# Patient Record
Sex: MALE | Race: WHITE | NOT HISPANIC OR LATINO | URBAN - METROPOLITAN AREA
[De-identification: names, ages, dates, MRNs, and addresses within clinical notes are randomized per-mention and may not be internally consistent; named-entity substitution may affect disease eponyms.]

---

## 2017-01-26 ENCOUNTER — APPOINTMENT (RX ONLY)
Dept: URBAN - METROPOLITAN AREA CLINIC 371 | Facility: CLINIC | Age: 57
Setting detail: DERMATOLOGY
End: 2017-01-26

## 2017-01-26 DIAGNOSIS — D22 MELANOCYTIC NEVI: ICD-10-CM

## 2017-01-26 DIAGNOSIS — L91.8 OTHER HYPERTROPHIC DISORDERS OF THE SKIN: ICD-10-CM

## 2017-01-26 DIAGNOSIS — L81.4 OTHER MELANIN HYPERPIGMENTATION: ICD-10-CM

## 2017-01-26 DIAGNOSIS — L82.1 OTHER SEBORRHEIC KERATOSIS: ICD-10-CM

## 2017-01-26 PROBLEM — E13.9 OTHER SPECIFIED DIABETES MELLITUS WITHOUT COMPLICATIONS: Status: ACTIVE | Noted: 2017-01-26

## 2017-01-26 PROBLEM — D22.4 MELANOCYTIC NEVI OF SCALP AND NECK: Status: ACTIVE | Noted: 2017-01-26

## 2017-01-26 PROBLEM — D48.5 NEOPLASM OF UNCERTAIN BEHAVIOR OF SKIN: Status: ACTIVE | Noted: 2017-01-26

## 2017-01-26 PROCEDURE — ? BIOPSY BY SHAVE METHOD

## 2017-01-26 PROCEDURE — 11100: CPT

## 2017-01-26 PROCEDURE — 99243 OFF/OP CNSLTJ NEW/EST LOW 30: CPT | Mod: 25

## 2017-01-26 PROCEDURE — ? DEFER

## 2017-01-26 PROCEDURE — ? COUNSELING

## 2017-01-26 ASSESSMENT — LOCATION DETAILED DESCRIPTION DERM
LOCATION DETAILED: LEFT INFERIOR LATERAL NECK
LOCATION DETAILED: RIGHT RIB CAGE
LOCATION DETAILED: RIGHT CLAVICULAR NECK
LOCATION DETAILED: LEFT RIB CAGE
LOCATION DETAILED: RIGHT SUPERIOR UPPER BACK
LOCATION DETAILED: RIGHT MEDIAL TRAPEZIAL NECK
LOCATION DETAILED: LEFT INGUINAL CREASE
LOCATION DETAILED: RIGHT CENTRAL MALAR CHEEK
LOCATION DETAILED: SUPRAPUBIC SKIN

## 2017-01-26 ASSESSMENT — LOCATION ZONE DERM
LOCATION ZONE: NECK
LOCATION ZONE: TRUNK
LOCATION ZONE: FACE

## 2017-01-26 ASSESSMENT — LOCATION SIMPLE DESCRIPTION DERM
LOCATION SIMPLE: ABDOMEN
LOCATION SIMPLE: RIGHT UPPER BACK
LOCATION SIMPLE: LEFT ANTERIOR NECK
LOCATION SIMPLE: POSTERIOR NECK
LOCATION SIMPLE: GROIN
LOCATION SIMPLE: RIGHT ANTERIOR NECK
LOCATION SIMPLE: RIGHT CHEEK

## 2017-01-26 NOTE — PROCEDURE: BIOPSY BY SHAVE METHOD
Render Post-Care Instructions In Note?: yes
Size Of Lesion In Cm: 0
Biopsy Method: Dermablade
Destruction After The Procedure: No
Notification Instructions: Patient will be notified of biopsy results. However, patient instructed to call the office if not contacted within 2 weeks.
Type Of Destruction Used: Curettage
Biopsy Type: H and E
Billing Type: Third-Party Bill
Anesthesia Volume In Cc (Will Not Render If 0): 0.5
Body Location Override (Optional - Billing Will Still Be Based On Selected Body Map Location If Applicable): Left Pubic Bone
Consent: Written consent was obtained and risks were reviewed including but not limited to scarring, infection, bleeding, scabbing, incomplete removal, nerve damage and allergy to anesthesia.
Detail Level: Detailed
Wound Care: Petrolatum
Silver Nitrate Text: The wound bed was treated with silver nitrate after the biopsy was performed.
Anesthesia Type: 1% lidocaine with epinephrine and a 1:10 solution of 8.4% sodium bicarbonate
Dressing: bandage
Hemostasis: Aluminum Chloride
Post-Care Instructions: I reviewed with the patient in detail post-care instructions. Patient is to keep the biopsy site dry overnight, and then apply bacitracin twice daily until healed. Patient may apply hydrogen peroxide soaks to remove any crusting.

## 2017-01-28 DIAGNOSIS — I10 ESSENTIAL HYPERTENSION: ICD-10-CM

## 2017-01-28 DIAGNOSIS — I42.9 CARDIOMYOPATHY (HCC): ICD-10-CM

## 2017-01-28 RX ORDER — CARVEDILOL 12.5 MG/1
TABLET ORAL
Qty: 60 TABLET | Refills: 0 | Status: SHIPPED | OUTPATIENT
Start: 2017-01-28 | End: 2017-04-24 | Stop reason: SDUPTHER

## 2017-01-28 RX ORDER — ISOSORBIDE MONONITRATE 30 MG/1
TABLET, EXTENDED RELEASE ORAL
Qty: 30 TABLET | Refills: 5 | Status: SHIPPED | OUTPATIENT
Start: 2017-01-28 | End: 2017-06-12 | Stop reason: SDUPTHER

## 2017-02-17 ENCOUNTER — OFFICE VISIT (OUTPATIENT)
Dept: FAMILY MEDICINE CLINIC | Facility: CLINIC | Age: 57
End: 2017-02-17

## 2017-02-17 ENCOUNTER — HOSPITAL ENCOUNTER (OUTPATIENT)
Age: 57
Discharge: HOME | End: 2017-02-17
Payer: COMMERCIAL

## 2017-02-17 VITALS
BODY MASS INDEX: 37.17 KG/M2 | DIASTOLIC BLOOD PRESSURE: 88 MMHG | HEART RATE: 68 BPM | TEMPERATURE: 97 F | HEIGHT: 72 IN | OXYGEN SATURATION: 96 % | SYSTOLIC BLOOD PRESSURE: 139 MMHG | WEIGHT: 274.4 LBS

## 2017-02-17 DIAGNOSIS — E11.21 DIABETIC NEPHROPATHY ASSOCIATED WITH TYPE 2 DIABETES MELLITUS (HCC): ICD-10-CM

## 2017-02-17 DIAGNOSIS — I10 ESSENTIAL HYPERTENSION: ICD-10-CM

## 2017-02-17 DIAGNOSIS — Z12.5 SCREENING PSA (PROSTATE SPECIFIC ANTIGEN): ICD-10-CM

## 2017-02-17 DIAGNOSIS — E11.42 TYPE 2 DIABETES MELLITUS WITH DIABETIC POLYNEUROPATHY, WITHOUT LONG-TERM CURRENT USE OF INSULIN (HCC): Primary | ICD-10-CM

## 2017-02-17 DIAGNOSIS — E87.6 HYPOKALEMIA: Primary | ICD-10-CM

## 2017-02-17 DIAGNOSIS — E78.5 HYPERLIPIDEMIA WITH TARGET LDL LESS THAN 70: ICD-10-CM

## 2017-02-17 DIAGNOSIS — E11.42 TYPE 2 DIABETES MELLITUS WITH DIABETIC POLYNEUROPATHY, WITHOUT LONG-TERM CURRENT USE OF INSULIN (HCC): ICD-10-CM

## 2017-02-17 DIAGNOSIS — L84 FOOT CALLUS: ICD-10-CM

## 2017-02-17 DIAGNOSIS — I42.9 CARDIOMYOPATHY (HCC): ICD-10-CM

## 2017-02-17 LAB
ALBUMIN SERPL-MCNC: 4.2 G/DL (ref 3.5–5.2)
ALBUMIN/GLOBULIN RATIO: ABNORMAL (ref 1–2.5)
ALP BLD-CCNC: 84 U/L (ref 40–129)
ALT SERPL-CCNC: 17 U/L (ref 5–41)
ANION GAP SERPL CALCULATED.3IONS-SCNC: 18 MMOL/L (ref 9–17)
AST SERPL-CCNC: 12 U/L
BILIRUB SERPL-MCNC: 0.3 MG/DL (ref 0.3–1.2)
BUN BLDV-MCNC: 14 MG/DL (ref 6–20)
BUN/CREAT BLD: ABNORMAL (ref 9–20)
CALCIUM SERPL-MCNC: 9.5 MG/DL (ref 8.6–10.4)
CHLORIDE BLD-SCNC: 100 MMOL/L (ref 98–107)
CO2: 25 MMOL/L (ref 20–31)
CREAT SERPL-MCNC: 0.86 MG/DL (ref 0.7–1.2)
GFR AFRICAN AMERICAN: >60 ML/MIN
GFR NON-AFRICAN AMERICAN: >60 ML/MIN
GFR SERPL CREATININE-BSD FRML MDRD: ABNORMAL ML/MIN/{1.73_M2}
GFR SERPL CREATININE-BSD FRML MDRD: ABNORMAL ML/MIN/{1.73_M2}
GLUCOSE BLD-MCNC: 146 MG/DL (ref 70–99)
HBA1C MFR BLD: 9.6 %
POTASSIUM SERPL-SCNC: 3.5 MMOL/L (ref 3.7–5.3)
PROSTATE SPECIFIC ANTIGEN: 1.19 UG/L
SODIUM BLD-SCNC: 143 MMOL/L (ref 135–144)
TOTAL PROTEIN: 7.9 G/DL (ref 6.4–8.3)

## 2017-02-17 PROCEDURE — 80053 COMPREHEN METABOLIC PANEL: CPT

## 2017-02-17 PROCEDURE — 99214 OFFICE O/P EST MOD 30 MIN: CPT | Performed by: FAMILY MEDICINE

## 2017-02-17 PROCEDURE — 84153 ASSAY OF PSA TOTAL: CPT

## 2017-02-17 PROCEDURE — 83036 HEMOGLOBIN GLYCOSYLATED A1C: CPT | Performed by: FAMILY MEDICINE

## 2017-02-17 PROCEDURE — 36415 COLL VENOUS BLD VENIPUNCTURE: CPT

## 2017-02-17 RX ORDER — NITROGLYCERIN 0.4 MG/1
0.4 TABLET SUBLINGUAL EVERY 5 MIN PRN
Qty: 25 TABLET | Refills: 0 | Status: SHIPPED | OUTPATIENT
Start: 2017-02-17 | End: 2018-06-18 | Stop reason: SDUPTHER

## 2017-02-17 ASSESSMENT — ENCOUNTER SYMPTOMS
DIARRHEA: 0
VOMITING: 0
CONSTIPATION: 0
APNEA: 1
WHEEZING: 0
ABDOMINAL PAIN: 0
SHORTNESS OF BREATH: 0
NAUSEA: 0
ABDOMINAL DISTENTION: 0
COUGH: 0
CHEST TIGHTNESS: 0

## 2017-04-24 DIAGNOSIS — I10 ESSENTIAL HYPERTENSION: ICD-10-CM

## 2017-04-24 RX ORDER — CARVEDILOL 12.5 MG/1
TABLET ORAL
Qty: 60 TABLET | Refills: 3 | Status: SHIPPED | OUTPATIENT
Start: 2017-04-24 | End: 2017-06-12 | Stop reason: SDUPTHER

## 2017-04-24 RX ORDER — ACETAMINOPHEN 500 MG/1
TABLET ORAL
Qty: 120 TABLET | Refills: 0 | Status: SHIPPED | OUTPATIENT
Start: 2017-04-24 | End: 2020-05-27 | Stop reason: SDUPTHER

## 2017-06-12 ENCOUNTER — HOSPITAL ENCOUNTER (OUTPATIENT)
Age: 57
Discharge: HOME | End: 2017-06-12
Payer: COMMERCIAL

## 2017-06-12 ENCOUNTER — OFFICE VISIT (OUTPATIENT)
Dept: FAMILY MEDICINE CLINIC | Age: 57
End: 2017-06-12
Payer: COMMERCIAL

## 2017-06-12 VITALS
HEIGHT: 73 IN | OXYGEN SATURATION: 97 % | SYSTOLIC BLOOD PRESSURE: 200 MMHG | BODY MASS INDEX: 36.45 KG/M2 | RESPIRATION RATE: 17 BRPM | TEMPERATURE: 97 F | DIASTOLIC BLOOD PRESSURE: 122 MMHG | HEART RATE: 60 BPM | WEIGHT: 275 LBS

## 2017-06-12 DIAGNOSIS — E11.42 TYPE 2 DIABETES MELLITUS WITH DIABETIC POLYNEUROPATHY, WITHOUT LONG-TERM CURRENT USE OF INSULIN (HCC): Primary | ICD-10-CM

## 2017-06-12 DIAGNOSIS — R35.0 FREQUENCY OF URINATION: ICD-10-CM

## 2017-06-12 DIAGNOSIS — I10 ESSENTIAL HYPERTENSION: ICD-10-CM

## 2017-06-12 DIAGNOSIS — G47.33 OSA ON CPAP: ICD-10-CM

## 2017-06-12 DIAGNOSIS — E78.5 HYPERLIPIDEMIA WITH TARGET LDL LESS THAN 70: ICD-10-CM

## 2017-06-12 DIAGNOSIS — Z99.89 OSA ON CPAP: ICD-10-CM

## 2017-06-12 DIAGNOSIS — I42.9 CARDIOMYOPATHY (HCC): ICD-10-CM

## 2017-06-12 DIAGNOSIS — E11.21 DIABETIC NEPHROPATHY ASSOCIATED WITH TYPE 2 DIABETES MELLITUS (HCC): ICD-10-CM

## 2017-06-12 LAB
-: ABNORMAL
ALBUMIN SERPL-MCNC: 3.8 G/DL (ref 3.5–5.2)
ALBUMIN/GLOBULIN RATIO: ABNORMAL (ref 1–2.5)
ALP BLD-CCNC: 94 U/L (ref 40–129)
ALT SERPL-CCNC: 12 U/L (ref 5–41)
AMORPHOUS: ABNORMAL
ANION GAP SERPL CALCULATED.3IONS-SCNC: 12 MMOL/L (ref 9–17)
AST SERPL-CCNC: 10 U/L
BACTERIA: ABNORMAL
BILIRUB SERPL-MCNC: 0.54 MG/DL (ref 0.3–1.2)
BILIRUBIN URINE: NEGATIVE
BUN BLDV-MCNC: 11 MG/DL (ref 6–20)
BUN/CREAT BLD: ABNORMAL (ref 9–20)
CALCIUM SERPL-MCNC: 9.1 MG/DL (ref 8.6–10.4)
CASTS UA: ABNORMAL /LPF
CHLORIDE BLD-SCNC: 100 MMOL/L (ref 98–107)
CO2: 25 MMOL/L (ref 20–31)
COLOR: YELLOW
COMMENT UA: ABNORMAL
CREAT SERPL-MCNC: 0.9 MG/DL (ref 0.7–1.2)
CRYSTALS, UA: ABNORMAL /HPF
EPITHELIAL CELLS UA: ABNORMAL /HPF
GFR AFRICAN AMERICAN: >60 ML/MIN
GFR NON-AFRICAN AMERICAN: >60 ML/MIN
GFR SERPL CREATININE-BSD FRML MDRD: ABNORMAL ML/MIN/{1.73_M2}
GFR SERPL CREATININE-BSD FRML MDRD: ABNORMAL ML/MIN/{1.73_M2}
GLUCOSE BLD-MCNC: 251 MG/DL (ref 70–99)
GLUCOSE URINE: ABNORMAL
HBA1C MFR BLD: 9.9 %
HCT VFR BLD CALC: 42.4 % (ref 41–53)
HEMOGLOBIN: 13.8 G/DL (ref 13.5–17.5)
KETONES, URINE: NEGATIVE
LEUKOCYTE ESTERASE, URINE: ABNORMAL
MCH RBC QN AUTO: 30 PG (ref 26–34)
MCHC RBC AUTO-ENTMCNC: 32.4 G/DL (ref 31–37)
MCV RBC AUTO: 92.4 FL (ref 80–100)
MUCUS: ABNORMAL
NITRITE, URINE: NEGATIVE
OTHER OBSERVATIONS UA: ABNORMAL
PDW BLD-RTO: 13.9 % (ref 11.5–14.9)
PH UA: 6 (ref 5–8)
PLATELET # BLD: 177 K/UL (ref 150–450)
PMV BLD AUTO: 11.5 FL (ref 6–12)
POTASSIUM SERPL-SCNC: 3.9 MMOL/L (ref 3.7–5.3)
PROTEIN UA: ABNORMAL
RBC # BLD: 4.59 M/UL (ref 4.5–5.9)
RBC UA: ABNORMAL /HPF
RENAL EPITHELIAL, UA: ABNORMAL /HPF
SODIUM BLD-SCNC: 137 MMOL/L (ref 135–144)
SPECIFIC GRAVITY UA: 1.03 (ref 1–1.03)
TOTAL PROTEIN: 7.7 G/DL (ref 6.4–8.3)
TRICHOMONAS: ABNORMAL
TURBIDITY: CLEAR
URINE HGB: NEGATIVE
UROBILINOGEN, URINE: NORMAL
WBC # BLD: 7.1 K/UL (ref 3.5–11)
WBC UA: ABNORMAL /HPF
YEAST: ABNORMAL

## 2017-06-12 PROCEDURE — 80053 COMPREHEN METABOLIC PANEL: CPT

## 2017-06-12 PROCEDURE — 81001 URINALYSIS AUTO W/SCOPE: CPT

## 2017-06-12 PROCEDURE — 85027 COMPLETE CBC AUTOMATED: CPT

## 2017-06-12 PROCEDURE — 83036 HEMOGLOBIN GLYCOSYLATED A1C: CPT | Performed by: FAMILY MEDICINE

## 2017-06-12 PROCEDURE — 36415 COLL VENOUS BLD VENIPUNCTURE: CPT

## 2017-06-12 PROCEDURE — 99215 OFFICE O/P EST HI 40 MIN: CPT | Performed by: FAMILY MEDICINE

## 2017-06-12 RX ORDER — ISOSORBIDE MONONITRATE 30 MG/1
TABLET, EXTENDED RELEASE ORAL
Qty: 90 TABLET | Refills: 3 | Status: SHIPPED | OUTPATIENT
Start: 2017-06-12 | End: 2018-06-19 | Stop reason: ALTCHOICE

## 2017-06-12 RX ORDER — GLIPIZIDE 10 MG/1
10 TABLET ORAL
Qty: 60 TABLET | Refills: 3 | Status: SHIPPED | OUTPATIENT
Start: 2017-06-12 | End: 2018-06-11 | Stop reason: SDUPTHER

## 2017-06-12 RX ORDER — CARVEDILOL 12.5 MG/1
TABLET ORAL
Qty: 180 TABLET | Refills: 3 | Status: SHIPPED | OUTPATIENT
Start: 2017-06-12 | End: 2018-06-19 | Stop reason: DRUGHIGH

## 2017-06-12 RX ORDER — HYDROCHLOROTHIAZIDE 25 MG/1
TABLET ORAL
Qty: 90 TABLET | Refills: 3 | Status: SHIPPED | OUTPATIENT
Start: 2017-06-12 | End: 2018-06-19 | Stop reason: ALTCHOICE

## 2017-06-12 RX ORDER — ATORVASTATIN CALCIUM 20 MG/1
20 TABLET, FILM COATED ORAL EVERY EVENING
Qty: 90 TABLET | Refills: 3 | Status: SHIPPED | OUTPATIENT
Start: 2017-06-12 | End: 2018-06-14 | Stop reason: ALTCHOICE

## 2017-06-12 RX ORDER — AMLODIPINE BESYLATE 10 MG/1
TABLET ORAL
Qty: 90 TABLET | Refills: 3 | Status: SHIPPED | OUTPATIENT
Start: 2017-06-12 | End: 2018-06-18 | Stop reason: SDUPTHER

## 2017-06-12 RX ORDER — LISINOPRIL 40 MG/1
40 TABLET ORAL DAILY
Qty: 90 TABLET | Refills: 3 | Status: SHIPPED | OUTPATIENT
Start: 2017-06-12 | End: 2017-10-16 | Stop reason: SDUPTHER

## 2017-06-12 RX ORDER — ASPIRIN 81 MG/1
TABLET ORAL
Qty: 90 TABLET | Refills: 3 | Status: SHIPPED | OUTPATIENT
Start: 2017-06-12 | End: 2018-06-18 | Stop reason: SDUPTHER

## 2017-06-12 ASSESSMENT — ENCOUNTER SYMPTOMS
NAUSEA: 0
DIARRHEA: 0
ABDOMINAL DISTENTION: 0
CHEST TIGHTNESS: 0
SHORTNESS OF BREATH: 0
VOMITING: 0
APNEA: 1
COUGH: 0
CONSTIPATION: 0
ABDOMINAL PAIN: 0
WHEEZING: 0

## 2017-06-15 ENCOUNTER — HOSPITAL ENCOUNTER (INPATIENT)
Age: 57
LOS: 1 days | Discharge: LEFT BEFORE DISCHARGE | DRG: 198 | End: 2017-06-16
Attending: EMERGENCY MEDICINE | Admitting: INTERNAL MEDICINE
Payer: COMMERCIAL

## 2017-06-15 ENCOUNTER — APPOINTMENT (OUTPATIENT)
Dept: GENERAL RADIOLOGY | Age: 57
DRG: 198 | End: 2017-06-15
Payer: COMMERCIAL

## 2017-06-15 DIAGNOSIS — I20.0 UNSTABLE ANGINA (HCC): Primary | ICD-10-CM

## 2017-06-15 LAB
ABSOLUTE EOS #: 0.1 K/UL (ref 0–0.4)
ABSOLUTE LYMPH #: 1.9 K/UL (ref 1–4.8)
ABSOLUTE MONO #: 1 K/UL (ref 0.1–1.2)
ANION GAP SERPL CALCULATED.3IONS-SCNC: 13 MMOL/L (ref 9–17)
BASOPHILS # BLD: 0 %
BASOPHILS ABSOLUTE: 0 K/UL (ref 0–0.2)
BUN BLDV-MCNC: 15 MG/DL (ref 6–20)
BUN/CREAT BLD: ABNORMAL (ref 9–20)
CALCIUM SERPL-MCNC: 9.5 MG/DL (ref 8.6–10.4)
CHLORIDE BLD-SCNC: 103 MMOL/L (ref 98–107)
CO2: 25 MMOL/L (ref 20–31)
CREAT SERPL-MCNC: 0.89 MG/DL (ref 0.7–1.2)
DIFFERENTIAL TYPE: NORMAL
EOSINOPHILS RELATIVE PERCENT: 1 %
GFR AFRICAN AMERICAN: >60 ML/MIN
GFR NON-AFRICAN AMERICAN: >60 ML/MIN
GFR SERPL CREATININE-BSD FRML MDRD: ABNORMAL ML/MIN/{1.73_M2}
GFR SERPL CREATININE-BSD FRML MDRD: ABNORMAL ML/MIN/{1.73_M2}
GLUCOSE BLD-MCNC: 104 MG/DL (ref 70–99)
GLUCOSE BLD-MCNC: 148 MG/DL (ref 75–110)
HCT VFR BLD CALC: 42.8 % (ref 41–53)
HEMOGLOBIN: 13.9 G/DL (ref 13.5–17.5)
LYMPHOCYTES # BLD: 23 %
MAGNESIUM: 2 MG/DL (ref 1.6–2.6)
MCH RBC QN AUTO: 29.5 PG (ref 26–34)
MCHC RBC AUTO-ENTMCNC: 32.5 G/DL (ref 31–37)
MCV RBC AUTO: 90.9 FL (ref 80–100)
MONOCYTES # BLD: 12 %
PARTIAL THROMBOPLASTIN TIME: 109.9 SEC (ref 21.3–31.3)
PARTIAL THROMBOPLASTIN TIME: 25.6 SEC (ref 21.3–31.3)
PDW BLD-RTO: 14.2 % (ref 12.5–15.4)
PLATELET # BLD: 191 K/UL (ref 140–450)
PLATELET ESTIMATE: NORMAL
PMV BLD AUTO: 10.7 FL (ref 6–12)
POC TROPONIN I: 0.01 NG/ML (ref 0–0.1)
POC TROPONIN INTERP: NORMAL
POTASSIUM SERPL-SCNC: 4 MMOL/L (ref 3.7–5.3)
RBC # BLD: 4.71 M/UL (ref 4.5–5.9)
RBC # BLD: NORMAL 10*6/UL
SEG NEUTROPHILS: 64 %
SEGMENTED NEUTROPHILS ABSOLUTE COUNT: 5.2 K/UL (ref 1.8–7.7)
SODIUM BLD-SCNC: 141 MMOL/L (ref 135–144)
TROPONIN INTERP: NORMAL
TROPONIN INTERP: NORMAL
TROPONIN T: <0.03 NG/ML
TROPONIN T: <0.03 NG/ML
WBC # BLD: 8.3 K/UL (ref 3.5–11)
WBC # BLD: NORMAL 10*3/UL

## 2017-06-15 PROCEDURE — 96365 THER/PROPH/DIAG IV INF INIT: CPT

## 2017-06-15 PROCEDURE — 83735 ASSAY OF MAGNESIUM: CPT

## 2017-06-15 PROCEDURE — 93005 ELECTROCARDIOGRAM TRACING: CPT

## 2017-06-15 PROCEDURE — 80048 BASIC METABOLIC PNL TOTAL CA: CPT

## 2017-06-15 PROCEDURE — 6370000000 HC RX 637 (ALT 250 FOR IP): Performed by: EMERGENCY MEDICINE

## 2017-06-15 PROCEDURE — 84484 ASSAY OF TROPONIN QUANT: CPT

## 2017-06-15 PROCEDURE — 6370000000 HC RX 637 (ALT 250 FOR IP): Performed by: INTERNAL MEDICINE

## 2017-06-15 PROCEDURE — 2060000000 HC ICU INTERMEDIATE R&B

## 2017-06-15 PROCEDURE — 36415 COLL VENOUS BLD VENIPUNCTURE: CPT

## 2017-06-15 PROCEDURE — 6360000002 HC RX W HCPCS: Performed by: EMERGENCY MEDICINE

## 2017-06-15 PROCEDURE — 82947 ASSAY GLUCOSE BLOOD QUANT: CPT

## 2017-06-15 PROCEDURE — 85730 THROMBOPLASTIN TIME PARTIAL: CPT

## 2017-06-15 PROCEDURE — 99222 1ST HOSP IP/OBS MODERATE 55: CPT | Performed by: INTERNAL MEDICINE

## 2017-06-15 PROCEDURE — 71020 XR CHEST STANDARD TWO VW: CPT

## 2017-06-15 PROCEDURE — 2580000003 HC RX 258: Performed by: INTERNAL MEDICINE

## 2017-06-15 PROCEDURE — 2500000003 HC RX 250 WO HCPCS: Performed by: EMERGENCY MEDICINE

## 2017-06-15 PROCEDURE — 85025 COMPLETE CBC W/AUTO DIFF WBC: CPT

## 2017-06-15 PROCEDURE — 99285 EMERGENCY DEPT VISIT HI MDM: CPT

## 2017-06-15 RX ORDER — DEXTROSE MONOHYDRATE 25 G/50ML
12.5 INJECTION, SOLUTION INTRAVENOUS PRN
Status: DISCONTINUED | OUTPATIENT
Start: 2017-06-15 | End: 2017-06-16 | Stop reason: HOSPADM

## 2017-06-15 RX ORDER — SODIUM CHLORIDE 0.9 % (FLUSH) 0.9 %
10 SYRINGE (ML) INJECTION PRN
Status: DISCONTINUED | OUTPATIENT
Start: 2017-06-15 | End: 2017-06-16 | Stop reason: HOSPADM

## 2017-06-15 RX ORDER — ASPIRIN 81 MG/1
243 TABLET, CHEWABLE ORAL ONCE
Status: COMPLETED | OUTPATIENT
Start: 2017-06-15 | End: 2017-06-15

## 2017-06-15 RX ORDER — NITROGLYCERIN 0.4 MG/1
0.4 TABLET SUBLINGUAL EVERY 5 MIN PRN
Status: DISCONTINUED | OUTPATIENT
Start: 2017-06-15 | End: 2017-06-16 | Stop reason: HOSPADM

## 2017-06-15 RX ORDER — ONDANSETRON 2 MG/ML
4 INJECTION INTRAMUSCULAR; INTRAVENOUS EVERY 6 HOURS PRN
Status: DISCONTINUED | OUTPATIENT
Start: 2017-06-15 | End: 2017-06-16 | Stop reason: HOSPADM

## 2017-06-15 RX ORDER — AMLODIPINE BESYLATE 10 MG/1
10 TABLET ORAL DAILY
Status: DISCONTINUED | OUTPATIENT
Start: 2017-06-15 | End: 2017-06-16 | Stop reason: HOSPADM

## 2017-06-15 RX ORDER — LISINOPRIL 20 MG/1
40 TABLET ORAL DAILY
Status: DISCONTINUED | OUTPATIENT
Start: 2017-06-15 | End: 2017-06-16 | Stop reason: HOSPADM

## 2017-06-15 RX ORDER — ASPIRIN 81 MG/1
81 TABLET ORAL DAILY
Status: DISCONTINUED | OUTPATIENT
Start: 2017-06-15 | End: 2017-06-16 | Stop reason: HOSPADM

## 2017-06-15 RX ORDER — SODIUM CHLORIDE 9 MG/ML
INJECTION, SOLUTION INTRAVENOUS CONTINUOUS
Status: DISCONTINUED | OUTPATIENT
Start: 2017-06-15 | End: 2017-06-16 | Stop reason: HOSPADM

## 2017-06-15 RX ORDER — ISOSORBIDE MONONITRATE 30 MG/1
30 TABLET, EXTENDED RELEASE ORAL DAILY
Status: DISCONTINUED | OUTPATIENT
Start: 2017-06-15 | End: 2017-06-16 | Stop reason: HOSPADM

## 2017-06-15 RX ORDER — CARVEDILOL 12.5 MG/1
12.5 TABLET ORAL 2 TIMES DAILY
Status: DISCONTINUED | OUTPATIENT
Start: 2017-06-15 | End: 2017-06-16 | Stop reason: HOSPADM

## 2017-06-15 RX ORDER — HEPARIN SODIUM 10000 [USP'U]/100ML
1000 INJECTION, SOLUTION INTRAVENOUS CONTINUOUS
Status: DISCONTINUED | OUTPATIENT
Start: 2017-06-15 | End: 2017-06-16 | Stop reason: HOSPADM

## 2017-06-15 RX ORDER — NICOTINE POLACRILEX 4 MG
15 LOZENGE BUCCAL PRN
Status: DISCONTINUED | OUTPATIENT
Start: 2017-06-15 | End: 2017-06-16 | Stop reason: HOSPADM

## 2017-06-15 RX ORDER — DEXTROSE MONOHYDRATE 50 MG/ML
100 INJECTION, SOLUTION INTRAVENOUS PRN
Status: DISCONTINUED | OUTPATIENT
Start: 2017-06-15 | End: 2017-06-16 | Stop reason: HOSPADM

## 2017-06-15 RX ORDER — SODIUM CHLORIDE 0.9 % (FLUSH) 0.9 %
10 SYRINGE (ML) INJECTION EVERY 12 HOURS SCHEDULED
Status: DISCONTINUED | OUTPATIENT
Start: 2017-06-15 | End: 2017-06-16 | Stop reason: HOSPADM

## 2017-06-15 RX ORDER — ACETAMINOPHEN 325 MG/1
650 TABLET ORAL EVERY 4 HOURS PRN
Status: DISCONTINUED | OUTPATIENT
Start: 2017-06-15 | End: 2017-06-16 | Stop reason: HOSPADM

## 2017-06-15 RX ORDER — NITROGLYCERIN 20 MG/100ML
5 INJECTION INTRAVENOUS CONTINUOUS
Status: DISCONTINUED | OUTPATIENT
Start: 2017-06-15 | End: 2017-06-16 | Stop reason: HOSPADM

## 2017-06-15 RX ORDER — HEPARIN SODIUM 1000 [USP'U]/ML
60 INJECTION, SOLUTION INTRAVENOUS; SUBCUTANEOUS ONCE
Status: COMPLETED | OUTPATIENT
Start: 2017-06-15 | End: 2017-06-15

## 2017-06-15 RX ORDER — ATORVASTATIN CALCIUM 20 MG/1
20 TABLET, FILM COATED ORAL NIGHTLY
Status: DISCONTINUED | OUTPATIENT
Start: 2017-06-15 | End: 2017-06-16 | Stop reason: HOSPADM

## 2017-06-15 RX ORDER — HEPARIN SODIUM 1000 [USP'U]/ML
30 INJECTION, SOLUTION INTRAVENOUS; SUBCUTANEOUS PRN
Status: DISCONTINUED | OUTPATIENT
Start: 2017-06-15 | End: 2017-06-16 | Stop reason: HOSPADM

## 2017-06-15 RX ORDER — GLIPIZIDE 10 MG/1
10 TABLET ORAL
Status: DISCONTINUED | OUTPATIENT
Start: 2017-06-15 | End: 2017-06-16 | Stop reason: HOSPADM

## 2017-06-15 RX ORDER — HEPARIN SODIUM 1000 [USP'U]/ML
60 INJECTION, SOLUTION INTRAVENOUS; SUBCUTANEOUS PRN
Status: DISCONTINUED | OUTPATIENT
Start: 2017-06-15 | End: 2017-06-16 | Stop reason: HOSPADM

## 2017-06-15 RX ADMIN — CARVEDILOL 12.5 MG: 12.5 TABLET, FILM COATED ORAL at 20:28

## 2017-06-15 RX ADMIN — GLIPIZIDE 10 MG: 10 TABLET ORAL at 17:15

## 2017-06-15 RX ADMIN — HEPARIN SODIUM 7400 UNITS: 1000 INJECTION, SOLUTION INTRAVENOUS; SUBCUTANEOUS at 20:03

## 2017-06-15 RX ADMIN — SODIUM CHLORIDE: 9 INJECTION, SOLUTION INTRAVENOUS at 13:39

## 2017-06-15 RX ADMIN — HEPARIN SODIUM 7400 UNITS: 1000 INJECTION, SOLUTION INTRAVENOUS; SUBCUTANEOUS at 10:14

## 2017-06-15 RX ADMIN — INSULIN LISPRO 2 UNITS: 100 INJECTION, SOLUTION INTRAVENOUS; SUBCUTANEOUS at 17:22

## 2017-06-15 RX ADMIN — NITROGLYCERIN 5 MCG/MIN: 20 INJECTION INTRAVENOUS at 09:29

## 2017-06-15 RX ADMIN — ATORVASTATIN CALCIUM 20 MG: 20 TABLET, FILM COATED ORAL at 20:28

## 2017-06-15 RX ADMIN — ASPIRIN 243 MG: 81 TABLET ORAL at 09:29

## 2017-06-15 RX ADMIN — HEPARIN SODIUM AND DEXTROSE 1000 UNITS/HR: 10000; 5 INJECTION INTRAVENOUS at 10:14

## 2017-06-15 ASSESSMENT — PAIN DESCRIPTION - LOCATION: LOCATION: CHEST

## 2017-06-15 ASSESSMENT — ENCOUNTER SYMPTOMS
BACK PAIN: 0
COUGH: 0
SHORTNESS OF BREATH: 1
ABDOMINAL PAIN: 0
VOMITING: 0
SORE THROAT: 0
NAUSEA: 0

## 2017-06-15 ASSESSMENT — PAIN SCALES - GENERAL
PAINLEVEL_OUTOF10: 0
PAINLEVEL_OUTOF10: 6
PAINLEVEL_OUTOF10: 0

## 2017-06-15 ASSESSMENT — PAIN DESCRIPTION - ORIENTATION: ORIENTATION: LEFT

## 2017-06-15 ASSESSMENT — PAIN DESCRIPTION - DESCRIPTORS: DESCRIPTORS: ACHING

## 2017-06-15 ASSESSMENT — HEART SCORE: ECG: 1

## 2017-06-15 ASSESSMENT — PAIN DESCRIPTION - PAIN TYPE: TYPE: ACUTE PAIN

## 2017-06-16 VITALS
BODY MASS INDEX: 35.87 KG/M2 | WEIGHT: 264.8 LBS | SYSTOLIC BLOOD PRESSURE: 168 MMHG | RESPIRATION RATE: 18 BRPM | TEMPERATURE: 98.2 F | OXYGEN SATURATION: 96 % | DIASTOLIC BLOOD PRESSURE: 121 MMHG | HEART RATE: 69 BPM | HEIGHT: 72 IN

## 2017-06-16 LAB
GLUCOSE BLD-MCNC: 139 MG/DL (ref 75–110)
PARTIAL THROMBOPLASTIN TIME: 32.5 SEC (ref 21.3–31.3)
TROPONIN INTERP: NORMAL
TROPONIN T: <0.03 NG/ML

## 2017-06-16 PROCEDURE — 36415 COLL VENOUS BLD VENIPUNCTURE: CPT

## 2017-06-16 PROCEDURE — 2580000003 HC RX 258: Performed by: INTERNAL MEDICINE

## 2017-06-16 PROCEDURE — 6360000002 HC RX W HCPCS: Performed by: EMERGENCY MEDICINE

## 2017-06-16 PROCEDURE — 85730 THROMBOPLASTIN TIME PARTIAL: CPT

## 2017-06-16 PROCEDURE — 84484 ASSAY OF TROPONIN QUANT: CPT

## 2017-06-16 RX ADMIN — HEPARIN SODIUM 3700 UNITS: 1000 INJECTION, SOLUTION INTRAVENOUS; SUBCUTANEOUS at 01:57

## 2017-06-16 RX ADMIN — HEPARIN SODIUM AND DEXTROSE 13.4 UNITS/KG/HR: 10000; 5 INJECTION INTRAVENOUS at 01:56

## 2017-06-16 RX ADMIN — SODIUM CHLORIDE: 9 INJECTION, SOLUTION INTRAVENOUS at 01:56

## 2017-06-19 LAB
EKG ATRIAL RATE: 69 BPM
EKG P AXIS: 45 DEGREES
EKG P-R INTERVAL: 172 MS
EKG Q-T INTERVAL: 394 MS
EKG QRS DURATION: 94 MS
EKG QTC CALCULATION (BAZETT): 422 MS
EKG R AXIS: 4 DEGREES
EKG T AXIS: 146 DEGREES
EKG VENTRICULAR RATE: 69 BPM

## 2017-06-26 ENCOUNTER — TELEPHONE (OUTPATIENT)
Dept: FAMILY MEDICINE CLINIC | Age: 57
End: 2017-06-26

## 2017-07-24 ENCOUNTER — OFFICE VISIT (OUTPATIENT)
Dept: FAMILY MEDICINE CLINIC | Age: 57
End: 2017-07-24
Payer: COMMERCIAL

## 2017-07-24 VITALS
BODY MASS INDEX: 37.27 KG/M2 | DIASTOLIC BLOOD PRESSURE: 89 MMHG | HEIGHT: 72 IN | TEMPERATURE: 97.7 F | SYSTOLIC BLOOD PRESSURE: 157 MMHG | WEIGHT: 275.2 LBS | HEART RATE: 64 BPM | OXYGEN SATURATION: 98 %

## 2017-07-24 DIAGNOSIS — Z99.89 OSA ON CPAP: ICD-10-CM

## 2017-07-24 DIAGNOSIS — G47.33 OSA ON CPAP: ICD-10-CM

## 2017-07-24 DIAGNOSIS — E11.21 DIABETIC NEPHROPATHY ASSOCIATED WITH TYPE 2 DIABETES MELLITUS (HCC): ICD-10-CM

## 2017-07-24 DIAGNOSIS — E78.5 HYPERLIPIDEMIA WITH TARGET LDL LESS THAN 70: ICD-10-CM

## 2017-07-24 DIAGNOSIS — F43.21 GRIEF: ICD-10-CM

## 2017-07-24 DIAGNOSIS — I10 ESSENTIAL HYPERTENSION: ICD-10-CM

## 2017-07-24 DIAGNOSIS — G47.09 OTHER INSOMNIA: ICD-10-CM

## 2017-07-24 DIAGNOSIS — E66.9 OBESITY (BMI 30-39.9): ICD-10-CM

## 2017-07-24 DIAGNOSIS — E11.42 TYPE 2 DIABETES MELLITUS WITH DIABETIC POLYNEUROPATHY, WITHOUT LONG-TERM CURRENT USE OF INSULIN (HCC): Primary | ICD-10-CM

## 2017-07-24 PROBLEM — I20.0 UNSTABLE ANGINA (HCC): Status: RESOLVED | Noted: 2017-06-15 | Resolved: 2017-07-24

## 2017-07-24 PROBLEM — G47.00 INSOMNIA: Status: ACTIVE | Noted: 2017-07-24

## 2017-07-24 PROCEDURE — 99214 OFFICE O/P EST MOD 30 MIN: CPT | Performed by: FAMILY MEDICINE

## 2017-07-24 RX ORDER — TRAZODONE HYDROCHLORIDE 50 MG/1
50 TABLET ORAL NIGHTLY
Qty: 30 TABLET | Refills: 1 | Status: SHIPPED | OUTPATIENT
Start: 2017-07-24 | End: 2018-06-14

## 2017-07-24 ASSESSMENT — PATIENT HEALTH QUESTIONNAIRE - PHQ9
4. FEELING TIRED OR HAVING LITTLE ENERGY: 1
6. FEELING BAD ABOUT YOURSELF - OR THAT YOU ARE A FAILURE OR HAVE LET YOURSELF OR YOUR FAMILY DOWN: 1
2. FEELING DOWN, DEPRESSED OR HOPELESS: 1
SUM OF ALL RESPONSES TO PHQ9 QUESTIONS 1 & 2: 3
1. LITTLE INTEREST OR PLEASURE IN DOING THINGS: 2
9. THOUGHTS THAT YOU WOULD BE BETTER OFF DEAD, OR OF HURTING YOURSELF: 0
SUM OF ALL RESPONSES TO PHQ QUESTIONS 1-9: 8
3. TROUBLE FALLING OR STAYING ASLEEP: 1
8. MOVING OR SPEAKING SO SLOWLY THAT OTHER PEOPLE COULD HAVE NOTICED. OR THE OPPOSITE, BEING SO FIGETY OR RESTLESS THAT YOU HAVE BEEN MOVING AROUND A LOT MORE THAN USUAL: 0
5. POOR APPETITE OR OVEREATING: 2
10. IF YOU CHECKED OFF ANY PROBLEMS, HOW DIFFICULT HAVE THESE PROBLEMS MADE IT FOR YOU TO DO YOUR WORK, TAKE CARE OF THINGS AT HOME, OR GET ALONG WITH OTHER PEOPLE: 1
7. TROUBLE CONCENTRATING ON THINGS, SUCH AS READING THE NEWSPAPER OR WATCHING TELEVISION: 0

## 2017-07-24 ASSESSMENT — ENCOUNTER SYMPTOMS
ABDOMINAL DISTENTION: 0
VOMITING: 0
COUGH: 0
DIARRHEA: 0
SHORTNESS OF BREATH: 0
NAUSEA: 0
ABDOMINAL PAIN: 0
CHEST TIGHTNESS: 0
WHEEZING: 0
CONSTIPATION: 0

## 2017-09-25 ENCOUNTER — OFFICE VISIT (OUTPATIENT)
Dept: FAMILY MEDICINE CLINIC | Age: 57
End: 2017-09-25
Payer: COMMERCIAL

## 2017-09-25 VITALS
DIASTOLIC BLOOD PRESSURE: 100 MMHG | TEMPERATURE: 97.7 F | BODY MASS INDEX: 36.57 KG/M2 | HEART RATE: 53 BPM | WEIGHT: 270 LBS | HEIGHT: 72 IN | RESPIRATION RATE: 17 BRPM | SYSTOLIC BLOOD PRESSURE: 185 MMHG

## 2017-09-25 DIAGNOSIS — E11.42 TYPE 2 DIABETES MELLITUS WITH DIABETIC POLYNEUROPATHY, WITHOUT LONG-TERM CURRENT USE OF INSULIN (HCC): ICD-10-CM

## 2017-09-25 DIAGNOSIS — Z23 NEEDS FLU SHOT: ICD-10-CM

## 2017-09-25 DIAGNOSIS — E78.5 HYPERLIPIDEMIA WITH TARGET LDL LESS THAN 70: ICD-10-CM

## 2017-09-25 DIAGNOSIS — I10 ESSENTIAL HYPERTENSION: ICD-10-CM

## 2017-09-25 DIAGNOSIS — E11.21 DIABETIC NEPHROPATHY ASSOCIATED WITH TYPE 2 DIABETES MELLITUS (HCC): Primary | ICD-10-CM

## 2017-09-25 DIAGNOSIS — I42.9 CARDIOMYOPATHY, UNSPECIFIED TYPE (HCC): ICD-10-CM

## 2017-09-25 DIAGNOSIS — M79.604 RIGHT LEG PAIN: ICD-10-CM

## 2017-09-25 DIAGNOSIS — N52.2 DRUG-INDUCED ERECTILE DYSFUNCTION: ICD-10-CM

## 2017-09-25 LAB — HBA1C MFR BLD: 8.8 %

## 2017-09-25 PROCEDURE — 99214 OFFICE O/P EST MOD 30 MIN: CPT | Performed by: FAMILY MEDICINE

## 2017-09-25 PROCEDURE — 83036 HEMOGLOBIN GLYCOSYLATED A1C: CPT | Performed by: FAMILY MEDICINE

## 2017-09-25 PROCEDURE — 90471 IMMUNIZATION ADMIN: CPT | Performed by: FAMILY MEDICINE

## 2017-09-25 PROCEDURE — 90686 IIV4 VACC NO PRSV 0.5 ML IM: CPT | Performed by: FAMILY MEDICINE

## 2017-09-25 ASSESSMENT — ENCOUNTER SYMPTOMS
COUGH: 0
NAUSEA: 0
ABDOMINAL DISTENTION: 0
VOMITING: 0
ABDOMINAL PAIN: 0
CONSTIPATION: 0
WHEEZING: 0
DIARRHEA: 0
SHORTNESS OF BREATH: 0
CHEST TIGHTNESS: 0

## 2017-09-26 PROBLEM — N52.2 DRUG-INDUCED ERECTILE DYSFUNCTION: Status: ACTIVE | Noted: 2017-09-26

## 2017-09-26 ASSESSMENT — ENCOUNTER SYMPTOMS: APNEA: 1

## 2017-10-16 ENCOUNTER — NURSE ONLY (OUTPATIENT)
Dept: FAMILY MEDICINE CLINIC | Age: 57
End: 2017-10-16
Payer: COMMERCIAL

## 2017-10-16 VITALS — HEART RATE: 68 BPM | DIASTOLIC BLOOD PRESSURE: 102 MMHG | SYSTOLIC BLOOD PRESSURE: 163 MMHG

## 2017-10-16 DIAGNOSIS — I10 ESSENTIAL HYPERTENSION: Primary | ICD-10-CM

## 2017-10-16 PROCEDURE — 99211 OFF/OP EST MAY X REQ PHY/QHP: CPT | Performed by: FAMILY MEDICINE

## 2017-10-16 RX ORDER — LISINOPRIL 40 MG/1
40 TABLET ORAL DAILY
Qty: 90 TABLET | Refills: 3 | Status: SHIPPED | OUTPATIENT
Start: 2017-10-16 | End: 2018-06-18 | Stop reason: SDUPTHER

## 2017-10-16 NOTE — PROGRESS NOTES
Patient is here for blood pressure check.     1. Essential hypertension  Needs to start taking Lisinopril 40 mg  Orders Placed This Encounter   Medications    lisinopril (PRINIVIL;ZESTRIL) 40 MG tablet     Sig: Take 1 tablet by mouth daily Stop Lisinopril 30 mg     Dispense:  90 tablet     Refill:  3           UNcontrolled BP     Patient was advised by Natasha Woods to start taking Lisinopril 40 mg  He is taking all other BP meds per isaias       BP Readings from Last 3 Encounters:   10/16/17 (!) 163/102   09/25/17 (!) 185/100   07/24/17 (!) 157/89       Pulse Readings from Last 3 Encounters:   10/16/17 68   09/25/17 53   07/24/17 64       Needs nurse visit appointment for BP check in 1 week      Future Appointments  Date Time Provider Vianey Malagon   10/31/2017 8:30 AM John Cabello MD [facility]

## 2017-10-31 ENCOUNTER — OFFICE VISIT (OUTPATIENT)
Dept: FAMILY MEDICINE CLINIC | Age: 57
End: 2017-10-31
Payer: COMMERCIAL

## 2017-10-31 VITALS
HEIGHT: 73 IN | HEART RATE: 68 BPM | WEIGHT: 272 LBS | BODY MASS INDEX: 36.05 KG/M2 | RESPIRATION RATE: 18 BRPM | TEMPERATURE: 97.8 F | DIASTOLIC BLOOD PRESSURE: 97 MMHG | SYSTOLIC BLOOD PRESSURE: 155 MMHG

## 2017-10-31 DIAGNOSIS — I25.10 CORONARY ARTERY DISEASE INVOLVING NATIVE CORONARY ARTERY OF NATIVE HEART WITHOUT ANGINA PECTORIS: ICD-10-CM

## 2017-10-31 DIAGNOSIS — I10 ESSENTIAL HYPERTENSION: ICD-10-CM

## 2017-10-31 DIAGNOSIS — E11.42 TYPE 2 DIABETES MELLITUS WITH DIABETIC POLYNEUROPATHY, WITHOUT LONG-TERM CURRENT USE OF INSULIN (HCC): Primary | ICD-10-CM

## 2017-10-31 DIAGNOSIS — L84 FOOT CALLUS: ICD-10-CM

## 2017-10-31 DIAGNOSIS — E11.65 TYPE 2 DIABETES MELLITUS WITH HYPERGLYCEMIA, WITHOUT LONG-TERM CURRENT USE OF INSULIN (HCC): ICD-10-CM

## 2017-10-31 DIAGNOSIS — E78.5 HYPERLIPIDEMIA WITH TARGET LDL LESS THAN 70: ICD-10-CM

## 2017-10-31 DIAGNOSIS — E11.21 DIABETIC NEPHROPATHY ASSOCIATED WITH TYPE 2 DIABETES MELLITUS (HCC): ICD-10-CM

## 2017-10-31 DIAGNOSIS — R35.0 FREQUENCY OF URINATION: ICD-10-CM

## 2017-10-31 PROCEDURE — 3045F PR MOST RECENT HEMOGLOBIN A1C LEVEL 7.0-9.0%: CPT | Performed by: FAMILY MEDICINE

## 2017-10-31 PROCEDURE — G8427 DOCREV CUR MEDS BY ELIG CLIN: HCPCS | Performed by: FAMILY MEDICINE

## 2017-10-31 PROCEDURE — 1036F TOBACCO NON-USER: CPT | Performed by: FAMILY MEDICINE

## 2017-10-31 PROCEDURE — G8598 ASA/ANTIPLAT THER USED: HCPCS | Performed by: FAMILY MEDICINE

## 2017-10-31 PROCEDURE — 3017F COLORECTAL CA SCREEN DOC REV: CPT | Performed by: FAMILY MEDICINE

## 2017-10-31 PROCEDURE — 96160 PT-FOCUSED HLTH RISK ASSMT: CPT | Performed by: FAMILY MEDICINE

## 2017-10-31 PROCEDURE — G8484 FLU IMMUNIZE NO ADMIN: HCPCS | Performed by: FAMILY MEDICINE

## 2017-10-31 PROCEDURE — G8417 CALC BMI ABV UP PARAM F/U: HCPCS | Performed by: FAMILY MEDICINE

## 2017-10-31 PROCEDURE — 99214 OFFICE O/P EST MOD 30 MIN: CPT | Performed by: FAMILY MEDICINE

## 2017-10-31 ASSESSMENT — PATIENT HEALTH QUESTIONNAIRE - PHQ9
5. POOR APPETITE OR OVEREATING: 0
SUM OF ALL RESPONSES TO PHQ QUESTIONS 1-9: 0
3. TROUBLE FALLING OR STAYING ASLEEP: 0
SUM OF ALL RESPONSES TO PHQ9 QUESTIONS 1 & 2: 0
9. THOUGHTS THAT YOU WOULD BE BETTER OFF DEAD, OR OF HURTING YOURSELF: 0
4. FEELING TIRED OR HAVING LITTLE ENERGY: 0
8. MOVING OR SPEAKING SO SLOWLY THAT OTHER PEOPLE COULD HAVE NOTICED. OR THE OPPOSITE, BEING SO FIGETY OR RESTLESS THAT YOU HAVE BEEN MOVING AROUND A LOT MORE THAN USUAL: 0
7. TROUBLE CONCENTRATING ON THINGS, SUCH AS READING THE NEWSPAPER OR WATCHING TELEVISION: 0
10. IF YOU CHECKED OFF ANY PROBLEMS, HOW DIFFICULT HAVE THESE PROBLEMS MADE IT FOR YOU TO DO YOUR WORK, TAKE CARE OF THINGS AT HOME, OR GET ALONG WITH OTHER PEOPLE: 0
1. LITTLE INTEREST OR PLEASURE IN DOING THINGS: 0
6. FEELING BAD ABOUT YOURSELF - OR THAT YOU ARE A FAILURE OR HAVE LET YOURSELF OR YOUR FAMILY DOWN: 0
2. FEELING DOWN, DEPRESSED OR HOPELESS: 0

## 2017-10-31 ASSESSMENT — ENCOUNTER SYMPTOMS
ABDOMINAL PAIN: 0
COUGH: 0
WHEEZING: 0
APNEA: 1
CONSTIPATION: 0
DIARRHEA: 0
SHORTNESS OF BREATH: 0
CHEST TIGHTNESS: 0
NAUSEA: 0
VOMITING: 0
ABDOMINAL DISTENTION: 0

## 2017-10-31 NOTE — PROGRESS NOTES
HYPERTENSION visit     BP Readings from Last 3 Encounters:   10/31/17 (!) 162/96   10/16/17 (!) 163/102   09/25/17 (!) 185/100       LDL Cholesterol (mg/dL)   Date Value   10/13/2016 94     HDL (mg/dL)   Date Value   10/13/2016 45     BUN (mg/dL)   Date Value   06/15/2017 15     CREATININE (mg/dL)   Date Value   06/15/2017 0.89     Glucose (mg/dL)   Date Value   06/15/2017 104 (H)   02/06/2012 157 (H)              Have you changed or started any medications since your last visit including any over-the-counter medicines, vitamins, or herbal medicines? no   Have you stopped taking any of your medications? Is so, why? -  no  Are you having any side effects from any of your medications? - no    Have you seen any other physician or provider since your last visit?  no   Have you had any other diagnostic tests since your last visit?  no   Have you been seen in the emergency room and/or had an admission in a hospital since we last saw you?  no   Have you had your routine dental cleaning in the past 6 months?  no     Do you have an active [employer] account? If no, what is the barrier?   Yes    Patient Care Team:  Karel Camargo MD as PCP - General (Family Medicine)  Arden Bunn MD as Consulting Physician (Cardiology)    Medical History Review  Past Medical, Family, and Social History reviewed and does contribute to the patient presenting condition    Health Maintenance   Topic Date Due    Diabetic retinal exam  08/01/2014    Lipid screen  10/13/2017    Diabetic foot exam  02/17/2018    PSA counseling  02/17/2018    Diabetic hemoglobin A1C test  09/25/2018    Colon cancer screen colonoscopy  01/15/2023    DTaP/Tdap/Td vaccine (2 - Td) 04/03/2024    Flu vaccine  Completed    Pneumococcal med risk  Completed    Hepatitis C screen  Completed    HIV screen  Completed

## 2017-10-31 NOTE — PROGRESS NOTES
10/13/2016       -vital signs stable and within normal limits except high BPs and Overweight per BMI. BP (!) 155/97   Pulse 68   Temp 97.8 °F (36.6 °C) (Oral)   Resp 18   Ht 6' 1\" (1.854 m)   Wt 272 lb (123.4 kg)   BMI 35.89 kg/m²   Body mass index is 35.89 kg/m². Hypertension, cardiomyopathy : Patient here for follow-up of elevated blood pressure. He is not exercising, but walking,  and is adherent to low salt diet. Blood pressure is well controlled at home. Cardiac symptoms fatigue. Patient denies chest pain, chest pressure/discomfort, claudication, dyspnea, exertional chest pressure/discomfort, irregular heart beat, lower extremity edema, near-syncope, orthopnea, palpitations, paroxysmal nocturnal dyspnea, syncope and tachypnea. Cardiovascular risk factors: advanced age (older than 54 for men, 72 for women), diabetes mellitus, dyslipidemia, hypertension, male gender, microalbuminuria and obesity (BMI >= 30 kg/m2). Use of agents associated with hypertension: none. History of target organ damage: Cardiomyopathy. Per my note from 9/25/17  Had cardiac cath and echo 2-D at [address] He was admitted 6/16/17  Records reviewed in media. He had Lexiscan which was acute focal, EF 47%  Had coronary angiography with left circumflex 50% stenosis     He tells me again that he didn't follow-up with his cardiologist.  He doesn't know his name. He doesn't have an appointment. I advised him to see a new cardiologist.    Didn't take the BP meds today. \"Was too early\"    Uncontrolled BP but improved from prior    BP Readings from Last 3 Encounters:   10/31/17 (!) 155/97   10/16/17 (!) 163/102   09/25/17 (!) 185/100      Pulse controlled. Pulse Readings from Last 3 Encounters:   10/31/17 68   10/16/17 68   09/25/17 53       Frequency of urination    Still urinating every 3-4 hrs and waking up 2-3 times/night to urinate. Denies dysuria or hematuria.   Reports the Erectile Dysfunction has improved and \"it's great Lab Results   Component Value Date    LDLCHOLESTEROL 94 10/13/2016    LDLCHOLESTEROL 117 (H) 08/06/2013    LDLCHOLESTEROL 116 (H) 02/06/2012       Lab Results   Component Value Date    CHOLHDLRATIO 3.4 10/13/2016    CHOLHDLRATIO 4.0 08/06/2013    CHOLHDLRATIO 4.0 02/06/2012         Lab Results   Component Value Date    THERYRQU35 668 10/13/2016     Lab Results   Component Value Date    FOLATE 8.4 10/13/2016     No results found for: VITD25      Lab Results   Component Value Date    PSA 1.19 02/17/2017    PSA 0.53 08/06/2013         Current Outpatient Prescriptions   Medication Sig Dispense Refill    lisinopril (PRINIVIL;ZESTRIL) 40 MG tablet Take 1 tablet by mouth daily Stop Lisinopril 30 mg 90 tablet 3    traZODone (DESYREL) 50 MG tablet Take 1 tablet by mouth nightly Insomnia 30 tablet 1    linagliptin (TRADJENTA) 5 MG tablet Take 1 tablet by mouth daily 90 tablet 3    hydrochlorothiazide (HYDRODIURIL) 25 MG tablet TAKE ONE TABLET BY MOUTH ONCE DAILY 90 tablet 3    atorvastatin (LIPITOR) 20 MG tablet Take 1 tablet by mouth every evening 90 tablet 3    amLODIPine (NORVASC) 10 MG tablet TAKE ONE TABLET BY MOUTH EVERY DAY 90 tablet 3    glipiZIDE (GLUCOTROL) 10 MG tablet Take 1 tablet by mouth 2 times daily (before meals) 60 tablet 3    metFORMIN (GLUCOPHAGE) 1000 MG tablet Take 1 tablet by mouth 2 times daily (with meals) 180 tablet 3    aspirin (ASPIRIN LOW DOSE) 81 MG EC tablet TAKE ONE TABLET BY MOUTH EVERY DAY 90 tablet 3    carvedilol (COREG) 12.5 MG tablet TAKE ONE TABLET BY MOUTH TWICE DAILY 180 tablet 3    isosorbide mononitrate (IMDUR) 30 MG extended release tablet TAKE ONE TABLET BY MOUTH IN THE MORNING 90 tablet 3    EQ ACETAMINOPHEN 500 MG tablet TAKE ONE TABLET BY MOUTH EVERY 6 HOURS AS NEEDED FOR PAIN 120 tablet 0    nitroGLYCERIN (NITROSTAT) 0.4 MG SL tablet Place 1 tablet under the tongue every 5 minutes as needed for Chest pain Max 3 times 25 tablet 0    miconazole (MICOTIN) 2 % powder Apply topically 2 times daily  between the toes 1 Bottle 1     No current facility-administered medications for this visit. Rest of complaints with corresponding details per ROS      The patient's past medical, surgical, social, and family history as well as his current medications and allergies were reviewed as documented in today's encounter. Past Medical History:   Diagnosis Date    CAD (coronary artery disease)     Cardiomyopathy ([facility]     Coronary artery disease involving native coronary artery of native heart without angina pectoris 2016    Diabetic nephropathy associated with type 2 diabetes mellitus ([facility] 2016    Diabetic polyneuropathy associated with type 2 diabetes mellitus ([facility]     DM (diabetes mellitus), type 2 ([facility]     for 15-20 yrs    HTN (hypertension)     Hypertensive urgency     Lipidemia     Obesity     Obesity (BMI 30-39.9) 2016    MORENA on CPAP 2013    Sleep apnea 2013      Social History     Social History    Marital status: Single     Spouse name: N/A    Number of children: N/A    Years of education: N/A     Occupational History    Not on file.      Social History Main Topics    Smoking status: Former Smoker     Quit date: 1981    Smokeless tobacco: Former User    Alcohol use 0.6 - 1.2 oz/week     1 - 2 Cans of beer per week      Comment: 1-2 cans a month    Drug use:      Types: Marijuana      Comment: marijuana every day     Sexual activity: Not on file     Other Topics Concern    Not on file     Social History Narrative    No narrative on file     Counseling given: Yes        Family History   Problem Relation Age of Onset    Asthma Mother      /78    High Blood Pressure Mother     Heart Disease Mother     High Blood Pressure Father     Diabetes Sister     High Blood Pressure Sister     Diabetes Brother     High Blood Pressure Brother     Cancer Sister      breast    Cancer Sister      lung/smoker time. No cranial nerve deficit. He exhibits normal muscle tone. Coordination normal.   Skin: Skin is warm and dry. No rash noted. He is not diaphoretic. Psychiatric: He has a normal mood and affect. His behavior is normal. Judgment and thought content normal.   Nursing note and vitals reviewed. ASSESSMENT AND PLAN      1. Type 2 diabetes mellitus with diabetic polyneuropathy, without long-term current use of insulin ([facility]  3. Type 2 diabetes mellitus with hyperglycemia, without long-term current use of insulin ([facility])  8. Diabetic nephropathy associated with type 2 diabetes mellitus ([facility]    Poorly controlled   - Comprehensive Metabolic Panel; Future  - TSH without Reflex; Future  - Vitamin B12 & Folate; Future  - Microalbumin / Creatinine Urine Ratio; Future  -advised home blood glucose testing  daily  -daily feet exam, Foot care: advised to wash feet daily, pat dry and apply lotion at night, avoiding between toes. Need to look at feet daily and report to a physician any signs of inflammation or skin damage  -annual dilated eye exam  -Low carb, low fat diet, increase fruits and vegetables, and exercise 4-5 times a day 30-40 minutes a day discussed  -continue current treatment  -continue Aspirin 81 mg  -continue ACEI and statin      2. Essential hypertension  Not controlled  Didn't take the BP meds today. Discussed low salt diet and BP and pulse monitoring daily, BP log given  Needs nurse visit appointment for BP check in 1 week   Continue current treatment. - UA W/REFLEX CULTURE; Future  - Comprehensive Metabolic Panel; Future  - TSH without Reflex; Future      4. Coronary artery disease involving native coronary artery of native heart without angina pectoris  Needs to make appointment with cardiology  Continue lisinopril, Lipitor, aspirin, Coreg, Imdur    5. Foot callus  Used to make appointment with podiatry  Referring reprinted and contact information given to patient  6.  Frequency of urination    - UA W/REFLEX CULTURE; Future    7. Hyperlipidemia with target LDL less than 70  Not at goal  Continue statin  - Lipid Panel; Future      Reprinted referral to  Ophthalmology , urology, cardio, podiatry, and advised patient to schedule. Also contact information was given to patient. PLEASE TAKE YOUR MEDICATIONS EVEN IN THE MORNING, BEFORE appointments. COME BACK IN 1 WEEK FOR blood pressure RECHECK     BRING BLOOD PRESSURE AND BLOOD GLUCOSE LOGS AT NEXT appointment with ME.     Reprinted referral to  Ophthalmology , urology, cardiology, podiatry and advised patient to schedule appointment with specialist.   Vicki Lopes labs in 1 week  Orders Placed This Encounter   Procedures    UA W/REFLEX CULTURE     Culture and sensitivity     Standing Status:   Future     Standing Expiration Date:   10/31/2018    Comprehensive Metabolic Panel     Standing Status:   Future     Standing Expiration Date:   10/31/2018    TSH without Reflex     Standing Status:   Future     Standing Expiration Date:   11/1/2018    Lipid Panel     Standing Status:   Future     Standing Expiration Date:   10/31/2018     Order Specific Question:   Is Patient Fasting?/# of Hours     Answer:   8-10 Hours    Vitamin B12 & Folate     Standing Status:   Future     Standing Expiration Date:   11/1/2018    Microalbumin / Creatinine Urine Ratio     Standing Status:   Future     Standing Expiration Date:   11/1/2018       There are no discontinued medications. rology, cardiology, podiatry and advised patient to schedule appointment with specialist.   Gomez Adorno received counseling on the following healthy behaviors: nutrition, exercise, medication adherence and weight loss  Reviewed prior labs and health maintenance  Continue current medications, diet and exercise. Discussed use, benefit, and side effects of prescribed medications. Barriers to medication compliance addressed.    Patient given educational materials - see patient instructions  Was a self-tracking inadvertent computerized transcription errors may be present. Although every effort was made to ensure accuracy, no guarantees can be provided that every mistake has been identified and corrected by editing .     Electronically signed by Leny Reardon MD on 10/31/2017 at 10:03 PM

## 2017-10-31 NOTE — PATIENT INSTRUCTIONS
PLEASE TAKE YOUR MEDICATIONS EVEN IN THE MORNING, BEFORE appointments. COME BACK IN 1 WEEK FOR blood pressure RECHECK    BRING BLOOD PRESSURE AND BLOOD GLUCOSE LOGS AT NEXT appointment with ME. Reprinted referral to  Ophthalmology , urology, cardiology, podiatry and advised patient to schedule appointment with specialist.   Farnaz shulzt in 1 week        Patient Education        DASH Diet: Care Instructions  Your Care Instructions  The DASH diet is an eating plan that can help lower your blood pressure. DASH stands for Dietary Approaches to Stop Hypertension. Hypertension is high blood pressure. The DASH diet focuses on eating foods that are high in calcium, potassium, and magnesium. These nutrients can lower blood pressure. The foods that are highest in these nutrients are fruits, vegetables, low-fat dairy products, nuts, seeds, and legumes. But taking calcium, potassium, and magnesium supplements instead of eating foods that are high in those nutrients does not have the same effect. The DASH diet also includes whole grains, fish, and poultry. The DASH diet is one of several lifestyle changes your doctor may recommend to lower your high blood pressure. Your doctor may also want you to decrease the amount of sodium in your diet. Lowering sodium while following the DASH diet can lower blood pressure even further than just the DASH diet alone. Follow-up care is a key part of your treatment and safety. Be sure to make and go to all appointments, and call your doctor if you are having problems. It's also a good idea to know your test results and keep a list of the medicines you take. How can you care for yourself at home? Following the DASH diet  · Eat 4 to 5 servings of fruit each day. A serving is 1 medium-sized piece of fruit, ½ cup chopped or canned fruit, 1/4 cup dried fruit, or 4 ounces (½ cup) of fruit juice. Choose fruit more often than fruit juice. · Eat 4 to 5 servings of vegetables each day.  A serving is 1 cup of lettuce or raw leafy vegetables, ½ cup of chopped or cooked vegetables, or 4 ounces (½ cup) of vegetable juice. Choose vegetables more often than vegetable juice. · Get 2 to 3 servings of low-fat and fat-free dairy each day. A serving is 8 ounces of milk, 1 cup of yogurt, or 1 ½ ounces of cheese. · Eat 6 to 8 servings of grains each day. A serving is 1 slice of bread, 1 ounce of dry cereal, or ½ cup of cooked rice, pasta, or cooked cereal. Try to choose whole-grain products as much as possible. · Limit lean meat, poultry, and fish to 2 servings each day. A serving is 3 ounces, about the size of a deck of cards. · Eat 4 to 5 servings of nuts, seeds, and legumes (cooked dried beans, lentils, and split peas) each week. A serving is 1/3 cup of nuts, 2 tablespoons of seeds, or ½ cup of cooked beans or peas. · Limit fats and oils to 2 to 3 servings each day. A serving is 1 teaspoon of vegetable oil or 2 tablespoons of salad dressing. · Limit sweets and added sugars to 5 servings or less a week. A serving is 1 tablespoon jelly or jam, ½ cup sorbet, or 1 cup of lemonade. · Eat less than 2,300 milligrams (mg) of sodium a day. If you limit your sodium to 1,500 mg a day, you can lower your blood pressure even more. Tips for success  · Start small. Do not try to make dramatic changes to your diet all at once. You might feel that you are missing out on your favorite foods and then be more likely to not follow the plan. Make small changes, and stick with them. Once those changes become habit, add a few more changes. · Try some of the following:  ¨ Make it a goal to eat a fruit or vegetable at every meal and at snacks. This will make it easy to get the recommended amount of fruits and vegetables each day. ¨ Try yogurt topped with fruit and nuts for a snack or healthy dessert. ¨ Add lettuce, tomato, cucumber, and onion to sandwiches.   ¨ Combine a ready-made pizza crust with low-fat mozzarella cheese and lots of vegetable toppings. Try using tomatoes, squash, spinach, broccoli, carrots, cauliflower, and onions. ¨ Have a variety of cut-up vegetables with a low-fat dip as an appetizer instead of chips and dip. ¨ Sprinkle sunflower seeds or chopped almonds over salads. Or try adding chopped walnuts or almonds to cooked vegetables. ¨ Try some vegetarian meals using beans and peas. Add garbanzo or kidney beans to salads. Make burritos and tacos with mashed saavedra beans or black beans. Where can you learn more? Go to https://[employer].GetSocial. org and sign in to your [employer] account. Enter V340 in the [employer] box to learn more about \"DASH Diet: Care Instructions. \"     If you do not have an account, please click on the \"Sign Up Now\" link. Current as of: April 3, 2017  Content Version: 11.3  © 7060-3684 [employer]. Care instructions adapted under license by [facility] ([facility]). If you have questions about a medical condition or this instruction, always ask your healthcare professional. [place] [address] any warranty or liability for your use of this information.

## 2018-05-20 LAB
AVERAGE GLUCOSE: NORMAL
HBA1C MFR BLD: 11 %

## 2018-05-21 LAB
AVERAGE GLUCOSE: NORMAL
HBA1C MFR BLD: 11 %
LEFT VENTRICULAR EJECTION FRACTION MODE: NORMAL
LV EF: 55 % (ref 55–?)

## 2018-05-24 ENCOUNTER — TELEPHONE (OUTPATIENT)
Dept: FAMILY MEDICINE CLINIC | Age: 58
End: 2018-05-24

## 2018-06-11 DIAGNOSIS — E11.42 DIABETIC POLYNEUROPATHY ASSOCIATED WITH TYPE 2 DIABETES MELLITUS (HCC): ICD-10-CM

## 2018-06-11 DIAGNOSIS — E11.42 TYPE 2 DIABETES MELLITUS WITH DIABETIC POLYNEUROPATHY, WITHOUT LONG-TERM CURRENT USE OF INSULIN (HCC): ICD-10-CM

## 2018-06-11 RX ORDER — BLOOD-GLUCOSE METER
KIT MISCELLANEOUS
Qty: 100 STRIP | Refills: 3 | Status: SHIPPED | OUTPATIENT
Start: 2018-06-11 | End: 2019-01-11 | Stop reason: SDUPTHER

## 2018-06-11 RX ORDER — GLIPIZIDE 10 MG/1
TABLET ORAL
Qty: 60 TABLET | Refills: 3 | Status: SHIPPED | OUTPATIENT
Start: 2018-06-11 | End: 2019-01-11 | Stop reason: SDUPTHER

## 2018-06-14 ENCOUNTER — OFFICE VISIT (OUTPATIENT)
Dept: FAMILY MEDICINE CLINIC | Age: 58
End: 2018-06-14
Payer: COMMERCIAL

## 2018-06-14 VITALS
TEMPERATURE: 98.3 F | HEART RATE: 60 BPM | OXYGEN SATURATION: 98 % | DIASTOLIC BLOOD PRESSURE: 75 MMHG | SYSTOLIC BLOOD PRESSURE: 128 MMHG | HEIGHT: 73 IN | BODY MASS INDEX: 33.34 KG/M2 | WEIGHT: 251.6 LBS

## 2018-06-14 DIAGNOSIS — E11.42 TYPE 2 DIABETES MELLITUS WITH DIABETIC POLYNEUROPATHY, WITHOUT LONG-TERM CURRENT USE OF INSULIN (HCC): ICD-10-CM

## 2018-06-14 DIAGNOSIS — G47.33 OSA ON CPAP: ICD-10-CM

## 2018-06-14 DIAGNOSIS — I42.9 CARDIOMYOPATHY, UNSPECIFIED TYPE (HCC): Primary | ICD-10-CM

## 2018-06-14 DIAGNOSIS — Z13.220 SCREENING FOR HYPERLIPIDEMIA: ICD-10-CM

## 2018-06-14 DIAGNOSIS — I10 ESSENTIAL HYPERTENSION: ICD-10-CM

## 2018-06-14 DIAGNOSIS — E11.21 DIABETIC NEPHROPATHY ASSOCIATED WITH TYPE 2 DIABETES MELLITUS (HCC): ICD-10-CM

## 2018-06-14 DIAGNOSIS — E11.65 TYPE 2 DIABETES MELLITUS WITH HYPERGLYCEMIA, WITHOUT LONG-TERM CURRENT USE OF INSULIN (HCC): ICD-10-CM

## 2018-06-14 DIAGNOSIS — Z23 NEED FOR PROPHYLACTIC VACCINATION AND INOCULATION AGAINST VARICELLA: ICD-10-CM

## 2018-06-14 DIAGNOSIS — I25.10 CORONARY ARTERY DISEASE INVOLVING NATIVE CORONARY ARTERY OF NATIVE HEART WITHOUT ANGINA PECTORIS: ICD-10-CM

## 2018-06-14 DIAGNOSIS — Z99.89 OSA ON CPAP: ICD-10-CM

## 2018-06-14 DIAGNOSIS — E66.9 OBESITY (BMI 30-39.9): ICD-10-CM

## 2018-06-14 PROCEDURE — 1036F TOBACCO NON-USER: CPT | Performed by: NURSE PRACTITIONER

## 2018-06-14 PROCEDURE — G8598 ASA/ANTIPLAT THER USED: HCPCS | Performed by: NURSE PRACTITIONER

## 2018-06-14 PROCEDURE — 3046F HEMOGLOBIN A1C LEVEL >9.0%: CPT | Performed by: NURSE PRACTITIONER

## 2018-06-14 PROCEDURE — G8417 CALC BMI ABV UP PARAM F/U: HCPCS | Performed by: NURSE PRACTITIONER

## 2018-06-14 PROCEDURE — 2022F DILAT RTA XM EVC RTNOPTHY: CPT | Performed by: NURSE PRACTITIONER

## 2018-06-14 PROCEDURE — 99214 OFFICE O/P EST MOD 30 MIN: CPT | Performed by: NURSE PRACTITIONER

## 2018-06-14 PROCEDURE — 3017F COLORECTAL CA SCREEN DOC REV: CPT | Performed by: NURSE PRACTITIONER

## 2018-06-14 PROCEDURE — G8427 DOCREV CUR MEDS BY ELIG CLIN: HCPCS | Performed by: NURSE PRACTITIONER

## 2018-06-14 RX ORDER — CARVEDILOL 25 MG/1
25 TABLET ORAL 2 TIMES DAILY WITH MEALS
COMMUNITY
End: 2018-06-18 | Stop reason: SDUPTHER

## 2018-06-14 RX ORDER — SPIRONOLACTONE 25 MG/1
25 TABLET ORAL DAILY
COMMUNITY
End: 2018-06-18 | Stop reason: SDUPTHER

## 2018-06-14 RX ORDER — CHLORTHALIDONE 25 MG/1
25 TABLET ORAL DAILY
COMMUNITY
End: 2018-06-18 | Stop reason: SDUPTHER

## 2018-06-14 ASSESSMENT — ENCOUNTER SYMPTOMS
EYE DISCHARGE: 0
COUGH: 0
SHORTNESS OF BREATH: 0
ABDOMINAL PAIN: 0
BLOOD IN STOOL: 0

## 2018-06-18 DIAGNOSIS — I10 ESSENTIAL HYPERTENSION: ICD-10-CM

## 2018-06-18 DIAGNOSIS — E11.42 TYPE 2 DIABETES MELLITUS WITH DIABETIC POLYNEUROPATHY, WITHOUT LONG-TERM CURRENT USE OF INSULIN (HCC): ICD-10-CM

## 2018-06-18 DIAGNOSIS — E11.21 DIABETIC NEPHROPATHY ASSOCIATED WITH TYPE 2 DIABETES MELLITUS (HCC): ICD-10-CM

## 2018-06-19 RX ORDER — NITROGLYCERIN 0.4 MG/1
0.4 TABLET SUBLINGUAL EVERY 5 MIN PRN
Qty: 25 TABLET | Refills: 0 | Status: SHIPPED | OUTPATIENT
Start: 2018-06-19 | End: 2019-03-18 | Stop reason: SDUPTHER

## 2018-06-19 RX ORDER — CHLORTHALIDONE 25 MG/1
25 TABLET ORAL DAILY
Qty: 30 TABLET | Refills: 2 | Status: SHIPPED | OUTPATIENT
Start: 2018-06-19 | End: 2018-07-23 | Stop reason: SDUPTHER

## 2018-06-19 RX ORDER — CARVEDILOL 25 MG/1
25 TABLET ORAL 2 TIMES DAILY WITH MEALS
Qty: 60 TABLET | Refills: 2 | Status: SHIPPED | OUTPATIENT
Start: 2018-06-19 | End: 2018-07-23 | Stop reason: SDUPTHER

## 2018-06-19 RX ORDER — ASPIRIN 81 MG/1
TABLET ORAL
Qty: 90 TABLET | Refills: 3 | Status: SHIPPED | OUTPATIENT
Start: 2018-06-19 | End: 2018-07-23 | Stop reason: SDUPTHER

## 2018-06-19 RX ORDER — HYDROCHLOROTHIAZIDE 25 MG/1
TABLET ORAL
Qty: 90 TABLET | Refills: 3 | OUTPATIENT
Start: 2018-06-19

## 2018-06-19 RX ORDER — SPIRONOLACTONE 25 MG/1
25 TABLET ORAL DAILY
Qty: 30 TABLET | Refills: 2 | Status: SHIPPED | OUTPATIENT
Start: 2018-06-19 | End: 2019-01-11

## 2018-06-19 RX ORDER — ACETAMINOPHEN 500 MG
TABLET ORAL
Qty: 120 TABLET | Refills: 0 | OUTPATIENT
Start: 2018-06-19

## 2018-06-19 RX ORDER — AMLODIPINE BESYLATE 10 MG/1
TABLET ORAL
Qty: 90 TABLET | Refills: 3 | Status: SHIPPED | OUTPATIENT
Start: 2018-06-19 | End: 2018-07-23 | Stop reason: SDUPTHER

## 2018-06-19 RX ORDER — ISOSORBIDE MONONITRATE 30 MG/1
TABLET, EXTENDED RELEASE ORAL
Qty: 90 TABLET | Refills: 3 | OUTPATIENT
Start: 2018-06-19

## 2018-06-19 RX ORDER — LISINOPRIL 40 MG/1
40 TABLET ORAL DAILY
Qty: 90 TABLET | Refills: 3 | Status: SHIPPED | OUTPATIENT
Start: 2018-06-19 | End: 2018-07-23 | Stop reason: SDUPTHER

## 2018-07-23 ENCOUNTER — OFFICE VISIT (OUTPATIENT)
Dept: FAMILY MEDICINE CLINIC | Age: 58
End: 2018-07-23
Payer: COMMERCIAL

## 2018-07-23 VITALS
WEIGHT: 254.4 LBS | SYSTOLIC BLOOD PRESSURE: 140 MMHG | BODY MASS INDEX: 33.72 KG/M2 | OXYGEN SATURATION: 98 % | DIASTOLIC BLOOD PRESSURE: 84 MMHG | HEART RATE: 60 BPM | TEMPERATURE: 97.8 F | HEIGHT: 73 IN

## 2018-07-23 DIAGNOSIS — I10 ESSENTIAL HYPERTENSION: ICD-10-CM

## 2018-07-23 DIAGNOSIS — E78.5 HYPERLIPIDEMIA WITH TARGET LDL LESS THAN 70: ICD-10-CM

## 2018-07-23 DIAGNOSIS — Z23 NEED FOR PROPHYLACTIC VACCINATION AND INOCULATION AGAINST VARICELLA: ICD-10-CM

## 2018-07-23 DIAGNOSIS — E11.21 DIABETIC NEPHROPATHY ASSOCIATED WITH TYPE 2 DIABETES MELLITUS (HCC): ICD-10-CM

## 2018-07-23 DIAGNOSIS — Z12.5 SCREENING PSA (PROSTATE SPECIFIC ANTIGEN): ICD-10-CM

## 2018-07-23 DIAGNOSIS — I50.32 CHRONIC DIASTOLIC CHF (CONGESTIVE HEART FAILURE) (HCC): ICD-10-CM

## 2018-07-23 DIAGNOSIS — E11.42 TYPE 2 DIABETES MELLITUS WITH DIABETIC POLYNEUROPATHY, WITHOUT LONG-TERM CURRENT USE OF INSULIN (HCC): ICD-10-CM

## 2018-07-23 DIAGNOSIS — E11.65 TYPE 2 DIABETES MELLITUS WITH HYPERGLYCEMIA, WITHOUT LONG-TERM CURRENT USE OF INSULIN (HCC): Primary | ICD-10-CM

## 2018-07-23 PROCEDURE — 99214 OFFICE O/P EST MOD 30 MIN: CPT | Performed by: FAMILY MEDICINE

## 2018-07-23 PROCEDURE — 3046F HEMOGLOBIN A1C LEVEL >9.0%: CPT | Performed by: FAMILY MEDICINE

## 2018-07-23 PROCEDURE — 1036F TOBACCO NON-USER: CPT | Performed by: FAMILY MEDICINE

## 2018-07-23 PROCEDURE — G8427 DOCREV CUR MEDS BY ELIG CLIN: HCPCS | Performed by: FAMILY MEDICINE

## 2018-07-23 PROCEDURE — 3017F COLORECTAL CA SCREEN DOC REV: CPT | Performed by: FAMILY MEDICINE

## 2018-07-23 PROCEDURE — G8417 CALC BMI ABV UP PARAM F/U: HCPCS | Performed by: FAMILY MEDICINE

## 2018-07-23 PROCEDURE — 2022F DILAT RTA XM EVC RTNOPTHY: CPT | Performed by: FAMILY MEDICINE

## 2018-07-23 PROCEDURE — G8598 ASA/ANTIPLAT THER USED: HCPCS | Performed by: FAMILY MEDICINE

## 2018-07-23 RX ORDER — CHLORTHALIDONE 25 MG/1
25 TABLET ORAL DAILY
Qty: 30 TABLET | Refills: 2 | Status: SHIPPED | OUTPATIENT
Start: 2018-07-23 | End: 2019-01-11 | Stop reason: SDUPTHER

## 2018-07-23 RX ORDER — AMLODIPINE BESYLATE 10 MG/1
TABLET ORAL
Qty: 90 TABLET | Refills: 3 | Status: SHIPPED | OUTPATIENT
Start: 2018-07-23 | End: 2019-01-11 | Stop reason: SDUPTHER

## 2018-07-23 RX ORDER — ATORVASTATIN CALCIUM 40 MG/1
40 TABLET, FILM COATED ORAL DAILY
Qty: 90 TABLET | Refills: 3 | Status: SHIPPED | OUTPATIENT
Start: 2018-07-23 | End: 2019-01-11 | Stop reason: SDUPTHER

## 2018-07-23 RX ORDER — LISINOPRIL 40 MG/1
40 TABLET ORAL DAILY
Qty: 90 TABLET | Refills: 3 | Status: SHIPPED | OUTPATIENT
Start: 2018-07-23 | End: 2019-01-11 | Stop reason: SDUPTHER

## 2018-07-23 RX ORDER — CARVEDILOL 25 MG/1
25 TABLET ORAL 2 TIMES DAILY WITH MEALS
Qty: 60 TABLET | Refills: 2 | Status: SHIPPED | OUTPATIENT
Start: 2018-07-23 | End: 2019-01-11 | Stop reason: SDUPTHER

## 2018-07-23 RX ORDER — ASPIRIN 81 MG/1
TABLET ORAL
Qty: 90 TABLET | Refills: 3 | Status: SHIPPED | OUTPATIENT
Start: 2018-07-23 | End: 2019-01-11 | Stop reason: SDUPTHER

## 2018-07-23 ASSESSMENT — ENCOUNTER SYMPTOMS
COUGH: 0
VOMITING: 0
SHORTNESS OF BREATH: 0
ABDOMINAL PAIN: 0
CONSTIPATION: 0
DIARRHEA: 0
WHEEZING: 0
ABDOMINAL DISTENTION: 0
APNEA: 1
NAUSEA: 0
CHEST TIGHTNESS: 0

## 2018-07-23 NOTE — PROGRESS NOTES
5/21/18 in [facility]  Lab Results   Component Value Date    LABA1C 11 05/20/2018     Lab Results   Component Value Date     08/06/2013         Lab Results   Component Value Date    LABA1C 8.8 09/25/2017     taking Aspirin 81 mg  Doesn't smoke    Urine microabumin was high. Lab Results   Component Value Date    LABMICR 39 (H) 10/13/2016       LDL not controlled  Not on Lipitor, but the note from cardiologist says he should be on Lipitor. Lab Results   Component Value Date    LDLCHOLESTEROL 94 10/13/2016       Hypertension, CHF: Patient here for follow-up of elevated blood pressure. He is exercising,walking,  and is adherent to low salt diet. Blood pressure is well controlled at home. Cardiac symptoms lower extremity edema. Patient denies chest pain, chest pressure/discomfort, claudication, dyspnea, exertional chest pressure/discomfort, fatigue, irregular heart beat, near-syncope, orthopnea, palpitations, paroxysmal nocturnal dyspnea, syncope and tachypnea. Cardiovascular risk factors: advanced age (older than 54 for men, 72 for women), diabetes mellitus, dyslipidemia, hypertension, male gender, microalbuminuria and obesity (BMI >= 30 kg/m2). Use of agents associated with hypertension: none. History of target organ damage: heart failure and left ventricular hypertrophy, cardiomyopathy. Had CHF x 2 in May  Had cardiac cath and Echo 2-D at [address]. Saw cardiology in June per his report    [facility]18[facility] [address] [facility] [place]   \"Moderate to severe concentric hypertrophy. The estimated left   ventricular ejection fraction is 55%. Normal left ventricular ejection   fraction. · The aortic valve is sclerotic but opens well. · Mild mitral regurgitation. · Calculated RVSP: 38 mmHg. Mild pulmonary hypertension\"            Didn't take the BP meds today.  Says he didn't have breakfast yet and cannot take meds on empty stomach    High BP  BP Readings from Last 3 Encounters:   07/23/18 (!) 140/84   06/14/18 128/75   10/31/17 (!) 155/97      Mild bradycardia   Pulse Readings from Last 3 Encounters:   07/23/18 60   06/14/18 60   10/31/17 68       Negative depression screening. PHQ Scores 10/31/2017 7/24/2017 11/18/2016   PHQ2 Score 0 3 0   PHQ9 Score 0 8 0     Interpretation of Total Score Depression Severity: 1-4 = Minimal depression, 5-9 = Mild depression, 10-14 = Moderate depression, 15-19 = Moderately severe depression, 20-27 = Severe depression          Current Outpatient Prescriptions   Medication Sig Dispense Refill    spironolactone (ALDACTONE) 25 MG tablet Take 1 tablet by mouth daily 30 tablet 2    nitroGLYCERIN (NITROSTAT) 0.4 MG SL tablet Place 1 tablet under the tongue every 5 minutes as needed for Chest pain Max 3 times 25 tablet 0    lisinopril (PRINIVIL;ZESTRIL) 40 MG tablet Take 1 tablet by mouth daily Stop Lisinopril 30 mg 90 tablet 3    linagliptin (TRADJENTA) 5 MG tablet Take 1 tablet by mouth daily 90 tablet 3    aspirin (ASPIRIN LOW DOSE) 81 MG EC tablet TAKE ONE TABLET BY MOUTH EVERY DAY 90 tablet 3    amLODIPine (NORVASC) 10 MG tablet TAKE ONE TABLET BY MOUTH EVERY DAY 90 tablet 3    carvedilol (COREG) 25 MG tablet Take 1 tablet by mouth 2 times daily (with meals) 60 tablet 2    chlorthalidone (HYGROTON) 25 MG tablet Take 1 tablet by mouth daily 30 tablet 2    glipiZIDE (GLUCOTROL) 10 MG tablet TAKE ONE TABLET BY MOUTH TWICE DAILY (BEFORE  MEALS) 60 tablet 3    FREESTYLE LITE strip USE TO TEST 2 TO 3 TIMES DAILY TO MATCH GLUCOMETER 100 strip 3    metFORMIN (GLUCOPHAGE) 1000 MG tablet Take 1 tablet by mouth 2 times daily (with meals) 180 tablet 3    EQ ACETAMINOPHEN 500 MG tablet TAKE ONE TABLET BY MOUTH EVERY 6 HOURS AS NEEDED FOR PAIN 120 tablet 0    miconazole (MICOTIN) 2 % powder Apply topically 2 times daily  between the toes 1 Bottle 1     No current facility-administered medications for this visit.           No Known Allergies     Rest of complaints with 06/15/2017    BUN 15 06/15/2017    CREATININE 0.89 06/15/2017    GLUCOSE 104 06/15/2017    GLUCOSE 157 02/06/2012    CALCIUM 9.5 06/15/2017        Lab Results   Component Value Date    ALT 12 06/12/2017    AST 10 06/12/2017    ALKPHOS 94 06/12/2017    BILITOT 0.54 06/12/2017       Lab Results   Component Value Date    TSH 0.92 10/13/2016       Hyperlipidemia . he  is already on statin, tolerates it well, denies muscle cramps. Reports compliance with statin. Lab Results   Component Value Date    CHOL 155 10/13/2016    CHOL 182 08/06/2013    CHOL 190 02/06/2012     Lab Results   Component Value Date    TRIG 78 10/13/2016    TRIG 99 08/06/2013    TRIG 136 02/06/2012     Lab Results   Component Value Date    HDL 45 10/13/2016    HDL 45 08/06/2013    HDL 47 02/06/2012     Lab Results   Component Value Date    LDLCHOLESTEROL 94 10/13/2016    LDLCHOLESTEROL 117 (H) 08/06/2013    LDLCHOLESTEROL 116 (H) 02/06/2012       Lab Results   Component Value Date    CHOLHDLRATIO 3.4 10/13/2016    CHOLHDLRATIO 4.0 08/06/2013    CHOLHDLRATIO 4.0 02/06/2012         Lab Results   Component Value Date    RWECZADU24 668 10/13/2016     Lab Results   Component Value Date    FOLATE 8.4 10/13/2016     No results found for: VITD25    Labs in [address] from 5/23/18 reviewed    Blood Glucose 123  CBC showed mild anemia  Trops mildly elevated 0.08 on 5/20/18  TSH 0.31 on 5/21/18    ASSESSMENT AND PLAN      1. Type 2 diabetes mellitus with hyperglycemia, without long-term current use of insulin (HCC)  Worsening   - Comprehensive Metabolic Panel; Future  - restart metFORMIN (GLUCOPHAGE) 1000 MG tablet; Take 1 tablet by mouth 2 times daily (with meals)  Dispense: 180 tablet;  Refill: 3  -continue  [address] Jhonatan Maradiaga MD*    -advised home blood glucose testing daily or twice a day, fasting in the morning and bedtime.   -daily feet exam, Foot care: advised to wash feet daily, pat dry and apply lotion at night, avoiding between toes. Need to look at feet daily and report to a physician any signs of inflammation or skin damage  -annual dilated eye exam  -Low carb, low fat diet, increase fruits and vegetables, and exercise 4-5 times a day 30-40 minutes a day discussed  -continue current treatment  -continue Aspirin  -continue ACEI and statin      2. Chronic diastolic CHF (congestive heart failure) (HCC)  Stable  Lab Results   Component Value Date    LVEF 55 05/21/2018    LVEFMODE Echo 05/21/2018     Needs of compliance with meds discussed    - Comprehensive Metabolic Panel; Future  - lisinopril (PRINIVIL;ZESTRIL) 40 MG tablet; Take 1 tablet by mouth daily  Dispense: 90 tablet; Refill: 3  - [address]  - carvedilol (COREG) 25 MG tablet; Take 1 tablet by mouth 2 times daily (with meals)  Dispense: 60 tablet; Refill: 2  - chlorthalidone (HYGROTON) 25 MG tablet; Take 1 tablet by mouth daily  Dispense: 30 tablet; Refill: 2  - Ejection Fraction Percentage    Discussed low salt diet and BP , WT, and pulse monitoring daily, BP log given    3. Essential hypertension  NOT controlled  Didn't take the BP meds today. - Comprehensive Metabolic Panel; Future  - lisinopril (PRINIVIL;ZESTRIL) 40 MG tablet; Take 1 tablet by mouth daily  Dispense: 90 tablet; Refill: 3  - amLODIPine (NORVASC) 10 MG tablet; TAKE ONE TABLET BY MOUTH EVERY DAY  Dispense: 90 tablet; Refill: 3  Discussed low salt diet and BP and pulse monitoring daily, BP log given  Needs nurse visit appointment for BP check in 1 week    4. Type 2 diabetes mellitus with diabetic polyneuropathy, without long-term current use of insulin ([facility])  Worsening   - Comprehensive Metabolic Panel; Future  - aspirin (ASPIRIN LOW DOSE) 81 MG EC tablet; TAKE ONE TABLET BY MOUTH EVERY DAY  Dispense: 90 tablet; Refill: 3  - metFORMIN (GLUCOPHAGE) 1000 MG tablet; Take 1 tablet by mouth 2 times daily (with meals)  Dispense: 180 tablet;  Refill: 3  - AFL tablet 90 tablet 3     Sig: Take 1 tablet by mouth daily       All patient questions answered. Patient voiced understanding. Quality Measures    Body mass index is 33.56 kg/m². Elevated. Weight control planned discussed conventional weight loss and Healthy diet and regular exercise. BP: (!) 140/84 (manual) Blood pressure is high. Treatment plan consists of Weight Reduction, DASH Eating Plan, Dietary Sodium Restriction, Increased Physical Activity, Patient In-home Blood Pressure Monitoring and No treatment change needed. Needs nurse visit appointment for BP check in 1 week       LDL not controlled  Lab Results   Component Value Date    LDLCHOLESTEROL 94 10/13/2016    (goal LDL reduction with dx if diabetes is 50% LDL reduction)      Negative depression screening. PHQ Scores 10/31/2017 7/24/2017 11/18/2016   PHQ2 Score 0 3 0   PHQ9 Score 0 8 0     Interpretation of Total Score Depression Severity: 1-4 = Minimal depression, 5-9 = Mild depression, 10-14 = Moderate depression, 15-19 = Moderately severe depression, 20-27 = Severe depression      The patient's past medical, surgical, social, and family history as well as his   current medications and allergies were reviewed as documented in today's encounter. Medications, labs, diagnostic studies, consultations and follow-up as documented in this encounter. Return in about 6 weeks (around 9/3/2018) for NEEDS one time 30 MINUTES APPT., POC A1C, DM2, HTN, HLP, LABS F/U. Patient was seen with total face to face time of  25 minutes. More than 50% of this visit was counseling and education. Future Appointments  Date Time Provider Vianey Malagon   8/1/2018 10:00 AM [facility]EDU[facility]   9/7/2018 9:00 AM Kaya Ross MD [facility] [place]       This note was completed by using the assistance of a speech-recognition program. However, inadvertent computerized transcription errors may be present.  Although every effort was made

## 2018-07-23 NOTE — Clinical Note
Patient needs care coordination due to uncontrolled diabetes, hemoglobin A1c, 11. Admitted for CHF x2 in May, noncompliance with meds, his daughter is giving his meds.

## 2018-07-23 NOTE — PROGRESS NOTES
Visit Information    Have you changed or started any medications since your last visit including any over-the-counter medicines, vitamins, or herbal medicines? no   Are you having any side effects from any of your medications? -  no  Have you stopped taking any of your medications? Is so, why? -  no    Have you seen any other physician or provider since your last visit? No  Have you had any other diagnostic tests since your last visit? No  Have you been seen in the emergency room and/or had an admission to a hospital since we last saw you? No  Have you had your routine dental cleaning in the past 6 months? no    Have you activated your [facility] account? If not, what are your barriers?  Yes     Patient Care Team:  Zuleyma Guillermo MD as PCP - General (Family Medicine)  Zuleyma Guillermo MD as PCP - [facility] Attributed Provider  Louis Patel MD as Consulting Physician (Cardiology)    Medical History Review  Past Medical, Family, and Social History reviewed and does contribute to the patient presenting condition    Health Maintenance   Topic Date Due    Shingles Vaccine (1 of 2 - 2 Dose Series) 12/20/2011    Diabetic retinal exam  08/01/2014    Lipid screen  10/13/2017    Diabetic foot exam  02/17/2018    PSA counseling  02/17/2018    A1C test (Diabetic or Prediabetic)  03/25/2018    Potassium monitoring  06/15/2018    Creatinine monitoring  06/15/2018    Flu vaccine (1) 09/01/2018    Colon cancer screen colonoscopy  01/15/2023    DTaP/Tdap/Td vaccine (2 - Td) 04/03/2024    Pneumococcal med risk  Completed    Hepatitis C screen  Completed    HIV screen  Completed

## 2018-07-28 PROBLEM — R35.0 FREQUENCY OF URINATION: Status: RESOLVED | Noted: 2017-06-12 | Resolved: 2018-07-28

## 2018-07-28 PROBLEM — I50.9 ACUTE HEART FAILURE (HCC): Status: ACTIVE | Noted: 2018-05-20

## 2018-10-02 ENCOUNTER — TELEPHONE (OUTPATIENT)
Dept: FAMILY MEDICINE CLINIC | Age: 58
End: 2018-10-02

## 2018-10-02 NOTE — TELEPHONE ENCOUNTER
He needs care coordination, population health, noncompliance, CHF, diabetes mellitus. I received a note from CHF clinic that he didn't schedule even if I referred him, note scanned in media from 10/2/2018    Patient was admitted to CHF ×2 in May at [address]. Lab Results   Component Value Date    LABA1C 11 05/20/2018     Lab Results   Component Value Date     08/06/2013     No future appointments.      I did route his chart to you on 7/28/18

## 2018-10-04 NOTE — TELEPHONE ENCOUNTER
Please reschedule pt with me, in the first available spot, 15 mins. (\"6 week wf.u POC A1C,  DEPRESSION SCREEN. DM2, HTN, HLP, LABS F/U.CHF. (w/ref to yancy, CHF clinic&Julienne, needs diab ed)      ALSO, ADVISE HIM TO MAKE APPOINTMENT WITH CHF CLINIC AS WE RECEIVED NOTE FROM CHF CLINIC THAT HE DIDN'T START GOING YET! No future appointments.     (FYI-I also referred pt to [facility], message viewed per routing history)

## 2019-01-11 ENCOUNTER — TELEPHONE (OUTPATIENT)
Dept: FAMILY MEDICINE CLINIC | Age: 59
End: 2019-01-11

## 2019-01-11 ENCOUNTER — OFFICE VISIT (OUTPATIENT)
Dept: FAMILY MEDICINE CLINIC | Age: 59
End: 2019-01-11
Payer: COMMERCIAL

## 2019-01-11 VITALS
OXYGEN SATURATION: 99 % | SYSTOLIC BLOOD PRESSURE: 191 MMHG | BODY MASS INDEX: 35.25 KG/M2 | HEART RATE: 65 BPM | HEIGHT: 73 IN | DIASTOLIC BLOOD PRESSURE: 101 MMHG | WEIGHT: 266 LBS

## 2019-01-11 DIAGNOSIS — Z23 NEED FOR PROPHYLACTIC VACCINATION AND INOCULATION AGAINST VARICELLA: ICD-10-CM

## 2019-01-11 DIAGNOSIS — Z12.5 SCREENING PSA (PROSTATE SPECIFIC ANTIGEN): ICD-10-CM

## 2019-01-11 DIAGNOSIS — Z13.31 POSITIVE DEPRESSION SCREENING: ICD-10-CM

## 2019-01-11 DIAGNOSIS — F33.0 MILD EPISODE OF RECURRENT MAJOR DEPRESSIVE DISORDER (HCC): ICD-10-CM

## 2019-01-11 DIAGNOSIS — E78.5 HYPERLIPIDEMIA WITH TARGET LDL LESS THAN 70: ICD-10-CM

## 2019-01-11 DIAGNOSIS — I10 ESSENTIAL HYPERTENSION: ICD-10-CM

## 2019-01-11 DIAGNOSIS — E11.42 DIABETIC POLYNEUROPATHY ASSOCIATED WITH TYPE 2 DIABETES MELLITUS (HCC): ICD-10-CM

## 2019-01-11 DIAGNOSIS — Z91.199 NONADHERENCE TO MEDICAL TREATMENT: ICD-10-CM

## 2019-01-11 DIAGNOSIS — E11.21 DIABETIC NEPHROPATHY ASSOCIATED WITH TYPE 2 DIABETES MELLITUS (HCC): ICD-10-CM

## 2019-01-11 DIAGNOSIS — I42.9 CARDIOMYOPATHY, UNSPECIFIED TYPE (HCC): ICD-10-CM

## 2019-01-11 DIAGNOSIS — Z23 NEED FOR IMMUNIZATION AGAINST INFLUENZA: ICD-10-CM

## 2019-01-11 DIAGNOSIS — E11.42 TYPE 2 DIABETES MELLITUS WITH DIABETIC POLYNEUROPATHY, WITHOUT LONG-TERM CURRENT USE OF INSULIN (HCC): Primary | ICD-10-CM

## 2019-01-11 DIAGNOSIS — I50.32 CHRONIC DIASTOLIC CHF (CONGESTIVE HEART FAILURE) (HCC): ICD-10-CM

## 2019-01-11 DIAGNOSIS — E11.65 TYPE 2 DIABETES MELLITUS WITH HYPERGLYCEMIA, WITHOUT LONG-TERM CURRENT USE OF INSULIN (HCC): ICD-10-CM

## 2019-01-11 LAB — HBA1C MFR BLD: 9.8 %

## 2019-01-11 PROCEDURE — 90686 IIV4 VACC NO PRSV 0.5 ML IM: CPT | Performed by: FAMILY MEDICINE

## 2019-01-11 PROCEDURE — G8482 FLU IMMUNIZE ORDER/ADMIN: HCPCS | Performed by: FAMILY MEDICINE

## 2019-01-11 PROCEDURE — 1036F TOBACCO NON-USER: CPT | Performed by: FAMILY MEDICINE

## 2019-01-11 PROCEDURE — 83036 HEMOGLOBIN GLYCOSYLATED A1C: CPT | Performed by: FAMILY MEDICINE

## 2019-01-11 PROCEDURE — 3046F HEMOGLOBIN A1C LEVEL >9.0%: CPT | Performed by: FAMILY MEDICINE

## 2019-01-11 PROCEDURE — 90471 IMMUNIZATION ADMIN: CPT | Performed by: FAMILY MEDICINE

## 2019-01-11 PROCEDURE — 96160 PT-FOCUSED HLTH RISK ASSMT: CPT | Performed by: FAMILY MEDICINE

## 2019-01-11 PROCEDURE — G8427 DOCREV CUR MEDS BY ELIG CLIN: HCPCS | Performed by: FAMILY MEDICINE

## 2019-01-11 PROCEDURE — G8417 CALC BMI ABV UP PARAM F/U: HCPCS | Performed by: FAMILY MEDICINE

## 2019-01-11 PROCEDURE — 3017F COLORECTAL CA SCREEN DOC REV: CPT | Performed by: FAMILY MEDICINE

## 2019-01-11 PROCEDURE — G8431 POS CLIN DEPRES SCRN F/U DOC: HCPCS | Performed by: FAMILY MEDICINE

## 2019-01-11 PROCEDURE — 2022F DILAT RTA XM EVC RTNOPTHY: CPT | Performed by: FAMILY MEDICINE

## 2019-01-11 PROCEDURE — 99215 OFFICE O/P EST HI 40 MIN: CPT | Performed by: FAMILY MEDICINE

## 2019-01-11 PROCEDURE — G8598 ASA/ANTIPLAT THER USED: HCPCS | Performed by: FAMILY MEDICINE

## 2019-01-11 RX ORDER — LISINOPRIL 40 MG/1
40 TABLET ORAL DAILY
Qty: 90 TABLET | Refills: 3 | Status: SHIPPED | OUTPATIENT
Start: 2019-01-11 | End: 2019-02-19 | Stop reason: ALTCHOICE

## 2019-01-11 RX ORDER — ASPIRIN 81 MG/1
81 TABLET ORAL DAILY
Qty: 90 TABLET | Refills: 3 | Status: SHIPPED | OUTPATIENT
Start: 2019-01-11 | End: 2019-04-11 | Stop reason: SDUPTHER

## 2019-01-11 RX ORDER — ATORVASTATIN CALCIUM 40 MG/1
40 TABLET, FILM COATED ORAL DAILY
Qty: 90 TABLET | Refills: 3 | Status: SHIPPED | OUTPATIENT
Start: 2019-01-11 | End: 2019-04-11 | Stop reason: SDUPTHER

## 2019-01-11 RX ORDER — ASPIRIN 81 MG/1
TABLET ORAL
Qty: 90 TABLET | Refills: 3 | Status: SHIPPED | OUTPATIENT
Start: 2019-01-11 | End: 2019-01-11 | Stop reason: SDUPTHER

## 2019-01-11 RX ORDER — CHLORTHALIDONE 25 MG/1
25 TABLET ORAL EVERY MORNING
Qty: 90 TABLET | Refills: 3 | Status: SHIPPED | OUTPATIENT
Start: 2019-01-11 | End: 2019-02-19 | Stop reason: SDUPTHER

## 2019-01-11 RX ORDER — CARVEDILOL 25 MG/1
25 TABLET ORAL 2 TIMES DAILY WITH MEALS
Qty: 180 TABLET | Refills: 3 | Status: SHIPPED | OUTPATIENT
Start: 2019-01-11 | End: 2019-04-11 | Stop reason: SDUPTHER

## 2019-01-11 RX ORDER — SPIRONOLACTONE 25 MG/1
25 TABLET ORAL DAILY
Qty: 30 TABLET | Refills: 2 | Status: CANCELLED | OUTPATIENT
Start: 2019-01-11

## 2019-01-11 RX ORDER — GLIPIZIDE 10 MG/1
10 TABLET ORAL
Qty: 180 TABLET | Refills: 3 | Status: SHIPPED | OUTPATIENT
Start: 2019-01-11 | End: 2019-02-19 | Stop reason: ALTCHOICE

## 2019-01-11 RX ORDER — CHLORTHALIDONE 25 MG/1
25 TABLET ORAL DAILY
Qty: 30 TABLET | Refills: 2 | Status: SHIPPED | OUTPATIENT
Start: 2019-01-11 | End: 2019-01-11 | Stop reason: SDUPTHER

## 2019-01-11 RX ORDER — AMLODIPINE BESYLATE 10 MG/1
TABLET ORAL
Qty: 90 TABLET | Refills: 3 | Status: SHIPPED | OUTPATIENT
Start: 2019-01-11 | End: 2019-04-11 | Stop reason: SDUPTHER

## 2019-01-11 ASSESSMENT — PATIENT HEALTH QUESTIONNAIRE - PHQ9
5. POOR APPETITE OR OVEREATING: 2
10. IF YOU CHECKED OFF ANY PROBLEMS, HOW DIFFICULT HAVE THESE PROBLEMS MADE IT FOR YOU TO DO YOUR WORK, TAKE CARE OF THINGS AT HOME, OR GET ALONG WITH OTHER PEOPLE: 1
9. THOUGHTS THAT YOU WOULD BE BETTER OFF DEAD, OR OF HURTING YOURSELF: 0
SUM OF ALL RESPONSES TO PHQ QUESTIONS 1-9: 9
SUM OF ALL RESPONSES TO PHQ9 QUESTIONS 1 & 2: 3
6. FEELING BAD ABOUT YOURSELF - OR THAT YOU ARE A FAILURE OR HAVE LET YOURSELF OR YOUR FAMILY DOWN: 1
SUM OF ALL RESPONSES TO PHQ QUESTIONS 1-9: 9
8. MOVING OR SPEAKING SO SLOWLY THAT OTHER PEOPLE COULD HAVE NOTICED. OR THE OPPOSITE, BEING SO FIGETY OR RESTLESS THAT YOU HAVE BEEN MOVING AROUND A LOT MORE THAN USUAL: 0
3. TROUBLE FALLING OR STAYING ASLEEP: 2
4. FEELING TIRED OR HAVING LITTLE ENERGY: 1
1. LITTLE INTEREST OR PLEASURE IN DOING THINGS: 2
2. FEELING DOWN, DEPRESSED OR HOPELESS: 1
7. TROUBLE CONCENTRATING ON THINGS, SUCH AS READING THE NEWSPAPER OR WATCHING TELEVISION: 0

## 2019-01-11 ASSESSMENT — ENCOUNTER SYMPTOMS
SHORTNESS OF BREATH: 0
NAUSEA: 0
ABDOMINAL DISTENTION: 0
ABDOMINAL PAIN: 0
WHEEZING: 0
COUGH: 0
DIARRHEA: 0
CHEST TIGHTNESS: 0
VOMITING: 0
CONSTIPATION: 0

## 2019-01-13 PROBLEM — F33.0 MILD EPISODE OF RECURRENT MAJOR DEPRESSIVE DISORDER (HCC): Status: ACTIVE | Noted: 2019-01-13

## 2019-01-16 LAB
CHOLESTEROL, TOTAL: 177 MG/DL
CHOLESTEROL/HDL RATIO: 4.1
HDLC SERPL-MCNC: 43 MG/DL (ref 35–70)
LDL CHOLESTEROL CALCULATED: 119 MG/DL (ref 0–160)
LEFT VENTRICULAR EJECTION FRACTION MODE: NORMAL
LV EF: 30 % (ref 30–35)
TRIGL SERPL-MCNC: 73 MG/DL
VLDLC SERPL CALC-MCNC: 15 MG/DL

## 2019-01-18 ENCOUNTER — TELEPHONE (OUTPATIENT)
Dept: FAMILY MEDICINE CLINIC | Age: 59
End: 2019-01-18

## 2019-01-21 ENCOUNTER — TELEPHONE (OUTPATIENT)
Dept: FAMILY MEDICINE CLINIC | Age: 59
End: 2019-01-21

## 2019-02-05 ENCOUNTER — TELEPHONE (OUTPATIENT)
Dept: FAMILY MEDICINE CLINIC | Age: 59
End: 2019-02-05

## 2019-02-19 ENCOUNTER — OFFICE VISIT (OUTPATIENT)
Dept: FAMILY MEDICINE CLINIC | Age: 59
End: 2019-02-19
Payer: COMMERCIAL

## 2019-02-19 ENCOUNTER — TELEPHONE (OUTPATIENT)
Dept: FAMILY MEDICINE CLINIC | Age: 59
End: 2019-02-19

## 2019-02-19 VITALS
DIASTOLIC BLOOD PRESSURE: 75 MMHG | HEART RATE: 67 BPM | SYSTOLIC BLOOD PRESSURE: 149 MMHG | WEIGHT: 265 LBS | HEIGHT: 73 IN | BODY MASS INDEX: 35.12 KG/M2 | OXYGEN SATURATION: 98 %

## 2019-02-19 DIAGNOSIS — I10 ESSENTIAL HYPERTENSION: ICD-10-CM

## 2019-02-19 DIAGNOSIS — E78.5 HYPERLIPIDEMIA WITH TARGET LDL LESS THAN 70: ICD-10-CM

## 2019-02-19 DIAGNOSIS — Z12.5 SCREENING PSA (PROSTATE SPECIFIC ANTIGEN): ICD-10-CM

## 2019-02-19 DIAGNOSIS — E11.42 TYPE 2 DIABETES MELLITUS WITH DIABETIC POLYNEUROPATHY, WITHOUT LONG-TERM CURRENT USE OF INSULIN (HCC): ICD-10-CM

## 2019-02-19 DIAGNOSIS — K59.01 SLOW TRANSIT CONSTIPATION: ICD-10-CM

## 2019-02-19 DIAGNOSIS — I50.42 CHRONIC COMBINED SYSTOLIC AND DIASTOLIC CHF (CONGESTIVE HEART FAILURE) (HCC): Primary | ICD-10-CM

## 2019-02-19 DIAGNOSIS — D64.9 ANEMIA, UNSPECIFIED TYPE: ICD-10-CM

## 2019-02-19 DIAGNOSIS — E11.65 TYPE 2 DIABETES MELLITUS WITH HYPERGLYCEMIA, WITHOUT LONG-TERM CURRENT USE OF INSULIN (HCC): ICD-10-CM

## 2019-02-19 PROCEDURE — 99215 OFFICE O/P EST HI 40 MIN: CPT | Performed by: FAMILY MEDICINE

## 2019-02-19 PROCEDURE — G8417 CALC BMI ABV UP PARAM F/U: HCPCS | Performed by: FAMILY MEDICINE

## 2019-02-19 PROCEDURE — 3017F COLORECTAL CA SCREEN DOC REV: CPT | Performed by: FAMILY MEDICINE

## 2019-02-19 PROCEDURE — G8598 ASA/ANTIPLAT THER USED: HCPCS | Performed by: FAMILY MEDICINE

## 2019-02-19 PROCEDURE — 3046F HEMOGLOBIN A1C LEVEL >9.0%: CPT | Performed by: FAMILY MEDICINE

## 2019-02-19 PROCEDURE — 2022F DILAT RTA XM EVC RTNOPTHY: CPT | Performed by: FAMILY MEDICINE

## 2019-02-19 PROCEDURE — G8427 DOCREV CUR MEDS BY ELIG CLIN: HCPCS | Performed by: FAMILY MEDICINE

## 2019-02-19 PROCEDURE — 1036F TOBACCO NON-USER: CPT | Performed by: FAMILY MEDICINE

## 2019-02-19 PROCEDURE — G8482 FLU IMMUNIZE ORDER/ADMIN: HCPCS | Performed by: FAMILY MEDICINE

## 2019-02-19 RX ORDER — HYDRALAZINE HYDROCHLORIDE 50 MG/1
50 TABLET, FILM COATED ORAL 3 TIMES DAILY
Qty: 90 TABLET | Refills: 0
Start: 2019-02-19 | End: 2019-04-11 | Stop reason: SDUPTHER

## 2019-02-19 RX ORDER — SPIRONOLACTONE 25 MG/1
25 TABLET ORAL DAILY
Qty: 90 TABLET | Refills: 0 | Status: SHIPPED | OUTPATIENT
Start: 2019-02-19 | End: 2019-04-11 | Stop reason: SDUPTHER

## 2019-02-19 RX ORDER — HYDRALAZINE HYDROCHLORIDE 50 MG/1
50 TABLET, FILM COATED ORAL
COMMUNITY
Start: 2019-02-13 | End: 2019-02-19 | Stop reason: SDUPTHER

## 2019-02-19 RX ORDER — CHLORTHALIDONE 25 MG/1
25 TABLET ORAL EVERY MORNING
Qty: 90 TABLET | Refills: 0 | Status: SHIPPED | OUTPATIENT
Start: 2019-02-19 | End: 2019-04-11 | Stop reason: SDUPTHER

## 2019-02-19 RX ORDER — SPIRONOLACTONE 25 MG/1
25 TABLET ORAL
COMMUNITY
Start: 2019-02-13 | End: 2019-02-19 | Stop reason: SDUPTHER

## 2019-02-19 RX ORDER — ISOSORBIDE MONONITRATE 30 MG/1
30 TABLET, EXTENDED RELEASE ORAL DAILY
Qty: 90 TABLET | Refills: 0
Start: 2019-02-19 | End: 2019-04-11

## 2019-02-19 ASSESSMENT — ENCOUNTER SYMPTOMS
NAUSEA: 0
ABDOMINAL PAIN: 0
ABDOMINAL DISTENTION: 1
CHEST TIGHTNESS: 0
VOMITING: 0
COUGH: 0
WHEEZING: 0
SHORTNESS OF BREATH: 0
CONSTIPATION: 1
DIARRHEA: 0

## 2019-02-22 ENCOUNTER — TELEPHONE (OUTPATIENT)
Dept: FAMILY MEDICINE CLINIC | Age: 59
End: 2019-02-22

## 2019-02-25 ENCOUNTER — TELEPHONE (OUTPATIENT)
Dept: FAMILY MEDICINE CLINIC | Age: 59
End: 2019-02-25

## 2019-02-25 DIAGNOSIS — E11.42 TYPE 2 DIABETES MELLITUS WITH DIABETIC POLYNEUROPATHY, WITHOUT LONG-TERM CURRENT USE OF INSULIN (HCC): Primary | ICD-10-CM

## 2019-02-25 PROBLEM — D64.9 ANEMIA: Status: ACTIVE | Noted: 2019-02-25

## 2019-02-25 PROBLEM — I50.42 CHRONIC COMBINED SYSTOLIC AND DIASTOLIC CHF (CONGESTIVE HEART FAILURE) (HCC): Status: ACTIVE | Noted: 2018-07-23

## 2019-02-25 RX ORDER — MULTIVIT-MIN/IRON FUM/FOLIC AC 7.5 MG-4
1 TABLET ORAL DAILY
Qty: 90 TABLET | Refills: 0
Start: 2019-02-25 | End: 2020-01-02 | Stop reason: SDUPTHER

## 2019-02-25 RX ORDER — FOLIC ACID 1 MG/1
1 TABLET ORAL DAILY
Qty: 90 TABLET | Refills: 0
Start: 2019-02-25 | End: 2019-04-11

## 2019-02-26 LAB
ALBUMIN SERPL-MCNC: 4.3 G/DL
ALP BLD-CCNC: 88 U/L
ALT SERPL-CCNC: 22 U/L
ANION GAP SERPL CALCULATED.3IONS-SCNC: 10 MMOL/L
AST SERPL-CCNC: 18 U/L
BASOPHILS ABSOLUTE: ABNORMAL /ΜL
BASOPHILS RELATIVE PERCENT: ABNORMAL %
BILIRUB SERPL-MCNC: 0.6 MG/DL (ref 0.1–1.4)
BUN BLDV-MCNC: 28 MG/DL
CALCIUM SERPL-MCNC: 9.8 MG/DL
CHLORIDE BLD-SCNC: 102 MMOL/L
CO2: 27 MMOL/L
CREAT SERPL-MCNC: 1.25 MG/DL
EOSINOPHILS ABSOLUTE: ABNORMAL /ΜL
EOSINOPHILS RELATIVE PERCENT: ABNORMAL %
GFR CALCULATED: 60
GLUCOSE BLD-MCNC: 156 MG/DL
HCT VFR BLD CALC: 37.4 % (ref 41–53)
HEMOGLOBIN: 12.3 G/DL (ref 13.5–17.5)
LYMPHOCYTES ABSOLUTE: ABNORMAL /ΜL
LYMPHOCYTES RELATIVE PERCENT: ABNORMAL %
MCH RBC QN AUTO: 29.2 PG
MCHC RBC AUTO-ENTMCNC: 33 G/DL
MCV RBC AUTO: 89 FL
MONOCYTES ABSOLUTE: ABNORMAL /ΜL
MONOCYTES RELATIVE PERCENT: ABNORMAL %
NEUTROPHILS ABSOLUTE: ABNORMAL /ΜL
NEUTROPHILS RELATIVE PERCENT: ABNORMAL %
PLATELET # BLD: 181 K/ΜL
PMV BLD AUTO: 10.6 FL
POTASSIUM SERPL-SCNC: 3.9 MMOL/L
PROSTATE SPECIFIC ANTIGEN: 1.43 NG/ML
RBC # BLD: 4.22 10^6/ΜL
SODIUM BLD-SCNC: 139 MMOL/L
TOTAL PROTEIN: 8.1
TSH SERPL DL<=0.05 MIU/L-ACNC: 1.26 UIU/ML
WBC # BLD: 6.1 10^3/ML

## 2019-02-27 DIAGNOSIS — I10 ESSENTIAL HYPERTENSION: ICD-10-CM

## 2019-02-27 DIAGNOSIS — Z12.5 SCREENING PSA (PROSTATE SPECIFIC ANTIGEN): ICD-10-CM

## 2019-02-27 DIAGNOSIS — D64.9 ANEMIA, UNSPECIFIED TYPE: ICD-10-CM

## 2019-02-27 DIAGNOSIS — I50.42 CHRONIC COMBINED SYSTOLIC AND DIASTOLIC CHF (CONGESTIVE HEART FAILURE) (HCC): ICD-10-CM

## 2019-02-27 DIAGNOSIS — E11.42 TYPE 2 DIABETES MELLITUS WITH DIABETIC POLYNEUROPATHY, WITHOUT LONG-TERM CURRENT USE OF INSULIN (HCC): ICD-10-CM

## 2019-02-27 DIAGNOSIS — E78.5 HYPERLIPIDEMIA WITH TARGET LDL LESS THAN 70: ICD-10-CM

## 2019-03-18 RX ORDER — NITROGLYCERIN 0.4 MG/1
TABLET SUBLINGUAL
Qty: 25 TABLET | Refills: 0 | Status: SHIPPED | OUTPATIENT
Start: 2019-03-18 | End: 2019-10-04 | Stop reason: SDUPTHER

## 2019-03-19 ENCOUNTER — TELEPHONE (OUTPATIENT)
Dept: FAMILY MEDICINE CLINIC | Age: 59
End: 2019-03-19

## 2019-04-11 ENCOUNTER — CARE COORDINATION (OUTPATIENT)
Dept: CARE COORDINATION | Age: 59
End: 2019-04-11

## 2019-04-11 ENCOUNTER — OFFICE VISIT (OUTPATIENT)
Dept: FAMILY MEDICINE CLINIC | Age: 59
End: 2019-04-11
Payer: COMMERCIAL

## 2019-04-11 VITALS
WEIGHT: 251.4 LBS | OXYGEN SATURATION: 99 % | DIASTOLIC BLOOD PRESSURE: 79 MMHG | SYSTOLIC BLOOD PRESSURE: 132 MMHG | HEIGHT: 73 IN | HEART RATE: 60 BPM | BODY MASS INDEX: 33.32 KG/M2

## 2019-04-11 DIAGNOSIS — E78.5 HYPERLIPIDEMIA WITH TARGET LDL LESS THAN 70: ICD-10-CM

## 2019-04-11 DIAGNOSIS — Z01.84 IMMUNITY STATUS TESTING: ICD-10-CM

## 2019-04-11 DIAGNOSIS — I50.42 CHRONIC COMBINED SYSTOLIC AND DIASTOLIC CHF (CONGESTIVE HEART FAILURE) (HCC): ICD-10-CM

## 2019-04-11 DIAGNOSIS — D64.9 ANEMIA, UNSPECIFIED TYPE: ICD-10-CM

## 2019-04-11 DIAGNOSIS — N52.9 ERECTILE DYSFUNCTION, UNSPECIFIED ERECTILE DYSFUNCTION TYPE: ICD-10-CM

## 2019-04-11 DIAGNOSIS — E11.42 TYPE 2 DIABETES MELLITUS WITH DIABETIC POLYNEUROPATHY, WITHOUT LONG-TERM CURRENT USE OF INSULIN (HCC): Primary | ICD-10-CM

## 2019-04-11 DIAGNOSIS — I10 ESSENTIAL HYPERTENSION: ICD-10-CM

## 2019-04-11 DIAGNOSIS — Z23 NEED FOR PROPHYLACTIC VACCINATION AND INOCULATION AGAINST VARICELLA: ICD-10-CM

## 2019-04-11 DIAGNOSIS — E11.65 TYPE 2 DIABETES MELLITUS WITH HYPERGLYCEMIA, WITHOUT LONG-TERM CURRENT USE OF INSULIN (HCC): ICD-10-CM

## 2019-04-11 DIAGNOSIS — F33.41 RECURRENT MAJOR DEPRESSIVE DISORDER, IN PARTIAL REMISSION (HCC): ICD-10-CM

## 2019-04-11 LAB — HBA1C MFR BLD: 8.8 %

## 2019-04-11 PROCEDURE — 1036F TOBACCO NON-USER: CPT | Performed by: FAMILY MEDICINE

## 2019-04-11 PROCEDURE — G8427 DOCREV CUR MEDS BY ELIG CLIN: HCPCS | Performed by: FAMILY MEDICINE

## 2019-04-11 PROCEDURE — 3017F COLORECTAL CA SCREEN DOC REV: CPT | Performed by: FAMILY MEDICINE

## 2019-04-11 PROCEDURE — 99214 OFFICE O/P EST MOD 30 MIN: CPT | Performed by: FAMILY MEDICINE

## 2019-04-11 PROCEDURE — 3045F PR MOST RECENT HEMOGLOBIN A1C LEVEL 7.0-9.0%: CPT | Performed by: FAMILY MEDICINE

## 2019-04-11 PROCEDURE — 2022F DILAT RTA XM EVC RTNOPTHY: CPT | Performed by: FAMILY MEDICINE

## 2019-04-11 PROCEDURE — G8417 CALC BMI ABV UP PARAM F/U: HCPCS | Performed by: FAMILY MEDICINE

## 2019-04-11 PROCEDURE — G8598 ASA/ANTIPLAT THER USED: HCPCS | Performed by: FAMILY MEDICINE

## 2019-04-11 PROCEDURE — 83036 HEMOGLOBIN GLYCOSYLATED A1C: CPT | Performed by: FAMILY MEDICINE

## 2019-04-11 RX ORDER — AMLODIPINE BESYLATE 10 MG/1
TABLET ORAL
Qty: 90 TABLET | Refills: 3 | Status: SHIPPED | OUTPATIENT
Start: 2019-04-11 | End: 2019-09-25 | Stop reason: SDUPTHER

## 2019-04-11 RX ORDER — CARVEDILOL 25 MG/1
25 TABLET ORAL 2 TIMES DAILY WITH MEALS
Qty: 180 TABLET | Refills: 3 | Status: SHIPPED | OUTPATIENT
Start: 2019-04-11 | End: 2019-09-25 | Stop reason: SDUPTHER

## 2019-04-11 RX ORDER — HYDRALAZINE HYDROCHLORIDE 50 MG/1
50 TABLET, FILM COATED ORAL 3 TIMES DAILY
Qty: 90 TABLET | Refills: 3 | Status: SHIPPED | OUTPATIENT
Start: 2019-04-11 | End: 2019-09-25 | Stop reason: SDUPTHER

## 2019-04-11 RX ORDER — SPIRONOLACTONE 25 MG/1
25 TABLET ORAL EVERY MORNING
Qty: 90 TABLET | Refills: 1 | Status: SHIPPED | OUTPATIENT
Start: 2019-04-11 | End: 2019-09-25 | Stop reason: SDUPTHER

## 2019-04-11 RX ORDER — ASPIRIN 81 MG/1
81 TABLET ORAL DAILY
Qty: 90 TABLET | Refills: 3 | Status: SHIPPED | OUTPATIENT
Start: 2019-04-11 | End: 2019-09-25 | Stop reason: SDUPTHER

## 2019-04-11 RX ORDER — METFORMIN HYDROCHLORIDE 500 MG/1
500 TABLET, EXTENDED RELEASE ORAL 2 TIMES DAILY
Qty: 180 TABLET | Refills: 3 | Status: SHIPPED | OUTPATIENT
Start: 2019-04-11 | End: 2019-09-25 | Stop reason: SDUPTHER

## 2019-04-11 RX ORDER — CHLORTHALIDONE 25 MG/1
25 TABLET ORAL EVERY MORNING
Qty: 90 TABLET | Refills: 1 | Status: SHIPPED | OUTPATIENT
Start: 2019-04-11 | End: 2019-09-25 | Stop reason: SDUPTHER

## 2019-04-11 RX ORDER — ATORVASTATIN CALCIUM 40 MG/1
40 TABLET, FILM COATED ORAL DAILY
Qty: 90 TABLET | Refills: 3 | Status: SHIPPED | OUTPATIENT
Start: 2019-04-11 | End: 2019-09-25 | Stop reason: SDUPTHER

## 2019-04-11 ASSESSMENT — ENCOUNTER SYMPTOMS
VOMITING: 0
NAUSEA: 0
COUGH: 0
ABDOMINAL DISTENTION: 0
CONSTIPATION: 0
SHORTNESS OF BREATH: 1
ABDOMINAL PAIN: 0
WHEEZING: 0
CHEST TIGHTNESS: 0
DIARRHEA: 0

## 2019-04-11 ASSESSMENT — PATIENT HEALTH QUESTIONNAIRE - PHQ9
1. LITTLE INTEREST OR PLEASURE IN DOING THINGS: 0
2. FEELING DOWN, DEPRESSED OR HOPELESS: 0
SUM OF ALL RESPONSES TO PHQ9 QUESTIONS 1 & 2: 0
SUM OF ALL RESPONSES TO PHQ QUESTIONS 1-9: 0
SUM OF ALL RESPONSES TO PHQ QUESTIONS 1-9: 0

## 2019-04-11 NOTE — PROGRESS NOTES
Chief Complaint   Patient presents with    Diabetes    Hypertension    Hyperlipidemia    Discuss Labs    Other     did see cardio, not podiatry or dm ed        Chen Cabello  here today for follow up on chronic medical problems, go over labs and/or diagnostic studies, and medication refills. Diabetes; Hypertension; Hyperlipidemia; Discuss Labs; and Other (did see cardio, not podiatry or dm ed )      Comes with his daughter  Dipti Ennis for which I did FMLA for her to attend his appointments and to improve his compliance. She shows me the notebook that she bought for her father, there is nothing written in it. He still didn't bring the blood glucose log but he remembers the last values. He assures me that he is taking his medications. The daughter tells me she is placing the medications in a pillbox and makes sure he is taking them. She says she is checking everyday with him. Patient was also referred to care coordination. Diabetes Mellitus Type II, Follow-up: Patient here for follow-up ofType 2 diabetes mellitus. Current symptoms/problems include hyperglycemia, paresthesia of the feet and visual disturbances and have been improving. Symptoms have been present for several years. Known diabetic complications: nephropathy, peripheral neuropathy, impotence and cardiovascular disease   Today he complains a lot about erectile dysfunction not getting better, has been having it for about 2 years. He is agreeable for referral to urologist  I referred him to podiatry he never went    Cardiovascular risk factors: advanced age (older than 54 for men, 72 for women), diabetes mellitus, dyslipidemia, hypertension, male gender, microalbuminuria, obesity (BMI >= 30 kg/m2), sedentary lifestyle and smoking/ tobacco exposure    Medication compliance:  compliant all of the time  Current diabetic medications include oral agent (monotherapy): Glucophage.      Eye exam current (within one year): no   I refer him to ophthalmologist but he was never seen. Weight trend: decreasing rapidly  Wt Readings from Last 3 Encounters:   04/11/19 251 lb 6.4 oz (114 kg)   02/19/19 265 lb (120.2 kg)   01/11/19 266 lb (120.7 kg)       Prior visit with dietician: no  Current diet: in general, a \"healthy\" diet    Current exercise: no regular exercise  Barriers: impairment:  cognitive and lack of motivation    Current monitoring regimen: home blood tests - daily  Home blood sugar records: fasting range: 150, 135  Any episodes of hypoglycemia? no    Is He on ACE inhibitor or angiotensin II receptor blocker? NO     reports that he quit smoking about 38 years ago. His smoking use included cigarettes. He has a 1.00 pack-year smoking history. He has quit using smokeless tobacco.     Counseling given: Yes      BP Readings from Last 3 Encounters:   04/11/19 132/79   02/19/19 (!) 149/75   01/11/19 (!) 191/101       Daily Aspirin? Yes    A1c Improving     Urine microabumin high. Lab Results   Component Value Date    LABA1C 8.8 04/11/2019    LABA1C 9.8 01/11/2019    LABA1C 11.0 05/21/2018     Lab Results   Component Value Date    LABMICR 39 (H) 10/13/2016    CREATININE 1.25 02/26/2019     Lab Results   Component Value Date    ALT 22 02/26/2019    AST 18 02/26/2019     Lab Results   Component Value Date    CHOL 177 01/16/2019    TRIG 73 01/16/2019    HDL 43 01/16/2019    LDLCALC 119 01/16/2019        Lab Results   Component Value Date    LDLCALC 119 01/16/2019    LDLCHOLESTEROL 94 10/13/2016         Hypertension, congestive heart failure, cardiomyopathy, coronary artery disease   : Patient here for follow-up of elevated blood pressure. He is not  exercising and is adherent to low salt diet. Blood pressure is well controlled at home. Cardiac symptoms  dyspnea and fatigue, Improving .  Patient denies  chest pain, chest pressure/discomfort, claudication, exertional chest pressure/discomfort, irregular heart beat, lower extremity edema, near-syncope, orthopnea, palpitations, paroxysmal nocturnal dyspnea, syncope and tachypnea. Cardiovascular risk factors: advanced age (older than 54 for men, 72 for women), diabetes mellitus, dyslipidemia, hypertension, male gender, microalbuminuria, obesity (BMI >= 30 kg/m2), sedentary lifestyle and smoking/ tobacco exposure. Use of agents associated with hypertension: none. History of target organ damage: angina/ prior myocardial infarction, heart failure, left ventricular hypertrophy and prior coronary revascularization. Patient has no stents. He had 2 admissions due to acute chronic CHF in the May 2018. He was then admitted again for CHF at [facility] 1/15/19 through 1/17/19 and again 1/21/19 through 1/24/19. Now admits that he wasn't been taking medications at that time, but now he is taking them  He tells me has appointment with cardiologist at [facility]    [address] showed 50% stenosis, EF 45%    Lab Results   Component Value Date    LVEF 30 01/16/2019    LVEFMODE Echo 01/16/2019         BP controlled. Robertnahed Rubalcava reports compliance with BP medications, and tolerates them well, denies side effects. BP Readings from Last 3 Encounters:   04/11/19 132/79   02/19/19 (!) 149/75   01/11/19 (!) 191/101        Pulse Readings from Last 3 Encounters:   04/11/19 60   02/19/19 67   01/11/19 65         Hyperlipidemia:  No new myalgias or GI upset on atorvastatin (Lipitor). Medication compliance: compliant all of the time. Patient is  following a low fat, low cholesterol diet. He is not exercising regularly. Lab Results   Component Value Date    CHOL 177 01/16/2019    TRIG 73 01/16/2019    HDL 43 01/16/2019    LDLCALC 119 01/16/2019     Lab Results   Component Value Date    ALT 22 02/26/2019    AST 18 02/26/2019          Lab Results   Component Value Date    LDLCALC 119 01/16/2019    LDLCHOLESTEROL 94 10/13/2016         Depression is better.   Patient says he is feeling much better since he is able to breathe well and doesn't feel so sick anymore  Denies suicidal ideation, plan or intent. He says all his 4 daughters are checking on him. PHQ-2 Over the past 2 weeks, how often have you been bothered by any of the following problems? Little interest or pleasure in doing things: Not at all  Feeling down, depressed, or hopeless: Not at all  PHQ-2 Score: 0  PHQ-9 Over the past 2 weeks, how often have you been bothered by any of the following problems?   PHQ-9 Total Score: 0      PHQ Scores 4/11/2019 1/11/2019 10/31/2017 7/24/2017 11/18/2016   PHQ2 Score 0 3 0 3 0   PHQ9 Score 0 9 0 8 0     Interpretation of Total Score Depression Severity: 1-4 = Minimal depression, 5-9 = Mild depression, 10-14 = Moderate depression, 15-19 = Moderately severe depression, 20-27 = Severe depression        Current Outpatient Medications   Medication Sig Dispense Refill    nitroGLYCERIN (NITROSTAT) 0.4 MG SL tablet PLACE 1 TABLET UNDER THE TONGUE EVERY 5 MINUTES AS NEEDED FOR CHEST PAIN - MAXIMUM OF 3 TIMES 25 tablet 0    blood glucose test strips (FREESTYLE LITE) strip Checking Blood Glucose twice a day 100 strip 3    hydrALAZINE (APRESOLINE) 50 MG tablet Take 1 tablet by mouth 3 times daily BP medication 90 tablet 0    spironolactone (ALDACTONE) 25 MG tablet Take 1 tablet by mouth daily BP and CHF 90 tablet 0    chlorthalidone (HYGROTON) 25 MG tablet Take 1 tablet by mouth every morning Water pill and BP pill 90 tablet 0    metFORMIN (GLUCOPHAGE) 500 MG tablet Take 1 tablet by mouth 2 times daily (with meals) Per home nurse 60 tablet 0    carvedilol (COREG) 25 MG tablet Take 1 tablet by mouth 2 times daily (with meals) 180 tablet 3    amLODIPine (NORVASC) 10 MG tablet TAKE ONE TABLET BY MOUTH EVERY DAY 90 tablet 3    atorvastatin (LIPITOR) 40 MG tablet Take 1 tablet by mouth daily 90 tablet 3    aspirin EC 81 MG EC tablet Take 1 tablet by mouth daily 90 tablet 3    EQ ACETAMINOPHEN 500 MG tablet TAKE ONE TABLET BY MOUTH EVERY 6 HOURS AS NEEDED FOR PAIN 120 tablet 0    Multiple Vitamins-Minerals (MULTIVITAMIN WITH MINERALS) tablet Take 1 tablet by mouth daily 90 tablet 0     No current facility-administered medications for this visit. No Known Allergies     Rest of complaints with corresponding details per ROS      The patient'spast medical, surgical, social, and family history as well as his current medications and allergies were reviewed as documented in today's encounter. Social History     Socioeconomic History    Marital status: Single     Spouse name: Not on file    Number of children: Not on file    Years of education: Not on file    Highest education level: Not on file   Occupational History    Not on file   Social Needs    Financial resource strain: Not on file    Food insecurity:     Worry: Not on file     Inability: Not on file    Transportation needs:     Medical: Not on file     Non-medical: Not on file   Tobacco Use    Smoking status: Former Smoker     Packs/day: 0.50     Years: 2.00     Pack years: 1.00     Types: Cigarettes     Last attempt to quit: 1981     Years since quittin.2    Smokeless tobacco: Former User   Substance and Sexual Activity    Alcohol use:  Yes     Alcohol/week: 0.6 - 1.2 oz     Types: 1 - 2 Cans of beer per week     Comment: 1-2 cans a month    Drug use: Yes     Types: Marijuana     Comment: marijuana every day     Sexual activity: Not on file   Lifestyle    Physical activity:     Days per week: Not on file     Minutes per session: Not on file    Stress: Not on file   Relationships    Social connections:     Talks on phone: Not on file     Gets together: Not on file     Attends Baptism service: Not on file     Active member of club or organization: Not on file     Attends meetings of clubs or organizations: Not on file     Relationship status: Not on file    Intimate partner violence:     Fear of current or ex partner: Not on file Emotionally abused: Not on file     Physically abused: Not on file     Forced sexual activity: Not on file   Other Topics Concern    Not on file   Social History Narrative    Not on file     Counseling given: Yes                      Review of Systems   Constitutional: Positive for fatigue and unexpected weight change (lost). Negative for activity change, appetite change, chills, diaphoresis and fever. Eyes: Positive for visual disturbance (chronic). Respiratory: Positive for shortness of breath (NIX). Negative for cough, chest tightness and wheezing. Cardiovascular: Negative for chest pain, palpitations and leg swelling. Gastrointestinal: Negative for abdominal distention, abdominal pain, constipation, diarrhea, nausea and vomiting. Endocrine: Negative for cold intolerance, heat intolerance, polydipsia, polyphagia and polyuria. Genitourinary: Negative for difficulty urinating, frequency and urgency. Erectile Dysfunction    Allergic/Immunologic: Positive for immunocompromised state (due to diabetes). Neurological: Positive for numbness. Psychiatric/Behavioral: Negative for dysphoric mood and sleep disturbance. The patient is not nervous/anxious.          -vital signs stable and within normal limits except Obesity per BMI. /79   Pulse 60   Ht 6' 1\" (1.854 m)   Wt 251 lb 6.4 oz (114 kg)   SpO2 99%   BMI 33.17 kg/m²         Physical Exam   Constitutional: He is oriented to person, place, and time. He appears well-developed and well-nourished. No distress. HENT:   Head: Normocephalic and atraumatic. Right Ear: External ear normal.   Left Ear: External ear normal.   Nose: Nose normal.   Mouth/Throat: Oropharynx is clear and moist. No oropharyngeal exudate. Eyes: Conjunctivae and EOM are normal. Right eye exhibits no discharge. Left eye exhibits no discharge. No scleral icterus. Neck: Normal range of motion. Neck supple. No thyromegaly present.    Cardiovascular: Normal rate, regular rhythm and intact distal pulses. Murmur heard. Crescendo systolic murmur is present with a grade of 3/6. Pulmonary/Chest: Effort normal and breath sounds normal. No respiratory distress. He has no wheezes. He has no rales. He exhibits no tenderness. Abdominal: Soft. Bowel sounds are normal. He exhibits no distension. There is no tenderness. Obese abdomen. Musculoskeletal: Normal range of motion. He exhibits no edema or tenderness. Neurological: He is alert and oriented to person, place, and time. No cranial nerve deficit. He exhibits normal muscle tone. Coordination normal.   Skin: Skin is warm and dry. Capillary refill takes less than 2 seconds. No rash (.wellc) noted. He is not diaphoretic. Psychiatric: His behavior is normal. Judgment and thought content normal. His mood appears anxious. Nursing note and vitals reviewed. Discussed testing with the patient and all questions fully answered. I personally reviewed testing with patient. Hyperglycemia not well controlled  Mild acute kidney injury  Anemia, I referred him to GI specialist but he didn't go yet. He denies blood in the stool. He reports constipation has resolved since he has been taking stool softeners.   Hyperlipidemia improving  PSA increasing  Otherwise labs within normal limits    Orders Only on 02/27/2019   Component Date Value Ref Range Status    Sodium 02/26/2019 139  mmol/L Final    Chloride 02/26/2019 102  mmol/L Final    Potassium 02/26/2019 3.9  mmol/L Final    BUN 02/26/2019 28* mg/dL Final    CREATININE 02/26/2019 1.25   Final    Glucose 02/26/2019 156* mg/dL Final    AST 02/26/2019 18  U/L Final    ALT 02/26/2019 22  U/L Final    Calcium 02/26/2019 9.8  mg/dL Final    Total Protein 02/26/2019 8.1*  Final    CO2 02/26/2019 27  mmol/L Final    Alb 02/26/2019 4.3   Final    Alkaline Phosphatase 02/26/2019 88  U/L Final    Total Bilirubin 02/26/2019 0.6  0.1 - 1.4 mg/dL Final    Gfr Calculated 02/26/2019 60   Final    Anion Gap 02/26/2019 10  mmol/L Final    WBC 02/26/2019 6.1  10^3/mL Final    Hemoglobin 02/26/2019 12.3* 13.5 - 17.5 g/dL Final    Hematocrit 02/26/2019 37.4* 41 - 53 % Final    Platelets 57/88/0586 181  K/µL Final    RBC 02/26/2019 4.22* 10^6/µL Final    MCV 02/26/2019 89  fL Final    MCH 02/26/2019 29.2  pg Final    MCHC 02/26/2019 33.0  g/dL Final    MPV 02/26/2019 10.6  fL Final    PSA 02/26/2019 1.43  ng/mL Final    TSH 02/26/2019 1.26  uIU/mL Final             Lab Results   Component Value Date    CHOL 177 01/16/2019    CHOL 155 10/13/2016    CHOL 182 08/06/2013     Lab Results   Component Value Date    TRIG 73 01/16/2019    TRIG 78 10/13/2016    TRIG 99 08/06/2013     Lab Results   Component Value Date    HDL 43 01/16/2019    HDL 45 10/13/2016    HDL 45 08/06/2013     Lab Results   Component Value Date    LDLCALC 119 01/16/2019    LDLCHOLESTEROL 94 10/13/2016    LDLCHOLESTEROL 117 (H) 08/06/2013    LDLCHOLESTEROL 116 (H) 02/06/2012     Lab Results   Component Value Date    VLDL 15 01/16/2019    VLDL NOT REPORTED 10/13/2016    VLDL NOT REPORTED 08/06/2013     Lab Results   Component Value Date    CHOLHDLRATIO 4.1 01/16/2019    CHOLHDLRATIO 3.4 10/13/2016    CHOLHDLRATIO 4.0 08/06/2013         Lab Results   Component Value Date    FNNBISDD88 668 10/13/2016     Lab Results   Component Value Date    FOLATE 8.4 10/13/2016     No results found for: VITD25    Lab Results   Component Value Date    PSA 1.43 02/26/2019    PSA 1.19 02/17/2017    PSA 0.53 08/06/2013         ASSESSMENT AND PLAN  1. Type 2 diabetes mellitus with diabetic polyneuropathy, without long-term current use of insulin (HCC)  Improving   - Hepatitis B Surface Antibody; Future  - aspirin EC 81 MG EC tablet; Take 1 tablet by mouth daily  Dispense: 90 tablet;  Refill: 3  - POCT glycosylated hemoglobin (Hb A1C)  Lab Results   Component Value Date    LABA1C 8.8 04/11/2019    LABA1C 9.8 01/11/2019    LABA1C 11.0 05/21/2018       - Microalbumin / Creatinine Urine Ratio; Future  - Basic Metabolic Panel; Future  - Vitamin B12 & Folate; Future  - [facility] Diabetes Education - [address]    -advised home blood glucose testing  daily  -daily feet exam, Foot care: advised to wash feet daily, pat dry and apply lotion at night, avoiding between toes. Need to look at feet daily and report to a physician any signs of inflammation or skin damage  -annual dilated eye exam  -Low carb, low fat diet, increase fruits and vegetables, and exercise 4-5 times a day 30-40 minutes a day discussed  Change metformin short-acting to metformin  twice a day    -continue Aspirin  -continue  statin  Not on ACE inhibitor due to being on Aldactone, high risk for hyperkalemia  Will not start another SGLT as he developed hypoglycemia with Steglatro and he can get confused again    2. Essential hypertension  Well controlled  Discussed low salt diet and BP and pulse monitoring daily, BP log given  Continue current treatment. - amLODIPine (NORVASC) 10 MG tablet; TAKE ONE TABLET BY MOUTH EVERY DAY  Dispense: 90 tablet; Refill: 3  - carvedilol (COREG) 25 MG tablet; Take 1 tablet by mouth 2 times daily (with meals)  Dispense: 180 tablet; Refill: 3  - chlorthalidone (HYGROTON) 25 MG tablet; Take 1 tablet by mouth every morning Water pill and BP pill  Dispense: 90 tablet; Refill: 1  - hydrALAZINE (APRESOLINE) 50 MG tablet; Take 1 tablet by mouth 3 times daily BP medication  Dispense: 90 tablet; Refill: 3  - spironolactone (ALDACTONE) 25 MG tablet; Take 1 tablet by mouth every morning BP and CHF  Dispense: 90 tablet; Refill: 1  - Basic Metabolic Panel; Future    3. Chronic combined systolic and diastolic CHF (congestive heart failure) (HCC)  Stable  Lab Results   Component Value Date    LVEF 30 01/16/2019    LVEFMODE Echo 01/16/2019     - chlorthalidone (HYGROTON) 25 MG tablet;  Take 1 tablet by mouth every morning Water pill and BP pill  Dispense: 90 tablet; Refill: 1  - hydrALAZINE (APRESOLINE) 50 MG tablet; Take 1 tablet by mouth 3 times daily BP medication  Dispense: 90 tablet; Refill: 3  - spironolactone (ALDACTONE) 25 MG tablet; Take 1 tablet by mouth every morning BP and CHF  Dispense: 90 tablet; Refill: 1 - [address] [facility]  Follow up with [facility]ologist at [facility]  Will refer to [facility]  CHF counseling  Discussed low sodium  diet; patient verbalized understanding. Moderate daily exercise encouraged as tolerated. Discussed rest breaks as needed; patient verbalized understanding. Patient instructed to weigh self at the same time of each day, using same clothes and same scale; reinforced teaching to monitor for  3-5 lbs  unintentional weight gain over  2-3 days to notify the physician office if weight change noted. Signs and symptoms of CHF discussed with patient, such as feeling more tired than normal, feeling short of breath, coughing that increases when you lie down, sudden weight gain, swelling of your feet, legs or belly. Patient verbalized understanding to notify the physician office if these symptoms occur. Compliance with plan of care and further disease process causes discussed with patient, patient encouraged to keep all follow up appointments. Patient verbalized understanding. 4. Type 2 diabetes mellitus with hyperglycemia, without long-term current use of insulin (HCC)  Improving  Change metformin short-acting to metformin  twice a day  - [facility] Diabetes Education - [employer]    5. Hyperlipidemia with target LDL less than 70  Improving   - atorvastatin (LIPITOR) 40 MG tablet; Take 1 tablet by mouth daily  Dispense: 90 tablet; Refill: 3    6. Need for prophylactic vaccination and inoculation against varicella    - zoster recombinant adjuvanted vaccine [facility]) 50 MCG/0.5ML SUSR injection; Inject 0.5 mLs into the muscle once for 1 dose 50 MCG IM then repeat 2-6 months. Dispense: 1 each; Refill: 1    7.  Immunity status testing  - Hepatitis B Surface Antibody; Future    8. Erectile dysfunction, unspecified erectile dysfunction type  Failing to change as expected. - Chip Fuller MD, Urology [place]    9. Anemia, unspecified type  Reprinted referral to  GI  and advised patient to schedule appointment with specialist.   - Vitamin B12 & Folate; Future  - CBC Auto Differential; Future  - Iron and TIBC; Future  - Ferritin; Future    10. Recurrent major depressive disorder, in partial remission ([address]  Improving   Will continue to monitor. Reprinted referral to Ophthalmology , GI and podiatry,  and advised patient to schedule appointment with specialist.     Sign up for proxy for daughter    Orders Placed This Encounter   Procedures    Hepatitis B Surface Antibody     Standing Status:   Future     Standing Expiration Date:   4/11/2020    Microalbumin / Creatinine Urine Ratio     Standing Status:   Future     Standing Expiration Date:   4/11/2020    Basic Metabolic Panel     Standing Status:   Future     Standing Expiration Date:   12/11/2019    Vitamin B12 & Folate     Standing Status:   Future     Standing Expiration Date:   12/11/2019    CBC Auto Differential     Standing Status:   Future     Standing Expiration Date:   12/11/2019    Iron and TIBC     Standing Status:   Future     Standing Expiration Date:   12/11/2019     Order Specific Question:   Is Patient Fasting? Answer:   yes     Order Specific Question:   No of Hours?      Answer:   8    Ferritin     Standing Status:   Future     Standing Expiration Date:   12/11/2019   Chip Fuller MD, Urology, [place]     Referral Priority:   Routine     Referral Type:   Eval and Treat     Referral Reason:   Specialty Services Required     Referred to Provider:   Giovanna Fuentes MD     Requested Specialty:   Urology     Number of Visits Requested:   [address] [facility]     Referral Priority:   Routine     Referral pill    hydrALAZINE (APRESOLINE) 50 MG tablet 90 tablet 3     Sig: Take 1 tablet by mouth 3 times daily BP medication    spironolactone (ALDACTONE) 25 MG tablet 90 tablet 1     Sig: Take 1 tablet by mouth every morning BP and CHF    zoster recombinant adjuvanted vaccine (SHINGRIX) 50 MCG/0.5ML SUSR injection 1 each 1     Sig: Inject 0.5 mLs into the muscle once for 1 dose 50 MCG IM then repeat 2-6 months.  metFORMIN (GLUCOPHAGE XR) 500 MG extended release tablet 180 tablet 3     Sig: Take 1 tablet by mouth 2 times daily Stop short acting Metformin. All patient questions answered. Patient voiced understanding. Quality Measures    Body mass index is 33.17 kg/m². Elevated. Weight control planned discussed conventional weight loss and Healthy diet and regular exercise. BP: 132/79 Blood pressure is normal. Treatment plan consists of Weight Reduction, DASH Eating Plan, Dietary Sodium Restriction, Increased Physical Activity, Patient In-home Blood Pressure Monitoring and No treatment change needed. Lab Results   Component Value Date    LDLCALC 119 01/16/2019    LDLCHOLESTEROL 94 10/13/2016    (goal LDL reduction with dx if diabetes is 50% LDL reduction)      PHQ Scores 4/11/2019 1/11/2019 10/31/2017 7/24/2017 11/18/2016   PHQ2 Score 0 3 0 3 0   PHQ9 Score 0 9 0 8 0     Interpretation of Total Score Depression Severity: 1-4 = Minimal depression, 5-9 = Mild depression, 10-14 = Moderate depression, 15-19 = Moderately severe depression, 20-27 = Severe depression      The patient's past medical,surgical, social, and family history as well as his   current medications and allergies were reviewed as documented in today's encounter. Medications, labs, diagnostic studies, consultations and follow-up asdocumented in this encounter. Return in about 3 months (around 7/11/2019) for Needs 30 mins ONLY FOR F/U chronic problems, **POC A1C, DM2, HTN, HLP.     Patient was seen with total face to face time of  25 minutes. More than 50% of this visit was counseling and education. Future Appointments   Date Time Provider Vianey Malagon   7/23/2019  3:45 PM [facility]       This note was completed by using the assistance of a speech-recognition program. However, inadvertent computerized transcription errors may be present. Although every effort was made to ensure accuracy, no guarantees can be provided that every mistake has been identified and corrected by editing .     Electronically signed by Guerda Samuel MD on 4/11/2019 at 6:43 PM

## 2019-04-11 NOTE — PATIENT INSTRUCTIONS
[address] [facility]    [address] [facility], [facility]   [phone]   Nura Christine, APRN-CNP    [address]   [phone] (Fax)         ???  Kathryn Arriaga MD Consulting Physician Cardiology 02/17/2017 End  2/17/17   Phone: [phone]; Fax: [phone]          Patient Education        Learning About Meal Planning for Diabetes  Why plan your meals? Meal planning can be a key part of managing diabetes. Planning meals and snacks with the right balance of carbohydrate, protein, and fat can help you keep your blood sugar at the target level you set with your doctor. You don't have to eat special foods. You can eat what your family eats, including sweets once in a while. But you do have to pay attention to how often you eat and how much you eat of certain foods. You may want to work with a dietitian or a certified diabetes educator. He or she can give you tips and meal ideas and can answer your questions about meal planning. This health professional can also help you reach a healthy weight if that is one of your goals. What plan is right for you? Your dietitian or diabetes educator may suggest that you start with the plate format or carbohydrate counting. The plate format  The plate format is a simple way to help you manage how you eat. You plan meals by learning how much space each food should take on a plate. Using the plate format helps you spread carbohydrate throughout the day. It can make it easier to keep your blood sugar level within your target range. It also helps you see if you're eating healthy portion sizes. To use the plate format, you put non-starchy vegetables on half your plate. Add meat or meat substitutes on one-quarter of the plate. Put a grain or starchy vegetable (such as brown rice or a potato) on the final quarter of the plate.  You can add a small piece of fruit and some low-fat or fat-free milk or yogurt, that don't come with labels, such as fresh fruits and vegetables, you'll need a guide that lists carbohydrate in these foods. Ask your doctor, dietitian, or diabetes educator about books or other nutrition guides you can use. If you take insulin, you need to know how many grams of carbohydrate are in a meal. This lets you know how much rapid-acting insulin to take before you eat. If you use an insulin pump, you get a constant rate of insulin during the day. So the pump must be programmed at meals to give you extra insulin to cover the rise in blood sugar after meals. When you know how much carbohydrate you will eat, you can take the right amount of insulin. Or, if you always use the same amount of insulin, you need to make sure that you eat the same amount of carbohydrate at meals. If you need more help to understand carbohydrate counting and food labels, ask your doctor, dietitian, or diabetes educator. How do you get started with meal planning? Here are some tips to get started:  · Plan your meals a week at a time. Don't forget to include snacks too. · Use cookbooks or online recipes to plan several main meals. Plan some quick meals for busy nights. You also can double some recipes that freeze well. Then you can save half for other busy nights when you don't have time to cook. · Make sure you have the ingredients you need for your recipes. If you're running low on basic items, put these items on your shopping list too. · List foods that you use to make breakfasts, lunches, and snacks. List plenty of fruits and vegetables. · Post this list on the refrigerator. Add to it as you think of more things you need. · Take the list to the store to do your weekly shopping. Follow-up care is a key part of your treatment and safety. Be sure to make and go to all appointments, and call your doctor if you are having problems.  It's also a good idea to know your test results and keep a list of the medicines you take.  Where can you learn more? Go to https://chpepiceweb.Swapferit. org and sign in to your [employer] account. Enter J772 in the [facility] box to learn more about \"Learning About Meal Planning for Diabetes. \"     If you do not have an account, please click on the \"Sign Up Now\" link. Current as of: July 25, 2018  Content Version: 11.9  © 0757-0136 [employer]. Care instructions adapted under license by [facility] ([facility]). If you have questions about a medical condition or this instruction, always ask your healthcare professional. [place] [address] any warranty or liability for your use of this information. Patient Education        Learning About Diabetes Food Guidelines  Your Care Instructions    Meal planning is important to manage diabetes. It helps keep your blood sugar at a target level (which you set with your doctor). You don't have to eat special foods. You can eat what your family eats, including sweets once in a while. But you do have to pay attention to how often you eat and how much you eat of certain foods. You may want to work with a dietitian or a certified diabetes educator (CDE) to help you plan meals and snacks. A dietitian or CDE can also help you lose weight if that is one of your goals. What should you know about eating carbs? Managing the amount of carbohydrate (carbs) you eat is an important part of healthy meals when you have diabetes. Carbohydrate is found in many foods. · Learn which foods have carbs. And learn the amounts of carbs in different foods. ? Bread, cereal, pasta, and rice have about 15 grams of carbs in a serving. A serving is 1 slice of bread (1 ounce), ½ cup of cooked cereal, or 1/3 cup of cooked pasta or rice. ? Fruits have 15 grams of carbs in a serving.  A serving is 1 small fresh fruit, such as an apple or orange; ½ of a banana; ½ cup of cooked or canned fruit; ½ cup of fruit juice; 1 cup of melon or raspberries; or 2 tablespoons of dried fruit. ? Milk and no-sugar-added yogurt have 15 grams of carbs in a serving. A serving is 1 cup of milk or 2/3 cup of no-sugar-added yogurt. ? Starchy vegetables have 15 grams of carbs in a serving. A serving is ½ cup of mashed potatoes or sweet potato; 1 cup winter squash; ½ of a small baked potato; ½ cup of cooked beans; or ½ cup cooked corn or green peas. · Learn how much carbs to eat each day and at each meal. A dietitian or CDE can teach you how to keep track of the amount of carbs you eat. This is called carbohydrate counting. · If you are not sure how to count carbohydrate grams, use the Plate Method to plan meals. It is a good, quick way to make sure that you have a balanced meal. It also helps you spread carbs throughout the day. ? Divide your plate by types of foods. Put non-starchy vegetables on half the plate, meat or other protein food on one-quarter of the plate, and a grain or starchy vegetable in the final quarter of the plate. To this you can add a small piece of fruit and 1 cup of milk or yogurt, depending on how many carbs you are supposed to eat at a meal.  · Try to eat about the same amount of carbs at each meal. Do not \"save up\" your daily allowance of carbs to eat at one meal.  · Proteins have very little or no carbs per serving. Examples of proteins are beef, chicken, turkey, fish, eggs, tofu, cheese, cottage cheese, and peanut butter. A serving size of meat is 3 ounces, which is about the size of a deck of cards. Examples of meat substitute serving sizes (equal to 1 ounce of meat) are 1/4 cup of cottage cheese, 1 egg, 1 tablespoon of peanut butter, and ½ cup of tofu. How can you eat out and still eat healthy? · Learn to estimate the serving sizes of foods that have carbohydrate. If you measure food at home, it will be easier to estimate the amount in a serving of restaurant food.   · If the meal you order has too much carbohydrate (such as potatoes, corn, or baked beans), ask to have a low-carbohydrate food instead. Ask for a salad or green vegetables. · If you use insulin, check your blood sugar before and after eating out to help you plan how much to eat in the future. · If you eat more carbohydrate at a meal than you had planned, take a walk or do other exercise. This will help lower your blood sugar. What else should you know? · Limit saturated fat, such as the fat from meat and dairy products. This is a healthy choice because people who have diabetes are at higher risk of heart disease. So choose lean cuts of meat and nonfat or low-fat dairy products. Use olive or canola oil instead of butter or shortening when cooking. · Don't skip meals. Your blood sugar may drop too low if you skip meals and take insulin or certain medicines for diabetes. · Check with your doctor before you drink alcohol. Alcohol can cause your blood sugar to drop too low. Alcohol can also cause a bad reaction if you take certain diabetes medicines. Follow-up care is a key part of your treatment and safety. Be sure to make and go to all appointments, and call your doctor if you are having problems. It's also a good idea to know your test results and keep a list of the medicines you take. Where can you learn more? Go to https://EventWithpeElement Works.The Scene. org and sign in to your [employer] account. Enter S827 in the [employer] box to learn more about \"Learning About Diabetes Food Guidelines. \"     If you do not have an account, please click on the \"Sign Up Now\" link. Current as of: July 25, 2018  Content Version: 11.9  © 2266-8674 [employer]. Care instructions adapted under license by [facility] ([facility]). If you have questions about a medical condition or this instruction, always ask your healthcare professional. [address] any warranty or liability for your use of this information.

## 2019-04-11 NOTE — RESULT ENCOUNTER NOTE
Addressed during office visit today, A1c 8.8, improving from prior but still uncontrolled diabetes, continue treatment recommended during the office visit

## 2019-04-11 NOTE — PROGRESS NOTES
Visit Information    Have you changed or started any medications since your last visit including any over-the-counter medicines, vitamins, or herbal medicines? no   Are you having any side effects from any of your medications? -  no  Have you stopped taking any of your medications? Is so, why? -  no    Have you seen any other physician or provider since your last visit? No  Have you had any other diagnostic tests since your last visit? No  Have you been seen in the emergency room and/or had an admission to a hospital since we last saw you? No  Have you had your routine dental cleaning in the past 6 months? No    Have you activated your [employer] account? If not, what are your barriers?  Yes     Patient Care Team:  Barry Lubin MD as PCP - General (Family Medicine)  Barry Lubin MD as PCP - S Attributed Provider  Juan Francisco Billingsley MD as Consulting Physician (Cardiology)  Fern Kuhn OD (Optometry)  Rabia Fitzgerald MD as Consulting Physician (Nephrology)  Amina Wilcox RN as Care Coordinator    Medical History Review  Past Medical, Family, and Social History reviewed and does contribute to the patient presenting condition    Health Maintenance   Topic Date Due    Hepatitis B Vaccine (1 of 3 - Risk 3-dose series) 12/20/1980    Shingles Vaccine (1 of 2) 12/20/2011    Diabetic microalbuminuria test  10/13/2017    A1C test (Diabetic or Prediabetic)  04/11/2019    Diabetic retinal exam  07/31/2019    Diabetic foot exam  01/11/2020    Lipid screen  01/16/2020    Potassium monitoring  02/26/2020    Creatinine monitoring  02/26/2020    PSA counseling  02/26/2020    Colon cancer screen colonoscopy  01/15/2023    DTaP/Tdap/Td vaccine (2 - Td) 04/03/2024    Flu vaccine  Completed    Pneumococcal 0-64 years Vaccine  Completed    Hepatitis C screen  Completed    HIV screen  Completed

## 2019-04-11 NOTE — CARE COORDINATION
Met with patient and his daughter to explain care coordination program and answer questions. Patient consented to enrollment. Will contact patient tomorrow AM to complete database.

## 2019-04-12 ENCOUNTER — CARE COORDINATION (OUTPATIENT)
Dept: CARE COORDINATION | Age: 59
End: 2019-04-12

## 2019-04-12 NOTE — CARE COORDINATION
Ambulatory Care Coordination Note  4/12/2019  CM Risk Score: 6  Mignon Mortality Risk Score:      ACC: Bess Pineda RN    Summary Note: Admission database completed. PMH includes CHF, Type 2 DM and MORENA with CPAP. Treated in ED 21 January 2019 for hypoglycemia and Acute Systolic HF. Patient denies having CPAP. Reports having difficulty paying utility bills. HF and Diabetes zone management initiated. CC Plan:   1.) Monitor & record glucose every morning & PRN  2.) Daily weight and record  3.) Take medications as prescribed  4.) Zone Management - YELLOW zone for both HF and Diabetes  5.) Follow up with Ophthalmology, GI, Urology and Podiatry as scheduled  6.) Referral to social service for utility issues  7.) Daily feet exam.  Keep feet clean & dry. Report and craks, cuts, blisters or open areas to podiatry  8.) Contact CC for any questions or issues  9.) Follow up with patient in two weeks. Congestive Heart Failure Assessment    Are you currently restricting fluids?:  No Restriction  Do you understand a low sodium diet?:  No  Do you understand how to read food labels?:  No  How many restaurant meals do you eat per week?:  0  Do you salt your food before tasting it?:  No         Symptoms:   None:  Yes      Symptom course:  stable  Patient-reported weight (lb):  251  Salt intake watch compared to last visit:  stable       Diabetes Assessment    Medic Alert ID:  No  Meal Planning:  Avoidance of concentrated sweets, Plate Method   How often do you test your blood sugar?:  Daily   Do you have barriers with adherence to non-pharmacologic self-management interventions?  (Nutrition/Exercise/Self-Monitoring):  No   Have you ever had to go to the ED for symptoms of low blood sugar?:  Yes   What is the date of your last ED visit for low blood sugar?:  1/21/19       Do you have hyperglycemia symptoms?:  No   Do you have hypoglycemia symptoms?:  No   Last Blood Sugar Value:  135   Blood Sugar Monitoring Regimen: Morning Fasting   Blood Sugar Trends:  Fluctuating        Lab Results   Component Value Date    LABA1C 8.8 04/11/2019    LABA1C 9.8 01/11/2019    LABA1C 11.0 05/21/2018     Lab Results   Component Value Date     08/06/2013     02/06/2012     Wt Readings from Last 3 Encounters:   04/11/19 251 lb 6.4 oz (114 kg)   02/19/19 265 lb (120.2 kg)   01/11/19 266 lb (120.7 kg)     BP Readings from Last 3 Encounters:   04/11/19 132/79   02/19/19 (!) 149/75   01/11/19 (!) 191/101     What to do if you think you have low blood sugar:    Check:  blood sugar right away if you have any symptoms of low blood sugar. If you think your blood sugar is low but cannot check it at that time, treat anyway. Treat: by eating or drinking 15 grams of something high in sugar; such as      []     4 ounces (1/2 cup) of regular fruit juice (like orange, apple, or grape juice)     []     4 ounces (1/2 cup) of regular soda pop (not diet)     []     3 or 4 glucose tablets     []     5 to 6 hard candies that you can chew quickly (such as mints)    Wait: 15 minutes and then check your blood sugar again. If it is still low, eat or drink something high in sugar again. Once your blood sugar returns to normal, eat a meal or snack. This can help keep low blood sugar from coming back. Ambulatory Care Coordination Assessment    Care Coordination Protocol  Program Enrollment:  Complex Care  Referral from Primary Care Provider:  No  Week 1 - Initial Assessment     Do you have all of your prescriptions and are they filled?:  Yes  Barriers to medication adherence:  None  Are you able to afford your medications?:  No  How often do you have trouble taking your medications the way you have been told to take them?:  I always take them as prescribed.      Do you have Home O2 Therapy?:  No      Is patient able to live independently?:  Yes     Current Housing:  Private Residence              Are you experiencing loss of meaning?:  Yes  Are you experiencing loss of hope and peace?:  No     Thinking about your patient's physical health needs, are there any symptoms or problems (risk indicators) you are unsure about that require further investigation?:  No identified areas of uncertainly or problems already being investigated   Are the patients physical health problems impacting on their mental well-being?:  No identified areas of concern   Are there any problems with your patients lifestyle behaviors (alcohol, drugs, diet, exercise) that are impacting on physical or mental well-being?:  No identified areas of concern   Do you have any other concerns about your patients mental well-being? How would you rate their severity and impact on the patient?:  No identified areas of concern   How would you rate their home environment in terms of safety and stability (including domestic violence, insecure housing, neighbor harassment)?:  Consistently safe, supportive, stable, no identified problems   How do daily activities impact on the patient's well-being? (include current or anticipated unemployment, work, caregiving, access to transportation or other):  No identified problems or perceived positive benefits   How would you rate their social network (family, work, friends)?:  Adequate participation with social networks   How would you rate their financial resources (including ability to afford all required medical care)?:  Financially secure, some resource challenges   How wells does the patient now understand their health and well-being (symptoms, signs or risk factors) and what they need to do to manage their health?:  Reasonable to good understanding and already engages in managing health or is willing to undertake better management   How well do you think your patient can engage in healthcare discussions?  (Barriers include language, deafness, aphasia, alcohol or drug problems, learning difficulties, concentration):  Clear and open communication, no identified barriers   Do other services need to be involved to help this patient?:  Other care/services not required at this time   Suggested Interventions and Community Resources                  Prior to Admission medications    Medication Sig Start Date End Date Taking? Authorizing Provider   amLODIPine (NORVASC) 10 MG tablet TAKE ONE TABLET BY MOUTH EVERY DAY 4/11/19   Clement Pineda MD   aspirin EC 81 MG EC tablet Take 1 tablet by mouth daily 4/11/19   Clement Pineda MD   atorvastatin (LIPITOR) 40 MG tablet Take 1 tablet by mouth daily 4/11/19   Clement Pineda MD   carvedilol (COREG) 25 MG tablet Take 1 tablet by mouth 2 times daily (with meals) 4/11/19   Clement Pineda MD   chlorthalidone (HYGROTON) 25 MG tablet Take 1 tablet by mouth every morning Water pill and BP pill 4/11/19   Clement Pineda MD   hydrALAZINE (APRESOLINE) 50 MG tablet Take 1 tablet by mouth 3 times daily BP medication 4/11/19   Clement Pineda MD   spironolactone (ALDACTONE) 25 MG tablet Take 1 tablet by mouth every morning BP and CHF 4/11/19   Clement Pineda MD   metFORMIN (GLUCOPHAGE XR) 500 MG extended release tablet Take 1 tablet by mouth 2 times daily Stop short acting Metformin.  4/11/19   Stacey Levi MD   nitroGLYCERIN (NITROSTAT) 0.4 MG SL tablet PLACE 1 TABLET UNDER THE TONGUE EVERY 5 MINUTES AS NEEDED FOR CHEST PAIN - MAXIMUM OF 3 TIMES 3/18/19   Clement Pineda MD   Multiple Vitamins-Minerals (MULTIVITAMIN WITH MINERALS) tablet Take 1 tablet by mouth daily 2/25/19   Clement Pineda MD   blood glucose test strips (FREESTYLE LITE) strip Checking Blood Glucose twice a day 2/19/19   Clement Pineda MD   EQ ACETAMINOPHEN 500 MG tablet TAKE ONE TABLET BY MOUTH EVERY 6 HOURS AS NEEDED FOR PAIN 4/24/17   Clement Pineda MD       Future Appointments   Date Time Provider Vianey Malagon   7/23/2019  3:45 PM Clement Pineda MD [facility]

## 2019-04-26 ENCOUNTER — CARE COORDINATION (OUTPATIENT)
Dept: CARE COORDINATION | Age: 59
End: 2019-04-26

## 2019-04-26 NOTE — CARE COORDINATION
Follow up CC call attempted. Patient was not available. Message left requesting return call. Call back info provided. Will attempt follow up with patient next week if no response received.

## 2019-05-01 ENCOUNTER — CARE COORDINATION (OUTPATIENT)
Dept: CARE COORDINATION | Age: 59
End: 2019-05-01

## 2019-05-01 NOTE — CARE COORDINATION
Call placed to patient for follow up. Patient states he recently had a niece pass away and the last several days have been chaotic with the  and dealing with family from out of town etc.  Patient asks if CC can call back at a later time. CC suggested Monday, 06 May. Patient was agreeable to this.

## 2019-05-06 ENCOUNTER — CARE COORDINATION (OUTPATIENT)
Dept: CARE COORDINATION | Age: 59
End: 2019-05-06

## 2019-05-06 NOTE — CARE COORDINATION
Call placed to patient. Patient states he s not able to talk at this time.   Requests CC call tomorrow morning, approximately 9:00 AM.

## 2019-05-07 ENCOUNTER — CARE COORDINATION (OUTPATIENT)
Dept: CARE COORDINATION | Age: 59
End: 2019-05-07

## 2019-05-07 NOTE — CARE COORDINATION
Ambulatory Care Coordination Note  5/7/2019  CM Risk Score: 6  Mignon Mortality Risk Score:      ACC: Tomás Thayer, LETY    Summary Note:   · Glucose 110 mg/dl this morning  · Not weighing self. Does not have a scale. · Struggles with salty snacks. · Requests assistance applying for food stamps    CC Plan:   1.) Monitor & record glucose every morning & PRN  2.) Obtain a scale and weigh self and record daily  3.) Take medications as prescribed  4.) Zone Management - YELLOW zone for both HF and Diabetes  5.) Follow up with Ophthalmology, GI, Urology and Podiatry as scheduled  6.) Referral to social service for social needs  7.) Daily feet exam.  Keep feet clean & dry. Report and craks, cuts, blisters or open areas to podiatry  8.) Contact CC for any questions or issues  9.) Follow up with patient in two weeks. Congestive Heart Failure Assessment    Are you currently restricting fluids?:  No Restriction  Do you understand a low sodium diet?:  No  Do you understand how to read food labels?:  No  How many restaurant meals do you eat per week?:  0  Do you salt your food before tasting it?:  No         Symptoms:   None:  Yes      Patient-reported weight (lb):   (Comment: Does not have a scale)  Salt intake watch compared to last visit:  improved       Diabetes Assessment    Medic Alert ID:  No  Meal Planning:  Avoidance of concentrated sweets, Plate Method   How often do you test your blood sugar?:  Daily   Do you have barriers with adherence to non-pharmacologic self-management interventions?  (Nutrition/Exercise/Self-Monitoring):  No   Have you ever had to go to the ED for symptoms of low blood sugar?:  Yes   What is the date of your last ED visit for low blood sugar?:  1/21/19       Do you have hyperglycemia symptoms?:  No   Do you have hypoglycemia symptoms?:  No   Last Blood Sugar Value:  110   Blood Sugar Monitoring Regimen:  Morning Fasting   Blood Sugar Trends:  No Change        Care Coordination Interventions    Program Enrollment:  Complex Care  Referral from Primary Care Provider:  No  Suggested Interventions and Community Resources         Goals Addressed                 This Visit's Progress     Conditions and Symptoms   On track     I will schedule office visits, as directed by my provider. I will keep my appointment or reschedule if I have to cancel. I will notify my provider of any barriers to my plan of care. I will follow my Zone Management tool to seek urgent or emergent care. I will notify my provider of any symptoms that indicate a worsening of my condition. Barriers: financial and stress  Plan for overcoming my barriers: Care Coordination  Confidence: 7/10  Anticipated Goal Completion Date: 12 July 2019         Self Monitoring   On track     Self-Monitored Blood Glucose - I will check my blood sugar Fasting blood sugar and Other: PRN  I will notify my provider of any trends of increasing or decreasing blood sugars over a 1 month period. I will notify my provider if I have any blood sugar readings less than 70 more than 2 times a month. Daily Weights - I will weight myself as directed - Daily and write down weights  I will notify my provider of any increase in weight by 3 or more pounds in 2 days OR 5 or more pounds in a week. Patient Reported Weight No flowsheet data found. Patient Reported Blood Glucose No flowsheet data found. Barriers: lack of motivation and lack of importance  Plan for overcoming my barriers: Care coordination  Confidence: 7/10  Anticipated Goal Completion Date: 12 July 2019              Prior to Admission medications    Medication Sig Start Date End Date Taking?  Authorizing Provider   carvedilol (COREG) 25 MG tablet Take 1 tablet by mouth 2 times daily (with meals) 4/11/19  Yes Flores Almaguer MD   chlorthalidone (HYGROTON) 25 MG tablet Take 1 tablet by mouth every morning Water pill and BP pill 4/11/19  Yes Flores Almaguer MD   hydrALAZINE (APRESOLINE) 50 MG tablet Take 1 tablet by mouth 3 times daily BP medication 4/11/19  Yes Gopi Lopez MD   spironolactone (ALDACTONE) 25 MG tablet Take 1 tablet by mouth every morning BP and CHF 4/11/19  Yes Gopi Lopez MD   metFORMIN (GLUCOPHAGE XR) 500 MG extended release tablet Take 1 tablet by mouth 2 times daily Stop short acting Metformin.  4/11/19  Yes Gopi Lopez MD   nitroGLYCERIN (NITROSTAT) 0.4 MG SL tablet PLACE 1 TABLET UNDER THE TONGUE EVERY 5 MINUTES AS NEEDED FOR CHEST PAIN - MAXIMUM OF 3 TIMES 3/18/19  Yes Gopi Lopez MD   Multiple Vitamins-Minerals (MULTIVITAMIN WITH MINERALS) tablet Take 1 tablet by mouth daily 2/25/19  Yes Gopi Lopez MD   blood glucose test strips (FREESTYLE LITE) strip Checking Blood Glucose twice a day 2/19/19  Yes Gopi Lopez MD   EQ ACETAMINOPHEN 500 MG tablet TAKE ONE TABLET BY MOUTH EVERY 6 HOURS AS NEEDED FOR PAIN 4/24/17  Yes Gopi Lopez MD   amLODIPine (NORVASC) 10 MG tablet TAKE ONE TABLET BY MOUTH EVERY DAY 4/11/19   Gopi Lopez MD   aspirin EC 81 MG EC tablet Take 1 tablet by mouth daily 4/11/19   Gopi Lopez MD   atorvastatin (LIPITOR) 40 MG tablet Take 1 tablet by mouth daily 4/11/19   Gopi Lopez MD       Future Appointments   Date Time Provider Vianey Malagon   6/11/2019 10:10 AM Ariel Kumari MD [address] [place] [address]   7/23/2019  3:45 PM Gopi Lopez MD  sean Toure

## 2019-05-09 ENCOUNTER — CARE COORDINATION (OUTPATIENT)
Dept: CARE COORDINATION | Age: 59
End: 2019-05-09

## 2019-05-09 NOTE — CARE COORDINATION
New pt was referred to  by [facility] , who states that pt is requesting assistance with filing for food stamps. CHW phoned pt and made introductions and explained the reason for calling. CHW explained to pt that he had been referred by CC/Nicholas King for assistance with applying for food stamps. Pt agreed that he had spoken with CC, and would be interested in the assistance. CHW arranged with pt to meet with him on Tuesday, 5/14/19 @ 10:30a, to complete the application for Medicaid/food stamps. Pt agreed to the appointment, he had no pen or paper available, CHW phoned pt back and left a voicemail for him with the date and time of the appointment, and CHW's name and contact number for reference.   CHW's Plan of Care  CHW will assist pt with completing a Medicaid/foodstamp application

## 2019-05-23 PROBLEM — Z12.11 SCREENING COLON: Status: ACTIVE | Noted: 2019-05-23

## 2019-05-28 ENCOUNTER — CARE COORDINATION (OUTPATIENT)
Dept: CARE COORDINATION | Age: 59
End: 2019-05-28

## 2019-05-28 NOTE — CARE COORDINATION
Phoned pt this afternoon to follow up on his missed appointment to apply for food stamps. Pt stated that he had forgotten, he had death in the family. CHW offered pt her sympathy. Pt was willing to reschedule with the CHW to apply for food stamps. Pt wants to reschedule for Monday, June 3rd @ 9a, pt will come to the [facility].   CHW's Plan of Care  CHW will phone pt on 5/31/19 to remind of appointment on 6/3/19 @ 9a

## 2019-06-10 ENCOUNTER — CARE COORDINATION (OUTPATIENT)
Dept: CARE COORDINATION | Age: 59
End: 2019-06-10

## 2019-06-10 NOTE — CARE COORDINATION
Phoned pt to follow up on his plans for completing an application for food stamps. Pt answered and asked if her can call CHW back, stating that he was on a long distance call at the time.   CHW's Plan of Care  CHW will assist pt with application when he decides to do it

## 2019-06-11 ENCOUNTER — OFFICE VISIT (OUTPATIENT)
Dept: UROLOGY | Age: 59
End: 2019-06-11
Payer: COMMERCIAL

## 2019-06-11 VITALS
BODY MASS INDEX: 33.19 KG/M2 | TEMPERATURE: 97.5 F | WEIGHT: 250.4 LBS | DIASTOLIC BLOOD PRESSURE: 69 MMHG | SYSTOLIC BLOOD PRESSURE: 141 MMHG | HEART RATE: 61 BPM | HEIGHT: 73 IN

## 2019-06-11 DIAGNOSIS — N52.01 ERECTILE DYSFUNCTION DUE TO ARTERIAL INSUFFICIENCY: Primary | ICD-10-CM

## 2019-06-11 PROCEDURE — G8417 CALC BMI ABV UP PARAM F/U: HCPCS | Performed by: UROLOGY

## 2019-06-11 PROCEDURE — 3017F COLORECTAL CA SCREEN DOC REV: CPT | Performed by: UROLOGY

## 2019-06-11 PROCEDURE — G8427 DOCREV CUR MEDS BY ELIG CLIN: HCPCS | Performed by: UROLOGY

## 2019-06-11 PROCEDURE — 1036F TOBACCO NON-USER: CPT | Performed by: UROLOGY

## 2019-06-11 PROCEDURE — 99204 OFFICE O/P NEW MOD 45 MIN: CPT | Performed by: UROLOGY

## 2019-06-11 PROCEDURE — G8598 ASA/ANTIPLAT THER USED: HCPCS | Performed by: UROLOGY

## 2019-06-11 ASSESSMENT — ENCOUNTER SYMPTOMS
SHORTNESS OF BREATH: 0
VOMITING: 0
NAUSEA: 0
BACK PAIN: 0
EYE PAIN: 0
COUGH: 0
COLOR CHANGE: 0
EYE REDNESS: 0
WHEEZING: 0
ABDOMINAL PAIN: 0

## 2019-06-11 NOTE — PROGRESS NOTES
[facility]  [facility] - [place]  [address]  [address]  [address] [facility] [address]  Dept: [phone]  Dept Fax: [phone] [facility] Office Note - New patient    Patient:  Hien Molina  YOB: 1960  Date: 6/11/2019    The patient is a 63 y.o. male who presents todayfor evaluation of the following problems:   Chief Complaint   Patient presents with    New Patient     Erectile Dysfunction ,  Can get an erection , can sustain for a few min , useable     referred by Giovany Malik MD.      HPI  Here for ED. Has been going on for one year. He can obtain an erections, but they only last for 5 minutes. The erections can be usable at times. He has not tried any meds for erections. He was a former smoker for a short period of time. He is on Coreg. He does not have any stents in his heart. He has never needed to take the nitro. (Patient's old records have been requested, reviewed and summarized in today's note.)    Summary of old records: N/A    Additional History: N/A    Procedures Today: N/A    Last several PSA's:  Lab Results   Component Value Date    PSA 1.43 02/26/2019    PSA 1.19 02/17/2017    PSA 0.53 08/06/2013     Last total testosterone:  No results found for: TESTOSTERONE  Urinalysis today:  No results found for this visit on 06/11/19.     AUA Symptom Score (6/11/2019):  INCOMPLETE EMPTYING: How often have you had the sensation of not emptying your bladder?: Not at all  FREQUENCY: How often do you have to urinate less than every two hours?: Not at all  INTERMITTENCY: How often have you found you stopped and started again several times when you urinated?: Not at all  URGENCY: How often have you found it difficult to postpone urination?: Not at all  WEAK STREAM: How often have you had a weak urinary stream?: Not at all  STRAINING: How often have you had to strain to start  urination?: Not at all  NOCTURIA: How many times did you typically get up at night to uriniate?: 2 Times  TOTAL I-PSS SCORE[de-identified] 2  How would you feel if you were to spend the rest of your life with your urinary condition?: Pleased    Last BUN and creatinine:  Lab Results   Component Value Date    BUN 28 (H) 2019     Lab Results   Component Value Date    CREATININE 1.25 2019       Additional Lab/Culture results: none    Imaging Reviewed during this Office Visit: none  (results were independently reviewed by physician and radiology report verified)    PAST MEDICAL, FAMILY AND SOCIAL HISTORY:  Past Medical History:   Diagnosis Date    Acute heart failure ([address] 2018    Anemia     CAD (coronary artery disease)     Cardiomyopathy ([address]     Chronic diastolic CHF (congestive heart failure) ([address] 2018    Chronic kidney disease     Coronary artery disease involving native coronary artery of native heart without angina pectoris 2016    Diabetic nephropathy associated with type 2 diabetes mellitus ([address] 2016    Diabetic polyneuropathy associated with type 2 diabetes mellitus ([address]     DM (diabetes mellitus), type 2 ([address]     for 15-20 yrs    Grief 2017    HTN (hypertension)     Hypertensive urgency     Lipidemia     Mild episode of recurrent major depressive disorder ([address] 2019    Obesity     Obesity (BMI 30-39.9) 2016    MORENA on CPAP 2013    Sleep apnea 2013    Slow transit constipation     Uncontrolled type 2 diabetes mellitus, without long-term current use of insulin ([address] 10/13/2016     Past Surgical History:   Procedure Laterality Date    CARDIAC CATHETERIZATION  2017    at [facility] per DR Darrion Morrison note in Media from 18, showed 50% stenosis PDA, EF 45%    CARDIAC CATHETERIZATION  2011    per Dr. Eduardo Poon note in media: non-obstructive CAD, EF 45%    COLONOSCOPY  01/15/2013    hemorrhoids, good prep, scanned     Family History   Problem Relation Age of Onset    Asthma Mother            Cassidy Bradshaw High Blood in this encounter. Orders Placed:  No orders of the defined types were placed in this encounter. Jonnathan Chatterjee MD    Agree with the ROS entered by the MA.

## 2019-06-28 ENCOUNTER — OFFICE (OUTPATIENT)
Dept: URBAN - METROPOLITAN AREA PATHOLOGY 17 | Facility: PATHOLOGY | Age: 59
End: 2019-06-28

## 2019-06-28 ENCOUNTER — OFFICE (OUTPATIENT)
Dept: URBAN - METROPOLITAN AREA CLINIC 32 | Facility: CLINIC | Age: 59
End: 2019-06-28
Payer: COMMERCIAL

## 2019-06-28 VITALS
RESPIRATION RATE: 16 BRPM | WEIGHT: 272 LBS | DIASTOLIC BLOOD PRESSURE: 61 MMHG | HEART RATE: 53 BPM | HEART RATE: 60 BPM | DIASTOLIC BLOOD PRESSURE: 60 MMHG | SYSTOLIC BLOOD PRESSURE: 98 MMHG | OXYGEN SATURATION: 100 % | SYSTOLIC BLOOD PRESSURE: 105 MMHG | SYSTOLIC BLOOD PRESSURE: 127 MMHG | OXYGEN SATURATION: 99 % | TEMPERATURE: 97.7 F | RESPIRATION RATE: 12 BRPM | HEART RATE: 61 BPM | SYSTOLIC BLOOD PRESSURE: 94 MMHG | DIASTOLIC BLOOD PRESSURE: 57 MMHG | HEIGHT: 75 IN | RESPIRATION RATE: 24 BRPM | DIASTOLIC BLOOD PRESSURE: 70 MMHG | SYSTOLIC BLOOD PRESSURE: 147 MMHG | DIASTOLIC BLOOD PRESSURE: 54 MMHG | RESPIRATION RATE: 20 BRPM | TEMPERATURE: 98.2 F | HEART RATE: 66 BPM | OXYGEN SATURATION: 97 % | HEART RATE: 62 BPM | SYSTOLIC BLOOD PRESSURE: 97 MMHG | OXYGEN SATURATION: 96 % | DIASTOLIC BLOOD PRESSURE: 68 MMHG | DIASTOLIC BLOOD PRESSURE: 71 MMHG | SYSTOLIC BLOOD PRESSURE: 149 MMHG | RESPIRATION RATE: 18 BRPM

## 2019-06-28 DIAGNOSIS — D12.2 BENIGN NEOPLASM OF ASCENDING COLON: ICD-10-CM

## 2019-06-28 DIAGNOSIS — Z12.11 ENCOUNTER FOR SCREENING FOR MALIGNANT NEOPLASM OF COLON: ICD-10-CM

## 2019-06-28 DIAGNOSIS — K57.30 DIVERTICULOSIS OF LARGE INTESTINE WITHOUT PERFORATION OR ABS: ICD-10-CM

## 2019-06-28 PROBLEM — D12.0 BENIGN NEOPLASM OF CECUM: Status: ACTIVE | Noted: 2019-06-28

## 2019-06-28 PROCEDURE — 45380 COLONOSCOPY AND BIOPSY: CPT | Mod: PT

## 2019-06-28 PROCEDURE — 88305 TISSUE EXAM BY PATHOLOGIST: CPT

## 2019-09-14 ENCOUNTER — CARE COORDINATION (OUTPATIENT)
Dept: FAMILY MEDICINE CLINIC | Age: 59
End: 2019-09-14

## 2019-09-14 NOTE — CARE COORDINATION
and Deepak Baptiste         Goals Addressed                 This Visit's Progress     Conditions and Symptoms   On track     I will schedule office visits, as directed by my provider. I will keep my appointment or reschedule if I have to cancel. I will notify my provider of any barriers to my plan of care. I will follow my Zone Management tool to seek urgent or emergent care. I will notify my provider of any symptoms that indicate a worsening of my condition. Barriers: financial and stress  Plan for overcoming my barriers: Care Coordination  Confidence: 7/10  Anticipated Goal Completion Date: 12 July 2019         Self Monitoring   On track     Self-Monitored Blood Glucose - I will check my blood sugar Fasting blood sugar and Other: PRN  I will notify my provider of any trends of increasing or decreasing blood sugars over a 1 month period. I will notify my provider if I have any blood sugar readings less than 70 more than 2 times a month. Daily Weights - I will weight myself as directed - Daily and write down weights  I will notify my provider of any increase in weight by 3 or more pounds in 2 days OR 5 or more pounds in a week. Patient Reported Weight No flowsheet data found. Patient Reported Blood Glucose No flowsheet data found. Barriers: lack of motivation and lack of importance  Plan for overcoming my barriers: Care coordination  Confidence: 7/10  Anticipated Goal Completion Date: 12 July 2019              Prior to Admission medications    Medication Sig Start Date End Date Taking?  Authorizing Provider   amLODIPine (NORVASC) 10 MG tablet TAKE ONE TABLET BY MOUTH EVERY DAY 4/11/19   Tyler Son MD   aspirin EC 81 MG EC tablet Take 1 tablet by mouth daily 4/11/19   Tyler Son MD   atorvastatin (LIPITOR) 40 MG tablet Take 1 tablet by mouth daily 4/11/19   Tyler Son MD   carvedilol (COREG) 25 MG tablet Take 1 tablet by mouth 2 times daily (with meals) 4/11/19

## 2019-09-25 ENCOUNTER — OFFICE VISIT (OUTPATIENT)
Dept: FAMILY MEDICINE CLINIC | Age: 59
End: 2019-09-25
Payer: COMMERCIAL

## 2019-09-25 ENCOUNTER — HOSPITAL ENCOUNTER (OUTPATIENT)
Age: 59
Setting detail: SPECIMEN
Discharge: HOME | End: 2019-09-25
Payer: COMMERCIAL

## 2019-09-25 ENCOUNTER — CARE COORDINATION (OUTPATIENT)
Dept: FAMILY MEDICINE CLINIC | Age: 59
End: 2019-09-25

## 2019-09-25 VITALS
OXYGEN SATURATION: 97 % | HEIGHT: 73 IN | DIASTOLIC BLOOD PRESSURE: 80 MMHG | SYSTOLIC BLOOD PRESSURE: 124 MMHG | WEIGHT: 243.2 LBS | HEART RATE: 62 BPM | BODY MASS INDEX: 32.23 KG/M2

## 2019-09-25 DIAGNOSIS — I10 ESSENTIAL HYPERTENSION: ICD-10-CM

## 2019-09-25 DIAGNOSIS — E11.65 TYPE 2 DIABETES MELLITUS WITH HYPERGLYCEMIA, WITHOUT LONG-TERM CURRENT USE OF INSULIN (HCC): ICD-10-CM

## 2019-09-25 DIAGNOSIS — N52.01 ERECTILE DYSFUNCTION DUE TO ARTERIAL INSUFFICIENCY: ICD-10-CM

## 2019-09-25 DIAGNOSIS — I50.42 CHRONIC COMBINED SYSTOLIC AND DIASTOLIC CHF (CONGESTIVE HEART FAILURE) (HCC): ICD-10-CM

## 2019-09-25 DIAGNOSIS — R76.8 HEPATITIS B ANTIBODY POSITIVE IN BLOOD: Primary | ICD-10-CM

## 2019-09-25 DIAGNOSIS — Z01.84 IMMUNITY STATUS TESTING: ICD-10-CM

## 2019-09-25 DIAGNOSIS — Z23 ENCOUNTER FOR IMMUNIZATION: ICD-10-CM

## 2019-09-25 DIAGNOSIS — E78.5 HYPERLIPIDEMIA WITH TARGET LDL LESS THAN 70: ICD-10-CM

## 2019-09-25 DIAGNOSIS — E11.42 TYPE 2 DIABETES MELLITUS WITH DIABETIC POLYNEUROPATHY, WITHOUT LONG-TERM CURRENT USE OF INSULIN (HCC): ICD-10-CM

## 2019-09-25 DIAGNOSIS — E11.42 TYPE 2 DIABETES MELLITUS WITH DIABETIC POLYNEUROPATHY, WITHOUT LONG-TERM CURRENT USE OF INSULIN (HCC): Primary | ICD-10-CM

## 2019-09-25 LAB
ALBUMIN SERPL-MCNC: 4.3 G/DL (ref 3.5–5.2)
ALBUMIN/GLOBULIN RATIO: ABNORMAL (ref 1–2.5)
ALP BLD-CCNC: 106 U/L (ref 40–129)
ALT SERPL-CCNC: 13 U/L (ref 5–41)
ANION GAP SERPL CALCULATED.3IONS-SCNC: 14 MMOL/L (ref 9–17)
AST SERPL-CCNC: 16 U/L
BILIRUB SERPL-MCNC: 0.49 MG/DL (ref 0.3–1.2)
BUN BLDV-MCNC: 26 MG/DL (ref 6–20)
BUN/CREAT BLD: ABNORMAL (ref 9–20)
CALCIUM SERPL-MCNC: 9.6 MG/DL (ref 8.6–10.4)
CHLORIDE BLD-SCNC: 104 MMOL/L (ref 98–107)
CHOLESTEROL/HDL RATIO: 3.1
CHOLESTEROL: 153 MG/DL
CHP ED QC CHECK: NORMAL
CO2: 24 MMOL/L (ref 20–31)
CREAT SERPL-MCNC: 1.35 MG/DL (ref 0.7–1.2)
FOLATE: >20 NG/ML
GFR AFRICAN AMERICAN: >60 ML/MIN
GFR NON-AFRICAN AMERICAN: 54 ML/MIN
GFR SERPL CREATININE-BSD FRML MDRD: ABNORMAL ML/MIN/{1.73_M2}
GFR SERPL CREATININE-BSD FRML MDRD: ABNORMAL ML/MIN/{1.73_M2}
GLUCOSE BLD-MCNC: 169 MG/DL (ref 70–99)
GLUCOSE BLD-MCNC: 180 MG/DL
HBA1C MFR BLD: 9.1 %
HBV SURFACE AB TITR SER: 193.3 MIU/ML
HCT VFR BLD CALC: 37 % (ref 41–53)
HDLC SERPL-MCNC: 49 MG/DL
HEMOGLOBIN: 12.3 G/DL (ref 13.5–17.5)
LDL CHOLESTEROL: 84 MG/DL (ref 0–130)
MCH RBC QN AUTO: 31.5 PG (ref 26–34)
MCHC RBC AUTO-ENTMCNC: 33.4 G/DL (ref 31–37)
MCV RBC AUTO: 94.6 FL (ref 80–100)
NRBC AUTOMATED: ABNORMAL PER 100 WBC
PDW BLD-RTO: 13.6 % (ref 11.5–14.9)
PLATELET # BLD: 209 K/UL (ref 150–450)
PMV BLD AUTO: 9.9 FL (ref 6–12)
POTASSIUM SERPL-SCNC: 3.9 MMOL/L (ref 3.7–5.3)
RBC # BLD: 3.91 M/UL (ref 4.5–5.9)
SODIUM BLD-SCNC: 142 MMOL/L (ref 135–144)
TOTAL PROTEIN: 8.1 G/DL (ref 6.4–8.3)
TRIGL SERPL-MCNC: 99 MG/DL
VITAMIN B-12: 709 PG/ML (ref 232–1245)
VLDLC SERPL CALC-MCNC: NORMAL MG/DL (ref 1–30)
WBC # BLD: 6.1 K/UL (ref 3.5–11)

## 2019-09-25 PROCEDURE — 80053 COMPREHEN METABOLIC PANEL: CPT

## 2019-09-25 PROCEDURE — G8427 DOCREV CUR MEDS BY ELIG CLIN: HCPCS | Performed by: FAMILY MEDICINE

## 2019-09-25 PROCEDURE — 86317 IMMUNOASSAY INFECTIOUS AGENT: CPT

## 2019-09-25 PROCEDURE — 80061 LIPID PANEL: CPT

## 2019-09-25 PROCEDURE — 36415 COLL VENOUS BLD VENIPUNCTURE: CPT

## 2019-09-25 PROCEDURE — G8598 ASA/ANTIPLAT THER USED: HCPCS | Performed by: FAMILY MEDICINE

## 2019-09-25 PROCEDURE — 3017F COLORECTAL CA SCREEN DOC REV: CPT | Performed by: FAMILY MEDICINE

## 2019-09-25 PROCEDURE — G8417 CALC BMI ABV UP PARAM F/U: HCPCS | Performed by: FAMILY MEDICINE

## 2019-09-25 PROCEDURE — 82746 ASSAY OF FOLIC ACID SERUM: CPT

## 2019-09-25 PROCEDURE — 2022F DILAT RTA XM EVC RTNOPTHY: CPT | Performed by: FAMILY MEDICINE

## 2019-09-25 PROCEDURE — 83036 HEMOGLOBIN GLYCOSYLATED A1C: CPT | Performed by: FAMILY MEDICINE

## 2019-09-25 PROCEDURE — 1036F TOBACCO NON-USER: CPT | Performed by: FAMILY MEDICINE

## 2019-09-25 PROCEDURE — 86705 HEP B CORE ANTIBODY IGM: CPT

## 2019-09-25 PROCEDURE — 99214 OFFICE O/P EST MOD 30 MIN: CPT | Performed by: FAMILY MEDICINE

## 2019-09-25 PROCEDURE — 82962 GLUCOSE BLOOD TEST: CPT | Performed by: FAMILY MEDICINE

## 2019-09-25 PROCEDURE — 85027 COMPLETE CBC AUTOMATED: CPT

## 2019-09-25 PROCEDURE — 82607 VITAMIN B-12: CPT

## 2019-09-25 PROCEDURE — 3046F HEMOGLOBIN A1C LEVEL >9.0%: CPT | Performed by: FAMILY MEDICINE

## 2019-09-25 RX ORDER — SILDENAFIL 50 MG/1
50 TABLET, FILM COATED ORAL PRN
Qty: 30 TABLET | Refills: 3 | Status: SHIPPED | OUTPATIENT
Start: 2019-09-25 | End: 2020-01-02 | Stop reason: SDUPTHER

## 2019-09-25 RX ORDER — SPIRONOLACTONE 25 MG/1
25 TABLET ORAL EVERY MORNING
Qty: 90 TABLET | Refills: 1 | Status: SHIPPED | OUTPATIENT
Start: 2019-09-25 | End: 2020-01-02 | Stop reason: SDUPTHER

## 2019-09-25 RX ORDER — ATORVASTATIN CALCIUM 40 MG/1
40 TABLET, FILM COATED ORAL DAILY
Qty: 90 TABLET | Refills: 3 | Status: SHIPPED | OUTPATIENT
Start: 2019-09-25 | End: 2020-01-02 | Stop reason: SDUPTHER

## 2019-09-25 RX ORDER — CHLORTHALIDONE 25 MG/1
25 TABLET ORAL EVERY MORNING
Qty: 90 TABLET | Refills: 1 | Status: SHIPPED | OUTPATIENT
Start: 2019-09-25 | End: 2020-01-02 | Stop reason: SDUPTHER

## 2019-09-25 RX ORDER — HYDRALAZINE HYDROCHLORIDE 50 MG/1
50 TABLET, FILM COATED ORAL 3 TIMES DAILY
Qty: 90 TABLET | Refills: 3 | Status: SHIPPED | OUTPATIENT
Start: 2019-09-25 | End: 2020-01-02 | Stop reason: SDUPTHER

## 2019-09-25 RX ORDER — METFORMIN HYDROCHLORIDE 500 MG/1
500 TABLET, EXTENDED RELEASE ORAL 2 TIMES DAILY
Qty: 180 TABLET | Refills: 3 | Status: SHIPPED | OUTPATIENT
Start: 2019-09-25 | End: 2020-01-02 | Stop reason: SDUPTHER

## 2019-09-25 RX ORDER — CARVEDILOL 25 MG/1
25 TABLET ORAL 2 TIMES DAILY WITH MEALS
Qty: 180 TABLET | Refills: 3 | Status: SHIPPED | OUTPATIENT
Start: 2019-09-25 | End: 2020-01-02 | Stop reason: SDUPTHER

## 2019-09-25 RX ORDER — ASPIRIN 81 MG/1
81 TABLET ORAL DAILY
Qty: 90 TABLET | Refills: 3 | Status: SHIPPED | OUTPATIENT
Start: 2019-09-25 | End: 2020-01-02 | Stop reason: SDUPTHER

## 2019-09-25 RX ORDER — AMLODIPINE BESYLATE 10 MG/1
TABLET ORAL
Qty: 90 TABLET | Refills: 3 | Status: SHIPPED | OUTPATIENT
Start: 2019-09-25 | End: 2020-01-02 | Stop reason: SDUPTHER

## 2019-09-25 ASSESSMENT — ENCOUNTER SYMPTOMS
NAUSEA: 0
CONSTIPATION: 0
ABDOMINAL PAIN: 0
SHORTNESS OF BREATH: 0
COUGH: 0
CHEST TIGHTNESS: 0
DIARRHEA: 0
ABDOMINAL DISTENTION: 0
WHEEZING: 0
VOMITING: 0

## 2019-09-25 NOTE — RESULT ENCOUNTER NOTE
Addressed during office visit today, blood glucose 180, A1c 9.1, worsening, abnormal, continue treatment recommended during the office visit

## 2019-09-25 NOTE — CARE COORDINATION
Ambulatory Care Coordination Note  9/25/2019  CM Risk Score: 6  Charlson 10 Year Mortality Risk Score: 79%     ACC: Jaime Tapia RN    Summary:  · POC A1C 9.1% today (previous 8.8%)  · Morning fasting - 180 mg/dl  · C/O neuropathy in BLE  · Admits to not being compliant with medications nor diet  · Discussed meal planning  · Discussed need for consistency in diabetes management    CC Plan:   1.) Monitor & record glucose twice daily & PRN  2.) Obtain a scale and weigh self and record daily  3.) Take medications as prescribed  4.) Zone Management - RED zone for Diabetes and YELLOW zone for HF  5.) Daily feet exam.  Keep feet clean & dry.  Report and cracks, cuts, blisters or open areas to podiatry  6.) Consult with Podiatry   7.) Consult with Diabetic Education  8.) Contact CC for any questions or issues    Congestive Heart Failure Assessment    Are you currently restricting fluids?:  No Restriction  Do you understand a low sodium diet?:  No  Do you understand how to read food labels?:  No  How many restaurant meals do you eat per week?:  0  Do you salt your food before tasting it?:  No         Symptoms:   CHF associated fatigue: Pos      Patient-reported weight (lb):  243  Weight trend:  fluctuating minimally  Salt intake watch compared to last visit:  stable       Diabetes Assessment    Medic Alert ID:  No  Meal Planning:  Avoidance of concentrated sweets, Plate Method   How often do you test your blood sugar?:  Daily, Bedtime   Do you have barriers with adherence to non-pharmacologic self-management interventions?  (Nutrition/Exercise/Self-Monitoring):  No   Have you ever had to go to the ED for symptoms of low blood sugar?:  Yes   What is the date of your last ED visit for low blood sugar?:  1/21/19       Do you have hyperglycemia symptoms?:  No   Do you have hypoglycemia symptoms?:  No   Last Blood Sugar Value:  180   Blood Sugar Monitoring Regimen:  Morning Fasting, At Bedtime        Lab Results   Component

## 2019-09-25 NOTE — PROGRESS NOTES
Taya Green reports compliance with BP medications, and tolerates them well, denies side effects. BP Readings from Last 3 Encounters:   09/25/19 124/80   06/11/19 (!) 141/69   04/11/19 132/79        Pulse is Normal.    Pulse Readings from Last 3 Encounters:   09/25/19 62   06/11/19 61   04/11/19 60         Hyperlipidemia:  No new myalgias or GI upset on atorvastatin (Lipitor). Medication compliance: compliant all of the time. Patient is  following a low fat, low cholesterol diet. LDL is Elevated. Lab Results   Component Value Date    LDLCALC 119 01/16/2019    LDLCHOLESTEROL 94 10/13/2016         Current Outpatient Medications   Medication Sig Dispense Refill    amLODIPine (NORVASC) 10 MG tablet TAKE ONE TABLET BY MOUTH EVERY DAY 90 tablet 3    aspirin EC 81 MG EC tablet Take 1 tablet by mouth daily 90 tablet 3    atorvastatin (LIPITOR) 40 MG tablet Take 1 tablet by mouth daily 90 tablet 3    carvedilol (COREG) 25 MG tablet Take 1 tablet by mouth 2 times daily (with meals) 180 tablet 3    chlorthalidone (HYGROTON) 25 MG tablet Take 1 tablet by mouth every morning Water pill and BP pill 90 tablet 1    hydrALAZINE (APRESOLINE) 50 MG tablet Take 1 tablet by mouth 3 times daily BP medication 90 tablet 3    spironolactone (ALDACTONE) 25 MG tablet Take 1 tablet by mouth every morning BP and CHF 90 tablet 1    metFORMIN (GLUCOPHAGE XR) 500 MG extended release tablet Take 1 tablet by mouth 2 times daily Stop short acting Metformin.  180 tablet 3    nitroGLYCERIN (NITROSTAT) 0.4 MG SL tablet PLACE 1 TABLET UNDER THE TONGUE EVERY 5 MINUTES AS NEEDED FOR CHEST PAIN - MAXIMUM OF 3 TIMES 25 tablet 0    Multiple Vitamins-Minerals (MULTIVITAMIN WITH MINERALS) tablet Take 1 tablet by mouth daily 90 tablet 0    blood glucose test strips (FREESTYLE LITE) strip Checking Blood Glucose twice a day 100 strip 3    EQ ACETAMINOPHEN 500 MG tablet TAKE ONE TABLET BY MOUTH EVERY 6 HOURS AS NEEDED FOR PAIN 120 tablet 0     No normal reflexes. No cranial nerve deficit. He exhibits normal muscle tone. Coordination normal.   Skin: Skin is warm and dry. Capillary refill takes less than 2 seconds. No rash noted. He is not diaphoretic. Psychiatric: He has a normal mood and affect. His behavior is normal. Judgment and thought content normal.   Nursing note and vitals reviewed. Discussed testing withthe patient and all questions fully answered. I personally reviewed testing with patient.   Hyperglycemia not controlled  Anemia  Hyperlipidemia  Chronic kidney disease        Otherwise labs within normal limits    Office Visit on 04/11/2019   Component Date Value Ref Range Status    Hemoglobin A1C 04/11/2019 8.8  % Final       Lab Results   Component Value Date    WBC 6.1 02/26/2019    HGB 12.3 (A) 02/26/2019    HCT 37.4 (A) 02/26/2019    MCV 89 02/26/2019     02/26/2019       Lab Results   Component Value Date     02/26/2019    K 3.9 02/26/2019     02/26/2019    CO2 27 02/26/2019    BUN 28 02/26/2019    CREATININE 1.25 02/26/2019    GLUCOSE 156 02/26/2019    GLUCOSE 157 02/06/2012    CALCIUM 9.8 02/26/2019        Lab Results   Component Value Date    ALT 22 02/26/2019    AST 18 02/26/2019    ALKPHOS 88 02/26/2019    BILITOT 0.6 02/26/2019       Lab Results   Component Value Date    TSH 1.26 02/26/2019       Lab Results   Component Value Date    CHOL 177 01/16/2019    CHOL 155 10/13/2016    CHOL 182 08/06/2013     Lab Results   Component Value Date    TRIG 73 01/16/2019    TRIG 78 10/13/2016    TRIG 99 08/06/2013     Lab Results   Component Value Date    HDL 43 01/16/2019    HDL 45 10/13/2016    HDL 45 08/06/2013     Lab Results   Component Value Date    LDLCALC 119 01/16/2019    LDLCHOLESTEROL 94 10/13/2016    LDLCHOLESTEROL 117 (H) 08/06/2013    LDLCHOLESTEROL 116 (H) 02/06/2012     Lab Results   Component Value Date    CHOLHDLRATIO 4.1 01/16/2019    CHOLHDLRATIO 3.4 10/13/2016    CHOLHDLRATIO 4.0 08/06/2013  carvedilol (COREG) 25 MG tablet REORDER    atorvastatin (LIPITOR) 40 MG tablet REORDER    amLODIPine (NORVASC) 10 MG tablet REORDER    aspirin EC 81 MG EC tablet REORDER         Freddy received counseling on the following healthy behaviors: nutrition, exercise, medication adherence and weight loss     Reviewed prior labs and health maintenance  Continue current medications, diet and exercise. Discussed use, benefit, and side effects of prescribed medications. Barriers to medication compliance addressed. Patient given educational materials - see patient instructions  Was a self-tracking handout given in paper form or via [employer]? Yes    Requested Prescriptions     Signed Prescriptions Disp Refills    spironolactone (ALDACTONE) 25 MG tablet 90 tablet 1     Sig: Take 1 tablet by mouth every morning BP and CHF    metFORMIN (GLUCOPHAGE XR) 500 MG extended release tablet 180 tablet 3     Sig: Take 1 tablet by mouth 2 times daily Stop short acting Metformin.  hydrALAZINE (APRESOLINE) 50 MG tablet 90 tablet 3     Sig: Take 1 tablet by mouth 3 times daily BP medication    chlorthalidone (HYGROTON) 25 MG tablet 90 tablet 1     Sig: Take 1 tablet by mouth every morning Water pill and BP pill    carvedilol (COREG) 25 MG tablet 180 tablet 3     Sig: Take 1 tablet by mouth 2 times daily (with meals)    atorvastatin (LIPITOR) 40 MG tablet 90 tablet 3     Sig: Take 1 tablet by mouth daily    amLODIPine (NORVASC) 10 MG tablet 90 tablet 3     Sig: TAKE ONE TABLET BY MOUTH EVERY DAY    aspirin EC 81 MG EC tablet 90 tablet 3     Sig: Take 1 tablet by mouth daily    sildenafil (VIAGRA) 50 MG tablet 30 tablet 3     Sig: Take 1 tablet by mouth as needed for Erectile Dysfunction Don't take with nitroglycerin    SITagliptin (JANUVIA) 50 MG tablet 90 tablet 1     Sig: Take 1 tablet by mouth daily       All patient questions answered. Patient voiced understanding. Quality Measures    Body mass index is 32.09 kg/m².

## 2019-09-26 LAB — HEPATITIS B CORE IGM ANTIBODY: NONREACTIVE

## 2019-10-04 DIAGNOSIS — I25.10 CORONARY ARTERY DISEASE INVOLVING NATIVE CORONARY ARTERY OF NATIVE HEART WITHOUT ANGINA PECTORIS: Primary | ICD-10-CM

## 2019-10-04 RX ORDER — NITROGLYCERIN 0.4 MG/1
TABLET SUBLINGUAL
Qty: 25 TABLET | Refills: 0 | Status: SHIPPED | OUTPATIENT
Start: 2019-10-04

## 2019-10-09 ENCOUNTER — OFFICE VISIT (OUTPATIENT)
Dept: PODIATRY | Age: 59
End: 2019-10-09
Payer: COMMERCIAL

## 2019-10-09 VITALS — RESPIRATION RATE: 16 BRPM | WEIGHT: 243 LBS | BODY MASS INDEX: 32.2 KG/M2 | HEIGHT: 73 IN

## 2019-10-09 DIAGNOSIS — E11.42 DIABETIC POLYNEUROPATHY ASSOCIATED WITH TYPE 2 DIABETES MELLITUS (HCC): ICD-10-CM

## 2019-10-09 DIAGNOSIS — E11.51 TYPE II DIABETES MELLITUS WITH PERIPHERAL CIRCULATORY DISORDER (HCC): ICD-10-CM

## 2019-10-09 DIAGNOSIS — I73.9 PERIPHERAL VASCULAR DISORDER (HCC): Primary | ICD-10-CM

## 2019-10-09 PROCEDURE — 99203 OFFICE O/P NEW LOW 30 MIN: CPT | Performed by: PODIATRIST

## 2019-10-09 PROCEDURE — G8598 ASA/ANTIPLAT THER USED: HCPCS | Performed by: PODIATRIST

## 2019-10-09 PROCEDURE — 3017F COLORECTAL CA SCREEN DOC REV: CPT | Performed by: PODIATRIST

## 2019-10-09 PROCEDURE — G8428 CUR MEDS NOT DOCUMENT: HCPCS | Performed by: PODIATRIST

## 2019-10-09 PROCEDURE — G8484 FLU IMMUNIZE NO ADMIN: HCPCS | Performed by: PODIATRIST

## 2019-10-09 PROCEDURE — 3046F HEMOGLOBIN A1C LEVEL >9.0%: CPT | Performed by: PODIATRIST

## 2019-10-09 PROCEDURE — 1036F TOBACCO NON-USER: CPT | Performed by: PODIATRIST

## 2019-10-09 PROCEDURE — 2022F DILAT RTA XM EVC RTNOPTHY: CPT | Performed by: PODIATRIST

## 2019-10-09 PROCEDURE — G8417 CALC BMI ABV UP PARAM F/U: HCPCS | Performed by: PODIATRIST

## 2019-10-09 RX ORDER — L-METHYLFOLATE-ALGAE-VIT B12-B6 CAP 3-90.314-2-35 MG 3-90.314-2-35 MG
1 CAP ORAL 2 TIMES DAILY
Qty: 60 CAPSULE | Refills: 2 | Status: SHIPPED | OUTPATIENT
Start: 2019-10-09 | End: 2020-01-02 | Stop reason: SDUPTHER

## 2019-10-28 ENCOUNTER — OFFICE VISIT (OUTPATIENT)
Dept: PODIATRY | Age: 59
End: 2019-10-28
Payer: COMMERCIAL

## 2019-10-28 DIAGNOSIS — M79.671 BILATERAL FOOT PAIN: ICD-10-CM

## 2019-10-28 DIAGNOSIS — E11.51 TYPE II DIABETES MELLITUS WITH PERIPHERAL CIRCULATORY DISORDER (HCC): Primary | ICD-10-CM

## 2019-10-28 DIAGNOSIS — I73.9 PERIPHERAL VASCULAR DISORDER (HCC): ICD-10-CM

## 2019-10-28 DIAGNOSIS — M79.672 BILATERAL FOOT PAIN: ICD-10-CM

## 2019-10-28 PROCEDURE — A5513 MULTI DEN INSERT CUSTOM MOLD: HCPCS | Performed by: PODIATRIST

## 2019-10-28 PROCEDURE — A5500 DIAB SHOE FOR DENSITY INSERT: HCPCS | Performed by: PODIATRIST

## 2019-11-02 ENCOUNTER — CARE COORDINATION (OUTPATIENT)
Dept: FAMILY MEDICINE CLINIC | Age: 59
End: 2019-11-02

## 2019-12-31 ENCOUNTER — CARE COORDINATION (OUTPATIENT)
Dept: CARE COORDINATION | Age: 59
End: 2019-12-31

## 2020-01-02 ENCOUNTER — OFFICE VISIT (OUTPATIENT)
Dept: FAMILY MEDICINE CLINIC | Age: 60
End: 2020-01-02
Payer: COMMERCIAL

## 2020-01-02 ENCOUNTER — CARE COORDINATION (OUTPATIENT)
Dept: CARE COORDINATION | Age: 60
End: 2020-01-02

## 2020-01-02 VITALS
OXYGEN SATURATION: 97 % | DIASTOLIC BLOOD PRESSURE: 79 MMHG | BODY MASS INDEX: 34.13 KG/M2 | HEIGHT: 72 IN | HEART RATE: 72 BPM | SYSTOLIC BLOOD PRESSURE: 137 MMHG | WEIGHT: 252 LBS

## 2020-01-02 PROCEDURE — 1036F TOBACCO NON-USER: CPT | Performed by: FAMILY MEDICINE

## 2020-01-02 PROCEDURE — G8427 DOCREV CUR MEDS BY ELIG CLIN: HCPCS | Performed by: FAMILY MEDICINE

## 2020-01-02 PROCEDURE — G8417 CALC BMI ABV UP PARAM F/U: HCPCS | Performed by: FAMILY MEDICINE

## 2020-01-02 PROCEDURE — 83036 HEMOGLOBIN GLYCOSYLATED A1C: CPT | Performed by: FAMILY MEDICINE

## 2020-01-02 PROCEDURE — G8484 FLU IMMUNIZE NO ADMIN: HCPCS | Performed by: FAMILY MEDICINE

## 2020-01-02 PROCEDURE — 99214 OFFICE O/P EST MOD 30 MIN: CPT | Performed by: FAMILY MEDICINE

## 2020-01-02 PROCEDURE — 2022F DILAT RTA XM EVC RTNOPTHY: CPT | Performed by: FAMILY MEDICINE

## 2020-01-02 PROCEDURE — 3017F COLORECTAL CA SCREEN DOC REV: CPT | Performed by: FAMILY MEDICINE

## 2020-01-02 RX ORDER — CARVEDILOL 25 MG/1
25 TABLET ORAL 2 TIMES DAILY WITH MEALS
Qty: 180 TABLET | Refills: 3 | Status: SHIPPED | OUTPATIENT
Start: 2020-01-02 | End: 2020-05-27 | Stop reason: SDUPTHER

## 2020-01-02 RX ORDER — AMLODIPINE BESYLATE 10 MG/1
TABLET ORAL
Qty: 90 TABLET | Refills: 3 | Status: SHIPPED | OUTPATIENT
Start: 2020-01-02 | End: 2020-05-27 | Stop reason: SDUPTHER

## 2020-01-02 RX ORDER — CHLORTHALIDONE 25 MG/1
25 TABLET ORAL EVERY MORNING
Qty: 90 TABLET | Refills: 1 | Status: SHIPPED | OUTPATIENT
Start: 2020-01-02 | End: 2020-05-27 | Stop reason: SDUPTHER

## 2020-01-02 RX ORDER — NITROGLYCERIN 0.4 MG/1
TABLET SUBLINGUAL
Qty: 25 TABLET | Refills: 0 | Status: CANCELLED | OUTPATIENT
Start: 2020-01-02

## 2020-01-02 RX ORDER — ASPIRIN 81 MG/1
81 TABLET ORAL DAILY
Qty: 90 TABLET | Refills: 3 | Status: SHIPPED | OUTPATIENT
Start: 2020-01-02 | End: 2020-05-27 | Stop reason: SDUPTHER

## 2020-01-02 RX ORDER — MULTIVIT-MIN/IRON FUM/FOLIC AC 7.5 MG-4
1 TABLET ORAL DAILY
Qty: 90 TABLET | Refills: 0 | Status: SHIPPED | OUTPATIENT
Start: 2020-01-02 | End: 2020-02-19

## 2020-01-02 RX ORDER — METFORMIN HYDROCHLORIDE 500 MG/1
500 TABLET, EXTENDED RELEASE ORAL 2 TIMES DAILY
Qty: 180 TABLET | Refills: 3 | Status: SHIPPED | OUTPATIENT
Start: 2020-01-02 | End: 2020-05-27 | Stop reason: SDUPTHER

## 2020-01-02 RX ORDER — HYDRALAZINE HYDROCHLORIDE 50 MG/1
50 TABLET, FILM COATED ORAL 3 TIMES DAILY
Qty: 90 TABLET | Refills: 3 | Status: SHIPPED | OUTPATIENT
Start: 2020-01-02 | End: 2020-05-27 | Stop reason: SDUPTHER

## 2020-01-02 RX ORDER — L-METHYLFOLATE-ALGAE-VIT B12-B6 CAP 3-90.314-2-35 MG 3-90.314-2-35 MG
1 CAP ORAL 2 TIMES DAILY
Qty: 60 CAPSULE | Refills: 2 | Status: SHIPPED | OUTPATIENT
Start: 2020-01-02 | End: 2020-05-27 | Stop reason: SDUPTHER

## 2020-01-02 RX ORDER — SILDENAFIL 50 MG/1
50 TABLET, FILM COATED ORAL PRN
Qty: 30 TABLET | Refills: 3 | Status: SHIPPED | OUTPATIENT
Start: 2020-01-02 | End: 2020-08-27 | Stop reason: DRUGHIGH

## 2020-01-02 RX ORDER — SPIRONOLACTONE 25 MG/1
25 TABLET ORAL EVERY MORNING
Qty: 90 TABLET | Refills: 1 | Status: SHIPPED | OUTPATIENT
Start: 2020-01-02 | End: 2020-05-27 | Stop reason: SDUPTHER

## 2020-01-02 RX ORDER — ATORVASTATIN CALCIUM 40 MG/1
40 TABLET, FILM COATED ORAL DAILY
Qty: 90 TABLET | Refills: 3 | Status: SHIPPED | OUTPATIENT
Start: 2020-01-02 | End: 2020-05-27 | Stop reason: SDUPTHER

## 2020-01-02 RX ORDER — ACETAMINOPHEN 500 MG
TABLET ORAL
Qty: 120 TABLET | Refills: 0 | Status: CANCELLED | OUTPATIENT
Start: 2020-01-02

## 2020-01-02 ASSESSMENT — ENCOUNTER SYMPTOMS
CONSTIPATION: 0
CHEST TIGHTNESS: 0
SORE THROAT: 0
TROUBLE SWALLOWING: 0
NAUSEA: 0
APNEA: 0
COLOR CHANGE: 0
EYE PAIN: 0
DIARRHEA: 0
COUGH: 0
VOMITING: 0
RESPIRATORY NEGATIVE: 1
SHORTNESS OF BREATH: 0
ABDOMINAL PAIN: 0

## 2020-01-02 NOTE — PROGRESS NOTES
Visit Information    Have you changed or started any medications since your last visit including any over-the-counter medicines, vitamins, or herbal medicines? no   Have you stopped taking any of your medications? Is so, why? -  no  Are you having any side effects from any of your medications? - no    Have you seen any other physician or provider since your last visit?  no   Have you had any other diagnostic tests since your last visit?  no   Have you been seen in the emergency room and/or had an admission in a hospital since we last saw you?  no   Have you had your routine dental cleaning in the past 6 months?  no     Do you have an active MyChart account? If no, what is the barrier?   Yes    Patient Care Team:  Lizzy Rankin MD as PCP - General (Family Medicine)  Lizzy Rankin MD as PCP - [facility]  Bobby Echavarria MD as Consulting Physician (Cardiology)  Adis Prasad OD (Optometry)  Brett Boyle MD as Consulting Physician (Nephrology)  Veronika Evans RN as Ambulatory Care Manager  Jennifer Mcpherson MD as Consulting Physician (Urology)  Mae Haider MD as Consulting Physician (Cardiology)  Adis Prasad OD (Optometry)  Elisabet Patterson DPM as Physician (Podiatry)    Medical History Review  Past Medical, Family, and Social History reviewed and does contribute to the patient presenting condition    Health Maintenance   Topic Date Due    Diabetic microalbuminuria test  10/13/2017    Diabetic retinal exam  07/31/2019    A1C test (Diabetic or Prediabetic)  12/25/2019    Shingles Vaccine (2 of 2) 02/13/2020    PSA counseling  02/26/2020    Lipid screen  09/25/2020    Potassium monitoring  09/25/2020    Creatinine monitoring  09/25/2020    Diabetic foot exam  10/09/2020    Colon cancer screen colonoscopy  01/15/2023    DTaP/Tdap/Td vaccine (2 - Td) 04/03/2024    Flu vaccine  Completed    Pneumococcal 0-64 years Vaccine  Completed    Hepatitis C screen  Completed    HIV

## 2020-01-02 NOTE — PATIENT INSTRUCTIONS
medicine for pain, take it as prescribed. ? If you are not taking a prescription pain medicine, ask your doctor if you can take an over-the-counter medicine. · Save hard tasks for days when you have less pain. Follow a hard task with an easy task. And remember to take breaks. · Relax, and reduce stress. You may want to try deep breathing or meditation. These can help. · Keep moving. Gentle, daily exercise can help reduce pain. Your doctor or physical therapist can tell you what type of exercise is best for you. This may include walking, swimming, and stationary biking. It may also include stretches and range-of-motion exercises. · Try heat, cold packs, and massage. · Get enough sleep. Constant pain can make you more tired. If the pain makes it hard to sleep, talk with your doctor. · Think positively. Your thoughts can affect your pain. Do fun things to distract yourself from the pain. See a movie, read a book, listen to music, or spend time with a friend. · Keep a pain diary. Try to write down how strong your pain is and what it feels like. Also try to notice and write down how your moods, thoughts, sleep, activities, and medicine affect your pain. These notes can help you and your doctor find the best ways to treat your pain. Reducing constipation caused by pain medicine  Pain medicines often cause constipation. To reduce constipation:  · Include fruits, vegetables, beans, and whole grains in your diet each day. These foods are high in fiber. · Drink plenty of fluids, enough so that your urine is light yellow or clear like water. If you have kidney, heart, or liver disease and have to limit fluids, talk with your doctor before you increase the amount of fluids you drink. · Get some exercise every day. Build up slowly to 30 to 60 minutes a day on 5 or more days of the week. · Take a fiber supplement, such as Citrucel or Metamucil, every day if needed.  Read and follow all instructions on the lotions are best used after a warm shower.   Do not apply to skin with cuts or bruises  EXAMPLES  Cetaphil  Cerave  Eucerin  Gold Bond

## 2020-01-02 NOTE — PROGRESS NOTES
[facility] PHYSICIANS  [facility]  [facility] [address]  [address] [address]  Dept: [phone]     2020   Sue Duff (:  1960) is a 63 y.o. male,   Chief Complaint   Patient presents with    Diabetes     HPI   DIABETES MELLITUS  Patient has a history of diabetes mellitus. medication compliance:  compliant most of the time, diabetic diet compliance:  compliant most of the time, home glucose monitoring: are performed regularly. Patient's recent   Lab Results   Component Value Date    LABA1C 8.2 2020   . This patient is on Metformin and Januvia. Patient is responding well with this therapy. Patient reports home glucose monitoring as Stable readings. Patient denieshypoglycemia episodes such as{symptoms. Patient denies neither polyuria nor polydipsia nor blurred vision. The patient has seen an ophthalmologist with in one last year. The patient is not on ACE inhibitors. The patient has a foot examination. The patient has seen an podiatrist with in last year. Recently saw Dr. Ashley Escalante. Lab Results   Component Value Date    LABA1C 8.2 2020     Lab Results   Component Value Date     2013     HYPERTENSION/CHF  Gerardo Avila 63 y.o. male  has a history of hypertension. Patient is currently on Chlorthalidone, Hydralazine, Norvasc, Coreg, and Spironolactone. Patient's most recent BP   BP Readings from Last 1 Encounters:   20 137/79      Patient denies any adverse reaction to this therapy. Patient does monitor BP at home. Blood pressure is well controlled. Cardiovascular risk factors: advanced age (older than 54 for men, 72 for women), diabetes mellitus, dyslipidemia, hypertension and male gender. He is not exercising and is adherent to low salt diet. Use of agents associated with hypertension: NSAIDS. History of target organ damage: angina/ prior myocardial infarction, heart failure and left ventricular hypertrophy.   Cardiology- Denies CP, SOB, No Constitutional: Positive for fatigue. Negative for chills and fever. HENT: Negative for congestion, ear pain, sore throat and trouble swallowing. Eyes: Negative for pain and visual disturbance. Respiratory: Negative. Negative for apnea, cough, chest tightness and shortness of breath. Cardiovascular: Negative. Gastrointestinal: Negative for abdominal pain, constipation, diarrhea, nausea and vomiting. Endocrine: Negative for cold intolerance and heat intolerance. Genitourinary: Negative for difficulty urinating, dysuria, frequency and genital sores. Erectile dysfunction   Musculoskeletal: Positive for myalgias. Negative for arthralgias and gait problem. Skin: Negative for color change and rash. Neurological: Negative for weakness, light-headedness and headaches. Psychiatric/Behavioral: Positive for sleep disturbance. Negative for agitation, behavioral problems and decreased concentration. The patient is nervous/anxious. Prior to Visit Medications    Medication Sig Taking? Authorizing Provider   spironolactone (ALDACTONE) 25 MG tablet Take 1 tablet by mouth every morning BP and CHF Yes VENTURA Majano CNP   SITagliptin (JANUVIA) 50 MG tablet Take 1 tablet by mouth daily Yes VENTURA Majano CNP   sildenafil (VIAGRA) 50 MG tablet Take 1 tablet by mouth as needed for Erectile Dysfunction Don't take with nitroglycerin Yes VENTURA Majano CNP   Multiple Vitamins-Minerals (MULTIVITAMIN WITH MINERALS) tablet Take 1 tablet by mouth daily Yes VENTURA Majano CNP   metFORMIN (GLUCOPHAGE XR) 500 MG extended release tablet Take 1 tablet by mouth 2 times daily Stop short acting Metformin.  Yes VENTURA Majano CNP   L-methylfolate-B6-B12 (METANX) 0-83.813-5-30 MG CAPS capsule Take 1 capsule by mouth 2 times daily Yes VENTURA Majano CNP   hydrALAZINE (APRESOLINE) 50 MG tablet Take 1 tablet by mouth 3 times daily BP medication Yes VENTURA Majano CNP   chlorthalidone (HYGROTON) 25 MG tablet Take 1 tablet by mouth every morning Water pill and BP pill Yes VENTURA Majano CNP   carvedilol (COREG) 25 MG tablet Take 1 tablet by mouth 2 times daily (with meals) Yes VENTURA Majano CNP   blood glucose test strips (FREESTYLE LITE) strip Checking Blood Glucose twice a day Yes VENTURA Majano CNP   atorvastatin (LIPITOR) 40 MG tablet Take 1 tablet by mouth daily Yes VENTURA Majano CNP   aspirin EC 81 MG EC tablet Take 1 tablet by mouth daily Yes VENTURA Majano CNP   amLODIPine (NORVASC) 10 MG tablet TAKE ONE TABLET BY MOUTH EVERY DAY Yes VENTURA Majano CNP   nitroGLYCERIN (NITROSTAT) 0.4 MG SL tablet DISSOLVE ONE TABLET UNDER THE TONGUE EVERY 5 MINUTES AS NEEDED FOR CHEST PAIN. DO NOT EXCEED A TOTAL OF 3 DOSES IN 15 MINUTES Yes Stacey Levi MD   EQ ACETAMINOPHEN 500 MG tablet TAKE ONE TABLET BY MOUTH EVERY 6 HOURS AS NEEDED FOR PAIN Yes Jaswant Wright MD      Social History     Tobacco Use    Smoking status: Former Smoker     Packs/day: 0.50     Years: 2.00     Pack years: 1.00     Types: Cigarettes     Last attempt to quit: 1981     Years since quittin.0    Smokeless tobacco: Former User   Substance Use Topics    Alcohol use: Yes     Alcohol/week: 1.0 - 2.0 standard drinks     Types: 1 - 2 Cans of beer per week     Comment: 1-2 cans a month      Vitals:    20 1531   BP: 137/79   Pulse: 72   SpO2: 97%   Weight: 252 lb (114.3 kg)   Height: 6' (1.829 m)     Estimated body mass index is 34.18 kg/m² as calculated from the following:    Height as of this encounter: 6' (1.829 m). Weight as of this encounter: 252 lb (114.3 kg). Physical Exam  Vitals signs and nursing note reviewed. Constitutional:       General: He is not in acute distress. Appearance: He is well-developed. He is not diaphoretic. HENT:      Head: Normocephalic and atraumatic. Right Ear: Hearing, tympanic membrane, ear canal and external ear normal.      Left Ear: Hearing, tympanic membrane, ear canal and external ear normal.      Nose: Nose normal. No mucosal edema. Mouth/Throat:      Pharynx: No oropharyngeal exudate or posterior oropharyngeal erythema. Eyes:      General: Lids are normal. No scleral icterus. Right eye: No discharge. Left eye: No discharge. Conjunctiva/sclera: Conjunctivae normal.      Pupils: Pupils are equal, round, and reactive to light. Neck:      Musculoskeletal: Normal range of motion and neck supple. Thyroid: No thyromegaly. Vascular: No JVD. Cardiovascular:      Rate and Rhythm: Normal rate and regular rhythm. Pulses:           Dorsalis pedis pulses are 2+ on the right side and 2+ on the left side. Posterior tibial pulses are 2+ on the right side and 2+ on the left side. Heart sounds: Murmur present. Crescendo  systolic murmur present with a grade of 3/6. No friction rub. No gallop. Pulmonary:      Effort: Pulmonary effort is normal. No respiratory distress. Breath sounds: Normal breath sounds. No wheezing or rales. Chest:      Chest wall: No tenderness. Abdominal:      General: Bowel sounds are normal. There is no distension. Palpations: Abdomen is soft. Tenderness: There is no tenderness. There is no guarding or rebound. Hernia: No hernia is present. Comments: Obese abdomen. Musculoskeletal: Normal range of motion. General: No tenderness. Right lower le+ Edema present. Left lower le+ Edema present. Feet:      Right foot:      Protective Sensation: 5 sites tested. 2 sites sensed. Skin integrity: Dry skin present. No ulcer, blister, skin breakdown, erythema, warmth or callus. Left foot:      Protective Sensation: 5 sites tested. 2 sites sensed. Skin integrity: Dry skin present.  No ulcer, blister, skin breakdown, erythema, warmth or callus. Lymphadenopathy:      Cervical: No cervical adenopathy. Skin:     General: Skin is warm and dry. Capillary Refill: Capillary refill takes less than 2 seconds. Findings: No rash. Neurological:      Mental Status: He is alert and oriented to person, place, and time. Cranial Nerves: No cranial nerve deficit. Sensory: No sensory deficit. Motor: No abnormal muscle tone. Coordination: Coordination normal.      Deep Tendon Reflexes: Reflexes normal.   Psychiatric:         Behavior: Behavior normal.         Thought Content: Thought content normal.         Judgment: Judgment normal.       Most recent labs reviewed with patient, and all questions answered.   Lab Results   Component Value Date    WBC 6.1 09/25/2019    HGB 12.3 (L) 09/25/2019    HCT 37.0 (L) 09/25/2019    MCV 94.6 09/25/2019     09/25/2019     Lab Results   Component Value Date     09/25/2019    K 3.9 09/25/2019     09/25/2019    CO2 24 09/25/2019    BUN 26 09/25/2019    CREATININE 1.35 09/25/2019    GLUCOSE 169 09/25/2019    GLUCOSE 157 02/06/2012    CALCIUM 9.6 09/25/2019      Lab Results   Component Value Date    ALT 13 09/25/2019    AST 16 09/25/2019    ALKPHOS 106 09/25/2019    BILITOT 0.49 09/25/2019     Lab Results   Component Value Date    TSH 1.26 02/26/2019     Lab Results   Component Value Date    CHOL 153 09/25/2019    CHOL 177 01/16/2019    CHOL 155 10/13/2016     Lab Results   Component Value Date    TRIG 99 09/25/2019    TRIG 73 01/16/2019    TRIG 78 10/13/2016     Lab Results   Component Value Date    HDL 49 09/25/2019    HDL 43 01/16/2019    HDL 45 10/13/2016     Lab Results   Component Value Date    LDLCALC 119 01/16/2019    LDLCHOLESTEROL 84 09/25/2019    LDLCHOLESTEROL 94 10/13/2016    LDLCHOLESTEROL 117 (H) 08/06/2013     Lab Results   Component Value Date    CHOLHDLRATIO 3.1 09/25/2019    CHOLHDLRATIO 4.1 01/16/2019    CHOLHDLRATIO 3.4 10/13/2016     Lab Results Component Value Date    LABA1C 8.2 01/02/2020      Lab Results   Component Value Date    VVJIZKPE95 520 09/25/2019     Lab Results   Component Value Date    FOLATE >20.0 09/25/2019     No results found for: VITD25  ASSESSMENT/PLAN:  1. Type 2 diabetes mellitus with diabetic polyneuropathy, without long-term current use of insulin (HCC)  Ongoing  Improved H A1c  Continue therapy. Metformin, Januvia  Continue to check Glucose levels at home. Report increased and low levels as discussed. Decrease carbohydrates, sugary drinks, desserts in your diet. We will recheck Hemoglobin A1c in 3 months. Exercise regularly, as tolerated. Try to lose weight.    - POCT glycosylated hemoglobin (Hb A1C)  - SITagliptin (JANUVIA) 50 MG tablet; Take 1 tablet by mouth daily  Dispense: 90 tablet; Refill: 1  - Multiple Vitamins-Minerals (MULTIVITAMIN WITH MINERALS) tablet; Take 1 tablet by mouth daily  Dispense: 90 tablet; Refill: 0  - metFORMIN (GLUCOPHAGE XR) 500 MG extended release tablet; Take 1 tablet by mouth 2 times daily Stop short acting Metformin. Dispense: 180 tablet; Refill: 3  - L-methylfolate-B6-B12 (METANX) 2-70.650-7-22 MG CAPS capsule; Take 1 capsule by mouth 2 times daily  Dispense: 60 capsule; Refill: 2  - blood glucose test strips (FREESTYLE LITE) strip; Checking Blood Glucose twice a day  Dispense: 100 strip; Refill: 3  - aspirin EC 81 MG EC tablet; Take 1 tablet by mouth daily  Dispense: 90 tablet; Refill: 3  - Basic Metabolic Panel; Future   Compounded pain cream from [employer] that will includes:   Diclofenac3%, Gabapentin 6%, Lidocaine 2%, and Prilocaine 2%. Patient is instructed to apply this cream TID    2. Coronary artery disease involving native coronary artery of native heart without angina pectoris  Historical  No recent CP  Follow up with cardiologist on a regular basis. Advised to go to the ER for worsening CP/SOB         3.  Chronic combined systolic and diastolic CHF (congestive heart failure) [facility])  Ongoing  Continue with the therapy. Weigh daily and log. Keep regular activity as tolerated. Cut down on salt intake. Keep appointment with the cardiologist.  Go to the ER for CP and SOB not relieved with rest.  - spironolactone (ALDACTONE) 25 MG tablet; Take 1 tablet by mouth every morning BP and CHF  Dispense: 90 tablet; Refill: 1  - hydrALAZINE (APRESOLINE) 50 MG tablet; Take 1 tablet by mouth 3 times daily BP medication  Dispense: 90 tablet; Refill: 3  - chlorthalidone (HYGROTON) 25 MG tablet; Take 1 tablet by mouth every morning Water pill and BP pill  Dispense: 90 tablet; Refill: 1  - aspirin EC 81 MG EC tablet; Take 1 tablet by mouth daily  Dispense: 90 tablet; Refill: 3  - Basic Metabolic Panel; Future    4. Essential hypertension  Ongoing  Continue therapy  Continue current therapy. Cut down on your salt intake. Cut down on caffeinated drinks, sports drinks. Instructed to check BP at home regularly. Report for any chest pains, shortness of breath, headaches, and lightheadedness. Call the office if your blood pressure continue to be higher than 140/90 or 90/50.    - spironolactone (ALDACTONE) 25 MG tablet; Take 1 tablet by mouth every morning BP and CHF  Dispense: 90 tablet; Refill: 1  - hydrALAZINE (APRESOLINE) 50 MG tablet; Take 1 tablet by mouth 3 times daily BP medication  Dispense: 90 tablet; Refill: 3  - chlorthalidone (HYGROTON) 25 MG tablet; Take 1 tablet by mouth every morning Water pill and BP pill  Dispense: 90 tablet; Refill: 1  - carvedilol (COREG) 25 MG tablet; Take 1 tablet by mouth 2 times daily (with meals)  Dispense: 180 tablet; Refill: 3  - amLODIPine (NORVASC) 10 MG tablet; TAKE ONE TABLET BY MOUTH EVERY DAY  Dispense: 90 tablet; Refill: 3    5. Hyperlipidemia with target LDL less than 70  Ongoing  Continue therapy   Advised to decrease the consumption of red meats, fried foods, trans fats, sweets, sugary beverages.    Advised to increase fish, vegetables, and fruits consumption. Advised to add fiber or OTC supplements in diet. Discussed weight loss which will result in improvement of lipids levels. Advised to increase daily physical activities and add regular exercises. - atorvastatin (LIPITOR) 40 MG tablet; Take 1 tablet by mouth daily  Dispense: 90 tablet; Refill: 3    6. Erectile dysfunction due to arterial insufficiency  Ongoing  Take the medication as it is discussed. Monitor your blood pressure regularly. Go to the emergency room for sustained headache, chest pain, shortness of breath, or sustained erection. - sildenafil (VIAGRA) 50 MG tablet; Take 1 tablet by mouth as needed for Erectile Dysfunction Don't take with nitroglycerin  Dispense: 30 tablet; Refill: 3      Compounded pain cream from [employer] that will includes:   Diclofenac3%, Gabapentin 6%, Lidocaine 2%, and Prilocaine 2%. Patient is instructed to apply this cream TID. Health Maintenance Due   Topic Date Due    Diabetic microalbuminuria test  10/13/2017    Diabetic retinal exam  07/31/2019    A1C test (Diabetic or Prediabetic)  12/25/2019     Return in about 3 months (around 4/2/2020) for Follow-up on chronic conditions, Keep Appt w/ Dr. Amna Cross, DIabetes Mellitus, Hypertension. Blessing Sparks received counseling on the following healthy behaviors: nutrition, exercise and medication adherence  Reviewed prior labs and health maintenance  Continue current medications, diet and exercise. Discussed use, benefit, and side effects of prescribed medications. Barriers to medication compliance addressed. Patient given educational materials - see patient instructions  Was a self-tracking handout given in paper form or via [employer]?  Yes    Requested Prescriptions     Signed Prescriptions Disp Refills    spironolactone (ALDACTONE) 25 MG tablet 90 tablet 1     Sig: Take 1 tablet by mouth every morning BP and CHF    SITagliptin (JANUVIA) 50 MG tablet 90 tablet 1     Sig: Take 1 tablet by mouth daily    sildenafil (VIAGRA) 50 MG tablet 30 tablet 3     Sig: Take 1 tablet by mouth as needed for Erectile Dysfunction Don't take with nitroglycerin    Multiple Vitamins-Minerals (MULTIVITAMIN WITH MINERALS) tablet 90 tablet 0     Sig: Take 1 tablet by mouth daily    metFORMIN (GLUCOPHAGE XR) 500 MG extended release tablet 180 tablet 3     Sig: Take 1 tablet by mouth 2 times daily Stop short acting Metformin.  L-methylfolate-B6-B12 (METANX) 8-25.691-3-40 MG CAPS capsule 60 capsule 2     Sig: Take 1 capsule by mouth 2 times daily    hydrALAZINE (APRESOLINE) 50 MG tablet 90 tablet 3     Sig: Take 1 tablet by mouth 3 times daily BP medication    chlorthalidone (HYGROTON) 25 MG tablet 90 tablet 1     Sig: Take 1 tablet by mouth every morning Water pill and BP pill    carvedilol (COREG) 25 MG tablet 180 tablet 3     Sig: Take 1 tablet by mouth 2 times daily (with meals)    blood glucose test strips (FREESTYLE LITE) strip 100 strip 3     Sig: Checking Blood Glucose twice a day    atorvastatin (LIPITOR) 40 MG tablet 90 tablet 3     Sig: Take 1 tablet by mouth daily    aspirin EC 81 MG EC tablet 90 tablet 3     Sig: Take 1 tablet by mouth daily    amLODIPine (NORVASC) 10 MG tablet 90 tablet 3     Sig: TAKE ONE TABLET BY MOUTH EVERY DAY       All patient questions answered. Patient voiced understanding. Quality Measures    Body mass index is 34.18 kg/m². Elevated. Weight control planned discussed Healthy diet and regular exercise. BP: 137/79 Blood pressure is normal. Treatment plan consists of No treatment change needed. Lab Results   Component Value Date    LDLCALC 119 01/16/2019    LDLCHOLESTEROL 84 09/25/2019    (goal LDL reduction with dx if diabetes is 50% LDL reduction)    . [facility]  PHQ Scores 4/11/2019 1/11/2019 10/31/2017 7/24/2017 11/18/2016   PHQ2 Score 0 3 0 3 0   PHQ9 Score 0 9 0 8 0     Interpretation of Total Score Depression Severity: 1-4 = Minimal depression, 5-9 = Mild depression, 10-14 = Moderate depression, 15-19 = Moderately severe depression, 20-27 = Severe depression   This note was completed by using the assistance of a speech-recognition program. However, inadvertent computerized transcription errors may be present. Although every effort was made to ensure accuracy, no guarantees can be provided that every mistake has been identified and corrected by editing   An electronic signature was used to authenticate this note.   --VENTURA Thomas CNP on 1/3/2020 at 7:25 AM

## 2020-01-02 NOTE — CARE COORDINATION
Ambulatory Care Coordination Note  1/2/2020  CM Risk Score: 6  [place]son 10 Year Mortality Risk Score: 79%     ACC: Vish Smith RN    Summary:  · Morning Fasting range 100-120 mg/dl  · After supper 160 - 175 mg/dl  · POC A1C 8.2% (Previous 9.1%, September 2019)  · C/O neuropathy in BLE  · Discussed meal planning  · Encouraged consistency in diabetes management  · Discussed Dietary Supplement      CC Plan:   1.) Continue to monitor and record glucose twice daily and prn  2.) Take medications as prescribed  3.) Zone Management - YELLOW zone for Diabetes and GREEN zone for HF  4.) Daily feet exam.  Keep feet clean & dry.  Report and cracks, cuts, blisters or open areas to podiatry  5.) Glucerna samples provided  6.) Contact CC for any questions or issues  7.) Consider graduation at next encounter    Congestive Heart Failure Assessment    Are you currently restricting fluids?:  No Restriction  Do you understand a low sodium diet?:  No  Do you understand how to read food labels?:  No  How many restaurant meals do you eat per week?:  0  Do you salt your food before tasting it?:  No         Symptoms:   None:  Yes   CHF associated leg swelling: Pos (Comment: Mild bilaterally)      Patient-reported weight (lb):  252       Diabetes Assessment    Medic Alert ID:  No  Meal Planning:  Avoidance of concentrated sweets, Plate Method   How often do you test your blood sugar?:  Daily, Bedtime   Do you have barriers with adherence to non-pharmacologic self-management interventions?  (Nutrition/Exercise/Self-Monitoring):  No   Have you ever had to go to the ED for symptoms of low blood sugar?:  Yes   What is the date of your last ED visit for low blood sugar?:  1/21/19            Wt Readings from Last 3 Encounters:   01/02/20 252 lb (114.3 kg)   10/09/19 243 lb (110.2 kg)   09/25/19 243 lb 3.2 oz (110.3 kg)     BP Readings from Last 3 Encounters:   01/02/20 137/79   09/25/19 124/80   06/11/19 (!) 141/69     Lab Results   Component Taking? Authorizing Provider   spironolactone (ALDACTONE) 25 MG tablet Take 1 tablet by mouth every morning BP and CHF 1/2/20   VENTURA Majano CNP   SITagliptin (JANUVIA) 50 MG tablet Take 1 tablet by mouth daily 1/2/20   VENTURA Majano CNP   sildenafil (VIAGRA) 50 MG tablet Take 1 tablet by mouth as needed for Erectile Dysfunction Don't take with nitroglycerin 1/2/20   VENTURA Majano CNP   Multiple Vitamins-Minerals (MULTIVITAMIN WITH MINERALS) tablet Take 1 tablet by mouth daily 1/2/20   VENTURA Majano CNP   metFORMIN (GLUCOPHAGE XR) 500 MG extended release tablet Take 1 tablet by mouth 2 times daily Stop short acting Metformin. 1/2/20   VENTURA Majano CNP   L-methylfolate-B6-B12 (METANX) 5-99.723-3-93 MG CAPS capsule Take 1 capsule by mouth 2 times daily 1/2/20   VENTURA Majano CNP   hydrALAZINE (APRESOLINE) 50 MG tablet Take 1 tablet by mouth 3 times daily BP medication 1/2/20   VENTURA Majano CNP   chlorthalidone (HYGROTON) 25 MG tablet Take 1 tablet by mouth every morning Water pill and BP pill 1/2/20   VENTURA Majano CNP   carvedilol (COREG) 25 MG tablet Take 1 tablet by mouth 2 times daily (with meals) 1/2/20   VENTURA Majano CNP   blood glucose test strips (FREESTYLE LITE) strip Checking Blood Glucose twice a day 1/2/20   VENTURA Majano CNP   atorvastatin (LIPITOR) 40 MG tablet Take 1 tablet by mouth daily 1/2/20   VENTURA Majano CNP   aspirin EC 81 MG EC tablet Take 1 tablet by mouth daily 1/2/20   VENTURA Majano CNP   amLODIPine (NORVASC) 10 MG tablet TAKE ONE TABLET BY MOUTH EVERY DAY 1/2/20   VENTURA Majano CNP   nitroGLYCERIN (NITROSTAT) 0.4 MG SL tablet DISSOLVE ONE TABLET UNDER THE TONGUE EVERY 5 MINUTES AS NEEDED FOR CHEST PAIN.   DO NOT EXCEED A TOTAL OF 3 DOSES IN 15 MINUTES 10/4/19   MD JOAQUIM Yen ACETAMINOPHEN 500 MG tablet TAKE ONE TABLET BY

## 2020-01-03 LAB — HBA1C MFR BLD: 8.2 %

## 2020-02-07 ENCOUNTER — OFFICE VISIT (OUTPATIENT)
Dept: FAMILY MEDICINE CLINIC | Age: 60
End: 2020-02-07
Payer: COMMERCIAL

## 2020-02-07 VITALS
BODY MASS INDEX: 33.66 KG/M2 | HEIGHT: 73 IN | DIASTOLIC BLOOD PRESSURE: 80 MMHG | OXYGEN SATURATION: 98 % | SYSTOLIC BLOOD PRESSURE: 130 MMHG | WEIGHT: 254 LBS | HEART RATE: 74 BPM

## 2020-02-07 PROBLEM — G57.91 NEUROPATHY OF RIGHT LOWER EXTREMITY: Status: ACTIVE | Noted: 2020-02-07

## 2020-02-07 PROCEDURE — 99213 OFFICE O/P EST LOW 20 MIN: CPT | Performed by: FAMILY MEDICINE

## 2020-02-07 PROCEDURE — G8417 CALC BMI ABV UP PARAM F/U: HCPCS | Performed by: FAMILY MEDICINE

## 2020-02-07 PROCEDURE — G8427 DOCREV CUR MEDS BY ELIG CLIN: HCPCS | Performed by: FAMILY MEDICINE

## 2020-02-07 PROCEDURE — 3017F COLORECTAL CA SCREEN DOC REV: CPT | Performed by: FAMILY MEDICINE

## 2020-02-07 PROCEDURE — 1036F TOBACCO NON-USER: CPT | Performed by: FAMILY MEDICINE

## 2020-02-07 PROCEDURE — G8482 FLU IMMUNIZE ORDER/ADMIN: HCPCS | Performed by: FAMILY MEDICINE

## 2020-02-07 PROCEDURE — 3052F HG A1C>EQUAL 8.0%<EQUAL 9.0%: CPT | Performed by: FAMILY MEDICINE

## 2020-02-07 PROCEDURE — 2022F DILAT RTA XM EVC RTNOPTHY: CPT | Performed by: FAMILY MEDICINE

## 2020-02-07 RX ORDER — GABAPENTIN 300 MG/1
300 CAPSULE ORAL NIGHTLY
Qty: 30 CAPSULE | Refills: 2 | Status: SHIPPED
Start: 2020-02-07 | End: 2020-02-25 | Stop reason: ALTCHOICE

## 2020-02-07 ASSESSMENT — ENCOUNTER SYMPTOMS
CONSTIPATION: 0
RESPIRATORY NEGATIVE: 1
COUGH: 0
SHORTNESS OF BREATH: 0
TROUBLE SWALLOWING: 0
CHEST TIGHTNESS: 0
EYE PAIN: 0
SORE THROAT: 0
COLOR CHANGE: 0
ABDOMINAL PAIN: 0
APNEA: 0
DIARRHEA: 0
VOMITING: 0
NAUSEA: 0

## 2020-02-07 NOTE — PROGRESS NOTES
[facility] PHYSICIANS  [facility] FAMILY PHYSICIANS [address] [facility] [address].  [address] [address]  Dept: [phone]     2020   Joseph Hinojosa (:  1960) is a 63 y.o. male is here today for sick call. Patient also wants to discuss   Chief Complaint   Patient presents with    Leg Pain     right leg radiates down right leg as well - motrin seems to help x 1 month      HPI   Joseph Hinojosa is a 63 y.o. male patient. Patient has a history of diabetes with polyneuropathy. He came in  c/o some right leg pain. He reports having some pain shooting pains coming from his ankle to his leg and thigh area. He describes the pain as \"funny feeling\", some numbness, that sometimes turns into sharp pain. Pain occurs intermittently. Usually 7/10 on the pain scale. He could be sitting on a chair and the pain would come on. Pain usually lasts for a few minutes. Then goes away. Patient denies any previous trauma or accident. Denies any weakness, or gait problems. Patient denies any falls patient reports pain is worse at night. Patient takes motrin with mild relief. Since patient's creatinine levels are increasing. I advised him to not take a lot of Ibuprofen. We purvi recheck BMP. Patient exam was normal.  Negative Homans sign. DIstal  pulses were intact. Takes some supplementation for the neuropathy. We will trial him on some gabapentin to take only at night. Lab Results   Component Value Date    CREATININE 1.35 (H) 2019     No data recorded   Counseling given: Not Answered    Review of Systems   Constitutional: Positive for activity change. Negative for chills, fatigue and fever. HENT: Negative for congestion, ear pain, sore throat and trouble swallowing. Eyes: Negative for pain and visual disturbance. Respiratory: Negative. Negative for apnea, cough, chest tightness and shortness of breath. Cardiovascular: Negative.     Gastrointestinal: Negative for abdominal pain, constipation, diarrhea, nausea and vomiting. Endocrine: Negative for cold intolerance and heat intolerance. Genitourinary: Negative for difficulty urinating, dysuria, frequency and genital sores. Musculoskeletal: Positive for arthralgias and myalgias. Negative for gait problem. Skin: Negative for color change and rash. Neurological: Positive for numbness. Negative for weakness, light-headedness and headaches. Psychiatric/Behavioral: Negative for agitation, behavioral problems, decreased concentration and sleep disturbance. The patient is nervous/anxious. Prior to Visit Medications    Medication Sig Taking? Authorizing Provider   gabapentin (NEURONTIN) 300 MG capsule Take 1 capsule by mouth nightly. Yes VENTURA Majano CNP   spironolactone (ALDACTONE) 25 MG tablet Take 1 tablet by mouth every morning BP and CHF Yes VENTURA Majano CNP   SITagliptin (JANUVIA) 50 MG tablet Take 1 tablet by mouth daily Yes VENTURA Majano CNP   Multiple Vitamins-Minerals (MULTIVITAMIN WITH MINERALS) tablet Take 1 tablet by mouth daily Yes VENTURA Majano CNP   metFORMIN (GLUCOPHAGE XR) 500 MG extended release tablet Take 1 tablet by mouth 2 times daily Stop short acting Metformin.  Yes VENTURA Majano CNP   L-methylfolate-B6-B12 (METANX) 1-73.346-8-03 MG CAPS capsule Take 1 capsule by mouth 2 times daily Yes VENTURA Majano CNP   hydrALAZINE (APRESOLINE) 50 MG tablet Take 1 tablet by mouth 3 times daily BP medication Yes VENTURA Majano CNP   chlorthalidone (HYGROTON) 25 MG tablet Take 1 tablet by mouth every morning Water pill and BP pill Yes VENTURA Majano CNP   carvedilol (COREG) 25 MG tablet Take 1 tablet by mouth 2 times daily (with meals) Yes VENTURA Majano CNP   blood glucose test strips (FREESTYLE LITE) strip Checking Blood Glucose twice a day Yes VENTURA Majano CNP   atorvastatin (LIPITOR) 40 MG tablet Take 1 muscle tone. Coordination: Coordination normal.      Deep Tendon Reflexes: Reflexes normal.   Psychiatric:         Behavior: Behavior normal.         Thought Content: Thought content normal.         Judgment: Judgment normal.       Most recent labs reviewed with patient, and all questions answered. Lab Results   Component Value Date    WBC 6.1 09/25/2019    HGB 12.3 (L) 09/25/2019    HCT 37.0 (L) 09/25/2019    MCV 94.6 09/25/2019     09/25/2019     Lab Results   Component Value Date     09/25/2019    K 3.9 09/25/2019     09/25/2019    CO2 24 09/25/2019    BUN 26 09/25/2019    CREATININE 1.35 09/25/2019    GLUCOSE 169 09/25/2019    GLUCOSE 157 02/06/2012    CALCIUM 9.6 09/25/2019      Lab Results   Component Value Date    ALT 13 09/25/2019    AST 16 09/25/2019    ALKPHOS 106 09/25/2019    BILITOT 0.49 09/25/2019     Lab Results   Component Value Date    TSH 1.26 02/26/2019     Lab Results   Component Value Date    CHOL 153 09/25/2019    CHOL 177 01/16/2019    CHOL 155 10/13/2016     Lab Results   Component Value Date    TRIG 99 09/25/2019    TRIG 73 01/16/2019    TRIG 78 10/13/2016     Lab Results   Component Value Date    HDL 49 09/25/2019    HDL 43 01/16/2019    HDL 45 10/13/2016     Lab Results   Component Value Date    LDLCALC 119 01/16/2019    LDLCHOLESTEROL 84 09/25/2019    LDLCHOLESTEROL 94 10/13/2016    LDLCHOLESTEROL 117 (H) 08/06/2013     Lab Results   Component Value Date    CHOLHDLRATIO 3.1 09/25/2019    CHOLHDLRATIO 4.1 01/16/2019    CHOLHDLRATIO 3.4 10/13/2016     Lab Results   Component Value Date    LABA1C 8.2 01/02/2020      Lab Results   Component Value Date    DCTVZAIR78 709 09/25/2019     Lab Results   Component Value Date    FOLATE >20.0 09/25/2019     No results found for: VITD25  ASSESSMENT/PLAN:  1. Neuropathy of right lower extremity  Ongoing  Start taking Gabapentin  Report for worsening symptoms    - gabapentin (NEURONTIN) 300 MG capsule;  Take 1 capsule by mouth nightly. Dispense: 30 capsule; Refill: 2    2. Type 2 diabetes mellitus with diabetic polyneuropathy, without long-term current use of insulin ([address]  Ongoing  Start taking Gabapentin  Monitor glucose closely  Get blood work done to check renal function    Report for worsening symptoms    - gabapentin (NEURONTIN) 300 MG capsule; Take 1 capsule by mouth nightly. Dispense: 30 capsule; Refill: 2  Health Maintenance Due   Topic Date Due    Diabetic microalbuminuria test  10/13/2017    Diabetic retinal exam  07/31/2019    PSA counseling  02/26/2020      Return for Keep Appt w/ Dr. Danny Mcardle,, Follow-up/chronic conditions. Staryaneli Cramer received counseling on the following healthy behaviors: nutrition, exercise and medication adherence  Reviewed prior labs and health maintenance  Continue current medications, diet and exercise. Discussed use, benefit, and side effects of prescribed medications. Barriers to medication compliance addressed. Patient given educational materials - see patient instructions  Was a self-tracking handout given in paper form or via [employer]? Yes    Requested Prescriptions     Signed Prescriptions Disp Refills    gabapentin (NEURONTIN) 300 MG capsule 30 capsule 2     Sig: Take 1 capsule by mouth nightly. All patient questions answered. Patient voiced understanding. Quality Measures    Body mass index is 33.51 kg/m². Elevated. Weight control planned discussed Healthy diet and regular exercise. BP: 130/80 Blood pressure is normal. Treatment plan consists of No treatment change needed.     Lab Results   Component Value Date    LDLCALC 119 01/16/2019    LDLCHOLESTEROL 84 09/25/2019    (goal LDL reduction with dx if diabetes is 50% LDL reduction)      PHQ Scores 4/11/2019 1/11/2019 10/31/2017 7/24/2017 11/18/2016   PHQ2 Score 0 3 0 3 0   PHQ9 Score 0 9 0 8 0     Interpretation of Total Score Depression Severity: 1-4 = Minimal depression, 5-9 = Mild depression, 10-14 = Moderate depression, 15-19 = Moderately severe depression, 20-27 = Severe depression   This note was completed by using the assistance of a speech-recognition program. However, inadvertent computerized transcription errors may be present. Although every effort was made to ensure accuracy, no guarantees can be provided that every mistake has been identified and corrected by editing   An electronic signature was used to authenticate this note.   --VENTURA Sharif - CNP on 2/7/2020 at 11:16 AM

## 2020-02-07 NOTE — PATIENT INSTRUCTIONS
pregnant. Seizure control is very important during pregnancy, and having a seizure could harm both mother and baby. Do not start or stop taking gabapentin for seizures without your doctor's advice, and tell your doctor right away if you become pregnant. Gabapentin can pass into breast milk, but effects on the nursing baby are not known. Tell your doctor if you are breast-feeding. How should I take gabapentin? Follow all directions on your prescription label. Do not take this medicine in larger or smaller amounts or for longer than recommended. The Horizant brand of gabapentin should not be taken during the day. For best results, take Horizant with food at about 5:00 in the evening. Both Gralise and Horizant should be taken with food. Neurontin can be taken with or without food. If you break a Neurontin tablet and take only half of it, take the other half at your next dose. Any tablet that has been broken should be used as soon as possible or within a few days. Do not crush, chew, or break an extended-release tablet. Swallow it whole. Measure liquid medicine with the dosing syringe provided, or with a special dose-measuring spoon or medicine cup. If you do not have a dose-measuring device, ask your pharmacist for one. If your doctor changes your brand, strength, or type of gabapentin, your dosage needs may change. Ask your pharmacist if you have any questions about the new kind of gabapentin you receive at the pharmacy. Do not stop using gabapentin suddenly, even if you feel fine. Stopping suddenly may cause increased seizures. Follow your doctor's instructions about tapering your dose. Wear a medical alert tag or carry an ID card stating that you have seizures. Any medical care provider who treats you should know that you take seizure medication. This medicine can cause unusual results with certain medical tests. Tell any doctor who treats you that you are using gabapentin.   Store gabapentin tablets warrant that uses outside of the [place] are appropriate, unless specifically indicated otherwise. [facility] drug information does not endorse drugs, diagnose patients or recommend therapy. [facility] drug information is an informational resource designed to assist licensed healthcare practitioners in caring for their patients and/or to serve consumers viewing this service as a supplement to, and not a substitute for, the expertise, skill, knowledge and judgment of healthcare practitioners. The absence of a warning for a given drug or drug combination in no way should be construed to indicate that the drug or drug combination is safe, effective or appropriate for any given patient. [facility] does not assume any responsibility for any aspect of healthcare administered with the aid of information [facility] provides. The information contained herein is not intended to cover all possible uses, directions, precautions, warnings, drug interactions, allergic reactions, or adverse effects. If you have questions about the drugs you are taking, check with your doctor, nurse or pharmacist.  Copyright 5898-3582 [address]: 14.01. Revision date: 10/10/2017. Care instructions adapted under license by [facility] ([facility]). If you have questions about a medical condition or this instruction, always ask your healthcare professional. [place] [address] any warranty or liability for your use of this information.

## 2020-02-19 RX ORDER — ASPIRIN 81 MG
TABLET, DELAYED RELEASE (ENTERIC COATED) ORAL
Qty: 90 TABLET | Refills: 3 | Status: SHIPPED | OUTPATIENT
Start: 2020-02-19 | End: 2020-05-27 | Stop reason: SDUPTHER

## 2020-02-20 ENCOUNTER — TELEPHONE (OUTPATIENT)
Dept: FAMILY MEDICINE CLINIC | Age: 60
End: 2020-02-20

## 2020-02-25 ENCOUNTER — HOSPITAL ENCOUNTER (OUTPATIENT)
Age: 60
Discharge: HOME | End: 2020-02-27
Payer: COMMERCIAL

## 2020-02-25 ENCOUNTER — HOSPITAL ENCOUNTER (OUTPATIENT)
Age: 60
Discharge: HOME | End: 2020-02-25
Payer: COMMERCIAL

## 2020-02-25 ENCOUNTER — HOSPITAL ENCOUNTER (OUTPATIENT)
Dept: GENERAL RADIOLOGY | Age: 60
Discharge: HOME | End: 2020-02-27
Payer: COMMERCIAL

## 2020-02-25 ENCOUNTER — CARE COORDINATION (OUTPATIENT)
Dept: CARE COORDINATION | Age: 60
End: 2020-02-25

## 2020-02-25 ENCOUNTER — OFFICE VISIT (OUTPATIENT)
Dept: FAMILY MEDICINE CLINIC | Age: 60
End: 2020-02-25
Payer: COMMERCIAL

## 2020-02-25 VITALS
HEART RATE: 68 BPM | BODY MASS INDEX: 34.33 KG/M2 | WEIGHT: 259 LBS | SYSTOLIC BLOOD PRESSURE: 130 MMHG | DIASTOLIC BLOOD PRESSURE: 80 MMHG | HEIGHT: 73 IN | OXYGEN SATURATION: 98 %

## 2020-02-25 LAB
ALBUMIN SERPL-MCNC: 4 G/DL (ref 3.5–5.2)
ALBUMIN/GLOBULIN RATIO: ABNORMAL (ref 1–2.5)
ALP BLD-CCNC: 84 U/L (ref 40–129)
ALT SERPL-CCNC: 20 U/L (ref 5–41)
ANION GAP SERPL CALCULATED.3IONS-SCNC: 9 MMOL/L (ref 9–17)
AST SERPL-CCNC: 17 U/L
BILIRUB SERPL-MCNC: 0.34 MG/DL (ref 0.3–1.2)
BUN BLDV-MCNC: 15 MG/DL (ref 6–20)
BUN/CREAT BLD: ABNORMAL (ref 9–20)
CALCIUM SERPL-MCNC: 9.3 MG/DL (ref 8.6–10.4)
CHLORIDE BLD-SCNC: 104 MMOL/L (ref 98–107)
CHOLESTEROL/HDL RATIO: 3.9
CHOLESTEROL: 180 MG/DL
CO2: 26 MMOL/L (ref 20–31)
CREAT SERPL-MCNC: 1.11 MG/DL (ref 0.7–1.2)
GFR AFRICAN AMERICAN: >60 ML/MIN
GFR NON-AFRICAN AMERICAN: >60 ML/MIN
GFR SERPL CREATININE-BSD FRML MDRD: ABNORMAL ML/MIN/{1.73_M2}
GFR SERPL CREATININE-BSD FRML MDRD: ABNORMAL ML/MIN/{1.73_M2}
GLUCOSE BLD-MCNC: 158 MG/DL (ref 70–99)
HCT VFR BLD CALC: 37 % (ref 41–53)
HDLC SERPL-MCNC: 46 MG/DL
HEMOGLOBIN: 12.3 G/DL (ref 13.5–17.5)
LDL CHOLESTEROL: 105 MG/DL (ref 0–130)
MCH RBC QN AUTO: 31 PG (ref 26–34)
MCHC RBC AUTO-ENTMCNC: 33.2 G/DL (ref 31–37)
MCV RBC AUTO: 93.5 FL (ref 80–100)
NRBC AUTOMATED: ABNORMAL PER 100 WBC
PDW BLD-RTO: 13.5 % (ref 11.5–14.9)
PLATELET # BLD: 180 K/UL (ref 150–450)
PMV BLD AUTO: 10.4 FL (ref 6–12)
POTASSIUM SERPL-SCNC: 4 MMOL/L (ref 3.7–5.3)
RBC # BLD: 3.95 M/UL (ref 4.5–5.9)
SODIUM BLD-SCNC: 139 MMOL/L (ref 135–144)
TOTAL PROTEIN: 7.8 G/DL (ref 6.4–8.3)
TRIGL SERPL-MCNC: 146 MG/DL
TSH SERPL DL<=0.05 MIU/L-ACNC: 0.8 MIU/L (ref 0.3–5)
VLDLC SERPL CALC-MCNC: NORMAL MG/DL (ref 1–30)
WBC # BLD: 5.7 K/UL (ref 3.5–11)

## 2020-02-25 PROCEDURE — 82607 VITAMIN B-12: CPT

## 2020-02-25 PROCEDURE — 80053 COMPREHEN METABOLIC PANEL: CPT

## 2020-02-25 PROCEDURE — 85027 COMPLETE CBC AUTOMATED: CPT

## 2020-02-25 PROCEDURE — 3052F HG A1C>EQUAL 8.0%<EQUAL 9.0%: CPT | Performed by: FAMILY MEDICINE

## 2020-02-25 PROCEDURE — 84443 ASSAY THYROID STIM HORMONE: CPT

## 2020-02-25 PROCEDURE — 36415 COLL VENOUS BLD VENIPUNCTURE: CPT

## 2020-02-25 PROCEDURE — 73610 X-RAY EXAM OF ANKLE: CPT

## 2020-02-25 PROCEDURE — G8417 CALC BMI ABV UP PARAM F/U: HCPCS | Performed by: FAMILY MEDICINE

## 2020-02-25 PROCEDURE — 3017F COLORECTAL CA SCREEN DOC REV: CPT | Performed by: FAMILY MEDICINE

## 2020-02-25 PROCEDURE — 80061 LIPID PANEL: CPT

## 2020-02-25 PROCEDURE — 2022F DILAT RTA XM EVC RTNOPTHY: CPT | Performed by: FAMILY MEDICINE

## 2020-02-25 PROCEDURE — 82746 ASSAY OF FOLIC ACID SERUM: CPT

## 2020-02-25 PROCEDURE — 99214 OFFICE O/P EST MOD 30 MIN: CPT | Performed by: FAMILY MEDICINE

## 2020-02-25 PROCEDURE — 1036F TOBACCO NON-USER: CPT | Performed by: FAMILY MEDICINE

## 2020-02-25 PROCEDURE — G8427 DOCREV CUR MEDS BY ELIG CLIN: HCPCS | Performed by: FAMILY MEDICINE

## 2020-02-25 PROCEDURE — G8482 FLU IMMUNIZE ORDER/ADMIN: HCPCS | Performed by: FAMILY MEDICINE

## 2020-02-25 RX ORDER — METHYLPREDNISOLONE 4 MG/1
TABLET ORAL
Qty: 1 KIT | Refills: 0 | Status: SHIPPED | OUTPATIENT
Start: 2020-02-25 | End: 2020-05-27 | Stop reason: ALTCHOICE

## 2020-02-25 RX ORDER — LIDOCAINE 40 MG/G
CREAM TOPICAL
Qty: 120 G | Refills: 3 | Status: SHIPPED | OUTPATIENT
Start: 2020-02-25 | End: 2020-05-27 | Stop reason: SDUPTHER

## 2020-02-25 RX ORDER — PREGABALIN 50 MG/1
50 CAPSULE ORAL 3 TIMES DAILY
Qty: 21 CAPSULE | Refills: 0 | Status: SHIPPED | OUTPATIENT
Start: 2020-02-25 | End: 2020-05-27 | Stop reason: ALTCHOICE

## 2020-02-25 ASSESSMENT — ENCOUNTER SYMPTOMS
ABDOMINAL DISTENTION: 0
VOMITING: 0
COUGH: 0
NAUSEA: 0
BACK PAIN: 0
SHORTNESS OF BREATH: 0
ABDOMINAL PAIN: 0
CONSTIPATION: 0
DIARRHEA: 0
WHEEZING: 0
CHEST TIGHTNESS: 0

## 2020-02-25 NOTE — CARE COORDINATION
Ambulatory Care Coordination Note  2/25/2020  CM Risk Score: 6  Charlson 10 Year Mortality Risk Score: 79%     ACC: Jg Blair RN    Summary:  · Here for ED follow up  · C/O Sciatic pain, (R) leg  · Morning fasting 111 mg/dl today  · After supper 130 - 160 mg/dl  · Most recent A1C 8.2% (02 January 2020)  · Discussed graduation with patient. Patient is making good progress with his diabetes and continues to show improvement. CHF stable. Patient is actively engaged. Patient agrees       CC Plan:   1.) Recommend graduation    Congestive Heart Failure Assessment    Are you currently restricting fluids?:  No Restriction  Do you understand a low sodium diet?:  No  Do you understand how to read food labels?:  No  How many restaurant meals do you eat per week?:  0  Do you salt your food before tasting it?:  No         Symptoms:   None:  Yes      Symptom course:  improving  Patient-reported weight (lb):  259  Weight trend:  increasing steadily  Salt intake watch compared to last visit:  stable       Diabetes Assessment    Medic Alert ID:  No  Meal Planning:  Avoidance of concentrated sweets, Plate Method   How often do you test your blood sugar?:  Daily, Bedtime   Do you have barriers with adherence to non-pharmacologic self-management interventions?  (Nutrition/Exercise/Self-Monitoring):  No   Have you ever had to go to the ED for symptoms of low blood sugar?:  Yes   What is the date of your last ED visit for low blood sugar?:  1/21/19       Do you have hyperglycemia symptoms?:  No   Do you have hypoglycemia symptoms?:  No   Last Blood Sugar Value:  111   Blood Sugar Monitoring Regimen:  Morning Fasting   Blood Sugar Trends:  Fluctuating        Wt Readings from Last 3 Encounters:   02/25/20 259 lb (117.5 kg)   02/07/20 254 lb (115.2 kg)   01/02/20 252 lb (114.3 kg)     BP Readings from Last 3 Encounters:   02/25/20 130/80   02/07/20 130/80   01/02/20 137/79     Lab Results   Component Value Date    LABA1C 8.2 01/02/2020    LABA1C 9.1 09/25/2019    LABA1C 8.8 04/11/2019     Lab Results   Component Value Date     08/06/2013     02/06/2012     Care Coordination Interventions    Program Enrollment:  Complex Care  Referral from Primary Care Provider:  No  Suggested Interventions and Community Resources  Diabetes Education:  Completed (Comment: [facility]  Zone Management Tools:  Completed (Comment: CHF, Diabetes)         Goals Addressed                 This Visit's Progress     Conditions and Symptoms   Improving     I will schedule office visits, as directed by my provider. I will keep my appointment or reschedule if I have to cancel. I will notify my provider of any barriers to my plan of care. I will follow my Zone Management tool to seek urgent or emergent care. I will notify my provider of any symptoms that indicate a worsening of my condition. Barriers: financial and stress  Plan for overcoming my barriers: Care Coordination  Confidence: 7/10  Anticipated Goal Completion Date: 12 July 2019         Self Monitoring   On track     Self-Monitored Blood Glucose - I will check my blood sugar Fasting blood sugar and Other: PRN  I will notify my provider of any trends of increasing or decreasing blood sugars over a 1 month period. I will notify my provider if I have any blood sugar readings less than 70 more than 2 times a month. Daily Weights - I will weight myself as directed - Daily and write down weights  I will notify my provider of any increase in weight by 3 or more pounds in 2 days OR 5 or more pounds in a week. Patient Reported Weight No flowsheet data found. Patient Reported Blood Glucose No flowsheet data found. Barriers: lack of motivation and lack of importance  Plan for overcoming my barriers: Care coordination  Confidence: 7/10  Anticipated Goal Completion Date: 12 July 2019              Prior to Admission medications    Medication Sig Start Date End Date Taking?  Authorizing Provider   methylPREDNISolone (MEDROL, ARLET,) 4 MG tablet Take by mouth, with food. Keep low carb, low salt diet while taking it 2/25/20   Jordan Polanco MD   pregabalin (LYRICA) 50 MG capsule Take 1 capsule by mouth 3 times daily for 7 days. 2/25/20 3/3/20  Jordan Polanco MD   camphor-menthol-methyl salicylate (BENGAY ULTRA STRENGTH) 4-10-30 % CREA cream Apply topically 3 times daily as needed for Pain 2/25/20   Jordan Polanco MD   lidocaine (LMX) 4 % cream Apply 2-3 times a day as needed for pain 2/25/20   Jordan Polanco MD   Multiple Vitamins-Minerals (MULTIVITAMIN-MINERALS) TABS tablet TAKE 1 TABLET BY MOUTH ONCE DAILY 2/19/20   Jordan Polanco MD   spironolactone (ALDACTONE) 25 MG tablet Take 1 tablet by mouth every morning BP and CHF 1/2/20   VENTURA Majano CNP   SITagliptin (JANUVIA) 50 MG tablet Take 1 tablet by mouth daily 1/2/20   VENTURA Majano CNP   sildenafil (VIAGRA) 50 MG tablet Take 1 tablet by mouth as needed for Erectile Dysfunction Don't take with nitroglycerin  Patient not taking: Reported on 2/25/2020 1/2/20   VENTURA Majano CNP   metFORMIN (GLUCOPHAGE XR) 500 MG extended release tablet Take 1 tablet by mouth 2 times daily Stop short acting Metformin.  1/2/20   VENTURA Majano CNP   L-methylfolate-B6-B12 (METANX) 1-01.876-5-30 MG CAPS capsule Take 1 capsule by mouth 2 times daily 1/2/20   VENTURA Majano CNP   hydrALAZINE (APRESOLINE) 50 MG tablet Take 1 tablet by mouth 3 times daily BP medication 1/2/20   VENTURA Majano CNP   chlorthalidone (HYGROTON) 25 MG tablet Take 1 tablet by mouth every morning Water pill and BP pill 1/2/20   VENTURA Majano CNP   carvedilol (COREG) 25 MG tablet Take 1 tablet by mouth 2 times daily (with meals) 1/2/20   VENTURA Majano CNP   blood glucose test strips (FREESTYLE LITE) strip Checking Blood Glucose twice a day 1/2/20   VENTURA Majano CNP

## 2020-02-25 NOTE — PROGRESS NOTES
Visit Information    Have you changed or started any medications since your last visit including any over-the-counter medicines, vitamins, or herbal medicines? no   Are you having any side effects from any of your medications? -  no  Have you stopped taking any of your medications? Is so, why? -  no    Have you seen any other physician or provider since your last visit? No  Have you had any other diagnostic tests since your last visit? No  Have you been seen in the emergency room and/or had an admission to a hospital since we last saw you? Yes - Records Obtained  Have you had your routine dental cleaning in the past 6 months? no    Have you activated your [employer] account? If not, what are your barriers?  Yes     Patient Care Team:  Yvonne Adams MD as PCP - General (Family Medicine)  Yvonne Adams MD as PCP - [facility]  Amilcar Maza MD as Consulting Physician (Cardiology)  Lauren Diaz OD (Optometry)  Dominick Hartman MD as Consulting Physician (Nephrology)  Tamiko Mcmillan RN as [address] [place] [address]  Bettie Jimenez MD as Consulting Physician (Urology)  Maribel Baltazar MD as Consulting Physician (Cardiology)  Lauren Diaz OD (Optometry)  Luis Daniel Jasso DPM as Physician (Podiatry)    Medical History Review  Past Medical, Family, and Social History reviewed and does contribute to the patient presenting condition    Health Maintenance   Topic Date Due    Diabetic microalbuminuria test  10/13/2017    Diabetic retinal exam  07/31/2019    Shingles Vaccine (2 of 2) 02/13/2020    PSA counseling  02/26/2020    A1C test (Diabetic or Prediabetic)  04/02/2020    Lipid screen  09/25/2020    Potassium monitoring  09/25/2020    Creatinine monitoring  09/25/2020    Diabetic foot exam  10/09/2020    Colon cancer screen colonoscopy  01/15/2023    DTaP/Tdap/Td vaccine (2 - Td) 04/03/2024    Flu vaccine  Completed    Pneumococcal 0-64 years Vaccine  Completed    Hepatitis C screen

## 2020-02-25 NOTE — LETTER
MEDICATION AGREEMENT     Freddy Obrien Ace  [id number]      For certain conditions, multiple classes of medications may be used to help better manage your symptoms, and to improve your ability to function at home, work and in social settings. However, these medications do have risks, which will be discussed with you, including addiction and dependency. The following prescribed medications need frequent monitoring and will require you to partner and assist in your healthcare. Medication  Dose, instructions and quantity as indicated on current prescription bottle Diagnosis/Reason(s) for Taking Category   Lyrica  OK to increase    Diabetic neuropathy  JEAN-PIERRE analog                           Benefits and goals of Controlled Substance Medications: There are two potential goals for your treatment: (1) decreased pain and suffering (2) improved daily life functions. There are many possible treatments for your chronic condition(s), and, in addition to controlled substance medications, we will try alternatives such as physical therapy, yoga, massage, home daily exercise, meditation, relaxation techniques, injections, chiropractic manipulations, surgery, cognitive therapy, hypnosis and many medications that are not habit-forming. Use of controlled substance medications may be helpful, but they are unlikely to resolve all of your symptoms or restore all function. Risks of Controlled Substance Medications:    Opioid pain medications: These medications can lead to problems such as addiction/dependence, sedation, lightheadedness/dizziness, memory issues, falls, constipation, nausea, or vomiting. They may also impair the ability to drive or operate machinery. Additionally, these medications may lower testosterone levels, leading to loss of bone strength, stamina and sex drive.   They may cause problems with breathing, sleep apnea and reduced coughing, which are especially dangerous for patients with lung disease. Overdose or dangerous interactions with alcohol and other medications may occur, leading to death. Hyperalgesia may develop, in which patients receiving opioids for the treatment of pain may actually become more sensitive to certain painful stimuli, and in some cases, experience pain from ordinarily non-painful stimuli. Women between the ages of 14-53 who could become pregnant should carefully weigh the risks and benefits of opioids with their physicians, as these medications increase the risk of pregnancy complications, including miscarriage,  delivery and stillbirth. It is also possible for babies to be born addicted to opioids. Opioid dependence withdrawal symptoms may include; feelings of uneasiness, increased pain, irritability, belly pain, diarrhea, sweats and goose-flesh. Benzodiazepines and non-benzodiazepine sleep medications: These medications can lead to problems such as addiction/dependence, sedation, fatigue, lightheadedness, dizziness, incoordination, falls, depression, hallucinations, and impaired judgment, memory and concentration. The ability to drive and operate machinery may also be affected. Abnormal sleep-related behaviors have been reported, including sleep walking, driving, making telephone calls, eating, or having sex while not fully awake. These medications can suppress breathing and worsen sleep apnea, particularly when combined with alcohol or other sedating medications, potentially leading to death. Dependence withdrawal symptoms may include tremors, anxiety, hallucinations and seizures. Stimulants:  Common adverse effects include addiction/dependence, increased blood pressure and heart rate, decreased appetite, nausea, involuntary weight loss, insomnia, irritability, and headaches.   These risks may increase when these medications are combined with other stimulants, such as caffeine pills or energy drinks, certain weight loss supplements and oral decongestants. Dependence withdrawal symptoms may include depressed mood, loss of interest, suicidal thoughts, anxiety, fatigue, appetite changes and agitation. Testosterone replacement therapy:  Potential side effects include increased risk of stroke and heart attack, blood clots, increased blood pressure, increased cholesterol, enlarged prostate, sleep apnea, irritability/aggression and other mood disorders, and decreased fertility. Other:     1. I understand that I have the following responsibilities:  · I will take medications at the dose and frequency prescribed. · I will not increase or change how I take my medications without the approval of the health care provider who signs this Medication Agreement. · I will arrange for refills at the prescribed interval ONLY during regular office hours. I will not ask for refills earlier than agreed, after-hours, on holidays or on weekends. · I will obtain all refills for these medications at  ·  ____________________________________  pharmacy (phone number  ·  ________________________), with full consent for my provider and pharmacist to exchange information in writing or verbally. · I will not request any pain medications or controlled substances from other providers and will inform this provider of all other medications I am taking. · I will inform my other health care providers that I am taking these medications and of the existence of this [facility]. In the event of an emergency, I will provide the same information to the emergency department providers. · I will protect my prescriptions and medications. I understand that lost or misplaced prescriptions will not be replaced. · I will keep medications only for my own use and will not share them with others. I will keep all medications away from children. · I agree to participate in any medical, psychological or psychiatric assessments recommended by my provider. which may also result in my being prevented from receiving further care from this office. · Other:____________________________________________________________________    AGREEMENT:    I have read the above and have had all of my questions answered. For chronic disease management, I know that my symptoms can be managed with many types of treatments. A chronic medication trial may be part of my treatment, but I must be an active participant in my care. Medication therapy is only one part of my symptom management plan. In some cases, there may be limited scientific evidence to support the chronic use of certain medications to improve symptoms and daily function. Furthermore, in certain circumstances, there may be scientific information that suggests that use of chronic controlled substances may actually worsen my symptoms and increase my risk of unintentional death directly related to this medication therapy. I know that if my provider feels my risk from controlled medications is greater than my benefit, I will have my controlled substance medication(s) compassionately lowered or removed altogether. I agree to a controlled substance medication trial.      I further agree to allow this office to contact my HIPAA contact on file if there are concerns about my safety and use of controlled medications. I have agreed to use the following medications above as instructed by my physician and as stated in this [facility] [address].      Patient Signature:  ______________________  ZJZT:3/80/4342 or _____________    Provider Signature:______________________  NHBB:3/63/1082 or _____________

## 2020-02-25 NOTE — PROGRESS NOTES
advanced age (older than 54 for men, 72 for women), diabetes mellitus, dyslipidemia, hypertension, male gender, microalbuminuria, obesity (BMI >= 30 kg/m2) and smoking/ tobacco exposure    Medication compliance:  compliant all of the time  Current diabetic medications include oral agent (monotherapy): Glucophage. Eye exam current (within one year): yes    Obesity per BMI is worsening   Body mass index is 34.17 kg/m². Weight trend: increasing steadily  Wt Readings from Last 3 Encounters:   02/25/20 259 lb (117.5 kg)   02/07/20 254 lb (115.2 kg)   01/02/20 252 lb (114.3 kg)     09/25/19 243 lb 3.2 oz (110.3 kg)       Prior visit with dietician: no  Current diet: in general, a \"healthy\" diet    Current exercise: none  Barriers: impairment:  physical: pain, lack of motivation and lack of support    Current monitoring regimen: home blood tests - daily  Home blood sugar records: postprandial range: low 100's; 111 yesterday  Any episodes of hypoglycemia? no    Is He on ACE inhibitor or angiotensin II receptor blocker? No      reports that he quit smoking about 39 years ago. His smoking use included cigarettes. He has a 1.00 pack-year smoking history. He has quit using smokeless tobacco.     Daily Aspirin? Yes    Counseling given: Yes    BP controlled. Connie Ramirez reports compliance with BP medications, and tolerates them well, denies side effects. BP Readings from Last 3 Encounters:   02/25/20 130/80   02/07/20 130/80   01/02/20 137/79       A1c is improved, but uncontrolled    Lab Results   Component Value Date    LABA1C 8.2 01/02/2020    LABA1C 9.1 09/25/2019    LABA1C 8.8 04/11/2019       Urine microabumin is high  Lab Results   Component Value Date    LABMICR 39 (H) 10/13/2016       Hyperlipidemia:  No new myalgias or GI upset on atorvastatin (Lipitor). Medication compliance: compliant all of the time. Patient is  following a low fat, low cholesterol diet.     LDL is high, but improving  Lab Results Don't take with nitroglycerin (Patient not taking: Reported on 2020) 30 tablet 3     No current facility-administered medications for this visit. Social History     Tobacco Use    Smoking status: Former Smoker     Packs/day: 0.50     Years: 2.00     Pack years: 1.00     Types: Cigarettes     Last attempt to quit: 1981     Years since quittin.1    Smokeless tobacco: Former User   Substance Use Topics    Alcohol use: Yes     Alcohol/week: 1.0 - 2.0 standard drinks     Types: 1 - 2 Cans of beer per week     Comment: 1-2 cans a month    Drug use: Yes     Types: Marijuana     Comment: marijuana every day        Counseling given: Yes            The patient's past medical, surgical, social,and family history as well as his current medications and allergies were reviewed as documented in today's encounter. Rest of complaints with corresponding details per ROS. Review of Systems   Constitutional: Positive for fatigue and unexpected weight change. Negative for activity change, appetite change, chills, diaphoresis and fever. Eyes: Positive for visual disturbance (chronic, stable). Respiratory: Negative for cough, chest tightness, shortness of breath and wheezing. Cardiovascular: Negative for chest pain, palpitations and leg swelling. Gastrointestinal: Negative for abdominal distention, abdominal pain, constipation, diarrhea, nausea and vomiting. Endocrine: Negative for cold intolerance, heat intolerance, polydipsia, polyphagia and polyuria. Genitourinary: Negative for difficulty urinating, frequency and urgency. Erectile Dysfunction    Musculoskeletal: Positive for arthralgias (right ankle, right leg) and gait problem. Negative for back pain. Allergic/Immunologic: Positive for immunocompromised state (due to diabetes). Neurological: Positive for numbness (feet).    Psychiatric/Behavioral: Positive for sleep disturbance.         -vital signs stable and within normal limits except Obesity per BMI. /80   Pulse 68   Ht 6' 1\" (1.854 m)   Wt 259 lb (117.5 kg)   SpO2 98%   BMI 34.17 kg/m²      Physical Exam  Vitals signs and nursing note reviewed. Constitutional:       General: He is in acute distress (due to pain). Appearance: He is well-developed. He is obese. He is not diaphoretic. HENT:      Head: Normocephalic and atraumatic. Right Ear: External ear normal.      Left Ear: External ear normal.      Nose: Nose normal.      Mouth/Throat:      Mouth: Mucous membranes are moist.   Eyes:      General: Lids are normal. No scleral icterus. Right eye: No discharge. Left eye: No discharge. Conjunctiva/sclera: Conjunctivae normal.   Neck:      Musculoskeletal: Normal range of motion and neck supple. Thyroid: No thyromegaly. Cardiovascular:      Rate and Rhythm: Normal rate and regular rhythm. Heart sounds: Murmur present. Crescendo  systolic murmur present with a grade of 2/6. Pulmonary:      Effort: Pulmonary effort is normal. No respiratory distress. Breath sounds: Normal breath sounds. No wheezing or rales. Chest:      Chest wall: No tenderness. Abdominal:      General: Bowel sounds are normal. There is no distension. Palpations: Abdomen is soft. There is no hepatomegaly or splenomegaly. Tenderness: There is no abdominal tenderness. Comments: Obese abdomen   Musculoskeletal:         General: Tenderness present. Right hip: He exhibits normal range of motion. Right knee: He exhibits normal range of motion. No tenderness found. Right ankle: He exhibits decreased range of motion. Tenderness. Lateral malleolus tenderness found. Lumbar back: He exhibits normal range of motion, no tenderness and no bony tenderness. Right lower leg: No edema. Left lower leg: No edema. Comments: Right calf tenderness  Antalgic gait noted, limping.   Cannot sit for too long, needs to change position often  I advised him to not drive. He says he is agreeable to call his daughter to come to pick him up     Lymphadenopathy:      Cervical: No cervical adenopathy. Skin:     General: Skin is warm and dry. Capillary Refill: Capillary refill takes less than 2 seconds. Findings: No rash. Neurological:      Mental Status: He is alert and oriented to person, place, and time. Deep Tendon Reflexes: Reflexes are normal and symmetric. Psychiatric:         Mood and Affect: Mood normal.         Behavior: Behavior normal.         Thought Content: Thought content normal.         Judgment: Judgment normal.           Discussed testing with the patient and all questions fully answered.   Anemia  Hyperglycemia  Hyperlipidemia      Otherwise labs within normal limits        Office Visit on 01/02/2020   Component Date Value Ref Range Status    Hemoglobin A1C 01/02/2020 8.2  % Final       Lab Results   Component Value Date    WBC 6.1 09/25/2019    HGB 12.3 (L) 09/25/2019    HCT 37.0 (L) 09/25/2019    MCV 94.6 09/25/2019     09/25/2019       Lab Results   Component Value Date     09/25/2019    K 3.9 09/25/2019     09/25/2019    CO2 24 09/25/2019    BUN 26 09/25/2019    CREATININE 1.35 09/25/2019    GLUCOSE 169 09/25/2019    GLUCOSE 157 02/06/2012    CALCIUM 9.6 09/25/2019        Lab Results   Component Value Date    ALT 13 09/25/2019    AST 16 09/25/2019    ALKPHOS 106 09/25/2019    BILITOT 0.49 09/25/2019       Lab Results   Component Value Date    TSH 1.26 02/26/2019       Lab Results   Component Value Date    CHOL 153 09/25/2019    CHOL 177 01/16/2019    CHOL 155 10/13/2016     Lab Results   Component Value Date    TRIG 99 09/25/2019    TRIG 73 01/16/2019    TRIG 78 10/13/2016     Lab Results   Component Value Date    HDL 49 09/25/2019    HDL 43 01/16/2019    HDL 45 10/13/2016     Lab Results   Component Value Date    LDLCALC 119 01/16/2019    LDLCHOLESTEROL 84 09/25/2019 LDLCHOLESTEROL 94 10/13/2016    LDLCHOLESTEROL 117 (H) 08/06/2013       Lab Results   Component Value Date    CHOLHDLRATIO 3.1 09/25/2019    CHOLHDLRATIO 4.1 01/16/2019    CHOLHDLRATIO 3.4 10/13/2016           Lab Results   Component Value Date    BEXXGICP75 709 09/25/2019       Lab Results   Component Value Date    FOLATE >20.0 09/25/2019     ASSESSMENT AND PLAN      1. Acute right ankle pain  Failing to change as expected. - XR ANKLE RIGHT (MIN 3 VIEWS); Future  -trial of pregabalin (LYRICA) 50 MG capsule; Take 1 capsule by mouth 3 times daily for 7 days. Dispense: 21 capsule; Refill: 0  - camphor-menthol-methyl salicylate (BENGAY ULTRA STRENGTH) 4-10-30 % CREA cream; Apply topically 3 times daily as needed for Pain  Dispense: 113 g; Refill: 0  - lidocaine (LMX) 4 % cream; Apply 2-3 times a day as needed for pain  Dispense: 120 g; Refill: 3  -methylPREDNISolone (MEDROL, ARLET,) 4 MG tablet; Take by mouth, with food. Keep low carb, low salt diet while taking it  Dispense: 1 kit; Refill: 0    -Repositioning  -Heat  Patient was advised to follow-up with podiatry   He says he has diabetic shoes at home, advised to use them  Patient was advised not to drive  The patient verbalizes understanding and agrees with the plan. 2. Pain of right calf  Failing to change as expected. -  VL DUP LOWER EXTREMITY VENOUS RIGHT; Future--to rule out DVT    3. Type 2 diabetes mellitus with diabetic polyneuropathy, without long-term current use of insulin (HCC)  -Improving  -pregabalin (LYRICA) 50 MG capsule; Take 1 capsule by mouth 3 times daily for 7 days. Dispense: 21 capsule; Refill: 0  -Gabapentin didn't help in the past   - CBC; Future  - Comprehensive Metabolic Panel; Future  - Vitamin B12 & Folate; Future  - TSH without Reflex; Future  -advised home blood glucose testing  daily  -daily feet exam, Foot care: advised to wash feet daily, pat dry and apply lotion at night, avoiding between toes.  Need to look at feet daily and mouth, with food. Keep low carb, low salt diet while taking it     Dispense:  1 kit     Refill:  0    pregabalin (LYRICA) 50 MG capsule     Sig: Take 1 capsule by mouth 3 times daily for 7 days. Dispense:  21 capsule     Refill:  0    camphor-menthol-methyl salicylate (BENGAY ULTRA STRENGTH) 4-10-30 % CREA cream     Sig: Apply topically 3 times daily as needed for Pain     Dispense:  113 g     Refill:  0    lidocaine (LMX) 4 % cream     Sig: Apply 2-3 times a day as needed for pain     Dispense:  120 g     Refill:  3       Medications Discontinued During This Encounter   Medication Reason    gabapentin (NEURONTIN) 300 MG capsule Alternate therapy        Freddy received counseling on the following healthy behaviors: nutrition, exercise, medication adherence and weight loss     Reviewed prior labs and health maintenance  Continue current medications, diet and exercise. Discussed use, benefit, and side effects of prescribed medications. Barriers to medication compliance addressed. Patient given educational materials - see patient instructions  Was a self-tracking handout given in paper form or via [employer]? Yes    Requested Prescriptions     Signed Prescriptions Disp Refills    methylPREDNISolone (MEDROL, ARLET,) 4 MG tablet 1 kit 0     Sig: Take by mouth, with food. Keep low carb, low salt diet while taking it    pregabalin (LYRICA) 50 MG capsule 21 capsule 0     Sig: Take 1 capsule by mouth 3 times daily for 7 days.  camphor-menthol-methyl salicylate (BENGAY ULTRA STRENGTH) 4-10-30 % CREA cream 113 g 0     Sig: Apply topically 3 times daily as needed for Pain    lidocaine (LMX) 4 % cream 120 g 3     Sig: Apply 2-3 times a day as needed for pain       All patient questions answered. Patient voiced understanding. Quality Measures    Body mass index is 34.17 kg/m². Elevated. Weight control planned discussed conventional weight loss and Healthy diet and regular exercise.     BP: 130/80 Blood pressure is normal. Treatment plan consists of Weight Reduction, DASH Eating Plan, Dietary Sodium Restriction, Increased Physical Activity, Patient In-home Blood Pressure Monitoring and No treatment change needed. The patient's past medical, surgical, social, and family history as well as his   current medications and allergies were reviewed as documented in today's encounter. Medications, labs, diagnostic studies, consultations and follow-up as documented in this encounter. Return for KEEP APPT. Patient was seen with total face to face time of  25 minutes. More than 50% of this visit was counseling and education. Future Appointments   Date Time Provider Vianey Malagon   2/25/2020  3:30 PM [facility] [address]   4/2/2020  9:30 AM Donnell Patiño MD [facility]       This note was completed by using the assistance of a speech-recognition Dragon program. However,inadvertent computerized transcription errors may be present.  Although every effort was made to ensure accuracy, no guarantees can be provided that every mistake has been identified and corrected by editing   Electronically signed by Donnell Patiño MD on 3/2/2020 at 7:07 PM

## 2020-02-26 LAB
FOLATE: >20 NG/ML
VITAMIN B-12: 649 PG/ML (ref 232–1245)

## 2020-03-18 ENCOUNTER — TELEPHONE (OUTPATIENT)
Dept: GASTROENTEROLOGY | Age: 60
End: 2020-03-18

## 2020-03-18 NOTE — TELEPHONE ENCOUNTER
Attempted to contact patient in regards to the cancelled appt with . Patient phone rings and rings and then sounds as if it goes busy?

## 2020-05-01 ENCOUNTER — TELEPHONE (OUTPATIENT)
Dept: FAMILY MEDICINE CLINIC | Age: 60
End: 2020-05-01

## 2020-05-27 ENCOUNTER — HOSPITAL ENCOUNTER (OUTPATIENT)
Dept: GENERAL RADIOLOGY | Age: 60
Discharge: HOME | End: 2020-05-29
Payer: COMMERCIAL

## 2020-05-27 ENCOUNTER — HOSPITAL ENCOUNTER (OUTPATIENT)
Age: 60
Discharge: HOME | End: 2020-05-27
Payer: COMMERCIAL

## 2020-05-27 ENCOUNTER — VIRTUAL VISIT (OUTPATIENT)
Dept: FAMILY MEDICINE CLINIC | Age: 60
End: 2020-05-27
Payer: COMMERCIAL

## 2020-05-27 ENCOUNTER — TELEPHONE (OUTPATIENT)
Dept: FAMILY MEDICINE CLINIC | Age: 60
End: 2020-05-27

## 2020-05-27 ENCOUNTER — HOSPITAL ENCOUNTER (OUTPATIENT)
Age: 60
Discharge: HOME | End: 2020-05-29
Payer: COMMERCIAL

## 2020-05-27 VITALS — BODY MASS INDEX: 33.66 KG/M2 | HEIGHT: 73 IN | WEIGHT: 254 LBS

## 2020-05-27 PROBLEM — I12.9 BENIGN HYPERTENSION WITH CKD (CHRONIC KIDNEY DISEASE), STAGE II: Status: ACTIVE | Noted: 2017-07-24

## 2020-05-27 PROBLEM — N18.2 BENIGN HYPERTENSION WITH CKD (CHRONIC KIDNEY DISEASE), STAGE II: Status: ACTIVE | Noted: 2017-07-24

## 2020-05-27 PROBLEM — R97.20 INCREASING PROSTATE SPECIFIC ANTIGEN LEVEL: Status: ACTIVE | Noted: 2020-05-27

## 2020-05-27 LAB
ALBUMIN SERPL-MCNC: 4 G/DL (ref 3.5–5.2)
ALBUMIN/GLOBULIN RATIO: 1.1 (ref 1–2.5)
ALP BLD-CCNC: 91 U/L (ref 40–129)
ALT SERPL-CCNC: 12 U/L (ref 5–41)
ANION GAP SERPL CALCULATED.3IONS-SCNC: 12 MMOL/L (ref 9–17)
AST SERPL-CCNC: 15 U/L
BILIRUB SERPL-MCNC: 0.44 MG/DL (ref 0.3–1.2)
BUN BLDV-MCNC: 26 MG/DL (ref 6–20)
BUN/CREAT BLD: ABNORMAL (ref 9–20)
CALCIUM SERPL-MCNC: 9.2 MG/DL (ref 8.6–10.4)
CHLORIDE BLD-SCNC: 104 MMOL/L (ref 98–107)
CHOLESTEROL/HDL RATIO: 3.1
CHOLESTEROL: 130 MG/DL
CO2: 25 MMOL/L (ref 20–31)
CREAT SERPL-MCNC: 1.38 MG/DL (ref 0.7–1.2)
CREATININE URINE: 251.6 MG/DL (ref 39–259)
ESTIMATED AVERAGE GLUCOSE: 223 MG/DL
FOLATE: 14.7 NG/ML
GFR AFRICAN AMERICAN: >60 ML/MIN
GFR NON-AFRICAN AMERICAN: 53 ML/MIN
GFR SERPL CREATININE-BSD FRML MDRD: ABNORMAL ML/MIN/{1.73_M2}
GFR SERPL CREATININE-BSD FRML MDRD: ABNORMAL ML/MIN/{1.73_M2}
GLUCOSE BLD-MCNC: 152 MG/DL (ref 70–99)
HBA1C MFR BLD: 9.4 % (ref 4–6)
HCT VFR BLD CALC: 36 % (ref 40.7–50.3)
HDLC SERPL-MCNC: 42 MG/DL
HEMOGLOBIN: 11.4 G/DL (ref 13–17)
LDL CHOLESTEROL: 66 MG/DL (ref 0–130)
MCH RBC QN AUTO: 30.8 PG (ref 25.2–33.5)
MCHC RBC AUTO-ENTMCNC: 31.7 G/DL (ref 28.4–34.8)
MCV RBC AUTO: 97.3 FL (ref 82.6–102.9)
MICROALBUMIN/CREAT 24H UR: 37 MG/L
MICROALBUMIN/CREAT UR-RTO: 15 MCG/MG CREAT
NRBC AUTOMATED: 0 PER 100 WBC
PDW BLD-RTO: 13.2 % (ref 11.8–14.4)
PLATELET # BLD: 205 K/UL (ref 138–453)
PMV BLD AUTO: 11.9 FL (ref 8.1–13.5)
POTASSIUM SERPL-SCNC: 3.7 MMOL/L (ref 3.7–5.3)
PROSTATE SPECIFIC ANTIGEN: 1.88 UG/L
RBC # BLD: 3.7 M/UL (ref 4.21–5.77)
SODIUM BLD-SCNC: 141 MMOL/L (ref 135–144)
TOTAL PROTEIN: 7.8 G/DL (ref 6.4–8.3)
TRIGL SERPL-MCNC: 112 MG/DL
TSH SERPL DL<=0.05 MIU/L-ACNC: 1 MIU/L (ref 0.3–5)
VITAMIN B-12: 671 PG/ML (ref 232–1245)
VLDLC SERPL CALC-MCNC: NORMAL MG/DL (ref 1–30)
WBC # BLD: 6.5 K/UL (ref 3.5–11.3)

## 2020-05-27 PROCEDURE — 73130 X-RAY EXAM OF HAND: CPT

## 2020-05-27 PROCEDURE — G8427 DOCREV CUR MEDS BY ELIG CLIN: HCPCS | Performed by: FAMILY MEDICINE

## 2020-05-27 PROCEDURE — 80053 COMPREHEN METABOLIC PANEL: CPT

## 2020-05-27 PROCEDURE — 99214 OFFICE O/P EST MOD 30 MIN: CPT | Performed by: FAMILY MEDICINE

## 2020-05-27 PROCEDURE — 2022F DILAT RTA XM EVC RTNOPTHY: CPT | Performed by: FAMILY MEDICINE

## 2020-05-27 PROCEDURE — 3017F COLORECTAL CA SCREEN DOC REV: CPT | Performed by: FAMILY MEDICINE

## 2020-05-27 PROCEDURE — 36415 COLL VENOUS BLD VENIPUNCTURE: CPT

## 2020-05-27 PROCEDURE — 85027 COMPLETE CBC AUTOMATED: CPT

## 2020-05-27 PROCEDURE — 82607 VITAMIN B-12: CPT

## 2020-05-27 PROCEDURE — G0103 PSA SCREENING: HCPCS

## 2020-05-27 PROCEDURE — 83036 HEMOGLOBIN GLYCOSYLATED A1C: CPT

## 2020-05-27 PROCEDURE — 82746 ASSAY OF FOLIC ACID SERUM: CPT

## 2020-05-27 PROCEDURE — 80061 LIPID PANEL: CPT

## 2020-05-27 PROCEDURE — 82570 ASSAY OF URINE CREATININE: CPT

## 2020-05-27 PROCEDURE — 82043 UR ALBUMIN QUANTITATIVE: CPT

## 2020-05-27 PROCEDURE — 73080 X-RAY EXAM OF ELBOW: CPT

## 2020-05-27 PROCEDURE — 3046F HEMOGLOBIN A1C LEVEL >9.0%: CPT | Performed by: FAMILY MEDICINE

## 2020-05-27 PROCEDURE — 84443 ASSAY THYROID STIM HORMONE: CPT

## 2020-05-27 RX ORDER — L-METHYLFOLATE-ALGAE-VIT B12-B6 CAP 3-90.314-2-35 MG 3-90.314-2-35 MG
1 CAP ORAL 2 TIMES DAILY
Qty: 60 CAPSULE | Refills: 2 | Status: SHIPPED | OUTPATIENT
Start: 2020-05-27 | End: 2020-08-27

## 2020-05-27 RX ORDER — GABAPENTIN 300 MG/1
1 CAPSULE ORAL NIGHTLY
COMMUNITY
Start: 2020-05-13 | End: 2020-08-27

## 2020-05-27 RX ORDER — ATORVASTATIN CALCIUM 40 MG/1
40 TABLET, FILM COATED ORAL DAILY
Qty: 90 TABLET | Refills: 3 | Status: SHIPPED | OUTPATIENT
Start: 2020-05-27 | End: 2020-08-27 | Stop reason: SDUPTHER

## 2020-05-27 RX ORDER — SPIRONOLACTONE 25 MG/1
25 TABLET ORAL EVERY MORNING
Qty: 90 TABLET | Refills: 3 | Status: SHIPPED | OUTPATIENT
Start: 2020-05-27 | End: 2021-01-12 | Stop reason: SDUPTHER

## 2020-05-27 RX ORDER — BLOOD PRESSURE TEST KIT
KIT MISCELLANEOUS
Qty: 1 KIT | Refills: 0 | Status: SHIPPED | OUTPATIENT
Start: 2020-05-27

## 2020-05-27 RX ORDER — CHLORTHALIDONE 25 MG/1
25 TABLET ORAL EVERY MORNING
Qty: 90 TABLET | Refills: 3 | Status: SHIPPED | OUTPATIENT
Start: 2020-05-27 | End: 2021-06-01 | Stop reason: SDUPTHER

## 2020-05-27 RX ORDER — ASPIRIN 81 MG/1
81 TABLET ORAL DAILY
Qty: 90 TABLET | Refills: 3 | Status: SHIPPED | OUTPATIENT
Start: 2020-05-27 | End: 2021-02-11

## 2020-05-27 RX ORDER — ACETAMINOPHEN 500 MG
500 TABLET ORAL EVERY 6 HOURS PRN
Qty: 120 TABLET | Refills: 3 | Status: SHIPPED | OUTPATIENT
Start: 2020-05-27 | End: 2021-06-01 | Stop reason: SDUPTHER

## 2020-05-27 RX ORDER — METFORMIN HYDROCHLORIDE 500 MG/1
500 TABLET, EXTENDED RELEASE ORAL 2 TIMES DAILY
Qty: 180 TABLET | Refills: 3 | Status: SHIPPED | OUTPATIENT
Start: 2020-05-27 | End: 2021-06-01 | Stop reason: SDUPTHER

## 2020-05-27 RX ORDER — LIDOCAINE 40 MG/G
CREAM TOPICAL
Qty: 120 G | Refills: 3 | Status: SHIPPED | OUTPATIENT
Start: 2020-05-27

## 2020-05-27 RX ORDER — CARVEDILOL 25 MG/1
25 TABLET ORAL 2 TIMES DAILY WITH MEALS
Qty: 180 TABLET | Refills: 3 | Status: SHIPPED | OUTPATIENT
Start: 2020-05-27 | End: 2021-04-12

## 2020-05-27 RX ORDER — ASPIRIN 81 MG
1 TABLET, DELAYED RELEASE (ENTERIC COATED) ORAL DAILY
Qty: 90 TABLET | Refills: 3 | Status: SHIPPED | OUTPATIENT
Start: 2020-05-27 | End: 2021-06-01 | Stop reason: SDUPTHER

## 2020-05-27 RX ORDER — HYDRALAZINE HYDROCHLORIDE 50 MG/1
50 TABLET, FILM COATED ORAL 3 TIMES DAILY
Qty: 270 TABLET | Refills: 3 | Status: SHIPPED | OUTPATIENT
Start: 2020-05-27 | End: 2021-06-01 | Stop reason: SDUPTHER

## 2020-05-27 RX ORDER — AMLODIPINE BESYLATE 10 MG/1
TABLET ORAL
Qty: 90 TABLET | Refills: 3 | Status: SHIPPED | OUTPATIENT
Start: 2020-05-27 | End: 2021-02-11

## 2020-05-27 ASSESSMENT — ENCOUNTER SYMPTOMS
VOMITING: 0
DIARRHEA: 0
NAUSEA: 0
SHORTNESS OF BREATH: 0
CHEST TIGHTNESS: 0
ABDOMINAL DISTENTION: 0
CONSTIPATION: 0
COUGH: 0
WHEEZING: 0
ABDOMINAL PAIN: 0

## 2020-05-27 ASSESSMENT — PATIENT HEALTH QUESTIONNAIRE - PHQ9
2. FEELING DOWN, DEPRESSED OR HOPELESS: 0
1. LITTLE INTEREST OR PLEASURE IN DOING THINGS: 0
SUM OF ALL RESPONSES TO PHQ QUESTIONS 1-9: 0
SUM OF ALL RESPONSES TO PHQ9 QUESTIONS 1 & 2: 0
SUM OF ALL RESPONSES TO PHQ QUESTIONS 1-9: 0

## 2020-05-27 NOTE — PROGRESS NOTES
Constitutional: Positive for fatigue. Negative for activity change, appetite change, chills, diaphoresis and fever. Respiratory: Negative for cough, chest tightness, shortness of breath and wheezing. Cardiovascular: Negative for chest pain, palpitations and leg swelling. Gastrointestinal: Negative for abdominal distention, abdominal pain, constipation, diarrhea, nausea and vomiting. Endocrine: Negative for cold intolerance, heat intolerance, polydipsia and polyphagia. Musculoskeletal: Positive for arthralgias (right hand and right elbow). Neurological: Positive for numbness (feet). Psychiatric/Behavioral: Negative for dysphoric mood and sleep disturbance. The patient is not nervous/anxious. Prior to Visit Medications    Medication Sig Taking? Authorizing Provider   gabapentin (NEURONTIN) 300 MG capsule Take 1 capsule by mouth nightly. Yes Historical Provider, MD   magnesium hydroxide (MILK OF MAGNESIA) 400 MG/5ML suspension Take 15 mLs by mouth daily as needed for Constipation Yes Karen Boothe MD   methylPREDNISolone (MEDROL, ARLET,) 4 MG tablet Take by mouth, with food.  Keep low carb, low salt diet while taking it Yes Karen Boothe MD   camphor-menthol-methyl salicylate (BENGAY ULTRA STRENGTH) 4-10-30 % CREA cream Apply topically 3 times daily as needed for Pain Yes Karen Boothe MD   lidocaine (LMX) 4 % cream Apply 2-3 times a day as needed for pain Yes Karen Boothe MD   Multiple Vitamins-Minerals (MULTIVITAMIN-MINERALS) TABS tablet TAKE 1 TABLET BY MOUTH ONCE DAILY Yes Karen Boothe MD   spironolactone (ALDACTONE) 25 MG tablet Take 1 tablet by mouth every morning BP and CHF Yes VENTURA Majano - CNP   SITagliptin (JANUVIA) 50 MG tablet Take 1 tablet by mouth daily Yes VENTURA Majano - CNP   sildenafil (VIAGRA) 50 MG tablet Take 1 tablet by mouth as needed for Erectile Dysfunction Don't take with nitroglycerin Yes VENTURA Majano - Future  - XR ELBOW RIGHT (MIN 3 VIEWS); Future  - acetaminophen (EQ ACETAMINOPHEN) 500 MG tablet; Take 1 tablet by mouth every 6 hours as needed for Pain  Dispense: 120 tablet; Refill: 3    2. Right hand pain  rule out fractures or dislocations     - XR HAND RIGHT (MIN 3 VIEWS); Future  - XR ELBOW RIGHT (MIN 3 VIEWS); Future  - lidocaine (LMX) 4 % cream; Apply 2-3 times a day as needed for pain  Dispense: 120 g; Refill: 3  - acetaminophen (EQ ACETAMINOPHEN) 500 MG tablet; Take 1 tablet by mouth every 6 hours as needed for Pain  Dispense: 120 tablet; Refill: 3    3. Right elbow pain  rule out fracture    - XR HAND RIGHT (MIN 3 VIEWS); Future  - XR ELBOW RIGHT (MIN 3 VIEWS); Future  - mupirocin (BACTROBAN) 2 % ointment; Apply topically 2 times daily on the affected area for 7-10 days. OK to substitute to cream  Dispense: 30 g; Refill: 2  - lidocaine (LMX) 4 % cream; Apply 2-3 times a day as needed for pain  Dispense: 120 g; Refill: 3  - acetaminophen (EQ ACETAMINOPHEN) 500 MG tablet; Take 1 tablet by mouth every 6 hours as needed for Pain  Dispense: 120 tablet; Refill: 3    4. Type 2 diabetes mellitus with hyperglycemia, without long-term current use of insulin (HCC)  Improving    - gabapentin (NEURONTIN) 300 MG capsule; Take 1 capsule by mouth nightly. - CBC; Future  - Comprehensive Metabolic Panel; Future  - Microalbumin / Creatinine Urine Ratio; Future  - TSH without Reflex; Future  - Vitamin B12 & Folate; Future  - Hemoglobin A1C; Future  - SITagliptin (JANUVIA) 50 MG tablet; Take 1 tablet by mouth daily  Dispense: 90 tablet; Refill: 3  - Multiple Vitamins-Minerals (MULTIVITAMIN-MINERALS) TABS tablet; Take 1 tablet by mouth daily  Dispense: 90 tablet; Refill: 3  - metFORMIN (GLUCOPHAGE XR) 500 MG extended release tablet; Take 1 tablet by mouth 2 times daily Stop short acting Metformin. Dispense: 180 tablet; Refill: 3  - L-methylfolate-B6-B12 (METANX) 7-46.789-3-09 MG CAPS capsule;  Take 1 capsule by mouth 2 times mouth daily  Dispense: 90 tablet; Refill: 3    8. Type 2 diabetes mellitus with diabetic polyneuropathy, without long-term current use of insulin (HCC)  improving  Continue current treatment  - CBC; Future  - Comprehensive Metabolic Panel; Future  - Microalbumin / Creatinine Urine Ratio; Future  - TSH without Reflex; Future  - Vitamin B12 & Folate; Future  - Hemoglobin A1C; Future  - SITagliptin (JANUVIA) 50 MG tablet; Take 1 tablet by mouth daily  Dispense: 90 tablet; Refill: 3  - lidocaine (LMX) 4 % cream; Apply 2-3 times a day as needed for pain  Dispense: 120 g; Refill: 3  - Multiple Vitamins-Minerals (MULTIVITAMIN-MINERALS) TABS tablet; Take 1 tablet by mouth daily  Dispense: 90 tablet; Refill: 3  - metFORMIN (GLUCOPHAGE XR) 500 MG extended release tablet; Take 1 tablet by mouth 2 times daily Stop short acting Metformin. Dispense: 180 tablet; Refill: 3  - L-methylfolate-B6-B12 (METANX) 3-70.897-9-50 MG CAPS capsule; Take 1 capsule by mouth 2 times daily  Dispense: 60 capsule; Refill: 2  - aspirin EC 81 MG EC tablet; Take 1 tablet by mouth daily  Dispense: 90 tablet; Refill: 3    9. Screening PSA (prostate specific antigen)  - Psa screening; Future  The natural history of prostate cancer and ongoing controversy regarding screening and potential treatment outcomes of prostate cancer has been discussed with the patient. The meaning of a false positive PSA and a false negative PSA has been discussed. He indicates understanding of the limitations of this screening test and wishes to proceed with screening PSA testing. Nick Marinelli received counseling on the following healthy behaviors: nutrition, exercise, medication adherence and weight loss    Reviewed prior labs and health maintenance. Continue current medications, diet and exercise. Discussed use, benefit, and side effects of prescribed medications. Barriers to medication compliance addressed.    Patient given educational materials - see patient

## 2020-06-09 ENCOUNTER — HOSPITAL ENCOUNTER (OUTPATIENT)
Dept: DIABETES SERVICES | Age: 60
Setting detail: THERAPIES SERIES
Discharge: HOME | End: 2020-06-09
Payer: COMMERCIAL

## 2020-06-09 PROCEDURE — G0108 DIAB MANAGE TRN  PER INDIV: HCPCS

## 2020-06-09 SDOH — ECONOMIC STABILITY: FOOD INSECURITY: ADDITIONAL INFORMATION: NO

## 2020-06-09 ASSESSMENT — PROBLEM AREAS IN DIABETES QUESTIONNAIRE (PAID)
FEELING DEPRESSED WHEN YOU THINK ABOUT LIVING WITH DIABETES: 1
WORRYING ABOUT THE FUTURE AND THE POSSIBILITY OF SERIOUS COMPLICATIONS: 2
FEELING SCARED WHEN YOU THINK ABOUT LIVING WITH DIABETES: 1
PAID-5 TOTAL SCORE: 6
FEELING THAT DIABETES IS TAKING UP TOO MUCH OF YOUR MENTAL AND PHYSICAL ENERGY EVERY DAY: 1
FEELING THAT DIABETES IS TAKING UP TOO MUCH OF YOUR MENTAL AND PHYSICAL ENERGY EVERY DAY: 1
FEELING SCARED WHEN YOU THINK ABOUT LIVING WITH DIABETES: 1
COPING WITH COMPLICATIONS OF DIABETES: 1
WORRYING ABOUT THE FUTURE AND THE POSSIBILITY OF SERIOUS COMPLICATIONS: 2
FEELING DEPRESSED WHEN YOU THINK ABOUT LIVING WITH DIABETES: 1

## 2020-06-09 NOTE — PROGRESS NOTES
.  This visit is a TeleHealth encounter (During CPZGG-75 public health emergency). Pursuant to the emergency declaration under the [address] [place] [address], [address] [place] [address] and the [employer] Act, this Virtual Visit was conducted with patient's (and/or legal guardian's) consent, to reduce the patient's risk of exposure to COVID-19 and provide necessary medical care. The patient (and/or legal guardian) has also been advised to contact this office for worsening conditions or problems, and seek emergency medical treatment and/or call 911 if deemed necessary. Patient identification was verified at the start of the visit: Yes    Total time spent for this encounter: [id number] [phone] were provided through a video synchronous discussion virtually to substitute for in-person clinic visit. Patient and provider were located at their individual home/office.       MEDICAL HISTORY:  Past Medical History:   Diagnosis Date    Acute heart failure ([facility] 5/20/2018    Anemia     Benign hypertension with CKD (chronic kidney disease), stage II 7/24/2017    CAD (coronary artery disease)     Cardiomyopathy (HCC)     Chronic diastolic CHF (congestive heart failure) ([facility] 7/23/2018    Chronic kidney disease     Coronary artery disease involving native coronary artery of native heart without angina pectoris 11/19/2016    Diabetic nephropathy associated with type 2 diabetes mellitus ([facility] 1/12/2016    Diabetic [facility] with [facility]     DM (diabetes mellitus), type 2 ([facility]     for 15-20 yrs    Erectile dysfunction     Grief 7/24/2017    HTN (hypertension)     Hypertensive urgency     Lipidemia     Mild episode of recurrent major depressive disorder ([facility] 1/13/2019    Obesity     Obesity (BMI 30-39.9) 11/19/2016    MORENA on CPAP 2/4/2013    Sleep apnea 2/4/2013    Slow transit constipation     Uncontrolled type 2 diabetes mellitus, without long-term current use of insulin ([facility] 10/13/2016     Family History   Problem Relation Age of Onset    Asthma Mother         /78    High Blood Pressure Mother     Heart Disease Mother     High Blood Pressure Father     Diabetes Sister     High Blood Pressure Sister     Diabetes Brother     High Blood Pressure Brother     Cancer Sister         breast    Cancer Sister         lung/smoker     Colon Cancer Other      Lisinopril   Immunization History   Administered Date(s) Administered    Influenza Virus Vaccine 10/02/2017, 2019    Influenza Whole 10/16/2007    Influenza, Quadv, 6 mo and older, IM, PF (Flulaval, Fluarix) 2019    Influenza, Quadv, IM, PF (6 mo and older Fluzone, Flulaval, Fluarix, and 3 yrs and older Afluria) 10/13/2016, 2017, 2019    Pneumococcal Polysaccharide (Ophleruai70) 2006    Tdap (Boostrix, Adacel) 2014    Zoster Recombinant (Shingrix) 2019     Current Medications  Current Outpatient Medications   Medication Sig Dispense Refill    gabapentin (NEURONTIN) 300 MG capsule Take 1 capsule by mouth nightly.  mupirocin (BACTROBAN) 2 % ointment Apply topically 2 times daily on the affected area for 7-10 days. OK to substitute to cream 30 g 2    SITagliptin (JANUVIA) 50 MG tablet Take 1 tablet by mouth daily 90 tablet 3    lidocaine (LMX) 4 % cream Apply 2-3 times a day as needed for pain 120 g 3    Multiple Vitamins-Minerals (MULTIVITAMIN-MINERALS) TABS tablet Take 1 tablet by mouth daily 90 tablet 3    spironolactone (ALDACTONE) 25 MG tablet Take 1 tablet by mouth every morning BP and CHF 90 tablet 3    metFORMIN (GLUCOPHAGE XR) 500 MG extended release tablet Take 1 tablet by mouth 2 times daily Stop short acting Metformin.  180 tablet 3    L-methylfolate-B6-B12 (METANX) 5-43.142-2-88 MG CAPS capsule Take 1 capsule by mouth 2 times daily 60 capsule 2    hydrALAZINE (APRESOLINE) 50 Education Record    Participant Name: Dimitrios Olmedo  Referring Provider: Zuleyma Moore MD   Assessment/Evaluation Ratings:  1=Needs Instruction   4=Demonstrates Understanding/Competency  2=Needs Review   NC=Not Covered    3=Comprehends Key Points  N/A=Not Applicable  Topics/Learning Objectives Pre-session Assess Date:  6/9/20 Instr. Date Reinforce Date Post- session Eval Comments   Diabetes disease process & Treatment process: Define diabetes & pre-diabetes; Identify own type of diabetes; role of the pancreas; signs/symptoms; diagnostic criteria; prevention & treatment options; contributing factors. 1    Dx 20 -years ago   Incorporating nutritional management into lifestyle: Describe effect of type, amount & timing of food on blood glucose; Describe basic meal planning techniques & current nutrition guideline   1-   see notes in paper chart    What to eat - Food groups, When to eat - timing of meals and snacks, and How much to eat - portions control. calories/ day   CHO choices/ meal   CHO choices/  day   grams of protein /day   gram of fat /day     Correctly read food labels & demonstrate CHO counting & portion control with personalized meal plan. Identify dining out strategies, & dietary sick day guidelines. 1       Incorporating physical activity into lifestyle:   Verbalize effect of exercise on blood glucose levels; benefits of regular exercise; safety considerations; contraindications; maintenance of activity. 1    Walking - daily - gardening  - encouraged more   Using medications safely:  Identify effects of diabetes medicines on blood glucose levels; List diabetes medication taken, action & side effects;    1    - metformin xr\  - januvia daily   Insulin / Injectable - Appropriate injection sites; proper storage; supplies needed; proper technique; safe needle disposal guidelines.    1       Monitoring blood glucose, interpreting and using results:  Identify recommended & personal blood glucose targets; importance of testing; testing supplies; HgbA1C target levels; Factors affecting blood glucose; Importance of logging blood glucose levels for pattern recognition; ketone testing; safe lancet disposal.   1    Fasting 130 - post meal 170 's - checks BG 1- 2 times per day   Prevention, detection & treatment of acute complications:  Identify symptoms of hyper & hypoglycemia, and prevention & treatment strategies. 1       Describe sick day guidelines & indications for  physician notification. Identify short term consequences of poor control. Disaster preparedness strategies    1       Prevention, detection & treatment of chronic complications:  Define the natural course of diabetes & describe the relationship of blood glucose levels to long term complications of diabetes. Identify preventative measures & standards of care. 1       Developing strategies to address psychosocial issues:  Describe feelings about living with diabetes; Describe how stress, depression & anxiety affect blood glucose; Identify coping strategies; Identify support needed & support network available. 1       Developing strategies to promote health/change behavior: Identify 7 self-care behaviors; Personal health risk factors; Benefits, challenges & strategies for behavioral change;    1         Individualized goal selection. My goal , to help me improve my health, I will:   1. Healthy eating  Stay away from pop and candy    2. Add in more low CHO veggies      2. Plan  Follow-up Appointments planned - Per VV in one week     Instruction Method: [x]Lecture/Discussion  []Power Point Presentation  [x]Handouts  []Return Demonstration    Education Materials/Equipment Provided (VIA Mail for phone visits)  :    [x]Self-Management - Initial assessment - Enrolment in to ADA  Where do I Begin, Living with Type 2 diabetes ADA home support program and  handout on diabetes education classes.  -- 6/9/20RS  []Self-Management  Class 1 speakers visit from 9 - 10am        [] [facility], [facility], [facility], [place], [facility] chi (site rotate)  Call [phone] or visit   website: [email or web address] for more details   Check web site for updated times/ dates       [] [address]  [address] [facility], [address] [address] [place] [address] [place] Tuesday and Thursday -   9 :30 am- 10:30am - ongoing   [] [address]  [address] [facility], [address] [address] [facility] Thursday 11:00am - 11:45am     [] Third Wednesday Cooking  Class  Free  Registration is required     [address] [facility]. 1100 [facility], 125 [facility] [phone] or   Email [email or web address]   Wednesdays-5:00- 6:00 pm       [] Eat Smart  Be Active & Learn How - Free   Families & grandparents with children in home 0- 25 & pregnant moms          Various sites in community - call to find next session near you. [facility] extension office for future sessions - Favio Kaplan  Email : [email or web address]  Phone: [phone]     [] (SNAP-Ed)   - free nutrition education   - adults and youth, who are eligible for the Supplemental Nutrition Assistance Program    Various sites in community - call to find next session near you. [facility]-ER SNAP-Ed  contact Rai Bernardo, Email:[email or web address]           Post Education Referrals:      [] [place] Tobacco Quit information sheet and [address] , [address]       [] Dental care - [facility]     [] [facility] ([facility]) link  phone number - for information and referral to [address]  Clinically  [address], WEIGHT MANAGEMENT        []Other  Madi Flores RN

## 2020-07-21 ENCOUNTER — TELEPHONE (OUTPATIENT)
Dept: GASTROENTEROLOGY | Age: 60
End: 2020-07-21

## 2020-07-21 NOTE — TELEPHONE ENCOUNTER
LVM to confirm appt with patient at [facility] with [address] tomorrow 7/22/2020 in the morning.

## 2020-08-21 ENCOUNTER — OFFICE VISIT (OUTPATIENT)
Dept: UROLOGY | Age: 60
End: 2020-08-21
Payer: COMMERCIAL

## 2020-08-21 VITALS
BODY MASS INDEX: 32.19 KG/M2 | TEMPERATURE: 97.9 F | SYSTOLIC BLOOD PRESSURE: 127 MMHG | HEART RATE: 54 BPM | WEIGHT: 244 LBS | DIASTOLIC BLOOD PRESSURE: 76 MMHG

## 2020-08-21 LAB
BILIRUBIN, POC: NEGATIVE
BLOOD URINE, POC: NORMAL
CLARITY, POC: CLEAR
COLOR, POC: YELLOW
GLUCOSE URINE, POC: 100
KETONES, POC: NEGATIVE
LEUKOCYTE EST, POC: NORMAL
NITRITE, POC: NEGATIVE
PH, POC: 5.5
PROTEIN, POC: NORMAL
SPECIFIC GRAVITY, POC: 1.03
UROBILINOGEN, POC: 0.2

## 2020-08-21 PROCEDURE — 81002 URINALYSIS NONAUTO W/O SCOPE: CPT | Performed by: UROLOGY

## 2020-08-21 PROCEDURE — G8417 CALC BMI ABV UP PARAM F/U: HCPCS | Performed by: UROLOGY

## 2020-08-21 PROCEDURE — G8428 CUR MEDS NOT DOCUMENT: HCPCS | Performed by: UROLOGY

## 2020-08-21 PROCEDURE — 1036F TOBACCO NON-USER: CPT | Performed by: UROLOGY

## 2020-08-21 PROCEDURE — 99202 OFFICE O/P NEW SF 15 MIN: CPT | Performed by: UROLOGY

## 2020-08-21 PROCEDURE — 3017F COLORECTAL CA SCREEN DOC REV: CPT | Performed by: UROLOGY

## 2020-08-21 PROCEDURE — 99204 OFFICE O/P NEW MOD 45 MIN: CPT | Performed by: UROLOGY

## 2020-08-21 RX ORDER — SILDENAFIL 100 MG/1
100 TABLET, FILM COATED ORAL DAILY PRN
Qty: 30 TABLET | Refills: 3 | Status: SHIPPED | OUTPATIENT
Start: 2020-08-21 | End: 2021-01-12

## 2020-08-21 NOTE — PATIENT INSTRUCTIONS
Patient Education        sildenafil (oral)  Pronunciation:  concha DEN declan ynes  Brand:  Revatio, Viagra  What is the most important information I should know about oral sildenafil? Some medicines can cause unwanted or dangerous effects when used with sildenafil. Tell your doctor about all your current medicines, especially riociguat (Adempas). Do not take sildenafil if you are also using a nitrate drug for chest pain or heart problems, including nitroglycerin, isosorbide dinitrate, isosorbide mononitrate, and some recreational drugs such as \"poppers\". Taking sildenafil with a nitrate medicine can cause a sudden and serious decrease in blood pressure. Contact your doctor or seek emergency medical attention if your erection is painful or lasts longer than 4 hours. A prolonged erection (priapism) can damage the penis. Stop using sildenafil and get emergency medical help if you have sudden vision loss. What is sildenafil? Sildenafil relaxes muscles of the blood vessels and increases blood flow to particular areas of the body. Sildenafil under the name Viagra is used to treat erectile dysfunction (impotence) in men. Another brand of sildenafil is Revatio, which is used to treat pulmonary arterial hypertension and improve exercise capacity in men and women. Do not take Viagra while also taking Revatio, unless your doctor tells you to. Sildenafil may also be used for purposes not listed in this medication guide. What should I discuss with my healthcare provider before taking oral sildenafil? You should not use sildenafil if you are allergic to it, or:  · if you take other medicines to treat pulmonary arterial hypertension, such as riociguat (Adempas). Do not take sildenafil if you are also using a nitrate drug for chest pain or heart problems. This includes nitroglycerin, isosorbide dinitrate, and isosorbide mononitrate. Nitrates are also found in some recreational drugs such as amyl nitrate or nitrite (\"poppers\"). needed, 30 minutes to 1 hour before sexual activity. You may take it up to 4 hours before sexual activity. Do not take Viagra more than once per day. Shake the oral suspension (liquid) before you measure a dose. Use the dosing syringe provided, or use a medicine dose-measuring device (not a kitchen spoon). Viagra  can help you have an erection when sexual stimulation occurs. An erection will not occur just by taking a pill. Follow your doctor's instructions. During sexual activity, if you become dizzy or nauseated, or have pain, numbness, or tingling in your chest, arms, neck, or jaw, stop and call your doctor right away. You could be having a serious side effect of sildenafil. Store at room temperature away from moisture and heat. What happens if I miss a dose? Since Viagra  is used as needed, you are not likely to miss a dose. If you miss a dose of Revatio, take the medicine as soon as you can, but skip the missed dose if it is almost time for your next dose. Do not take two doses at one time. What happens if I overdose? Seek emergency medical attention or call the Poison Help line at 1-392.851.8250. What should I avoid while taking oral sildenafil? Drinking alcohol with this medicine can cause side effects. Grapefruit may interact with sildenafil and lead to unwanted side effects. Avoid the use of grapefruit products. Avoid using any other medicines to treat impotence, such as alprostadil or yohimbine, without first talking to your doctor. What are the possible side effects of oral sildenafil? Get emergency medical help if you have signs of an allergic reaction: hives; difficulty breathing; swelling of your face, lips, tongue, or throat.   Stop taking sildenafil and get emergency medical help if you have:   · heart attack symptoms --chest pain or pressure, pain spreading to your jaw or shoulder, nausea, sweating;  · vision changes or sudden vision loss; or  · erection is painful or lasts longer than effect. Drug information contained herein may be time sensitive. [employer] information has been compiled for use by healthcare practitioners and consumers in the [place] and therefore [employer] does not warrant that uses outside of the [place] are appropriate, unless specifically indicated otherwise. [facility]'s drug information does not endorse drugs, diagnose patients or recommend therapy. [employer] drug information is an informational resource designed to assist licensed healthcare practitioners in caring for their patients and/or to serve consumers viewing this service as a supplement to, and not a substitute for, the expertise, skill, knowledge and judgment of healthcare practitioners. The absence of a warning for a given drug or drug combination in no way should be construed to indicate that the drug or drug combination is safe, effective or appropriate for any given patient. [facility] does not assume any responsibility for any aspect of healthcare administered with the aid of information [facility] provides. The information contained herein is not intended to cover all possible uses, directions, precautions, warnings, drug interactions, allergic reactions, or adverse effects. If you have questions about the drugs you are taking, check with your doctor, nurse or pharmacist.  Copyright 8259-8584 [address]: 13.01. Revision date: 12/3/2018. Care instructions adapted under license by [facility] ([facility]). If you have questions about a medical condition or this instruction, always ask your healthcare professional. [place] [address] any warranty or liability for your use of this information.         PLEASE GET YOUR PSA LAB DONE 3 DAYS PRIOR TO YOUR NEXT VISIT!!!!!

## 2020-08-21 NOTE — PROGRESS NOTES
Dr. Philly Valerio M.D., Dr. Ela Toscano M.D., Dr. Margarito Jameson M.D., Dr. Roark Klinefelter M.D., Dr. [facility] Consultation / New Patient Visit    Patient:  Rosa Charlton  YOB: 1960  Date: 8/21/2020  Consult requested from Dr. Sebastien Alfaro  for purpose of evaluation of elevated PSA. HISTORY OF PRESENT ILLNESS:   The patient is a 63 y.o. male who presents today for follow-up for the following problem(s): elevated PSA, ED, nocturia  Overall the problem(s) : show no change. Associated Symptoms: No dysuria, no gross hematuria. Presents for elevated PSA, most recent value 1.88, up from 1.43 in 2019. Patient denies personal or family history of prostate cancer. Not a current smoker. No agent orange exposure  Mild LUTS including nocturia x3  No nephrolithiasis or gross hematuria  DM - moderately well controlled. Some neuropathy in feet  ED - moderate.  Erections 50% hardness, able to sometimes have penetrative intercourse   History of heart failure  Patient states he has not used nitrates for chest pain although prescription is listed  Denies history of MI or cardiac stents     Pain Severity: Pain Score:   0 - No pain    Summary of old records: N/A  (Patient's old records, notes and chart reviewed and summarized above.)    Last several PSA's:  Lab Results   Component Value Date    PSA 1.88 05/27/2020    PSA 1.43 02/26/2019    PSA 1.19 02/17/2017       Last total testosterone:  No results found for: TESTOSTERONE    Urinalysis today:  Results for POC orders placed in visit on 08/21/20   POCT Urinalysis no Micro   Result Value Ref Range    Color, UA yellow     Clarity, UA clear     Glucose, UA      Bilirubin, UA negative     Ketones, UA negative     Spec Grav, UA 1.030     Blood, UA POC trace-intact     pH, UA 5.5     Protein, UA POC trace     Urobilinogen, UA 0.2     Leukocytes, UA trace     Nitrite, UA negative          Last BUN and creatinine:  Lab Results   Component Value Date    BUN 26 (H) 2020     Lab Results   Component Value Date    CREATININE 1.38 (H) 2020       Imaging Reviewed during this Office Visit:   (results were independently reviewed by physician and radiology report verified)    PAST MEDICAL, FAMILY AND SOCIAL HISTORY:  Past Medical History:   Diagnosis Date    Acute heart failure ([address] 2018    Anemia     Benign hypertension with CKD (chronic kidney disease), stage II 2017    CAD (coronary artery disease)     Cardiomyopathy (HCC)     Chronic diastolic CHF (congestive heart failure) ([address] 2018    Chronic kidney disease     Coronary artery disease involving native coronary artery of native heart without angina pectoris 2016    Diabetic nephropathy associated with type 2 diabetes mellitus ([address] 2016    Diabetic polyneuropathy associated with type 2 diabetes mellitus ([address]     DM (diabetes mellitus), type 2 ([address]     for 15-20 yrs    Erectile dysfunction     Grief 2017    HTN (hypertension)     Hypertensive urgency     Lipidemia     Mild episode of recurrent major depressive disorder ([address] 2019    Obesity     Obesity (BMI 30-39.9) 2016    MORENA on CPAP 2013    Sleep apnea 2013    Slow transit constipation     Uncontrolled type 2 diabetes mellitus, without long-term current use of insulin ([address] 10/13/2016     Past Surgical History:   Procedure Laterality Date    CARDIAC CATHETERIZATION  2017    at [facility] per DR Josh Esquivel note in Media from 18, showed 50% stenosis PDA, EF 45%    CARDIAC CATHETERIZATION  2011    per Dr. Lanny Parry note in media: non-obstructive CAD, EF 45%    COLONOSCOPY  01/15/2013    hemorrhoids, good prep, scanned     Family History   Problem Relation Age of Onset    Asthma Mother             High Blood Pressure Mother     Heart Disease Mother     High Blood Pressure Father     Diabetes Sister     High Blood Pressure Sister     Diabetes Brother     High Blood Pressure Brother    Jamia Calhoun Sister         breast    Cancer Sister         lung/smoker     Colon Cancer Other      Outpatient Medications Marked as Taking for the 8/21/20 encounter (Office Visit) with Bonnie Will MD   Medication Sig Dispense Refill    sildenafil (VIAGRA) 100 MG tablet Take 1 tablet by mouth daily as needed for Erectile Dysfunction 30 tablet 3    gabapentin (NEURONTIN) 300 MG capsule Take 1 capsule by mouth nightly.  mupirocin (BACTROBAN) 2 % ointment Apply topically 2 times daily on the affected area for 7-10 days. OK to substitute to cream 30 g 2    SITagliptin (JANUVIA) 50 MG tablet Take 1 tablet by mouth daily 90 tablet 3    lidocaine (LMX) 4 % cream Apply 2-3 times a day as needed for pain 120 g 3    Multiple Vitamins-Minerals (MULTIVITAMIN-MINERALS) TABS tablet Take 1 tablet by mouth daily 90 tablet 3    spironolactone (ALDACTONE) 25 MG tablet Take 1 tablet by mouth every morning BP and CHF 90 tablet 3    metFORMIN (GLUCOPHAGE XR) 500 MG extended release tablet Take 1 tablet by mouth 2 times daily Stop short acting Metformin. 180 tablet 3    L-methylfolate-B6-B12 (METANX) 6-57.973-0-78 MG CAPS capsule Take 1 capsule by mouth 2 times daily 60 capsule 2    hydrALAZINE (APRESOLINE) 50 MG tablet Take 1 tablet by mouth 3 times daily BP medication 270 tablet 3    chlorthalidone (HYGROTON) 25 MG tablet Take 1 tablet by mouth every morning Water pill and BP pill 90 tablet 3    carvedilol (COREG) 25 MG tablet Take 1 tablet by mouth 2 times daily (with meals) 180 tablet 3    atorvastatin (LIPITOR) 40 MG tablet Take 1 tablet by mouth daily 90 tablet 3    aspirin EC 81 MG EC tablet Take 1 tablet by mouth daily 90 tablet 3    amLODIPine (NORVASC) 10 MG tablet TAKE ONE TABLET BY MOUTH EVERY DAY 90 tablet 3    acetaminophen (EQ ACETAMINOPHEN) 500 MG tablet Take 1 tablet by mouth every 6 hours as needed for Pain 120 tablet 3    Blood Pressure KIT Diagnosis: HTN.  Needs to check blood pressure 1-2 times a day until stable, then once a day. Goal blood pressure less than 135/85, and above 110/60. 1 kit 0    blood glucose test strips (FREESTYLE LITE) strip Checking Blood Glucose twice a day 100 strip 3    nitroGLYCERIN (NITROSTAT) 0.4 MG SL tablet DISSOLVE ONE TABLET UNDER THE TONGUE EVERY 5 MINUTES AS NEEDED FOR CHEST PAIN. DO NOT EXCEED A TOTAL OF 3 DOSES IN 15 MINUTES 25 tablet 0       Lisinopril  Social History     Tobacco Use   Smoking Status Former Smoker    Packs/day: 0.50    Years: 2.00    Pack years: 1.00    Types: Cigarettes    Last attempt to quit: 1981    Years since quittin.6   Smokeless Tobacco Former User       Social History     Substance and Sexual Activity   Alcohol Use Yes    Alcohol/week: 1.0 - 2.0 standard drinks    Types: 1 - 2 Cans of beer per week    Comment: 1-2 cans a month       REVIEW OF SYSTEMS:  Constitutional: negative  Eyes: negative  Respiratory: negative  Cardiovascular: negative  Gastrointestinal: negative  Musculoskeletal: negative  Genitourinary: negative  Skin: negative   Neurological: negative  Hematological/Lymphatic: negative  Psychological: negative    Physical Exam:    This a 63 y.o. male   Vitals:    20 0846   BP: 127/76   Pulse: 54   Temp: 97.9 °F (36.6 °C)     AAOx3  NAD  Unlabored breathing  Normal rate  Abdomen soft, nontender, nondistended  No calf tenderness to palpation  Circumcised penis  Orthotopic meatus  Testes palpable bilaterally  Small left varicocele   ANDRE: prostate enlarged, estimated 50 g, smooth, no nodules  Stool in rectal vault       Assessment and Plan      1. Elevated PSA    2. Incomplete bladder emptying           Plan:      Return in about 13 weeks (around 2020) for 3 month f/u for PSA results, med check with reinaldo.   PSA velocity is 0.35 ng/ml/yr  Follow up 3 months with repeat PSA to discuss proceeding to biopsy - patient with minimal anxiety, comfortable with plan  Trial of sildenafil 100 mg - patient does have history of HF, states able to complete 4 MET without shortness of breath. Taking sildenafil in combination with nitrates is absolute contraindication - discussed with patient   Emphasized importance of tight glucose control with patient          I have discussed the care of this patient including pertinent history and exam findings, with the resident. I have seen and examined the patient and the key elements of all parts of the encounter have been performed by me. I agree with the assessment, plan and orders as documented by the resident.     Dr. Sharon Mustafa MD

## 2020-08-27 ENCOUNTER — VIRTUAL VISIT (OUTPATIENT)
Dept: FAMILY MEDICINE CLINIC | Age: 60
End: 2020-08-27
Payer: COMMERCIAL

## 2020-08-27 PROCEDURE — G8427 DOCREV CUR MEDS BY ELIG CLIN: HCPCS | Performed by: FAMILY MEDICINE

## 2020-08-27 PROCEDURE — 3046F HEMOGLOBIN A1C LEVEL >9.0%: CPT | Performed by: FAMILY MEDICINE

## 2020-08-27 PROCEDURE — 2022F DILAT RTA XM EVC RTNOPTHY: CPT | Performed by: FAMILY MEDICINE

## 2020-08-27 PROCEDURE — 3017F COLORECTAL CA SCREEN DOC REV: CPT | Performed by: FAMILY MEDICINE

## 2020-08-27 PROCEDURE — 99214 OFFICE O/P EST MOD 30 MIN: CPT | Performed by: FAMILY MEDICINE

## 2020-08-27 RX ORDER — ATORVASTATIN CALCIUM 40 MG/1
40 TABLET, FILM COATED ORAL DAILY
Qty: 90 TABLET | Refills: 3 | Status: SHIPPED | OUTPATIENT
Start: 2020-08-27 | End: 2020-11-24 | Stop reason: SDUPTHER

## 2020-08-27 ASSESSMENT — ENCOUNTER SYMPTOMS
COUGH: 0
SHORTNESS OF BREATH: 0
ABDOMINAL PAIN: 0
CHEST TIGHTNESS: 0
DIARRHEA: 0
WHEEZING: 0
VOMITING: 0
ABDOMINAL DISTENTION: 0
NAUSEA: 0
CONSTIPATION: 0

## 2020-08-27 NOTE — PROGRESS NOTES
medications    BP Readings from Last 3 Encounters:   08/21/20 127/76   02/25/20 130/80   02/07/20 130/80        Pulse is Low. Pulse Readings from Last 3 Encounters:   08/21/20 54   02/25/20 68   02/07/20 74       Weight is  244 lbs stable  Wt Readings from Last 3 Encounters:   08/21/20 244 lb (110.7 kg)   05/27/20 254 lb (115.2 kg)   02/25/20 259 lb (117.5 kg)       Diabetes mellitus type 2  Symptoms: High blood glucose, numbness and tingling in the feet and visual disturbance. Taking Metformin and Januvia, and they verified medications with his bottles that he has them at home  Home blood Glucose 120 yesterday  Patient says he has been using a pill box now and has been taking his medications  Prior A1c worsening    Lab Results   Component Value Date    LABA1C 9.4 (H) 05/27/2020    LABA1C 8.2 01/02/2020    LABA1C 9.1 09/25/2019     He does take aspirin and statin. Prior urine microalbumin was high 39 on 10/13/2016       Hyperlipidemia:  No new myalgias or GI upset on atorvastatin (Lipitor). Medication compliance: compliant all of the time. Patient is  following a low fat, low cholesterol diet. LDL is improving  Lab Results   Component Value Date    LDLCALC 119 01/16/2019    LDLCHOLESTEROL 66 05/27/2020     Lab Results   Component Value Date    TRIG 112 05/27/2020    TRIG 146 02/25/2020    TRIG 99 09/25/2019           Saw urology, told prostate is enlarged. Given Viagra helping with Erectile Dysfunction     Lab Results   Component Value Date    PSA 1.88 05/27/2020    PSA 1.43 02/26/2019    PSA 1.19 02/17/2017           Review of Systems   Constitutional: Positive for fatigue. Negative for activity change, appetite change, chills, diaphoresis, fever and unexpected weight change. Eyes: Positive for visual disturbance (stable, chronic). Respiratory: Negative for cough, chest tightness, shortness of breath and wheezing. Cardiovascular: Negative for chest pain, palpitations and leg swelling. Gastrointestinal: Negative for abdominal distention, abdominal pain, constipation, diarrhea, nausea and vomiting. Endocrine: Negative for cold intolerance, heat intolerance, polydipsia, polyphagia and polyuria. Genitourinary: Negative for difficulty urinating, frequency and urgency. Erectile dysfunction, I told him not to take Viagra with nitroglycerin   Neurological: Positive for numbness (feet). Prior to Visit Medications    Medication Sig Taking? Authorizing Provider   sildenafil (VIAGRA) 100 MG tablet Take 1 tablet by mouth daily as needed for Erectile Dysfunction Yes Tonio Luciano MD   gabapentin (NEURONTIN) 300 MG capsule Take 1 capsule by mouth nightly. Yes Historical Provider, MD   mupirocin (BACTROBAN) 2 % ointment Apply topically 2 times daily on the affected area for 7-10 days. OK to substitute to cream Yes Michael Conley MD   SITagliptin (JANUVIA) 50 MG tablet Take 1 tablet by mouth daily Yes Michael Conley MD   lidocaine (LMX) 4 % cream Apply 2-3 times a day as needed for pain Yes Michael Conley MD   Multiple Vitamins-Minerals (MULTIVITAMIN-MINERALS) TABS tablet Take 1 tablet by mouth daily Yes Michael Conley MD   spironolactone (ALDACTONE) 25 MG tablet Take 1 tablet by mouth every morning BP and CHF Yes Michael Conley MD   metFORMIN (GLUCOPHAGE XR) 500 MG extended release tablet Take 1 tablet by mouth 2 times daily Stop short acting Metformin.  Yes Michael Conley MD   L-methylfolate-B6-B12 Felisa Maher) 1-95.018-5-95 MG CAPS capsule Take 1 capsule by mouth 2 times daily Yes Michael Conley MD   hydrALAZINE (APRESOLINE) 50 MG tablet Take 1 tablet by mouth 3 times daily BP medication Yes Michael Conley MD   chlorthalidone (HYGROTON) 25 MG tablet Take 1 tablet by mouth every morning Water pill and BP pill Yes Michael Conley MD   carvedilol (COREG) 25 MG tablet Take 1 tablet by mouth 2 times daily (with meals) Yes Michael Conley MD atorvastatin (LIPITOR) 40 MG tablet Take 1 tablet by mouth daily Yes Mansoor Watts MD   aspirin EC 81 MG EC tablet Take 1 tablet by mouth daily Yes Mansoor Watts MD   amLODIPine (NORVASC) 10 MG tablet TAKE ONE TABLET BY MOUTH EVERY DAY Yes Mansoor Watts MD   acetaminophen (EQ ACETAMINOPHEN) 500 MG tablet Take 1 tablet by mouth every 6 hours as needed for Pain Yes Mansoor Watts MD   Blood Pressure KIT Diagnosis: HTN. Needs to check blood pressure 1-2 times a day until stable, then once a day. Goal blood pressure less than 135/85, and above 110/60. Yes Mansoor Watts MD   blood glucose test strips (FREESTYLE LITE) strip Checking Blood Glucose twice a day Yes VENTURA Majano CNP   nitroGLYCERIN (NITROSTAT) 0.4 MG SL tablet DISSOLVE ONE TABLET UNDER THE TONGUE EVERY 5 MINUTES AS NEEDED FOR CHEST PAIN. DO NOT EXCEED A TOTAL OF 3 DOSES IN 15 MINUTES Yes Mansoor Watts MD   sildenafil (VIAGRA) 50 MG tablet Take 1 tablet by mouth as needed for Erectile Dysfunction Don't take with nitroglycerin  Patient not taking: Reported on 2020  VENTURA Majano CNP       Social History     Tobacco Use    Smoking status: Former Smoker     Packs/day: 0.50     Years: 2.00     Pack years: 1.00     Types: Cigarettes     Last attempt to quit: 1981     Years since quittin.6    Smokeless tobacco: Former User   Substance Use Topics    Alcohol use:  Yes     Alcohol/week: 1.0 - 2.0 standard drinks     Types: 1 - 2 Cans of beer per week     Comment: 1-2 cans a month    Drug use: Yes     Types: Marijuana     Comment: marijuana every day           PHYSICAL EXAMINATION:    Vital Signs: (As obtained by patient/caregiver or practitioner observation)  -vital signs stable and within normal limits except obesity per BMI, 32.19 kg/M2  Patient-Reported Vitals 2020   Patient-Reported Weight 244   Patient-Reported Height 6'1   Patient-Reported Systolic 277   Patient-Reported  05/27/2020    K 3.7 05/27/2020     05/27/2020    CO2 25 05/27/2020    BUN 26 05/27/2020    CREATININE 1.38 05/27/2020    GLUCOSE 152 05/27/2020    GLUCOSE 157 02/06/2012    CALCIUM 9.2 05/27/2020        Lab Results   Component Value Date    ALT 12 05/27/2020    AST 15 05/27/2020    ALKPHOS 91 05/27/2020    BILITOT 0.44 05/27/2020       Lab Results   Component Value Date    TSH 1.00 05/27/2020       Lab Results   Component Value Date    CHOL 130 05/27/2020    CHOL 180 02/25/2020    CHOL 153 09/25/2019     Lab Results   Component Value Date    TRIG 112 05/27/2020    TRIG 146 02/25/2020    TRIG 99 09/25/2019     Lab Results   Component Value Date    HDL 42 05/27/2020    HDL 46 02/25/2020    HDL 49 09/25/2019     Lab Results   Component Value Date    LDLCALC 119 01/16/2019    LDLCHOLESTEROL 66 05/27/2020    LDLCHOLESTEROL 105 02/25/2020    LDLCHOLESTEROL 84 09/25/2019       Lab Results   Component Value Date    CHOLHDLRATIO 3.1 05/27/2020    CHOLHDLRATIO 3.9 02/25/2020    CHOLHDLRATIO 3.1 09/25/2019       Lab Results   Component Value Date    LABA1C 9.4 (H) 05/27/2020    LABA1C 8.2 01/02/2020    LABA1C 9.1 09/25/2019         Lab Results   Component Value Date    [id number] 671 05/27/2020       Lab Results   Component Value Date    FOLATE 14.7 05/27/2020         ASSESSMENT/PLAN:    1. Chronic combined systolic and diastolic CHF (congestive heart failure) (HCC)  Improving   Lab Results   Component Value Date    LVEF 30 01/16/2019    LVEFMODE Echo 01/16/2019   - CBC; Future  - Comprehensive Metabolic Panel; Future  Continue current treatment. Needs to follow up with cardiology    2. Benign hypertension with CKD (chronic kidney disease), stage II  Stable Chronic kidney disease   Well controlled HTN. Continue current treatment. Will recheck labs. - CBC; Future  - Comprehensive Metabolic Panel; Future  - Magnesium; Future  - Phosphorus; Future    3.  Type 2 diabetes mellitus with diabetic polyneuropathy, - see patient instructions. All patient questions answered. Patient voiced understanding. Return in about 3 months (around 11/27/2020) for Face-2F-30mins PHYSICAL, VISION screen, PHQ9. .  Data Unavailable      Future Appointments   Date Time Provider Vianey Malagon   11/20/2020  9:30 AM SCHEDULE, [facility]Y [facility]   1/12/2021  9:00 AM [facility]        Total time spent during this visit 25 minutes including face-to-face, counseling, charting review, and non-face-to-face time. iTn Padron is a 63 y.o. male being evaluated by a Virtual Visit (video visit) encounter to address concerns as mentioned above. Due to this being a TeleHealth encounter (During XWJBP-08 public health emergency), evaluation of the following organ systems was limited: Vitals/Constitutional/EENT/Resp/CV/GI//MS/Neuro/Skin/Heme-Lymph-Imm. Pursuant to the emergency declaration under the [address] [place] [address], [address] [place] [address] authority and the [employer] and [employer] Act, this Virtual Visit was conducted with patient's (and/or legal guardian's) consent, to reduce the patient's risk of exposure to COVID-19 and provide necessary medical care. The patient (and/or legal guardian) has also been advised to contact this office for worsening conditions or problems, and seek emergency medical treatment and/or call 911 if deemed necessary. Services were provided through a video synchronous discussion virtually to substitute for in-person clinic visit. Patient was located at his home, provider was located in the office, at the primary practice location. Patient identification was verified at the start of the visit: Yes    Total time spent for this encounter: 25 minutes    --Madelaine Coe MD on 8/29/2020 at 9:53 AM    An electronic signature was used to authenticate this note.

## 2020-09-27 ENCOUNTER — TELEPHONE (OUTPATIENT)
Dept: FAMILY MEDICINE CLINIC | Age: 60
End: 2020-09-27

## 2020-09-27 NOTE — TELEPHONE ENCOUNTER
Please advise the patient to have his blood work done it is overdue, all the orders are entered in the computer, they are fasting

## 2020-10-23 ENCOUNTER — TELEPHONE (OUTPATIENT)
Dept: FAMILY MEDICINE CLINIC | Age: 60
End: 2020-10-23

## 2020-10-24 NOTE — TELEPHONE ENCOUNTER
PATIENT IS ON ALDACTONE, NOT ON ACEI/ARB PER CARDIOLOGY    fyi  Health and Safety Notification    [employer] works with your patients' plan sponsors to provide you with the enclosed [employer] safety and health considerations for patients in your practice. Please review the health information provided and make any changes in therapy that you believe are appropriate. [employer] understands that the information may not be applicable to every patient's therapy and therefore presents it as informational only. Therapy Consideration: ACE Inhibitor or Angiotensin Receptor Blocker in Heart Failure Your patient has heart failure and may not be receiving an ACE inhibitor (ACE-I) or an angiotensin receptor blocker (ARB) based on claims records. Guidelines recommend ACE-I for patients to reduce morbidity and mortality in patients with heart failure and left ventricular dysfunction. ARBs are an alternative for patients who are intolerant of ACE-I. An angiotensin receptor-neprilysin inhibitor should replace ACE-I or ARB in stable patients with chronic symptomatic heart failure and an adequate blood pressure who are tolerating standard therapies well.

## 2020-10-29 ENCOUNTER — TELEPHONE (OUTPATIENT)
Dept: FAMILY MEDICINE CLINIC | Age: 60
End: 2020-10-29

## 2020-10-29 NOTE — TELEPHONE ENCOUNTER
Patient is not on ARB/ACE inhibitor due to patient taking Aldactone 25 mg very high risk of interaction with that medication

## 2020-11-20 ENCOUNTER — HOSPITAL ENCOUNTER (OUTPATIENT)
Age: 60
Discharge: HOME | End: 2020-11-20
Payer: COMMERCIAL

## 2020-11-20 ENCOUNTER — OFFICE VISIT (OUTPATIENT)
Dept: UROLOGY | Age: 60
End: 2020-11-20
Payer: COMMERCIAL

## 2020-11-20 VITALS
HEIGHT: 73 IN | HEART RATE: 61 BPM | DIASTOLIC BLOOD PRESSURE: 84 MMHG | BODY MASS INDEX: 32.2 KG/M2 | WEIGHT: 243 LBS | TEMPERATURE: 97 F | SYSTOLIC BLOOD PRESSURE: 133 MMHG

## 2020-11-20 LAB
ALBUMIN SERPL-MCNC: 4.1 G/DL (ref 3.5–5.2)
ALBUMIN/GLOBULIN RATIO: 1.1 (ref 1–2.5)
ALP BLD-CCNC: 83 U/L (ref 40–129)
ALT SERPL-CCNC: 12 U/L (ref 5–41)
ANION GAP SERPL CALCULATED.3IONS-SCNC: 11 MMOL/L (ref 9–17)
AST SERPL-CCNC: 12 U/L
BILIRUB SERPL-MCNC: 0.29 MG/DL (ref 0.3–1.2)
BUN BLDV-MCNC: 24 MG/DL (ref 6–20)
BUN/CREAT BLD: ABNORMAL (ref 9–20)
CALCIUM SERPL-MCNC: 9.1 MG/DL (ref 8.6–10.4)
CHLORIDE BLD-SCNC: 104 MMOL/L (ref 98–107)
CHOLESTEROL/HDL RATIO: 3.9
CHOLESTEROL: 192 MG/DL
CO2: 24 MMOL/L (ref 20–31)
CREAT SERPL-MCNC: 1.37 MG/DL (ref 0.7–1.2)
ESTIMATED AVERAGE GLUCOSE: 186 MG/DL
FOLATE: 18.4 NG/ML
GFR AFRICAN AMERICAN: >60 ML/MIN
GFR NON-AFRICAN AMERICAN: 53 ML/MIN
GFR SERPL CREATININE-BSD FRML MDRD: ABNORMAL ML/MIN/{1.73_M2}
GFR SERPL CREATININE-BSD FRML MDRD: ABNORMAL ML/MIN/{1.73_M2}
GLUCOSE BLD-MCNC: 139 MG/DL (ref 70–99)
HBA1C MFR BLD: 8.1 % (ref 4–6)
HCT VFR BLD CALC: 39.1 % (ref 40.7–50.3)
HDLC SERPL-MCNC: 49 MG/DL
HEMOGLOBIN: 12.5 G/DL (ref 13–17)
LDL CHOLESTEROL: 129 MG/DL (ref 0–130)
MAGNESIUM: 2.1 MG/DL (ref 1.6–2.6)
MCH RBC QN AUTO: 30.7 PG (ref 25.2–33.5)
MCHC RBC AUTO-ENTMCNC: 32 G/DL (ref 28.4–34.8)
MCV RBC AUTO: 96.1 FL (ref 82.6–102.9)
NRBC AUTOMATED: 0 PER 100 WBC
PDW BLD-RTO: 12.7 % (ref 11.8–14.4)
PHOSPHORUS: 3 MG/DL (ref 2.5–4.5)
PLATELET # BLD: 205 K/UL (ref 138–453)
PMV BLD AUTO: 11.3 FL (ref 8.1–13.5)
POTASSIUM SERPL-SCNC: 4.3 MMOL/L (ref 3.7–5.3)
PROSTATE SPECIFIC ANTIGEN: 1.89 UG/L
RBC # BLD: 4.07 M/UL (ref 4.21–5.77)
SODIUM BLD-SCNC: 139 MMOL/L (ref 135–144)
TOTAL PROTEIN: 7.7 G/DL (ref 6.4–8.3)
TRIGL SERPL-MCNC: 72 MG/DL
VITAMIN B-12: 810 PG/ML (ref 232–1245)
VLDLC SERPL CALC-MCNC: NORMAL MG/DL (ref 1–30)
WBC # BLD: 5 K/UL (ref 3.5–11.3)

## 2020-11-20 PROCEDURE — 99213 OFFICE O/P EST LOW 20 MIN: CPT | Performed by: UROLOGY

## 2020-11-20 PROCEDURE — 83735 ASSAY OF MAGNESIUM: CPT

## 2020-11-20 PROCEDURE — 99212 OFFICE O/P EST SF 10 MIN: CPT | Performed by: UROLOGY

## 2020-11-20 PROCEDURE — 3017F COLORECTAL CA SCREEN DOC REV: CPT | Performed by: UROLOGY

## 2020-11-20 PROCEDURE — 82607 VITAMIN B-12: CPT

## 2020-11-20 PROCEDURE — 85027 COMPLETE CBC AUTOMATED: CPT

## 2020-11-20 PROCEDURE — 1036F TOBACCO NON-USER: CPT | Performed by: UROLOGY

## 2020-11-20 PROCEDURE — 80053 COMPREHEN METABOLIC PANEL: CPT

## 2020-11-20 PROCEDURE — 83036 HEMOGLOBIN GLYCOSYLATED A1C: CPT

## 2020-11-20 PROCEDURE — G8417 CALC BMI ABV UP PARAM F/U: HCPCS | Performed by: UROLOGY

## 2020-11-20 PROCEDURE — G8427 DOCREV CUR MEDS BY ELIG CLIN: HCPCS | Performed by: UROLOGY

## 2020-11-20 PROCEDURE — 82746 ASSAY OF FOLIC ACID SERUM: CPT

## 2020-11-20 PROCEDURE — G8484 FLU IMMUNIZE NO ADMIN: HCPCS | Performed by: UROLOGY

## 2020-11-20 PROCEDURE — 36415 COLL VENOUS BLD VENIPUNCTURE: CPT

## 2020-11-20 PROCEDURE — 84100 ASSAY OF PHOSPHORUS: CPT

## 2020-11-20 PROCEDURE — 84153 ASSAY OF PSA TOTAL: CPT

## 2020-11-20 PROCEDURE — 80061 LIPID PANEL: CPT

## 2020-11-20 RX ORDER — SILDENAFIL 100 MG/1
100 TABLET, FILM COATED ORAL DAILY PRN
Qty: 30 TABLET | Refills: 3 | Status: SHIPPED | OUTPATIENT
Start: 2020-11-20 | End: 2021-01-12

## 2020-11-20 NOTE — RESULT ENCOUNTER NOTE
Please notify patient. Greatly improved diabetes, A1c 8.1,great job! Blood Glucose 139  Chronic kidney disease stage 2 stable  PSA is stable saw urology today  LDL is worsening and he is not taking the statin , atorvastatin 40 mg, has refills on it, needs to take it please  Anemia improved, but didn't see Dr. Deandra Francisco, please give contact info to call himself   Continue current treatment.    Future Appointments  1/12/2021  9:00 AM    Anamaria Bustos MD     [facility]  5/21/2021  9:30 AM    SCHEDULE, [facility] UROLOGY  [facility]    [facility]

## 2020-11-20 NOTE — PROGRESS NOTES
Dr. Jasbir Bhardwaj M.D., Dr. Jacky Jeronimo M.D., Dr. Hazel Blanchard M.D., Dr. Joe Moreno M.D., Dr. Yakov Swift M.D.  [facility] Consultation / follow up Patient Visit    Patient:  Cristel Hansen  YOB: 1960  Date: 11/21/2020  Consult requested from Dr. Jeanmarie Tucker  for purpose of evaluation of elevated PSA. HISTORY OF PRESENT ILLNESS:   The patient is a 63 y.o. male who presents today for follow-up for the following problem(s): elevated PSA, ED, nocturia  Overall the problem(s) : show no change. Associated Symptoms: No dysuria, no gross hematuria. Presents for elevated PSA, most recent value 1.88, up from 1.43 in 2019. Patient denies personal or family history of prostate cancer. Not a current smoker. No agent orange exposure  Mild LUTS including nocturia x3  No nephrolithiasis or gross hematuria  DM - moderately well controlled. Some neuropathy in feet    ED - moderate. Erections 50% hardness, able to sometimes have penetrative intercourse   History of heart failure  Patient states he has not used nitrates for chest pain although prescription is listed  Denies history of MI or cardiac stents     Pain Severity: Pain Score:   0 - No pain    Summary of old records: N/A  (Patient's old records, notes and chart reviewed and summarized above.)    Last several PSA's:  Lab Results   Component Value Date    PSA 1.89 11/20/2020    PSA 1.88 05/27/2020    PSA 1.43 02/26/2019       Last total testosterone:  No results found for: TESTOSTERONE    Urinalysis today:  No results found for this visit on 11/20/20.       Last BUN and creatinine:  Lab Results   Component Value Date    BUN 24 (H) 11/20/2020     Lab Results   Component Value Date    CREATININE 1.37 (H) 11/20/2020       Imaging Reviewed during this Office Visit:   (results were independently reviewed by physician and radiology report verified)    PAST MEDICAL, FAMILY AND SOCIAL HISTORY:  Past Medical History:   Diagnosis Date    Acute heart failure ([facility]) 5/20/2018    Anemia     Benign hypertension with CKD (chronic kidney disease), stage II 2017    CAD (coronary artery disease)     Cardiomyopathy (HCC)     Chronic diastolic CHF (congestive heart failure) ([address] 2018    Chronic kidney disease     Coronary artery disease involving native coronary artery of native heart without angina pectoris 2016    Diabetic nephropathy associated with type 2 diabetes mellitus ([address] 2016    Diabetic polyneuropathy associated with type 2 diabetes mellitus ([address]     DM (diabetes mellitus), type 2 ([address]     for 15-20 yrs    Erectile dysfunction     Grief 2017    HTN (hypertension)     Hypertensive urgency     Lipidemia     Mild episode of recurrent major depressive disorder ([address] 2019    Obesity     Obesity (BMI 30-39.9) 2016    MORENA on CPAP 2013    Sleep apnea 2013    Slow transit constipation     Uncontrolled type 2 diabetes mellitus, without long-term current use of insulin ([address] 10/13/2016     Past Surgical History:   Procedure Laterality Date    CARDIAC CATHETERIZATION  2017    [facility] per DR Nirmala Angel note in Media from 18, showed 50% stenosis PDA, EF 45%    CARDIAC CATHETERIZATION  2011    per Dr. Ariela Mccracken note in media: non-obstructive CAD, EF 45%    COLONOSCOPY  01/15/2013    hemorrhoids, good prep, scanned     Family History   Problem Relation Age of Onset    Asthma Mother         /78    High Blood Pressure Mother     Heart Disease Mother     High Blood Pressure Father     Diabetes Sister     High Blood Pressure Sister     Diabetes Brother     High Blood Pressure Brother     Cancer Sister         breast    Cancer Sister         lung/smoker     Colon Cancer Other      Outpatient Medications Marked as Taking for the 20 encounter (Office Visit) with Jennifer Martin MD   Medication Sig Dispense Refill    sildenafil (VIAGRA) 100 MG tablet Take 1 tablet by mouth daily as needed for Erectile Dysfunction 30 tablet 3    atorvastatin (LIPITOR) 40 MG tablet Take 1 tablet by mouth daily 90 tablet 3    sildenafil (VIAGRA) 100 MG tablet Take 1 tablet by mouth daily as needed for Erectile Dysfunction 30 tablet 3    mupirocin (BACTROBAN) 2 % ointment Apply topically 2 times daily on the affected area for 7-10 days. OK to substitute to cream 30 g 2    SITagliptin (JANUVIA) 50 MG tablet Take 1 tablet by mouth daily 90 tablet 3    lidocaine (LMX) 4 % cream Apply 2-3 times a day as needed for pain 120 g 3    Multiple Vitamins-Minerals (MULTIVITAMIN-MINERALS) TABS tablet Take 1 tablet by mouth daily 90 tablet 3    spironolactone (ALDACTONE) 25 MG tablet Take 1 tablet by mouth every morning BP and CHF 90 tablet 3    metFORMIN (GLUCOPHAGE XR) 500 MG extended release tablet Take 1 tablet by mouth 2 times daily Stop short acting Metformin. 180 tablet 3    hydrALAZINE (APRESOLINE) 50 MG tablet Take 1 tablet by mouth 3 times daily BP medication 270 tablet 3    chlorthalidone (HYGROTON) 25 MG tablet Take 1 tablet by mouth every morning Water pill and BP pill 90 tablet 3    carvedilol (COREG) 25 MG tablet Take 1 tablet by mouth 2 times daily (with meals) 180 tablet 3    aspirin EC 81 MG EC tablet Take 1 tablet by mouth daily 90 tablet 3    amLODIPine (NORVASC) 10 MG tablet TAKE ONE TABLET BY MOUTH EVERY DAY 90 tablet 3    acetaminophen (EQ ACETAMINOPHEN) 500 MG tablet Take 1 tablet by mouth every 6 hours as needed for Pain 120 tablet 3    Blood Pressure KIT Diagnosis: HTN. Needs to check blood pressure 1-2 times a day until stable, then once a day. Goal blood pressure less than 135/85, and above 110/60. 1 kit 0    blood glucose test strips (FREESTYLE LITE) strip Checking Blood Glucose twice a day 100 strip 3    nitroGLYCERIN (NITROSTAT) 0.4 MG SL tablet DISSOLVE ONE TABLET UNDER THE TONGUE EVERY 5 MINUTES AS NEEDED FOR CHEST PAIN.   DO NOT EXCEED A TOTAL OF 3 DOSES IN 15 MINUTES 25 tablet 0       Lisinopril  Social History     Tobacco Use   Smoking Status Former Smoker    Packs/day: 0.50    Years: 2.00    Pack years: 1.00    Types: Cigarettes    Last attempt to quit: 1981    Years since quittin.9   Smokeless Tobacco Former User       Social History     Substance and Sexual Activity   Alcohol Use Yes    Alcohol/week: 1.0 - 2.0 standard drinks    Types: 1 - 2 Cans of beer per week    Comment: 1-2 cans a month       REVIEW OF SYSTEMS:  Constitutional: negative  Eyes: negative  Respiratory: negative  Cardiovascular: negative  Gastrointestinal: negative  Musculoskeletal: negative  Genitourinary: negative  Skin: negative   Neurological: negative  Hematological/Lymphatic: negative  Psychological: negative    Physical Exam:    This a 63 y.o. male   Vitals:    20 0933   BP: 133/84   Pulse: 61   Temp: 97 °F (36.1 °C)     AAOx3  NAD  Unlabored breathing        Assessment and Plan      1. Incomplete bladder emptying    2. Erectile dysfunction due to arterial insufficiency    3. Rising PSA level           Plan:      Return in about 6 months (around 2021) for w psa with Rafaela Hogan. PSA velocity is 0.35 ng/ml/yr  Follow up 6 months with repeat PSA to discuss proceeding to biopsy - patient with minimal anxiety, comfortable with plan. Did not get PSA for this visit. Refill sildenafil 100 mg - patient does have history of HF, states able to complete 4 MET without shortness of breath.  Taking sildenafil in combination with nitrates is absolute contraindication - discussed with patient   Emphasized importance of tight glucose control with patient

## 2020-11-24 RX ORDER — ATORVASTATIN CALCIUM 40 MG/1
40 TABLET, FILM COATED ORAL DAILY
Qty: 90 TABLET | Refills: 3 | Status: SHIPPED | OUTPATIENT
Start: 2020-11-24 | End: 2021-01-12 | Stop reason: SDUPTHER

## 2021-01-12 ENCOUNTER — OFFICE VISIT (OUTPATIENT)
Dept: FAMILY MEDICINE CLINIC | Age: 61
End: 2021-01-12
Payer: COMMERCIAL

## 2021-01-12 VITALS
DIASTOLIC BLOOD PRESSURE: 84 MMHG | OXYGEN SATURATION: 99 % | HEIGHT: 73 IN | HEART RATE: 74 BPM | TEMPERATURE: 97.6 F | WEIGHT: 254.6 LBS | BODY MASS INDEX: 33.74 KG/M2 | SYSTOLIC BLOOD PRESSURE: 138 MMHG

## 2021-01-12 DIAGNOSIS — I50.42 CHRONIC COMBINED SYSTOLIC AND DIASTOLIC CHF (CONGESTIVE HEART FAILURE) (HCC): ICD-10-CM

## 2021-01-12 DIAGNOSIS — E78.5 HYPERLIPIDEMIA WITH TARGET LDL LESS THAN 70: ICD-10-CM

## 2021-01-12 DIAGNOSIS — N18.2 BENIGN HYPERTENSION WITH CKD (CHRONIC KIDNEY DISEASE), STAGE II: ICD-10-CM

## 2021-01-12 DIAGNOSIS — F33.41 RECURRENT MAJOR DEPRESSIVE DISORDER, IN PARTIAL REMISSION (HCC): ICD-10-CM

## 2021-01-12 DIAGNOSIS — B35.3 TINEA PEDIS OF BOTH FEET: ICD-10-CM

## 2021-01-12 DIAGNOSIS — E11.42 TYPE 2 DIABETES MELLITUS WITH DIABETIC POLYNEUROPATHY, WITHOUT LONG-TERM CURRENT USE OF INSULIN (HCC): ICD-10-CM

## 2021-01-12 DIAGNOSIS — Z00.00 ANNUAL PHYSICAL EXAM: Primary | ICD-10-CM

## 2021-01-12 DIAGNOSIS — I12.9 BENIGN HYPERTENSION WITH CKD (CHRONIC KIDNEY DISEASE), STAGE II: ICD-10-CM

## 2021-01-12 LAB — HBA1C MFR BLD: 7.3 %

## 2021-01-12 PROCEDURE — G8482 FLU IMMUNIZE ORDER/ADMIN: HCPCS | Performed by: FAMILY MEDICINE

## 2021-01-12 PROCEDURE — 99396 PREV VISIT EST AGE 40-64: CPT | Performed by: FAMILY MEDICINE

## 2021-01-12 PROCEDURE — 83036 HEMOGLOBIN GLYCOSYLATED A1C: CPT | Performed by: FAMILY MEDICINE

## 2021-01-12 RX ORDER — ATORVASTATIN CALCIUM 40 MG/1
40 TABLET, FILM COATED ORAL DAILY
Qty: 90 TABLET | Refills: 3 | Status: SHIPPED | OUTPATIENT
Start: 2021-01-12 | End: 2021-06-01 | Stop reason: SDUPTHER

## 2021-01-12 RX ORDER — SPIRONOLACTONE 25 MG/1
25 TABLET ORAL EVERY MORNING
Qty: 90 TABLET | Refills: 3 | Status: SHIPPED | OUTPATIENT
Start: 2021-01-12 | End: 2021-06-01 | Stop reason: SDUPTHER

## 2021-01-12 RX ORDER — KETOCONAZOLE 20 MG/G
CREAM TOPICAL
Qty: 1 TUBE | Refills: 1 | Status: SHIPPED | OUTPATIENT
Start: 2021-01-12

## 2021-01-12 ASSESSMENT — ENCOUNTER SYMPTOMS
ABDOMINAL PAIN: 0
WHEEZING: 0
DIARRHEA: 0
CONSTIPATION: 0
ABDOMINAL DISTENTION: 1
VOMITING: 0
CHEST TIGHTNESS: 0
BACK PAIN: 0
COUGH: 0
NAUSEA: 0
SHORTNESS OF BREATH: 0

## 2021-01-12 ASSESSMENT — PATIENT HEALTH QUESTIONNAIRE - PHQ9
SUM OF ALL RESPONSES TO PHQ QUESTIONS 1-9: 0
SUM OF ALL RESPONSES TO PHQ9 QUESTIONS 1 & 2: 0

## 2021-01-12 NOTE — PATIENT INSTRUCTIONS
Patient Education        Well Visit, Men 48 to 72: Care Instructions  Your Care Instructions     Physical exams can help you stay healthy. Your doctor has checked your overall health and may have suggested ways to take good care of yourself. He or she also may have recommended tests. At home, you can help prevent illness with healthy eating, regular exercise, and other steps. Follow-up care is a key part of your treatment and safety. Be sure to make and go to all appointments, and call your doctor if you are having problems. It's also a good idea to know your test results and keep a list of the medicines you take. How can you care for yourself at home? · Reach and stay at a healthy weight. This will lower your risk for many problems, such as obesity, diabetes, heart disease, and high blood pressure. · Get at least 30 minutes of exercise on most days of the week. Walking is a good choice. You also may want to do other activities, such as running, swimming, cycling, or playing tennis or team sports. · Do not smoke. Smoking can make health problems worse. If you need help quitting, talk to your doctor about stop-smoking programs and medicines. These can increase your chances of quitting for good. · Protect your skin from too much sun. When you're outdoors from 10 a.m. to 4 p.m., stay in the shade or cover up with clothing and a hat with a wide brim. Wear sunglasses that block UV rays. Even when it's cloudy, put broad-spectrum sunscreen (SPF 30 or higher) on any exposed skin. · See a dentist one or two times a year for checkups and to have your teeth cleaned. · Wear a seat belt in the car. Follow your doctor's advice about when to have certain tests. These tests can spot problems early. · Cholesterol. Your doctor will tell you how often to have this done based on your overall health and other things that can increase your risk for heart attack and stroke. · Blood pressure. Have your blood pressure checked during a routine doctor visit. Your doctor will tell you how often to check your blood pressure based on your age, your blood pressure results, and other factors. · Prostate exam. Talk to your doctor about whether you should have a blood test (called a PSA test) for prostate cancer. Experts recommend that you discuss the benefits and risks of the test with your doctor before you decide whether to have this test.  · Diabetes. Ask your doctor whether you should have tests for diabetes. · Vision. Some experts recommend that you have yearly exams for glaucoma and other age-related eye problems starting at age 48. · Hearing. Tell your doctor if you notice any change in your hearing. You can have tests to find out how well you hear. · Colorectal cancer. Your risk for colorectal cancer gets higher as you get older. Some experts say that adults should start regular screening at age 48 and stop at age 76. Others say to start before age 48 or continue after age 76. Talk with your doctor about your risk and when to start and stop screening. · Heart attack and stroke risk. At least every 4 to 6 years, you should have your risk for heart attack and stroke assessed. Your doctor uses factors such as your age, blood pressure, cholesterol, and whether you smoke or have diabetes to show what your risk for a heart attack or stroke is over the next 10 years. · Abdominal aortic aneurysm. Ask your doctor whether you should have a test to check for an aneurysm. You may need a test if you ever smoked or if your parent, brother, sister, or child has had an aneurysm. When should you call for help? Watch closely for changes in your health, and be sure to contact your doctor if you have any problems or symptoms that concern you. Where can you learn more? Go to https://chpepiceweb.healthLuminaCare [email or web address] org and sign in to your [employer] account. Enter Q917 in the [facility] box to learn more about \"Well Visit, Men 48 to 72: Care Instructions. \"     If you do not have an account, please click on the \"Sign Up Now\" link. Current as of: May 27, 2020               Content Version: 12.6  © 2006-2020 [employer]. Care instructions adapted under license by [facility] ([facility]). If you have questions about a medical condition or this instruction, always ask your healthcare professional. [place] [address] any warranty or liability for your use of this information. Patient Education        Diabetes Blood Sugar Emergencies: Your Action Plan  How can you prevent a blood sugar emergency? An important part of living with diabetes is keeping your blood sugar in your target range. You'll need to know what to do if it's too high or too low. Managing your blood sugar levels helps you avoid emergencies. This care sheet will teach you about the signs of high and low blood sugar. It will help you make an action plan with your doctor for when these signs occur. Low blood sugar is more likely to happen if you take certain medicines for diabetes. It can also happen if you skip a meal, drink alcohol, or exercise more than usual.  You may get high blood sugar if you eat differently than you normally do. One example is eating more carbohydrate than usual. Having a cold, the flu, or other sudden illness can also cause high blood sugar levels. Levels can also rise if you miss a dose of medicine. Any change in how you take your medicine may affect your blood sugar level. So it's important to work with your doctor before you make any changes. Check your blood sugar  Work with your doctor to fill in the blank spaces below that apply to you. Track your levels, know your target range, and write down ways you can get your blood sugar back in your target range. A log book can help you track your levels. Take the book to all of your medical appointments. · Check your blood sugar _____ times a day, at these times:________________________________________________. (For example: Before meals, at bedtime, before exercise, during exercise, other.)  · Your blood sugar target range before a meal is ___________________. Your blood sugar target range after a meal is _______________________. · Do this___________________________________________________to get your blood sugar back within your safe range if your blood sugar results are _________________________________________. (For example: Less than 70 or above 250 mg/dL.)  Call your doctor when your blood sugar results are ___________________________________. (For example: Less than 70 or above 250 mg/dL.)  What are the symptoms of low and high blood sugar? Common symptoms of low blood sugar are sweating and feeling shaky, weak, hungry, or confused. Symptoms can start quickly. Common symptoms of high blood sugar are feeling very thirsty or very hungry. You may also pass urine more often than usual. You may have blurry vision and may lose weight without trying. But some people may have high or low blood sugar without having any symptoms. That's a good reason to check your blood sugar on a regular schedule. What should you do if you have symptoms? Work with your doctor to fill in the blank spaces below that apply to you.    Low blood sugar If you have symptoms of low blood sugar, check your blood sugar. If it's below _____ ( for example, below 70), eat or drink a quick-sugar food that has about 15 grams of carbohydrate. Your goal is to get your level back to your safe range. Check your blood sugar again 15 minutes later. If it's still not in your target range, take another 15 grams of carbohydrate and check your blood sugar again in 15 minutes. Repeat this until you reach your target. Then go back to your regular testing schedule. Children usually need less than 15 grams of carbohydrate. Check with your doctor or diabetes educator for the amount that is right for your child. When you have low blood sugar, it's best to stop or reduce any physical activity until your blood sugar is back in your target range and is stable. If you must stay active, eat or drink 30 grams of carbohydrate. Then check your blood sugar again in 15 minutes. If it's not in your target range, take another 30 grams of carbohydrates. Check your blood sugar again in 15 minutes. Keep doing this until you reach your target. You can then go back to your regular testing schedule. If your symptoms or blood sugar levels are getting worse or have not improved after 15 minutes, seek medical care right away. Here are some examples of quick-sugar foods with 15 grams of carbohydrate:  · 3 or 4 glucose tablets  · 1 tablespoon (3 teaspoons) table sugar  · ½ cup to ¾ cup (4 to 6 ounces) of fruit juice or regular (not diet) soda  · Hard candy (such as 6 Life Savers)  High blood sugar  If you have symptoms of high blood sugar, check your blood sugar. Your goal is to get your level back to your target range. If it's above ______ ( for example, above 250), follow these steps:  · If you missed a dose of your diabetes medicine, take it now. Take only the amount of medicine that you have been prescribed. Do not take more or less medicine. · Give yourself insulin if your doctor has prescribed it for high blood sugar. · Test for ketones, if the doctor told you to do so. If the results of the ketone test show a moderate-to-large amount of ketones, call the doctor for advice. · Wait 30 minutes after you take the extra insulin or the missed medicine. Check your blood sugar again. If your symptoms or blood sugar levels are getting worse or have not improved after taking these steps, seek medical care right away. Follow-up care is a key part of your treatment and safety. Be sure to make and go to all appointments, and call your doctor if you are having problems. It's also a good idea to know your test results and keep a list of the medicines you take. Where can you learn more? Go to https://IGGpeZenter.latakoo. org and sign in to your [employer] account. Enter P310 in the [employer] box to learn more about \"Diabetes Blood Sugar Emergencies: Your Action Plan. \"     If you do not have an account, please click on the \"Sign Up Now\" link. Current as of: December 20, 2019               Content Version: 12.6  © 2755-8088 [employer]. Care instructions adapted under license by [facility] ([facility]). If you have questions about a medical condition or this instruction, always ask your healthcare professional. [address] any warranty or liability for your use of this information.

## 2021-01-12 NOTE — RESULT ENCOUNTER NOTE
Addressed during office visit today, A1c 7.3, abnormal improved diabetes,, continue treatment recommended during the office visit

## 2021-01-12 NOTE — PROGRESS NOTES
Visit Information    Have you changed or started any medications since your last visit including any over-the-counter medicines, vitamins, or herbal medicines? no   Have you stopped taking any of your medications? Is so, why? -  no  Are you having any side effects from any of your medications? - no    Have you seen any other physician or provider since your last visit? yes - UROLOGY   Have you had any other diagnostic tests since your last visit?  no   Have you been seen in the emergency room and/or had an admission in a hospital since we last saw you?  no   Have you had your routine dental cleaning in the past 6 months?  no     Do you have an active MyChart account? If no, what is the barrier?   Yes    Patient Care Team:  Bjorn Joseph MD as PCP - General (Family Medicine)  Bjorn Joseph MD as PCP - [facility]  Rosa Das MD as Consulting Physician (Cardiology)  Jazmine Gonsalez OD (Optometry)  Aly Mcbride MD as Consulting Physician (Nephrology)  Ruma August MD as Consulting Physician (Urology)  Jessica Reyes MD as Consulting Physician (Cardiology)  Jazmine Gonsalez OD (Optometry)  Charlie Hinojosa DPM as Physician (Podiatry)    Medical History Review  Past Medical, Family, and Social History reviewed and does contribute to the patient presenting condition    Health Maintenance   Topic Date Due    Diabetic retinal exam  07/31/2019    Diabetic foot exam  10/09/2020    A1C test (Diabetic or Prediabetic)  02/20/2021    Diabetic microalbuminuria test  05/27/2021    Lipid screen  11/20/2021    Potassium monitoring  11/20/2021    Creatinine monitoring  11/20/2021    PSA counseling  11/20/2021    Colon cancer screen colonoscopy  01/15/2023    DTaP/Tdap/Td vaccine (2 - Td) 04/03/2024    Flu vaccine  Completed    Shingles Vaccine  Completed    Pneumococcal 0-64 years Vaccine  Completed    Hepatitis C screen  Completed    HIV screen  Completed  Hepatitis A vaccine  Aged Out    Hib vaccine  Aged Out    Meningococcal (ACWY) vaccine  Aged Out    Hepatitis B vaccine  Discontinued

## 2021-01-12 NOTE — PROGRESS NOTES
2021      David Regan (:  1960) is a 62 y.o. male, here for a preventive medicine evaluation, Annual Exam, Other (NO CONCERNS), Discuss Labs, and Other (GAP)   . ASSESSMENT/PLAN:    1. Annual physical exam    Low carb, low fat diet, increase fruits and vegetables, and exercise 4-5 times a week 30-40 minutes a day, or walk 1-2 hours per day, or wear a pedometer and get at least 10,000 steps per day. Dental exam 2-3 times /year advised. Immunizations reviewed. Health Maintenance reviewed   Smoking cessation counseling given not to ever start smoking     Annual eye exam advised. 2. Type 2 diabetes mellitus with diabetic polyneuropathy, without long-term current use of insulin ([facility])  Improved  Lab Results   Component Value Date    LABA1C 7.3 2021    LABA1C 8.1 (H) 2020    LABA1C 9.4 (H) 2020       -      DIABETES FOOT EXAM  -     POCT glycosylated hemoglobin (Hb A1C)  -     CBC; Future  -     Comprehensive Metabolic Panel; Future  -     TSH without Reflex; Future    -advised home blood glucose testing  daily  -daily feet exam, Foot care: advised to wash feet daily, pat dry and apply lotion at night, avoiding between toes. Need to look at feet daily and report to a physician any signs of inflammation or skin damage  -annual dilated eye exam  -Low carb, low fat diet, increase fruits and vegetables, and exercise 4-5 times a day 30-40 minutes a day discussed  -continue current treatment  -continue Aspirin  -continue statin      3. Benign hypertension with CKD (chronic kidney disease), stage II  Stable chronic kidney disease stage II, with GFR over 61 for -American but 48 for non--American  Well controlled hypertension. Continue current treatment. Will recheck labs. Discussed low salt diet and BP and pulse monitoring daily  Advised against any NSAIDs  -     CBC; Future  -     Comprehensive Metabolic Panel; Future  -     TSH without Reflex;  Future  - Magnesium; Future  4. Hyperlipidemia with target LDL less than 70  Inadequately controlled  Compliance with statins discussed, he says he has been using the pillbox, he declines referral to pharmacist, he wants to talk with his own pharmacy first, he is not sure if they are giving him the Lipitor  Lab Results   Component Value Date    LDLCALC 119 01/16/2019    LDLCHOLESTEROL 129 11/20/2020       -     atorvastatin (LIPITOR) 40 MG tablet; Take 1 tablet by mouth daily For high cholesterol, you need to take it every day, Disp-90 tablet, R-3Normal  5. Chronic combined systolic and diastolic CHF (congestive heart failure) ([facility] [address]  Stable  He reports he did see the cardiologist  Lab Results   Component Value Date    LVEF 30 01/16/2019    LVEFMODE Echo 01/16/2019     Continue current medications  I requested the report from cardiologist  -     CBC; Future  -     Comprehensive Metabolic Panel; Future  -     TSH without Reflex; Future    6. Recurrent major depressive disorder, in partial remission (HCC)  Stable  We will continue to monitor  7. Tinea pedis of both feet  New  He does have diabetic shoes  - start    ketoconazole (NIZORAL) 2 % cream; Apply on the feet, not between the toes, twice a day, Disp-1 Tube, R-1, Normal        Freddy received counseling on the following healthy behaviors: nutrition, exercise, medication adherence and weight loss  Reviewed prior labs and health maintenance  Discussed use, benefit, and side effects of prescribed medications. Barriers to medication compliance addressed. Patient given educational materials - see patient instructions  All patient questions answered. Patient voiced understanding. The patient's past medical, surgical, social, and family history as well as his  current medications and allergies were reviewed as documented in today's encounter. Medications, labs, diagnostic studies, consultations and follow-up as documented in thisencounter.     Return in about 3 months (around 4/12/2021) for ALWAYS NEEDS 30 MIN. APPT, **POC A1C, DM2, HTN, HLP, CHF, F/U Xrays. Please obtain eye exam from [facility]. Please obtain [facility] from [facility] at [facility]        Patient Active Problem List   Diagnosis    Cardiomyopathy ([facility] [address]    Slow transit constipation    Hyperlipidemia with target LDL less than 70    MORENA on CPAP    Diabetic nephropathy associated with type 2 diabetes mellitus ([facility])    Tinea pedis of both feet    Obesity (BMI 30-39. 9)    Coronary artery disease involving native coronary artery of native heart without angina pectoris    Foot callus    Insomnia    Type 2 diabetes mellitus with hyperglycemia, without long-term current use of insulin ([facility])    Benign hypertension with CKD (chronic kidney disease), stage II    Erectile dysfunction    Type 2 diabetes mellitus with diabetic polyneuropathy, without long-term current use of insulin ([facility])    Chronic combined systolic and diastolic CHF (congestive heart failure) ([facility])    Recurrent major depressive disorder, in partial remission ([facility])    Anemia    Neuropathy of right lower extremity    Increasing prostate specific antigen level       Prior to Visit Medications    Medication Sig Taking? Authorizing Provider   atorvastatin (LIPITOR) 40 MG tablet Take 1 tablet by mouth daily For high cholesterol, you need to take it every day Yes Josh Craig MD   SITagliptin (JANUVIA) 50 MG tablet Take 1 tablet by mouth daily Yes Josh Craig MD   lidocaine (LMX) 4 % cream Apply 2-3 times a day as needed for pain Yes Josh Craig MD   Multiple Vitamins-Minerals (MULTIVITAMIN-MINERALS) TABS tablet Take 1 tablet by mouth daily Yes Josh Craig MD   spironolactone (ALDACTONE) 25 MG tablet Take 1 tablet by mouth every morning BP and CHF Yes Josh Craig MD   metFORMIN (GLUCOPHAGE XR) 500 MG extended release tablet Take 1 tablet by mouth 2 times daily Stop short acting Metformin.  Yes Stacey MD Amita   hydrALAZINE (APRESOLINE) 50 MG tablet Take 1 tablet by mouth 3 times daily BP medication Yes Giovanni Johnson MD   chlorthalidone (HYGROTON) 25 MG tablet Take 1 tablet by mouth every morning Water pill and BP pill Yes Giovanni Johnson MD   carvedilol (COREG) 25 MG tablet Take 1 tablet by mouth 2 times daily (with meals) Yes Giovanni Johnson MD   aspirin EC 81 MG EC tablet Take 1 tablet by mouth daily Yes Giovanni Johnson MD   amLODIPine (NORVASC) 10 MG tablet TAKE ONE TABLET BY MOUTH EVERY DAY Yes Giovanni Johnson MD   acetaminophen (EQ ACETAMINOPHEN) 500 MG tablet Take 1 tablet by mouth every 6 hours as needed for Pain Yes Giovanni Johnson MD   Blood Pressure KIT Diagnosis: HTN. Needs to check blood pressure 1-2 times a day until stable, then once a day. Goal blood pressure less than 135/85, and above 110/60. Yes Giovanni Johnson MD   blood glucose test strips (FREESTYLE LITE) strip Checking Blood Glucose twice a day Yes VENTURA Majano - CNP   nitroGLYCERIN (NITROSTAT) 0.4 MG SL tablet DISSOLVE ONE TABLET UNDER THE TONGUE EVERY 5 MINUTES AS NEEDED FOR CHEST PAIN.   DO NOT EXCEED A TOTAL OF 3 DOSES IN 15 MINUTES Yes Giovanni Johnson MD        Allergies   Allergen Reactions    Lisinopril      Patient is on Aldactone       Past Medical History:   Diagnosis Date    Acute heart failure ([facility] 5/20/2018    Anemia     Benign hypertension with CKD (chronic kidney disease), stage II 7/24/2017    CAD (coronary artery disease)     Cardiomyopathy (HCC)     [facility] 7/23/2018    Chronic kidney disease     Coronary artery disease involving native coronary artery of native heart without angina pectoris 11/19/2016    Diabetic nephropathy associated with type 2 diabetes mellitus ([facility] [address] 1/12/2016    Diabetic polyneuropathy associated with type 2 diabetes mellitus ([facility] [address]     DM (diabetes mellitus), type 2 ([facility] [address]     for 15-20 yrs    Erectile dysfunction     Grief 2017    HTN (hypertension)     Hypertensive urgency     Lipidemia     Mild episode of recurrent major depressive disorder ([facility] [address] 2019    Obesity     Obesity (BMI 30-39.9) 2016    MORENA on CPAP 2013    Sleep apnea 2013    Slow transit constipation     Uncontrolled type 2 diabetes mellitus, without long-term current use of insulin ([facility] [address] 10/13/2016       Past Surgical History:   Procedure Laterality Date    CARDIAC CATHETERIZATION  2017    at [facility] per DR Geo Lynn note in Media from 18, showed 50% stenosis PDA, EF 45%    CARDIAC CATHETERIZATION  2011    per Dr. Ashley Patterson note in media: non-obstructive CAD, EF 45%    COLONOSCOPY  01/15/2013    hemorrhoids, good prep, scanned       . Social History     Tobacco Use    Smoking status: Former Smoker     Packs/day: 0.50     Years: 2.00     Pack years: 1.00     Types: Cigarettes     Quit date: 1981     Years since quittin.0    Smokeless tobacco: Former User   Substance Use Topics    Alcohol use: Yes     Alcohol/week: 1.0 - 2.0 standard drinks     Types: 1 - 2 Cans of beer per week     Comment: 1-2 cans a month    Drug use: Yes     Types: Marijuana     Comment: marijuana every day        Family History   Problem Relation Age of Onset    Asthma Mother         /78    High Blood Pressure Mother     Heart Disease Mother     High Blood Pressure Father     Diabetes Sister     High Blood Pressure Sister     Diabetes Brother     High Blood Pressure Brother     Cancer Sister         breast    Cancer Sister         lung/smoker     Colon Cancer Other        ADVANCE DIRECTIVE: N, <no information>    SUBJECTIVE / [place] P[place] is here for Annual Exam, Other (NO CONCERNS), Discuss Labs, and Other (GAP)       Current concerns:    Patient has known diabetes mellitus type 2 with polyneuropathy, he reports Numbness in the feet, he just received his diabetic shoes and have been helping. Home Blood Glucose 115, 110, 164, 134  Patient reports compliance with his medications  Patient reports has been eating healthier but \"still eating cookies, chocolate chips\" and did not keep a diet during the holidays      A1c improved  Lab Results   Component Value Date    LABA1C 7.3 01/12/2021    LABA1C 8.1 (H) 11/20/2020    LABA1C 9.4 (H) 05/27/2020       Urinary symptoms: no    Sexually active: Yes     Wears seatbelts: yes     Regular exercise: yes  Ever been transfused or tattooed?: no  The patient reports that domestic violence in your life is absent. Last eye exam: up to date: Yes    Abnormal visual screening. Dipti Luciano  reports he is up-to-date with his eye exam.        Hearing Screening    125Hz 250Hz 500Hz 1000Hz 2000Hz 3000Hz 4000Hz 6000Hz 8000Hz   Right ear:            Left ear:               Visual Acuity Screening    Right eye Left eye Both eyes   Without correction: 20/25 20/15 20/20   With correction:          Hearing:normal :Yes    Last dental exam and preventative dental cleaning: up to date : Yes, will have teeth removed      Nutritional assessment: Body mass index is 33.59 kg/m². Elevated. Weight is increasing, there is unintentional weight gain of 11 pounds in 3 months  Wt Readings from Last 3 Encounters:   01/12/21 254 lb 9.6 oz (115.5 kg)   11/20/20 243 lb (110.2 kg)   08/21/20 244 lb (110.7 kg)       Healthful diet and Physical activity counseling to prevent CVD- low carb, low fat diet, increase fruits and vegetables, and exercise 4-5 times a day 30-40minutes a day discussed    Nicotine dependence.  no  Counseling given: Yes      Alcohol use: yes - Occasionally weekly advised against drinking any alcoholic beverages, he understands      Immunization History   Administered Date(s) Administered    Influenza Virus Vaccine 10/02/2017, 12/19/2019    Influenza Whole 10/16/2007    Influenza, Quadv, 6 mo and older, IM, PF (Flulaval, Fluarix) 01/11/2019    Influenza, Quadv, IM, PF (6 mo and older Fluzone, Flulaval, Fluarix, and 3 yrs and older Afluria) 10/13/2016, 09/25/2017, 12/19/2019, 12/16/2020    Pneumococcal Polysaccharide ([id number]) 11/03/2006    Tdap (Boostrix, Adacel) 04/03/2014    Zoster Recombinant (Shingrix) 12/19/2019, 12/16/2020       Tdap one time, then Td every 10 years-up to date:  Yes    Influenza annually-up to date:  Yes    PPSV 23 in all adults 19-63 yo with chronic conditions,smokers, alcoholism,  nursing home residents; then PCV 13 at 73 yo-up to date: Yes    Colon cancer screening recommended at 49 yo-up to date , 2013  The patient has NO family history of colon cancer      The patient has NO family history of cancer. Lab Results   Component Value Date    PSA 1.89 11/20/2020    PSA 1.88 05/27/2020    PSA 1.43 02/26/2019     Patient has known congestive heart failure, hypertension, cardiomyopathy  Going yo [facility], recently seen by cardiologist per his report. He denies chest pain, shortness of breath, leg swelling. He reports fatigue is at baseline, mild.   Lab Results   Component Value Date    LVEF 30 01/16/2019    LVEFMODE Echo 01/16/2019             Blood pressure is normal.  BP Readings from Last 3 Encounters:   01/12/21 138/84   11/20/20 133/84   08/21/20 127/76       Pulse is normal.  Pulse Readings from Last 3 Encounters:   01/12/21 74   11/20/20 61   08/21/20 54     Hyperlipidemia Taking lipitor  He is not sure if Lipitor was given to him at the last refill but he will check with pharmacist    Lab Results   Component Value Date    CHOL 192 11/20/2020    CHOL 130 05/27/2020    CHOL 180 02/25/2020     Lab Results   Component Value Date    TRIG 72 11/20/2020    TRIG 112 05/27/2020    TRIG 146 02/25/2020     Lab Results   Component Value Date    HDL 49 11/20/2020    HDL 42 05/27/2020    HDL 46 02/25/2020     Lab Results   Component Value Date    LDLCHOLESTEROL 129 11/20/2020    LDLCHOLESTEROL 66 05/27/2020    LDLCHOLESTEROL 105 02/25/2020    LDLCALC 119 01/16/2019     Lab Results   Component Value Date    CHOLHDLRATIO 3.9 11/20/2020    CHOLHDLRATIO 3.1 05/27/2020    CHOLHDLRATIO 3.9 02/25/2020       Cardiovascular risk is:high, saw cardiology      The 10-year ASCVD risk score (Juan Hart et al., 2013) is: 26.1%    Values used to calculate the score:      Age: 62 years      Sex: Male      Is Non- : Yes      Diabetic: Yes      Tobacco smoker: No      Systolic Blood Pressure: 963 mmHg      Is BP treated: Yes      HDL Cholesterol: 49 mg/dL      Total Cholesterol: 192 mg/dL    Hepatitis C screening-nonreactive  Lab Results   Component Value Date    HEPBIGM NONREACTIVE 09/25/2019    HEPCAB NONREACTIVE 10/13/2016       Depression  In remission  He just broke up with his girlfriend, he says he feels better this way and he is not sad about it. Denies suicidal ideation, plan or intent. PHQ-2 Over the past 2 weeks, how often have you been bothered by any of the following problems? Little interest or pleasure in doing things: Not at all  Feeling down, depressed, or hopeless: Not at all  PHQ-2 Score: 0  PHQ-9 Over the past 2 weeks, how often have you been bothered by any of the following problems?   PHQ-9 Total Score: 0        PHQ Scores 1/12/2021 5/27/2020 4/11/2019 1/11/2019 10/31/2017 7/24/2017 11/18/2016   PHQ2 Score 0 0 0 3 0 3 0   PHQ9 Score 0 0 0 9 0 8 0       Health Maintenance   Topic Date Due    Diabetic retinal exam  07/31/2019    A1C test (Diabetic or Prediabetic)  04/12/2021    Diabetic microalbuminuria test  05/27/2021    Lipid screen  11/20/2021    Potassium monitoring  11/20/2021    Creatinine monitoring  11/20/2021    PSA counseling  11/20/2021    Diabetic foot exam  01/12/2022    Colon cancer screen colonoscopy  01/15/2023    DTaP/Tdap/Td vaccine (2 - Td) 04/03/2024    Flu vaccine  Completed    Shingles Vaccine  Completed    Pneumococcal 0-64 years Vaccine  Completed    Hepatitis C screen  Completed    HIV screen Completed    Hepatitis A vaccine  Aged Out    Hib vaccine  Aged Out    Meningococcal (ACWY) vaccine  Aged Out    Hepatitis B vaccine  Discontinued       -rest of complaints with corresponding details per ROS    Review of Systems   Constitutional: Positive for fatigue and unexpected weight change. Negative for activity change, appetite change, chills, diaphoresis and fever. HENT: Positive for dental problem. Negative for congestion and hearing loss. Eyes: Positive for visual disturbance. Respiratory: Negative for cough, chest tightness, shortness of breath and wheezing. Cardiovascular: Negative for chest pain, palpitations and leg swelling. Gastrointestinal: Positive for abdominal distention (hernia). Negative for abdominal pain, constipation, diarrhea, nausea and vomiting. Endocrine: Negative for cold intolerance, heat intolerance, polydipsia, polyphagia and polyuria. Genitourinary: Negative for decreased urine volume, difficulty urinating, frequency and urgency. Musculoskeletal: Negative for back pain. Skin: Negative for rash. Allergic/Immunologic: Negative for immunocompromised state. Neurological: Positive for numbness (feet). Negative for dizziness, weakness and headaches. Hematological: Does not bruise/bleed easily. Psychiatric/Behavioral: Negative for decreased concentration, dysphoric mood and sleep disturbance. The patient is not nervous/anxious.          -vital signs stable and within normal limits except obesity per BMI     /84   Pulse 74   Temp 97.6 °F (36.4 °C)   Ht 6' 1\" (1.854 m)   Wt 254 lb 9.6 oz (115.5 kg)   SpO2 99%   BMI 33.59 kg/m²   Vitals:    01/12/21 0909   BP: 138/84   Pulse: 74   Temp: 97.6 °F (36.4 °C)   SpO2: 99%   Weight: 254 lb 9.6 oz (115.5 kg)   Height: 6' 1\" (1.854 m)     Estimated body mass index is 33.59 kg/m² as calculated from the following:    Height as of this encounter: 6' 1\" (1.854 m).     Weight as of this encounter: 254 lb 9.6 oz (115.5 kg). Physical Exam  Vitals signs and nursing note reviewed. Constitutional:       General: He is not in acute distress. Appearance: Normal appearance. He is well-developed. He is obese. He is not diaphoretic. HENT:      Head: Normocephalic and atraumatic. Right Ear: Hearing, tympanic membrane, ear canal and external ear normal.      Left Ear: Hearing, tympanic membrane, ear canal and external ear normal.      Nose: No mucosal edema. Mouth/Throat:      Comments: I did not examine the mouth due to coronavirus pandemic and wearing masks    Eyes:      General: Lids are normal. No scleral icterus. Right eye: No discharge. Left eye: No discharge. Conjunctiva/sclera: Conjunctivae normal.   Neck:      Musculoskeletal: Normal range of motion and neck supple. Thyroid: No thyromegaly. Cardiovascular:      Rate and Rhythm: Normal rate and regular rhythm. Pulses:           Dorsalis pedis pulses are 2+ on the right side and 2+ on the left side. Posterior tibial pulses are 2+ on the right side and 2+ on the left side. Heart sounds: Murmur present. Crescendo  systolic murmur present with a grade of 2/6. Pulmonary:      Effort: Pulmonary effort is normal. No respiratory distress. Breath sounds: Normal breath sounds. No wheezing or rales. Chest:      Chest wall: No tenderness. Abdominal:      General: Bowel sounds are normal. There is no distension. Palpations: Abdomen is soft. There is no hepatomegaly or splenomegaly. Tenderness: There is no abdominal tenderness. Hernia: A hernia is present. Hernia is present in the umbilical area. Comments: Obese abdomen. Musculoskeletal: Normal range of motion. General: No tenderness. Right lower leg: No edema. Left lower leg: No edema. Right foot: Normal range of motion. No deformity, bunion, Charcot foot, foot drop or prominent metatarsal heads.       Left foot: 05/27/2020       Lab Results   Component Value Date    LDLCALC 119 01/16/2019    LDLCHOLESTEROL 129 11/20/2020       Lab Results   Component Value Date    LABA1C 7.3 01/12/2021       Lab Results   Component Value Date    [id number] 810 11/20/2020       Lab Results   Component Value Date    FOLATE 18.4 11/20/2020         Orders Placed This Encounter   Medications    atorvastatin (LIPITOR) 40 MG tablet     Sig: Take 1 tablet by mouth daily For high cholesterol, you need to take it every day     Dispense:  90 tablet     Refill:  3    spironolactone (ALDACTONE) 25 MG tablet     Sig: Take 1 tablet by mouth every morning BP and CHF     Dispense:  90 tablet     Refill:  3    ketoconazole (NIZORAL) 2 % cream     Sig: Apply on the feet, not between the toes, twice a day     Dispense:  1 Tube     Refill:  1         Medications Discontinued During This Encounter   Medication Reason    sildenafil (VIAGRA) 100 MG tablet Cost of medication    sildenafil (VIAGRA) 100 MG tablet Cost of medication    mupirocin (BACTROBAN) 2 % ointment Cost of medication    spironolactone (ALDACTONE) 25 MG tablet REORDER    atorvastatin (LIPITOR) 40 MG tablet REORDER         Orders Placed This Encounter   Procedures    CBC     Standing Status:   Future     Standing Expiration Date:   1/12/2022    Comprehensive Metabolic Panel     Standing Status:   Future     Standing Expiration Date:   1/12/2022    TSH without Reflex     Standing Status:   Future     Standing Expiration Date:   1/12/2022    Magnesium     Standing Status:   Future     Standing Expiration Date:   1/12/2022    POCT glycosylated hemoglobin (Hb A1C)    HM DIABETES FOOT EXAM         Future Appointments   Date Time Provider Vianey Malagon   5/21/2021  9:30 AM SCHEDULE, [facility]   6/1/2021  8:00 AM MD yanelis Pulido [facility]       This note was completed by using the assistance of a speech-recognition program. However, inadvertent computerized transcription errors may be present. Although every effort was made to ensure accuracy, no guarantees can be provided that every mistake has been identified and corrected by editing. An electronic signature was used to authenticate this note.     Electronically signed by Randy Mauricio MD on 1/13/2021 at 8:23 AM

## 2021-02-11 DIAGNOSIS — I10 ESSENTIAL HYPERTENSION: ICD-10-CM

## 2021-02-11 DIAGNOSIS — E11.42 TYPE 2 DIABETES MELLITUS WITH DIABETIC POLYNEUROPATHY, WITHOUT LONG-TERM CURRENT USE OF INSULIN (HCC): ICD-10-CM

## 2021-02-11 DIAGNOSIS — E11.65 TYPE 2 DIABETES MELLITUS WITH HYPERGLYCEMIA, WITHOUT LONG-TERM CURRENT USE OF INSULIN (HCC): ICD-10-CM

## 2021-02-11 RX ORDER — AMLODIPINE BESYLATE 10 MG/1
TABLET ORAL
Qty: 90 TABLET | Refills: 3 | Status: SHIPPED | OUTPATIENT
Start: 2021-02-11 | End: 2021-06-01 | Stop reason: SDUPTHER

## 2021-02-11 RX ORDER — HYDROGEN PEROXIDE 2.65 ML/100ML
LIQUID ORAL; TOPICAL
Qty: 90 TABLET | Refills: 3 | Status: SHIPPED | OUTPATIENT
Start: 2021-02-11 | End: 2021-06-01 | Stop reason: SDUPTHER

## 2021-04-11 DIAGNOSIS — I10 ESSENTIAL HYPERTENSION: ICD-10-CM

## 2021-04-12 RX ORDER — CARVEDILOL 25 MG/1
TABLET ORAL
Qty: 60 TABLET | Refills: 11 | Status: SHIPPED | OUTPATIENT
Start: 2021-04-12 | End: 2021-06-01 | Stop reason: SDUPTHER

## 2021-04-19 ENCOUNTER — TELEPHONE (OUTPATIENT)
Dept: FAMILY MEDICINE CLINIC | Age: 61
End: 2021-04-19

## 2021-04-20 NOTE — TELEPHONE ENCOUNTER
Patient is currently on Aldactone, cannot be prescribed lisinopril at the same time due to high risk of hyperkalemia    Lab Results   Component Value Date     11/20/2020    K 4.3 11/20/2020     11/20/2020    CO2 24 11/20/2020    BUN 24 11/20/2020    CREATININE 1.37 11/20/2020    GLUCOSE 139 11/20/2020    GLUCOSE 157 02/06/2012    CALCIUM 9.1 11/20/2020          FYI    Therapy Consideration: ACE Inhibitor or Angiotensin Receptor Blocker in Diabetes with Renal Manifestations Your patient has been diagnosed with diabetes and renal impairment based on claims records. Please consider prescribing an ACE inhibitor (ACE-I) or angiotensin receptor blocker (ARB) to reduce further loss of kidney function. Guidelines recommend ACE-I or ARB therapy to slow the progression of nephropathy in nonpregnant patients with diabetes and albuminuria. Monitoring of renal function and serum potassium levels is advised if an ACE-I or ARB is used.

## 2021-05-05 DIAGNOSIS — E11.42 TYPE 2 DIABETES MELLITUS WITH DIABETIC POLYNEUROPATHY, WITHOUT LONG-TERM CURRENT USE OF INSULIN (HCC): Primary | ICD-10-CM

## 2021-05-05 DIAGNOSIS — E11.21 DIABETIC NEPHROPATHY ASSOCIATED WITH TYPE 2 DIABETES MELLITUS (HCC): ICD-10-CM

## 2021-05-05 DIAGNOSIS — E11.65 TYPE 2 DIABETES MELLITUS WITH HYPERGLYCEMIA, WITHOUT LONG-TERM CURRENT USE OF INSULIN (HCC): ICD-10-CM

## 2021-05-21 ENCOUNTER — OFFICE VISIT (OUTPATIENT)
Dept: UROLOGY | Age: 61
End: 2021-05-21
Payer: COMMERCIAL

## 2021-05-21 VITALS
HEIGHT: 73 IN | BODY MASS INDEX: 33.13 KG/M2 | WEIGHT: 250 LBS | SYSTOLIC BLOOD PRESSURE: 129 MMHG | DIASTOLIC BLOOD PRESSURE: 73 MMHG | HEART RATE: 70 BPM

## 2021-05-21 DIAGNOSIS — Z12.5 PROSTATE CANCER SCREENING: ICD-10-CM

## 2021-05-21 DIAGNOSIS — N40.1 BENIGN LOCALIZED PROSTATIC HYPERPLASIA WITH LOWER URINARY TRACT SYMPTOMS (LUTS): ICD-10-CM

## 2021-05-21 DIAGNOSIS — N52.01 ERECTILE DYSFUNCTION DUE TO ARTERIAL INSUFFICIENCY: Primary | ICD-10-CM

## 2021-05-21 PROCEDURE — G8417 CALC BMI ABV UP PARAM F/U: HCPCS | Performed by: UROLOGY

## 2021-05-21 PROCEDURE — 3017F COLORECTAL CA SCREEN DOC REV: CPT | Performed by: UROLOGY

## 2021-05-21 PROCEDURE — 99212 OFFICE O/P EST SF 10 MIN: CPT

## 2021-05-21 PROCEDURE — 99214 OFFICE O/P EST MOD 30 MIN: CPT | Performed by: UROLOGY

## 2021-05-21 PROCEDURE — G8427 DOCREV CUR MEDS BY ELIG CLIN: HCPCS | Performed by: UROLOGY

## 2021-05-21 PROCEDURE — 1036F TOBACCO NON-USER: CPT | Performed by: UROLOGY

## 2021-05-21 RX ORDER — SILDENAFIL 100 MG/1
100 TABLET, FILM COATED ORAL DAILY PRN
Qty: 30 TABLET | Refills: 3 | Status: SHIPPED | OUTPATIENT
Start: 2021-05-21 | End: 2021-08-27 | Stop reason: SDUPTHER

## 2021-05-21 RX ORDER — TAMSULOSIN HYDROCHLORIDE 0.4 MG/1
0.4 CAPSULE ORAL DAILY
Qty: 30 CAPSULE | Refills: 11 | Status: SHIPPED | OUTPATIENT
Start: 2021-05-21 | End: 2021-08-27 | Stop reason: SDUPTHER

## 2021-05-21 NOTE — PROGRESS NOTES
[facility] [address]  [address] [place] [id number]  Dept: [phone]  Dept Fax: [phone]      [facility] Office Note  Follow Up Visit    Patient:  Nevin Varela  YOB: 1960  Date: 5/21/2021    The patient is a 62 y.o. male who presents today for evaluation of the following problems:   Chief Complaint   Patient presents with    Follow-up        HISTORY OF PRESENT ILLNESS:     BPH- worsening. Frequency. Nocturia. Not on meds. ED- sildenafil PRN. Discussed absolute contraindication to nitroglycerin    pca screening- stable PSA      Requested/reviewed records from Zulma Morales MD office and/or outside physician/EMR    (Patient's old records have been requested, reviewed and pertinent findings summarized in today's note.)    Last several PSA's:  Lab Results   Component Value Date    PSA 1.89 11/20/2020    PSA 1.88 05/27/2020    PSA 1.43 02/26/2019       Last total testosterone:  No results found for: TESTOSTERONE    Urinalysis today:  No results found for this visit on 05/21/21. Last BUN and creatinine:  Lab Results   Component Value Date    BUN 24 (H) 11/20/2020     Lab Results   Component Value Date    CREATININE 1.37 (H) 11/20/2020       Additional Lab/Culture results: none    Imaging Reviewed during this Office Visit:   Tatyana Rucker MD independently reviewed the images and verified the radiology reports from:    No results found.     PAST MEDICAL, FAMILY AND SOCIAL HISTORY:  Past Medical History:   Diagnosis Date    Acute heart failure ([address] 5/20/2018    Anemia     Benign hypertension with CKD (chronic kidney disease), stage II 7/24/2017    CAD (coronary artery disease)     Cardiomyopathy (HCC)     Chronic diastolic CHF (congestive heart failure) ([address] 7/23/2018    Chronic kidney disease     Coronary artery disease involving native coronary artery of native heart without (ALDACTONE) 25 MG tablet Take 1 tablet by mouth every morning BP and CHF 90 tablet 3    ketoconazole (NIZORAL) 2 % cream Apply on the feet, not between the toes, twice a day 1 Tube 1    SITagliptin (JANUVIA) 50 MG tablet Take 1 tablet by mouth daily 90 tablet 3    lidocaine (LMX) 4 % cream Apply 2-3 times a day as needed for pain 120 g 3    Multiple Vitamins-Minerals (MULTIVITAMIN-MINERALS) TABS tablet Take 1 tablet by mouth daily 90 tablet 3    metFORMIN (GLUCOPHAGE XR) 500 MG extended release tablet Take 1 tablet by mouth 2 times daily Stop short acting Metformin. 180 tablet 3    hydrALAZINE (APRESOLINE) 50 MG tablet Take 1 tablet by mouth 3 times daily BP medication 270 tablet 3    chlorthalidone (HYGROTON) 25 MG tablet Take 1 tablet by mouth every morning Water pill and BP pill 90 tablet 3    acetaminophen (EQ ACETAMINOPHEN) 500 MG tablet Take 1 tablet by mouth every 6 hours as needed for Pain 120 tablet 3    Blood Pressure KIT Diagnosis: HTN. Needs to check blood pressure 1-2 times a day until stable, then once a day. Goal blood pressure less than 135/85, and above 110/60. 1 kit 0    blood glucose test strips (FREESTYLE LITE) strip Checking Blood Glucose twice a day 100 strip 3    nitroGLYCERIN (NITROSTAT) 0.4 MG SL tablet DISSOLVE ONE TABLET UNDER THE TONGUE EVERY 5 MINUTES AS NEEDED FOR CHEST PAIN.   DO NOT EXCEED A TOTAL OF 3 DOSES IN 15 MINUTES 25 tablet 0       Lisinopril  Social History     Tobacco Use   Smoking Status Former Smoker    Packs/day: 0.50    Years: 2.00    Pack years: 1.00    Types: Cigarettes    Quit date: 1981    Years since quittin.4   Smokeless Tobacco Former User      (If patient a smoker, smoking cessation counseling offered)   Social History     Substance and Sexual Activity   Alcohol Use Yes    Alcohol/week: 1.0 - 2.0 standard drinks    Types: 1 - 2 Cans of beer per week    Comment: 1-2 cans a month       REVIEW OF SYSTEMS:  Constitutional: negative  Eyes: negative  Respiratory: negative  Cardiovascular: negative  Gastrointestinal: negative  Genitourinary: see HPI  Musculoskeletal: negative  Skin: negative   Neurological: negative  Hematological/Lymphatic: negative  Psychological: negative        Physical Exam:    This a 62 y.o. male  Vitals:    05/21/21 0948   BP: 129/73   Pulse: 70     Body mass index is 32.98 kg/m². Constitutional: Patient in no acute distress;         Assessment and Plan        1. Erectile dysfunction due to arterial insufficiency    2. Benign localized prostatic hyperplasia with lower urinary tract symptoms (LUTS)    3. Prostate cancer screening               Plan:        ED- refill sildenafil. Cannot have erections without it  BPH- start flomax  pca screening- prostate cancer screening annually    Having intense leg pain, recommend ED visit. Prescriptions Ordered:  No orders of the defined types were placed in this encounter.      Orders Placed:  Orders Placed This Encounter   Procedures    PSA, Diagnostic     Standing Status:   Future     Standing Expiration Date:   5/21/2023            Michelle George MD

## 2021-05-28 ENCOUNTER — TELEPHONE (OUTPATIENT)
Dept: FAMILY MEDICINE CLINIC | Age: 61
End: 2021-05-28

## 2021-06-01 ENCOUNTER — HOSPITAL ENCOUNTER (OUTPATIENT)
Age: 61
Setting detail: SPECIMEN
Discharge: HOME | End: 2021-06-01
Payer: COMMERCIAL

## 2021-06-01 ENCOUNTER — OFFICE VISIT (OUTPATIENT)
Dept: FAMILY MEDICINE CLINIC | Age: 61
End: 2021-06-01
Payer: COMMERCIAL

## 2021-06-01 VITALS
HEIGHT: 73 IN | SYSTOLIC BLOOD PRESSURE: 126 MMHG | BODY MASS INDEX: 33.13 KG/M2 | HEART RATE: 62 BPM | TEMPERATURE: 98.6 F | WEIGHT: 250 LBS | DIASTOLIC BLOOD PRESSURE: 76 MMHG | RESPIRATION RATE: 16 BRPM | OXYGEN SATURATION: 100 %

## 2021-06-01 DIAGNOSIS — E11.65 TYPE 2 DIABETES MELLITUS WITH HYPERGLYCEMIA, WITHOUT LONG-TERM CURRENT USE OF INSULIN (HCC): Primary | ICD-10-CM

## 2021-06-01 DIAGNOSIS — M54.41 ACUTE BACK PAIN WITH SCIATICA, RIGHT: Primary | ICD-10-CM

## 2021-06-01 DIAGNOSIS — E78.5 HYPERLIPIDEMIA WITH TARGET LDL LESS THAN 70: ICD-10-CM

## 2021-06-01 DIAGNOSIS — E11.42 TYPE 2 DIABETES MELLITUS WITH DIABETIC POLYNEUROPATHY, WITHOUT LONG-TERM CURRENT USE OF INSULIN (HCC): ICD-10-CM

## 2021-06-01 DIAGNOSIS — I50.42 CHRONIC COMBINED SYSTOLIC AND DIASTOLIC CHF (CONGESTIVE HEART FAILURE) (HCC): ICD-10-CM

## 2021-06-01 DIAGNOSIS — I12.9 BENIGN HYPERTENSION WITH CKD (CHRONIC KIDNEY DISEASE), STAGE II: ICD-10-CM

## 2021-06-01 DIAGNOSIS — E11.65 TYPE 2 DIABETES MELLITUS WITH HYPERGLYCEMIA, WITHOUT LONG-TERM CURRENT USE OF INSULIN (HCC): ICD-10-CM

## 2021-06-01 DIAGNOSIS — F33.41 RECURRENT MAJOR DEPRESSIVE DISORDER, IN PARTIAL REMISSION (HCC): ICD-10-CM

## 2021-06-01 DIAGNOSIS — N18.2 BENIGN HYPERTENSION WITH CKD (CHRONIC KIDNEY DISEASE), STAGE II: ICD-10-CM

## 2021-06-01 LAB
ABSOLUTE EOS #: 0.2 K/UL (ref 0–0.4)
ABSOLUTE IMMATURE GRANULOCYTE: ABNORMAL K/UL (ref 0–0.3)
ABSOLUTE LYMPH #: 1.9 K/UL (ref 1–4.8)
ABSOLUTE MONO #: 0.8 K/UL (ref 0.1–1.3)
ALBUMIN SERPL-MCNC: 4.1 G/DL (ref 3.5–5.2)
ALBUMIN/GLOBULIN RATIO: ABNORMAL (ref 1–2.5)
ALP BLD-CCNC: 104 U/L (ref 40–129)
ALT SERPL-CCNC: 18 U/L (ref 5–41)
ANION GAP SERPL CALCULATED.3IONS-SCNC: 9 MMOL/L (ref 9–17)
AST SERPL-CCNC: 15 U/L
BASOPHILS # BLD: 1 % (ref 0–2)
BASOPHILS ABSOLUTE: 0 K/UL (ref 0–0.2)
BILIRUB SERPL-MCNC: 0.36 MG/DL (ref 0.3–1.2)
BNP INTERPRETATION: NORMAL
BUN BLDV-MCNC: 32 MG/DL (ref 6–20)
BUN/CREAT BLD: ABNORMAL (ref 9–20)
CALCIUM SERPL-MCNC: 9.5 MG/DL (ref 8.6–10.4)
CHLORIDE BLD-SCNC: 105 MMOL/L (ref 98–107)
CHOLESTEROL/HDL RATIO: 3.4
CHOLESTEROL: 128 MG/DL
CO2: 25 MMOL/L (ref 20–31)
CREAT SERPL-MCNC: 1.25 MG/DL (ref 0.7–1.2)
CREATININE URINE POCT: 100
DIFFERENTIAL TYPE: ABNORMAL
EOSINOPHILS RELATIVE PERCENT: 3 % (ref 0–4)
FOLATE: 9.4 NG/ML
GFR AFRICAN AMERICAN: >60 ML/MIN
GFR NON-AFRICAN AMERICAN: 59 ML/MIN
GFR SERPL CREATININE-BSD FRML MDRD: ABNORMAL ML/MIN/{1.73_M2}
GFR SERPL CREATININE-BSD FRML MDRD: ABNORMAL ML/MIN/{1.73_M2}
GLUCOSE BLD-MCNC: 145 MG/DL (ref 70–99)
HBA1C MFR BLD: 8.5 %
HCT VFR BLD CALC: 39 % (ref 41–53)
HDLC SERPL-MCNC: 38 MG/DL
HEMOGLOBIN: 12.5 G/DL (ref 13.5–17.5)
IMMATURE GRANULOCYTES: ABNORMAL %
LDL CHOLESTEROL: 64 MG/DL (ref 0–130)
LYMPHOCYTES # BLD: 32 % (ref 24–44)
MAGNESIUM: 1.9 MG/DL (ref 1.6–2.6)
MCH RBC QN AUTO: 30.3 PG (ref 26–34)
MCHC RBC AUTO-ENTMCNC: 32.2 G/DL (ref 31–37)
MCV RBC AUTO: 94.3 FL (ref 80–100)
MICROALBUMIN/CREAT 24H UR: 80 MG/G{CREAT}
MICROALBUMIN/CREAT UR-RTO: NORMAL
MONOCYTES # BLD: 13 % (ref 1–7)
NRBC AUTOMATED: ABNORMAL PER 100 WBC
PDW BLD-RTO: 13.7 % (ref 11.5–14.9)
PHOSPHORUS: 3.6 MG/DL (ref 2.5–4.5)
PLATELET # BLD: 198 K/UL (ref 150–450)
PLATELET ESTIMATE: ABNORMAL
PMV BLD AUTO: 10.5 FL (ref 6–12)
POTASSIUM SERPL-SCNC: 4.7 MMOL/L (ref 3.7–5.3)
PRO-BNP: 54 PG/ML
RBC # BLD: 4.14 M/UL (ref 4.5–5.9)
RBC # BLD: ABNORMAL 10*6/UL
SEG NEUTROPHILS: 51 % (ref 36–66)
SEGMENTED NEUTROPHILS ABSOLUTE COUNT: 3.2 K/UL (ref 1.3–9.1)
SODIUM BLD-SCNC: 139 MMOL/L (ref 135–144)
TOTAL PROTEIN: 7.8 G/DL (ref 6.4–8.3)
TRIGL SERPL-MCNC: 131 MG/DL
TSH SERPL DL<=0.05 MIU/L-ACNC: 1.2 MIU/L (ref 0.3–5)
VITAMIN B-12: 613 PG/ML (ref 232–1245)
VITAMIN D 25-HYDROXY: 40 NG/ML (ref 30–100)
VLDLC SERPL CALC-MCNC: ABNORMAL MG/DL (ref 1–30)
WBC # BLD: 6.1 K/UL (ref 3.5–11)
WBC # BLD: ABNORMAL 10*3/UL

## 2021-06-01 PROCEDURE — 84100 ASSAY OF PHOSPHORUS: CPT

## 2021-06-01 PROCEDURE — 3017F COLORECTAL CA SCREEN DOC REV: CPT | Performed by: FAMILY MEDICINE

## 2021-06-01 PROCEDURE — 2022F DILAT RTA XM EVC RTNOPTHY: CPT | Performed by: FAMILY MEDICINE

## 2021-06-01 PROCEDURE — 3052F HG A1C>EQUAL 8.0%<EQUAL 9.0%: CPT | Performed by: FAMILY MEDICINE

## 2021-06-01 PROCEDURE — 82044 UR ALBUMIN SEMIQUANTITATIVE: CPT | Performed by: FAMILY MEDICINE

## 2021-06-01 PROCEDURE — G8417 CALC BMI ABV UP PARAM F/U: HCPCS | Performed by: FAMILY MEDICINE

## 2021-06-01 PROCEDURE — 83036 HEMOGLOBIN GLYCOSYLATED A1C: CPT | Performed by: FAMILY MEDICINE

## 2021-06-01 PROCEDURE — 80061 LIPID PANEL: CPT

## 2021-06-01 PROCEDURE — 83880 ASSAY OF NATRIURETIC PEPTIDE: CPT

## 2021-06-01 PROCEDURE — 84443 ASSAY THYROID STIM HORMONE: CPT

## 2021-06-01 PROCEDURE — 82746 ASSAY OF FOLIC ACID SERUM: CPT

## 2021-06-01 PROCEDURE — G8427 DOCREV CUR MEDS BY ELIG CLIN: HCPCS | Performed by: FAMILY MEDICINE

## 2021-06-01 PROCEDURE — 82306 VITAMIN D 25 HYDROXY: CPT

## 2021-06-01 PROCEDURE — 85025 COMPLETE CBC W/AUTO DIFF WBC: CPT

## 2021-06-01 PROCEDURE — 82607 VITAMIN B-12: CPT

## 2021-06-01 PROCEDURE — 83735 ASSAY OF MAGNESIUM: CPT

## 2021-06-01 PROCEDURE — 36415 COLL VENOUS BLD VENIPUNCTURE: CPT

## 2021-06-01 PROCEDURE — 99214 OFFICE O/P EST MOD 30 MIN: CPT | Performed by: FAMILY MEDICINE

## 2021-06-01 PROCEDURE — 80053 COMPREHEN METABOLIC PANEL: CPT

## 2021-06-01 PROCEDURE — 1036F TOBACCO NON-USER: CPT | Performed by: FAMILY MEDICINE

## 2021-06-01 RX ORDER — HYDRALAZINE HYDROCHLORIDE 50 MG/1
50 TABLET, FILM COATED ORAL 3 TIMES DAILY
Qty: 270 TABLET | Refills: 3 | Status: SHIPPED | OUTPATIENT
Start: 2021-06-01 | End: 2021-09-10 | Stop reason: SDUPTHER

## 2021-06-01 RX ORDER — ASPIRIN 81 MG/1
TABLET ORAL
Qty: 90 TABLET | Refills: 3 | Status: SHIPPED | OUTPATIENT
Start: 2021-06-01 | End: 2021-09-10 | Stop reason: SDUPTHER

## 2021-06-01 RX ORDER — ASPIRIN 81 MG
1 TABLET, DELAYED RELEASE (ENTERIC COATED) ORAL DAILY
Qty: 90 TABLET | Refills: 3 | Status: SHIPPED | OUTPATIENT
Start: 2021-06-01

## 2021-06-01 RX ORDER — CARVEDILOL 25 MG/1
TABLET ORAL
Qty: 180 TABLET | Refills: 11 | Status: SHIPPED | OUTPATIENT
Start: 2021-06-01 | End: 2021-09-10 | Stop reason: SDUPTHER

## 2021-06-01 RX ORDER — CYCLOBENZAPRINE HCL 10 MG
10 TABLET ORAL 2 TIMES DAILY PRN
COMMUNITY
Start: 2021-05-21 | End: 2021-06-01 | Stop reason: DRUGHIGH

## 2021-06-01 RX ORDER — CHLORTHALIDONE 25 MG/1
25 TABLET ORAL EVERY MORNING
Qty: 90 TABLET | Refills: 3 | Status: SHIPPED | OUTPATIENT
Start: 2021-06-01 | End: 2021-08-11 | Stop reason: ALTCHOICE

## 2021-06-01 RX ORDER — AMLODIPINE BESYLATE 10 MG/1
TABLET ORAL
Qty: 90 TABLET | Refills: 3 | Status: SHIPPED | OUTPATIENT
Start: 2021-06-01 | End: 2021-09-10 | Stop reason: SDUPTHER

## 2021-06-01 RX ORDER — CYCLOBENZAPRINE HCL 10 MG
10 TABLET ORAL NIGHTLY PRN
Qty: 30 TABLET | Refills: 0 | Status: SHIPPED | OUTPATIENT
Start: 2021-06-01 | End: 2021-07-23 | Stop reason: ALTCHOICE

## 2021-06-01 RX ORDER — SPIRONOLACTONE 25 MG/1
25 TABLET ORAL EVERY MORNING
Qty: 90 TABLET | Refills: 3 | Status: SHIPPED | OUTPATIENT
Start: 2021-06-01 | End: 2021-09-10 | Stop reason: SDUPTHER

## 2021-06-01 RX ORDER — ACETAMINOPHEN 500 MG
500 TABLET ORAL EVERY 6 HOURS PRN
Qty: 120 TABLET | Refills: 3 | Status: SHIPPED | OUTPATIENT
Start: 2021-06-01

## 2021-06-01 RX ORDER — ATORVASTATIN CALCIUM 40 MG/1
40 TABLET, FILM COATED ORAL DAILY
Qty: 90 TABLET | Refills: 3 | Status: SHIPPED | OUTPATIENT
Start: 2021-06-01 | End: 2021-09-10 | Stop reason: SDUPTHER

## 2021-06-01 RX ORDER — METFORMIN HYDROCHLORIDE 500 MG/1
500 TABLET, EXTENDED RELEASE ORAL 2 TIMES DAILY
Qty: 180 TABLET | Refills: 3 | Status: SHIPPED | OUTPATIENT
Start: 2021-06-01 | End: 2021-08-11 | Stop reason: HOSPADM

## 2021-06-01 ASSESSMENT — ENCOUNTER SYMPTOMS
DIARRHEA: 0
ABDOMINAL PAIN: 0
BACK PAIN: 1
NAUSEA: 0
COUGH: 0
ABDOMINAL DISTENTION: 0
WHEEZING: 0
VOMITING: 0
SHORTNESS OF BREATH: 0
CHEST TIGHTNESS: 0
CONSTIPATION: 0

## 2021-06-01 ASSESSMENT — PATIENT HEALTH QUESTIONNAIRE - PHQ9
SUM OF ALL RESPONSES TO PHQ QUESTIONS 1-9: 0
SUM OF ALL RESPONSES TO PHQ QUESTIONS 1-9: 0
2. FEELING DOWN, DEPRESSED OR HOPELESS: 0
1. LITTLE INTEREST OR PLEASURE IN DOING THINGS: 0
SUM OF ALL RESPONSES TO PHQ QUESTIONS 1-9: 0
SUM OF ALL RESPONSES TO PHQ9 QUESTIONS 1 & 2: 0

## 2021-06-01 NOTE — RESULT ENCOUNTER NOTE
Addressed during office visit today, worsening proteins in the urine ,abnormal, continue treatment recommended during the office visit

## 2021-06-01 NOTE — PROGRESS NOTES
Visit Information    Have you changed or started any medications since your last visit including any over-the-counter medicines, vitamins, or herbal medicines? no   Are you having any side effects from any of your medications? -  no  Have you stopped taking any of your medications? Is so, why? -  no    Have you seen any other physician or provider since your last visit? No  Have you had any other diagnostic tests since your last visit? No  Have you been seen in the emergency room and/or had an admission to a hospital since we last saw you? Yes - Records Obtained  Have you had your routine dental cleaning in the past 6 months? no    Have you activated your [facility] account? If not, what are your barriers?  No     Patient Care Team:  Caprice Black MD as PCP - General (Family Medicine)  Caprice Black MD as PCP - [facility]  Christiana Gallagher MD as Consulting Physician (Cardiology)  Joseph Krause OD (Optometry)  Jeannine Angela MD as Consulting Physician (Nephrology)  Paz Pro MD as Consulting Physician (Urology)  Luis E Mckeon MD as Consulting Physician (Cardiology)  Joseph Krause OD (Optometry)  Jose Fierro DPM as Physician (Podiatry)    Medical History Review  Past Medical, Family, and Social History reviewed and does contribute to the patient presenting condition    Health Maintenance   Topic Date Due    Diabetic retinal exam  07/31/2019    A1C test (Diabetic or Prediabetic)  04/12/2021    COVID-19 Vaccine (2 - Pfizer 2-dose series) 05/03/2021    Diabetic microalbuminuria test  05/27/2021    Lipid screen  11/20/2021    Potassium monitoring  11/20/2021    Creatinine monitoring  11/20/2021    PSA counseling  11/20/2021    Diabetic foot exam  01/12/2022    Colon cancer screen colonoscopy  01/15/2023    DTaP/Tdap/Td vaccine (2 - Td) 04/03/2024    Pneumococcal 0-64 years Vaccine (2 of 2) 12/20/2026    Flu vaccine  Completed    Shingles Vaccine  Completed    Hepatitis C screen  Completed    HIV screen  Completed    Hepatitis A vaccine  Aged Out    Hib vaccine  Aged Out    Meningococcal (ACWY) vaccine  Aged Out    Hepatitis B vaccine  Discontinued     Chief Complaint   Patient presents with   [employer] Exam

## 2021-06-01 NOTE — RESULT ENCOUNTER NOTE
Addressed during office visit today, A1c 8.5, abnormal, worsening diabetes, continue treatment recommended during the office visit

## 2021-06-01 NOTE — PROGRESS NOTES
Shauna Salazar (:  1960) is a 62 y.o. male,Established patient, here for evaluation of the following chief complaint(s): Diabetes (Pt recieved covid vaccine from [facility] Hypertension, Hyperlipidemia, and Back Pain (right buttock, RLE sciatica x 2 weeks)      ASSESSMENT/PLAN:    1. Acute back pain with sciatica, right  improving  -     [facility]  -     cyclobenzaprine (FLEXERIL) 10 MG tablet; Take 1 tablet by mouth nightly as needed for Muscle spasms, Disp-30 tablet, R-0Normal  -     acetaminophen (EQ ACETAMINOPHEN) 500 MG tablet; Take 1 tablet by mouth every 6 hours as needed for Pain, Disp-120 tablet, R-3Normal  2. Type 2 diabetes mellitus with hyperglycemia, without long-term current use of insulin ([facility])  worsening  Lab Results   Component Value Date    LABA1C 8.5 2021    LABA1C 7.3 2021    LABA1C 8.1 (H) 2020       -     POCT glycosylated hemoglobin (Hb A1C)  -     SITagliptin (JANUVIA) 100 MG tablet; Take 1 tablet by mouth daily Dose increased 2021, Disp-90 tablet, R-3Normal  -     metFORMIN (GLUCOPHAGE XR) 500 MG extended release tablet; Take 1 tablet by mouth 2 times daily Stop short acting Metformin., Disp-180 tablet, R-3Normal  -     aspirin (EQ ASPIRIN ADULT LOW DOSE) 81 MG EC tablet; Take 1 tablet by mouth once daily, Disp-90 tablet, R-3Normal  -     CBC Auto Differential; Future  -     Comprehensive Metabolic Panel; Future  -     TSH without Reflex; Future  -     Vitamin B12 & Folate; Future  -     Multiple Vitamins-Minerals (MULTIVITAMIN-MINERALS) TABS tablet; Take 1 tablet by mouth daily, Disp-90 tablet, R-3Normal    Stop drinking alcohol  -advised home blood glucose testing daily  -daily feet exam, Foot care: advised to wash feet daily, pat dry and apply lotion at night, avoiding between toes.  Need to look at feet daily and report to a physician any signs of inflammation or skin damage  -annual dilated eye exam  -Low carb, low fat diet, increase fruits and vegetables, and exercise 4-5 times a day 30-40 minutes a day discussed  -continue current treatment  -continue Aspirin  -continue statin        3. Chronic combined systolic and diastolic CHF (congestive heart failure) (HCC)  Stable    Lab Results   Component Value Date    LVEF 30 01/16/2019    LVEFMODE Echo 01/16/2019       -     hydrALAZINE (APRESOLINE) 50 MG tablet; Take 1 tablet by mouth 3 times daily BP medication, Disp-270 tablet, R-3Normal  -     chlorthalidone (HYGROTON) 25 MG tablet; Take 1 tablet by mouth every morning Water pill and BP pill, Disp-90 tablet, R-3Normal  -     spironolactone (ALDACTONE) 25 MG tablet; Take 1 tablet by mouth every morning BP and CHF, Disp-90 tablet, R-3Normal  -     amLODIPine (NORVASC) 10 MG tablet; Take 1 tablet by mouth once daily, Disp-90 tablet, R-3Normal  -     carvedilol (COREG) 25 MG tablet; TAKE 1 TABLET BY MOUTH TWICE DAILY WITH MEALS, Disp-180 tablet, R-11Normal  -     Brain Natriuretic Peptide; Future    Recheck labs  I requested the cardio report, last seen 7/2/19, notes reviewed in EPIC  I made new referral to cardiology      4. Benign hypertension with CKD (chronic kidney disease), stage II  Stable Chronic kidney disease stage II  Well controlled HTN. Continue current treatment. Will recheck labs. Discussed low salt diet and BP and pulse monitoring.      -     hydrALAZINE (APRESOLINE) 50 MG tablet; Take 1 tablet by mouth 3 times daily BP medication, Disp-270 tablet, R-3Normal  -     chlorthalidone (HYGROTON) 25 MG tablet; Take 1 tablet by mouth every morning Water pill and BP pill, Disp-90 tablet, R-3Normal  -     spironolactone (ALDACTONE) 25 MG tablet; Take 1 tablet by mouth every morning BP and CHF, Disp-90 tablet, R-3Normal  -     amLODIPine (NORVASC) 10 MG tablet;  Take 1 tablet by mouth once daily, Disp-90 tablet, R-3Normal  -     carvedilol (COREG) 25 MG tablet; TAKE 1 TABLET BY MOUTH TWICE DAILY WITH MEALS, Disp-180 tablet, R-11Normal  -     CBC Auto Differential; Future  -     Comprehensive Metabolic Panel; Future  -     Phosphorus; Future  -     Magnesium; Future  -     TSH without Reflex; Future  -     Vitamin D 25 Hydroxy; Future  Discussed low salt diet and BP and pulse monitoring. 5. Hyperlipidemia with target LDL less than 70  Well controlled LDL. Continue current treatment. Will recheck labs. -     atorvastatin (LIPITOR) 40 MG tablet; Take 1 tablet by mouth daily For high cholesterol, you need to take it every day, Disp-90 tablet, R-3Normal  -     Lipid Panel; Future  6. Type 2 diabetes mellitus with diabetic polyneuropathy, without long-term current use of insulin (HCC)  Worsening diabetes    -     SITagliptin (JANUVIA) 100 MG tablet; Take 1 tablet by mouth daily Dose increased 6/1/2021, Disp-90 tablet, R-3Normal  -     metFORMIN (GLUCOPHAGE XR) 500 MG extended release tablet; Take 1 tablet by mouth 2 times daily Stop short acting Metformin., Disp-180 tablet, R-3Normal  -     aspirin (EQ ASPIRIN ADULT LOW DOSE) 81 MG EC tablet; Take 1 tablet by mouth once daily, Disp-90 tablet, R-3Normal  -     CBC Auto Differential; Future  -     Comprehensive Metabolic Panel; Future  -     TSH without Reflex; Future  -     Vitamin B12 & Folate; Future  -     Multiple Vitamins-Minerals (MULTIVITAMIN-MINERALS) TABS tablet; Take 1 tablet by mouth daily, Disp-90 tablet, R-3Normal      7. Recurrent major depressive disorder, in partial remission ([facility] [address]  Stable   Will continue to monitor. Fransisco Barreto received counseling on the following healthy behaviors: nutrition, exercise, medication adherence, tobacco cessation, decrease in alcohol consumption and weight loss  Reviewed prior labs and health maintenance  Discussed use, benefit, and side effects of prescribed medications. Barriers to medication compliance addressed.    Patient given educational materials - see patient instructions  Was a self-tracking handout given in paper form or via [employer]? Yes  All patient questions answered. Patient voiced understanding. The patient's past medical,surgical, social, and family history as well as his current medications and allergies were reviewed as documented in today's encounter. Medications, labs, diagnostic studies, consultations and follow-up as documented in this encounter. Return in about 3 months (around 9/1/2021) for ALWAYS NEEDS 30 MIN. APPT, **POC A1C, DM2, HTN, HLP. Please Request Eye exam from [facility] vision and last CARDIO REPORT    Future Appointments   Date Time Provider Vianey Leonardi   7/16/2021  9:30 AM SCHEDULE, [facility] UROLOGY [facility]   9[facility]10/2021 10:00 [facility]         SUBJECTIVE/OBJECTIVE:      Freddy complains of Right buttock pain radiates  lower extremity sciatica 7/10, was 10/10 initially when he went to ED. Onset about 2 weeks ago  Jeremiah Jordan to ED at [facility], on 5/21/21, treated with Toradol and Norflex,  was given Flexeril. Denies injury or lifting. Never had similar symptoms before. Diabetes Mellitus Type II, Follow-up:    Current symptoms/problems include hyperglycemia, paresthesia of the feet, polydipsia and visual disturbances. Symptoms have been present for several years.     Known diabetic complications: nephropathy, peripheral neuropathy, impotence and cardiovascular disease  Cardiovascular risk factors: advanced age (older than 54 for men, 72 for women), diabetes mellitus, dyslipidemia, hypertension, male gender, microalbuminuria and obesity (BMI >= 30 kg/m2)    Medication compliance:  compliant most of the time   Current diabetic medications include oral agents (dual therapy): Glucophage XR, sitagliptin (Januvia) 50 mg     He says he is using pill box, daughter helping, patient says he does not miss his medications, he just did not take his medications this morning      Eye exam current (within one year): no  Current diet: in general, a \"healthy\" diet them well, denies side effects. BP Readings from Last 3 Encounters:   06/01/21 126/76   05/21/21 129/73   01/12/21 138/84        Pulse is Normal.    Pulse Readings from Last 3 Encounters:   06/01/21 62   05/21/21 70   01/12/21 74         Hyperlipidemia:  No new myalgias or GI upset on atorvastatin (Lipitor). Medication compliance: compliant all of the time. Patient is  following a low fat, low cholesterol diet. LDL is Normal.    Lab Results   Component Value Date    LDLCALC 119 01/16/2019    LDLCHOLESTEROL 129 11/20/2020           Depression is in remission  He is not on any medications  His daughter is checking on him he says, and denies depression      PHQ-2 Over the past 2 weeks, how often have you been bothered by any of the following problems? Little interest or pleasure in doing things: Not at all  Feeling down, depressed, or hopeless: Not at all  PHQ-2 Score: 0  PHQ-9 Over the past 2 weeks, how often have you been bothered by any of the following problems? PHQ-9 Total Score: 0\      [facility] Scores 6/1/2021 1/12/2021 5/27/2020 4/11/2019 1/11/2019 10/31/2017 7/24/2017   PHQ2 Score 0 0 0 0 3 0 3   PHQ9 Score 0 0 0 0 9 0 8       Prior to Visit Medications    Medication Sig Taking?  Authorizing Provider   sildenafil (VIAGRA) 100 MG tablet Take 1 tablet by mouth daily as needed for Erectile Dysfunction Yes Juanjo Figueroa MD   tamsulosin (FLOMAX) 0.4 MG capsule Take 1 capsule by mouth daily Yes Juanjo Figueroa MD   carvedilol (COREG) 25 MG tablet TAKE 1 TABLET BY MOUTH TWICE DAILY WITH MEALS Yes Hannah Herrera MD   EQ ASPIRIN ADULT LOW DOSE 81 MG EC tablet Take 1 tablet by mouth once daily Yes Hannah Herrera MD   amLODIPine (NORVASC) 10 MG tablet Take 1 tablet by mouth once daily Yes Hannah Herrera MD   atorvastatin (LIPITOR) 40 MG tablet Take 1 tablet by mouth daily For high cholesterol, you need to take it every day Yes Hannah Herrera MD   spironolactone (ALDACTONE) 25 MG tablet Take 1 tablet by Types: Marijuana     Comment: marijuana every day          Review of Systems   Constitutional: Positive for fatigue. Negative for activity change, appetite change, chills, diaphoresis, fever and unexpected weight change. Eyes: Positive for visual disturbance. Respiratory: Negative for cough, chest tightness, shortness of breath and wheezing. Cardiovascular: Negative for chest pain, palpitations and leg swelling. Gastrointestinal: Negative for abdominal distention, abdominal pain, constipation, diarrhea, nausea and vomiting. Endocrine: Positive for polydipsia. Negative for cold intolerance, heat intolerance, polyphagia and polyuria. Genitourinary: Negative for difficulty urinating. Started on Flomax by urology, has been helping   Musculoskeletal: Positive for back pain (right buttock, radiating to the right lower extremity). Neurological: Positive for numbness (feet, better). -vital signs stable and within normal limits except obesity per BMI  /76 (Site: Right Upper Arm, Position: Sitting, Cuff Size: Large Adult)   Pulse 62   Temp 98.6 °F (37 °C)   Resp 16   Ht 6' 1\" (1.854 m)   Wt 250 lb (113.4 kg)   SpO2 100%   BMI 32.98 kg/m²     Wt Readings from Last 3 Encounters:   06/01/21 250 lb (113.4 kg)   05/21/21 250 lb (113.4 kg)   01/12/21 254 lb 9.6 oz (115.5 kg)         Physical Exam  Vitals and nursing note reviewed. Constitutional:       General: He is not in acute distress. Appearance: Normal appearance. He is well-developed. He is obese. He is not diaphoretic. HENT:      Head: Normocephalic and atraumatic. Right Ear: External ear normal.      Left Ear: External ear normal.      Mouth/Throat:      Comments: I did not examine the mouth due to coronavirus pandemic and wearing masks. Eyes:      General: Lids are normal. No scleral icterus. Right eye: No discharge. Left eye: No discharge. Extraocular Movements: Extraocular movements intact. Conjunctiva/sclera: Conjunctivae normal.   Neck:      Thyroid: No thyromegaly. Cardiovascular:      Rate and Rhythm: Normal rate and regular rhythm. Heart sounds: Murmur heard. Crescendo systolic murmur is present with a grade of 2/6. Pulmonary:      Effort: Pulmonary effort is normal. No respiratory distress. Breath sounds: Normal breath sounds. No wheezing or rales. Chest:      Chest wall: No tenderness. Abdominal:      General: Bowel sounds are normal. There is no distension. Palpations: Abdomen is soft. There is no hepatomegaly or splenomegaly. Tenderness: There is no abdominal tenderness. Comments: Obese abdomen. Musculoskeletal:      Cervical back: Normal range of motion and neck supple. Lumbar back: Spasms and tenderness present. Decreased range of motion. Right lower leg: No edema. Left lower leg: No edema. Comments: Straight leg test on the right side positive, mildly antalgic gait noted   Lymphadenopathy:      Cervical: No cervical adenopathy. Skin:     General: Skin is warm and dry. Capillary Refill: Capillary refill takes less than 2 seconds. Findings: No rash. Neurological:      Mental Status: He is alert and oriented to person, place, and time. Gait: Gait abnormal.      Deep Tendon Reflexes: Reflexes are normal and symmetric. Psychiatric:         Mood and Affect: Mood normal.         Behavior: Behavior normal.         Thought Content: Thought content normal.         Judgment: Judgment normal.           I personally reviewed testing with patient.   Chronic kidney disease stage 2, stable  Anemia  Hyperglycemia  Hyperlipidemia  Otherwise labs within normal limits    Office Visit on 01/12/2021   Component Date Value Ref Range Status    Hemoglobin A1C 01/12/2021 7.3  % Final       Lab Results   Component Value Date    WBC 5.0 11/20/2020    HGB 12.5 (L) 11/20/2020    HCT 39.1 (L) 11/20/2020    MCV 96.1 11/20/2020     11/20/2020       Lab Results   Component Value Date     11/20/2020    K 4.3 11/20/2020     11/20/2020    CO2 24 11/20/2020    BUN 24 11/20/2020    CREATININE 1.37 11/20/2020    GLUCOSE 139 11/20/2020    GLUCOSE 157 02/06/2012    CALCIUM 9.1 11/20/2020        Lab Results   Component Value Date    ALT 12 11/20/2020    AST 12 11/20/2020    ALKPHOS 83 11/20/2020    BILITOT 0.29 (L) 11/20/2020       Lab Results   Component Value Date    TSH 1.00 05/27/2020       Lab Results   Component Value Date    CHOL 192 11/20/2020    CHOL 130 05/27/2020    CHOL 180 02/25/2020     Lab Results   Component Value Date    TRIG 72 11/20/2020    TRIG 112 05/27/2020    TRIG 146 02/25/2020     Lab Results   Component Value Date    HDL 49 11/20/2020    HDL 42 05/27/2020    HDL 46 02/25/2020     Lab Results   Component Value Date    LDLCALC 119 01/16/2019    LDLCHOLESTEROL 129 11/20/2020    LDLCHOLESTEROL 66 05/27/2020    LDLCHOLESTEROL 105 02/25/2020     Lab Results   Component Value Date    CHOLHDLRATIO 3.9 11/20/2020    CHOLHDLRATIO 3.1 05/27/2020    CHOLHDLRATIO 3.9 02/25/2020         Lab Results   Component Value Date    ONBKCIYB90 810 11/20/2020     Lab Results   Component Value Date    FOLATE 18.4 11/20/2020     No results found for: VITD25      Orders Placed This Encounter   Medications    cyclobenzaprine (FLEXERIL) 10 MG tablet     Sig: Take 1 tablet by mouth nightly as needed for Muscle spasms     Dispense:  30 tablet     Refill:  0    SITagliptin (JANUVIA) 100 MG tablet     Sig: Take 1 tablet by mouth daily Dose increased 6/1/2021     Dispense:  90 tablet     Refill:  3    metFORMIN (GLUCOPHAGE XR) 500 MG extended release tablet     Sig: Take 1 tablet by mouth 2 times daily Stop short acting Metformin.      Dispense:  180 tablet     Refill:  3    hydrALAZINE (APRESOLINE) 50 MG tablet     Sig: Take 1 tablet by mouth 3 times daily BP medication     Dispense:  270 tablet     Refill:  3    chlorthalidone (HYGROTON) 25 MG tablet     Sig: Take 1 tablet by mouth every morning Water pill and BP pill     Dispense:  90 tablet     Refill:  3    spironolactone (ALDACTONE) 25 MG tablet     Sig: Take 1 tablet by mouth every morning BP and CHF     Dispense:  90 tablet     Refill:  3    atorvastatin (LIPITOR) 40 MG tablet     Sig: Take 1 tablet by mouth daily For high cholesterol, you need to take it every day     Dispense:  90 tablet     Refill:  3    amLODIPine (NORVASC) 10 MG tablet     Sig: Take 1 tablet by mouth once daily     Dispense:  90 tablet     Refill:  3    aspirin (EQ ASPIRIN ADULT LOW DOSE) 81 MG EC tablet     Sig: Take 1 tablet by mouth once daily     Dispense:  90 tablet     Refill:  3    carvedilol (COREG) 25 MG tablet     Sig: TAKE 1 TABLET BY MOUTH TWICE DAILY WITH MEALS     Dispense:  180 tablet     Refill:  11    Multiple Vitamins-Minerals (MULTIVITAMIN-MINERALS) TABS tablet     Sig: Take 1 tablet by mouth daily     Dispense:  90 tablet     Refill:  3    acetaminophen (EQ ACETAMINOPHEN) 500 MG tablet     Sig: Take 1 tablet by mouth every 6 hours as needed for Pain     Dispense:  120 tablet     Refill:  3       Orders Placed This Encounter   Procedures    CBC Auto Differential     Standing Status:   Future     Number of Occurrences:   1     Standing Expiration Date:   8/7/2021    Comprehensive Metabolic Panel     Standing Status:   Future     Number of Occurrences:   1     Standing Expiration Date:   8/7/2021    Lipid Panel     Standing Status:   Future     Number of Occurrences:   1     Standing Expiration Date:   8/7/2021     Order Specific Question:   Is Patient Fasting?/# of Hours     Answer:   8-10 Hours, water ok to drink    Phosphorus     Standing Status:   Future     Number of Occurrences:   1     Standing Expiration Date:   8/7/2021    Magnesium     Standing Status:   Future     Number of Occurrences:   1     Standing Expiration Date:   8/7/2021    TSH without Reflex     Standing Status:   Future treatment plan as well as documenting on the day of the visit. This note was completed by using the assistance of a speech-recognition program. However, inadvertent computerized transcription errors may be present. Although every effort was made to ensure accuracy, no guarantees can be provided that every mistake has been identified and corrected by editing . An electronic signature was used to authenticate this note.   Electronically signed by Domingo Gordillo MD on 6/1/2021 at 6:07 PM

## 2021-06-01 NOTE — PATIENT INSTRUCTIONS
Patient Education        Learning About Carbohydrate (Carb) Counting and Eating Out When You Have Diabetes  Why plan your meals? Meal planning can be a key part of managing diabetes. Planning meals and snacks with the right balance of carbohydrate, protein, and fat can help you keep your blood sugar at the target level you set with your doctor. You don't have to eat special foods. You can eat what your family eats, including sweets once in a while. But you do have to pay attention to how often you eat and how much you eat of certain foods. You may want to work with a dietitian or a certified diabetes educator. He or she can give you tips and meal ideas and can answer your questions about meal planning. This health professional can also help you reach a healthy weight if that is one of your goals. What should you know about eating carbs? Managing the amount of carbohydrate (carbs) you eat is an important part of healthy meals when you have diabetes. Carbohydrate is found in many foods. · Learn which foods have carbs. And learn the amounts of carbs in different foods. ? Bread, cereal, pasta, and rice have about 15 grams of carbs in a serving. A serving is 1 slice of bread (1 ounce), ½ cup of cooked cereal, or 1/3 cup of cooked pasta or rice. ? Fruits have 15 grams of carbs in a serving. A serving is 1 small fresh fruit, such as an apple or orange; ½ of a banana; ½ cup of cooked or canned fruit; ½ cup of fruit juice; 1 cup of melon or raspberries; or 2 tablespoons of dried fruit. ? Milk and no-sugar-added yogurt have 15 grams of carbs in a serving. A serving is 1 cup of milk or 2/3 cup of no-sugar-added yogurt. ? Starchy vegetables have 15 grams of carbs in a serving. A serving is ½ cup of mashed potatoes or sweet potato; 1 cup winter squash; ½ of a small baked potato; ½ cup of cooked beans; or ½ cup cooked corn or green peas.   · Learn how much carbs to eat each day and at each meal. A dietitian or CDE can teach you how to keep track of the amount of carbs you eat. This is called carbohydrate counting. · If you are not sure how to count carbohydrate grams, use the Plate Method to plan meals. It is a good, quick way to make sure that you have a balanced meal. It also helps you spread carbs throughout the day. ? Divide your plate by types of foods. Put non-starchy vegetables on half the plate, meat or other protein food on one-quarter of the plate, and a grain or starchy vegetable in the final quarter of the plate. To this you can add a small piece of fruit and 1 cup of milk or yogurt, depending on how many carbs you are supposed to eat at a meal.  · Try to eat about the same amount of carbs at each meal. Do not \"save up\" your daily allowance of carbs to eat at one meal.  · Proteins have very little or no carbs per serving. Examples of proteins are beef, chicken, turkey, fish, eggs, tofu, cheese, cottage cheese, and peanut butter. A serving size of meat is 3 ounces, which is about the size of a deck of cards. Examples of meat substitute serving sizes (equal to 1 ounce of meat) are 1/4 cup of cottage cheese, 1 egg, 1 tablespoon of peanut butter, and ½ cup of tofu. How can you eat out and still eat healthy? · Learn to estimate the serving sizes of foods that have carbohydrate. If you measure food at home, it will be easier to estimate the amount in a serving of restaurant food. · If the meal you order has too much carbohydrate (such as potatoes, corn, or baked beans), ask to have a low-carbohydrate food instead. Ask for a salad or green vegetables. · If you use insulin, check your blood sugar before and after eating out to help you plan how much to eat in the future. · If you eat more carbohydrate at a meal than you had planned, take a walk or do other exercise. This will help lower your blood sugar. What are some tips for eating healthy? · Limit saturated fat, such as the fat from meat and dairy products.  This is a healthy choice because people who have diabetes are at higher risk of heart disease. So choose lean cuts of meat and nonfat or low-fat dairy products. Use olive or canola oil instead of butter or shortening when cooking. · Don't skip meals. Your blood sugar may drop too low if you skip meals and take insulin or certain medicines for diabetes. · Check with your doctor before you drink alcohol. Alcohol can cause your blood sugar to drop too low. Alcohol can also cause a bad reaction if you take certain diabetes medicines. Follow-up care is a key part of your treatment and safety. Be sure to make and go to all appointments, and call your doctor if you are having problems. It's also a good idea to know your test results and keep a list of the medicines you take. Where can you learn more? Go to https://[employer]. org and sign in to your [employer] account. Enter Z525 in the [employer] box to learn more about \"Learning About Carbohydrate (Carb) Counting and Eating Out When You Have Diabetes. \"     If you do not have an account, please click on the \"Sign Up Now\" link. Current as of: August 31, 2020               Content Version: 12.8  © 2006-2021 [employer]. Care instructions adapted under license by [employer]. If you have questions about a medical condition or this instruction, always ask your healthcare professional. [address] any warranty or liability for your use of this information.

## 2021-06-02 DIAGNOSIS — D64.9 ANEMIA, UNSPECIFIED TYPE: Primary | ICD-10-CM

## 2021-06-05 ENCOUNTER — TELEPHONE (OUTPATIENT)
Dept: FAMILY MEDICINE CLINIC | Age: 61
End: 2021-06-05

## 2021-06-05 NOTE — TELEPHONE ENCOUNTER
Short term flexeril given  Will not refil it    FYI  Drug Safety Consideration: CYCLOBENZAPRINE HCL and HEART FAILURE Our claims record suggests that your patient is receiving CYCLOBENZAPRINE HCL and has a diagnosis of HEART FAILURE. Cyclobenzaprine is contraindicated in patients with heart failure, arrhythmias, heart block, or conduction disturbances. Adverse cardiovascular reactions to cyclobenzaprine may be similar to those reported with tricyclic antidepressants due to the close structural similarity of these agents. Please consider the potential risks versus benefits of therapy for your patient.

## 2021-06-15 ENCOUNTER — TELEPHONE (OUTPATIENT)
Dept: FAMILY MEDICINE CLINIC | Age: 61
End: 2021-06-15

## 2021-06-15 DIAGNOSIS — E11.65 TYPE 2 DIABETES MELLITUS WITH HYPERGLYCEMIA, WITHOUT LONG-TERM CURRENT USE OF INSULIN (HCC): Primary | ICD-10-CM

## 2021-06-15 NOTE — TELEPHONE ENCOUNTER
Received request from [employer] which is working with his insurance that he needs one new medication for diabetes to protect his kidneys  Continue all other medications and add Farxiga 10 mg    Lab Results   Component Value Date    LABA1C 8.5 06/01/2021    LABA1C 7.3 01/12/2021    LABA1C 8.1 (H) 11/20/2020     Please let the patient know to  prescription from the pharmacy. Orders Placed This Encounter   Medications    dapagliflozin (FARXIGA) 10 MG tablet     Sig: Take 1 tablet by mouth every morning For diabetes, per your insurance     Dispense:  30 tablet     Refill:  [address] [facility] (C), [place] [address] [place] [address] [employer] [facility] () [address]  Phone: [phone] Fax: [phone]    FYI  Therapy Consideration: SGLT2 Inhibitor in Diabetes and Renal Impairment Your patient has been diagnosed with diabetes and renal impairment based on claims records. Please consider prescribing an SGLT2 inhibitor to reduce further loss of kidney function. Guidelines recommend SGLT2i therapy to reduce albuminuria and slow GFR loss in patients with diabetes and albuminuria. Monitoring of volume status and renal function is advised if an SGLT2i is used.

## 2021-07-12 ENCOUNTER — TELEPHONE (OUTPATIENT)
Dept: FAMILY MEDICINE CLINIC | Age: 61
End: 2021-07-12

## 2021-07-12 NOTE — TELEPHONE ENCOUNTER
Patient was supposed to see PT today at 8 am and he had to cancel his appointment due to his R foot/ankle being so swollen. He would like to see if any testing can be ordered to see what the issue is. He had prior testing but it was a long time ago. Please advise.

## 2021-07-23 ENCOUNTER — OFFICE VISIT (OUTPATIENT)
Dept: FAMILY MEDICINE CLINIC | Age: 61
End: 2021-07-23
Payer: COMMERCIAL

## 2021-07-23 VITALS
SYSTOLIC BLOOD PRESSURE: 130 MMHG | BODY MASS INDEX: 33.27 KG/M2 | HEART RATE: 67 BPM | OXYGEN SATURATION: 98 % | WEIGHT: 251 LBS | TEMPERATURE: 97.9 F | DIASTOLIC BLOOD PRESSURE: 80 MMHG | HEIGHT: 73 IN

## 2021-07-23 DIAGNOSIS — I12.9 BENIGN HYPERTENSION WITH CKD (CHRONIC KIDNEY DISEASE), STAGE II: ICD-10-CM

## 2021-07-23 DIAGNOSIS — M79.89 BILATERAL SWELLING OF FEET: ICD-10-CM

## 2021-07-23 DIAGNOSIS — I50.42 CHRONIC COMBINED SYSTOLIC AND DIASTOLIC CHF (CONGESTIVE HEART FAILURE) (HCC): ICD-10-CM

## 2021-07-23 DIAGNOSIS — N18.2 BENIGN HYPERTENSION WITH CKD (CHRONIC KIDNEY DISEASE), STAGE II: ICD-10-CM

## 2021-07-23 DIAGNOSIS — M79.671 RIGHT FOOT PAIN: Primary | ICD-10-CM

## 2021-07-23 PROCEDURE — G8417 CALC BMI ABV UP PARAM F/U: HCPCS | Performed by: FAMILY MEDICINE

## 2021-07-23 PROCEDURE — 99214 OFFICE O/P EST MOD 30 MIN: CPT | Performed by: FAMILY MEDICINE

## 2021-07-23 PROCEDURE — 1036F TOBACCO NON-USER: CPT | Performed by: FAMILY MEDICINE

## 2021-07-23 PROCEDURE — 3017F COLORECTAL CA SCREEN DOC REV: CPT | Performed by: FAMILY MEDICINE

## 2021-07-23 PROCEDURE — G8427 DOCREV CUR MEDS BY ELIG CLIN: HCPCS | Performed by: FAMILY MEDICINE

## 2021-07-23 SDOH — ECONOMIC STABILITY: FOOD INSECURITY: WITHIN THE PAST 12 MONTHS, THE FOOD YOU BOUGHT JUST DIDN'T LAST AND YOU DIDN'T HAVE MONEY TO GET MORE.: NEVER TRUE

## 2021-07-23 SDOH — ECONOMIC STABILITY: TRANSPORTATION INSECURITY
IN THE PAST 12 MONTHS, HAS THE LACK OF TRANSPORTATION KEPT YOU FROM MEDICAL APPOINTMENTS OR FROM GETTING MEDICATIONS?: NO

## 2021-07-23 SDOH — ECONOMIC STABILITY: HOUSING INSECURITY
IN THE LAST 12 MONTHS, WAS THERE A TIME WHEN YOU DID NOT HAVE A STEADY PLACE TO SLEEP OR SLEPT IN A SHELTER (INCLUDING NOW)?: NO

## 2021-07-23 SDOH — ECONOMIC STABILITY: INCOME INSECURITY: IN THE LAST 12 MONTHS, WAS THERE A TIME WHEN YOU WERE NOT ABLE TO PAY THE MORTGAGE OR RENT ON TIME?: NO

## 2021-07-23 SDOH — ECONOMIC STABILITY: FOOD INSECURITY: WITHIN THE PAST 12 MONTHS, YOU WORRIED THAT YOUR FOOD WOULD RUN OUT BEFORE YOU GOT MONEY TO BUY MORE.: NEVER TRUE

## 2021-07-23 SDOH — ECONOMIC STABILITY: TRANSPORTATION INSECURITY
IN THE PAST 12 MONTHS, HAS LACK OF TRANSPORTATION KEPT YOU FROM MEETINGS, WORK, OR FROM GETTING THINGS NEEDED FOR DAILY LIVING?: NO

## 2021-07-23 ASSESSMENT — ENCOUNTER SYMPTOMS
WHEEZING: 0
COUGH: 0
CONSTIPATION: 0
VOMITING: 0
ABDOMINAL PAIN: 0
ABDOMINAL DISTENTION: 0
NAUSEA: 0
CHEST TIGHTNESS: 0
DIARRHEA: 0
SHORTNESS OF BREATH: 0

## 2021-07-23 ASSESSMENT — SOCIAL DETERMINANTS OF HEALTH (SDOH): HOW HARD IS IT FOR YOU TO PAY FOR THE VERY BASICS LIKE FOOD, HOUSING, MEDICAL CARE, AND HEATING?: NOT HARD AT ALL

## 2021-07-23 NOTE — PROGRESS NOTES
Gloria Powers (:  1960) is a 62 y.o. male,Established patient, here for evaluation of the following chief complaint(s): Foot Pain (AND ANKLE PAIN ), Leg Swelling (bilateral ), and Other (states he had one covid shot but does not have his card )      ASSESSMENT/PLAN:    1. Right foot pain  Failing to change as expected. Advised compression stockings to decrease the swelling, keep legs elevated, absolutely avoid salt  Will rule out uric acid and bone disorder like spurs and degenerative changes  Follow-up with podiatry might benefit from diabetic shoes  -     Uric Acid; Future  -     XR FOOT RIGHT (MIN 3 VIEWS); Future  2. Bilateral swelling of feet  Improving  Compression stockings, keep legs elevated, stay away from salty food, continue diuretic, chlorthalidone and Aldactone  -     Uric Acid; Future  3. Benign hypertension with CKD (chronic kidney disease), stage II  Stable chronic kidney disease stage II  Well controlled. Continue current treatment. Will recheck labs. Discussed low salt diet and BP and pulse monitoring.    -     Basic Metabolic Panel; Future  -     Magnesium; Future  -     Brain Natriuretic Peptide; Future  4. Chronic combined systolic and diastolic CHF (congestive heart failure) (HCC)  Stable  We will do blood work, strongly advised to follow-up with cardiologist  -     Kimberly Rodgers - Jes Parker; Future  Continue current cardiac medications, statin and baby aspirin        Reprinted referral to GI and cardiology and advised patient to schedule appointment with specialist.  The patient verbalizes understanding and agrees with the plan. Azucena Yehuda received counseling on the following healthy behaviors: nutrition, exercise, medication adherence and weight loss    Reviewed prior labs and health maintenance  Discussed use, benefit, and side effects of prescribed medications. Barriers to medication compliance addressed.    Patient given educational materials - see patient History of target organ damage: angina/ prior myocardial infarction, chronic kidney disease and heart failure. Patient had 2 admissions of acute and chronic congestive heart failure in May 2018 and then again in January 2019. Had a cardiac cath at [facility] 6/23/2017 showing ejection fraction 45%, and 50% stenosis    He says he didn't see cardiology  BP controlled. Osmin Rider reports compliance with BP medications, and tolerates them well, denies side effects. BP Readings from Last 3 Encounters:   07/23/21 130/80   06/01/21 126/76   05/21/21 129/73        Pulse is Normal.    Pulse Readings from Last 3 Encounters:   07/23/21 67   06/01/21 62   05/21/21 70       Weight is stable  Wt Readings from Last 3 Encounters:   07/23/21 251 lb (113.9 kg)   06/01/21 250 lb (113.4 kg)   05/21/21 250 lb (113.4 kg)               Prior to Visit Medications    Medication Sig Taking? Authorizing Provider   dapagliflozin (FARXIGA) 10 MG tablet Take 1 tablet by mouth every morning For diabetes, per your insurance Yes Gwendolyn Rondon MD   SITagliptin (JANUVIA) 100 MG tablet Take 1 tablet by mouth daily Dose increased 6/1/2021 Yes Gwendolyn Rondon MD   metFORMIN (GLUCOPHAGE XR) 500 MG extended release tablet Take 1 tablet by mouth 2 times daily Stop short acting Metformin.  Yes Gwendolyn Rondon MD   hydrALAZINE (APRESOLINE) 50 MG tablet Take 1 tablet by mouth 3 times daily BP medication Yes Gwendolyn Rondon MD   chlorthalidone (HYGROTON) 25 MG tablet Take 1 tablet by mouth every morning Water pill and BP pill Yes Gwendolyn Rondon MD   spironolactone (ALDACTONE) 25 MG tablet Take 1 tablet by mouth every morning BP and CHF Yes Gwendolyn Rondon MD   atorvastatin (LIPITOR) 40 MG tablet Take 1 tablet by mouth daily For high cholesterol, you need to take it every day Yes Gwendolyn Rondon MD   amLODIPine (NORVASC) 10 MG tablet Take 1 tablet by mouth once daily Yes Gwendolyn Rondon MD   aspirin (EQ ASPIRIN ADULT LOW DOSE) 81 MG EC tablet Take 1 tablet by mouth once daily Yes Piedad Potts MD   carvedilol (COREG) 25 MG tablet TAKE 1 TABLET BY MOUTH TWICE DAILY WITH MEALS Yes Piedad Potts MD   Multiple Vitamins-Minerals (MULTIVITAMIN-MINERALS) TABS tablet Take 1 tablet by mouth daily Yes Piedad Potts MD   acetaminophen (EQ ACETAMINOPHEN) 500 MG tablet Take 1 tablet by mouth every 6 hours as needed for Pain Yes Piedad Potts MD   sildenafil (VIAGRA) 100 MG tablet Take 1 tablet by mouth daily as needed for Erectile Dysfunction Yes Josse Winston MD   tamsulosin (FLOMAX) 0.4 MG capsule Take 1 capsule by mouth daily Yes Josse Winston MD   ketoconazole (NIZORAL) 2 % cream Apply on the feet, not between the toes, twice a day Yes Piedad Potts MD   lidocaine (LMX) 4 % cream Apply 2-3 times a day as needed for pain Yes Piedad Potts MD   Blood Pressure KIT Diagnosis: HTN. Needs to check blood pressure 1-2 times a day until stable, then once a day. Goal blood pressure less than 135/85, and above 110/60. Yes Piedad Potts MD   blood glucose test strips (FREESTYLE LITE) strip Checking Blood Glucose twice a day Yes Tyler Platt, APRN - CNP   nitroGLYCERIN (NITROSTAT) 0.4 MG SL tablet DISSOLVE ONE TABLET UNDER THE TONGUE EVERY 5 MINUTES AS NEEDED FOR CHEST PAIN. DO NOT EXCEED A TOTAL OF 3 DOSES IN 15 MINUTES Yes Piedad Potts MD   cyclobenzaprine (FLEXERIL) 10 MG tablet Take 1 tablet by mouth nightly as needed for Muscle spasms  Patient not taking: Reported on 2021  Piedad Potts MD       Social History     Tobacco Use    Smoking status: Former Smoker     Packs/day: 0.50     Years: 2.00     Pack years: 1.00     Types: Cigarettes     Quit date: 1981     Years since quittin.5    Smokeless tobacco: Former User   Substance Use Topics    Alcohol use: Yes     Alcohol/week: 1.0 - 2.0 standard drinks     Types: 1 - 2 Cans of beer per week     Comment: 1-2 cans a month    Drug use:  Yes Types: Marijuana     Comment: marijuana every day        Review of Systems   Constitutional: Positive for fatigue. Negative for activity change, appetite change, chills, diaphoresis, fever and unexpected weight change. Respiratory: Negative for cough, chest tightness, shortness of breath and wheezing. Cardiovascular: Positive for leg swelling. Negative for chest pain and palpitations. Gastrointestinal: Negative for abdominal distention, abdominal pain, constipation, diarrhea, nausea and vomiting. Musculoskeletal: Positive for arthralgias (RIGHT FOOT) and gait problem. Neurological: Positive for numbness (feet). Hematological: Does not bruise/bleed easily.         -vital signs stable and within normal limits except Obesity per BMI. /80   Pulse 67   Temp 97.9 °F (36.6 °C)   Ht 6' 1\" (1.854 m)   Wt 251 lb (113.9 kg)   SpO2 98%   BMI 33.12 kg/m²        Physical Exam  Vitals and nursing note reviewed. Constitutional:       General: He is not in acute distress. Appearance: Normal appearance. He is well-developed. He is obese. He is not diaphoretic. HENT:      Head: Normocephalic and atraumatic. Right Ear: External ear normal.      Left Ear: External ear normal.      Mouth/Throat:      Comments: I did not examine the mouth due to coronavirus pandemic and wearing masks. Eyes:      General: Lids are normal. No scleral icterus. Right eye: No discharge. Left eye: No discharge. Extraocular Movements: Extraocular movements intact. Conjunctiva/sclera: Conjunctivae normal.   Neck:      Thyroid: No thyromegaly. Cardiovascular:      Rate and Rhythm: Normal rate and regular rhythm. Heart sounds: Murmur heard. Crescendo systolic murmur is present with a grade of 3/6. Comments: Swelling is worse on the feet and around the ankles. Pulmonary:      Effort: Pulmonary effort is normal. No respiratory distress. Breath sounds: Normal breath sounds.  No wheezing or rales. Chest:      Chest wall: No tenderness. Abdominal:      General: Bowel sounds are normal. There is no distension. Palpations: Abdomen is soft. There is no hepatomegaly or splenomegaly. Tenderness: There is no abdominal tenderness. Comments: Obese abdomen. Musculoskeletal:      Cervical back: Normal range of motion and neck supple. Lumbar back: Spasms and tenderness present. Decreased range of motion. Right lower le+ Pitting Edema present. Left lower le+ Pitting Edema present. Comments: Straight leg test on the right side positive, mildly antalgic gait noted   Feet:      Comments: No point tenderness on the feet, painful right forefoot diffusely. diffuse swelling from edema  Lymphadenopathy:      Cervical: No cervical adenopathy. Skin:     General: Skin is warm and dry. Capillary Refill: Capillary refill takes less than 2 seconds. Findings: No rash. Neurological:      Mental Status: He is alert and oriented to person, place, and time. Gait: Gait abnormal (antalgic). Deep Tendon Reflexes: Reflexes are normal and symmetric. Psychiatric:         Mood and Affect: Mood normal.         Behavior: Behavior normal.         Thought Content: Thought content normal.         Judgment: Judgment normal.             I personally reviewed testing with patient.   Hyperglycemia  Chronic kidney disease stage II  Anemia, patient strongly advised to follow-up with GI for GI work-up    Otherwise labs within normal limits    Lab Results   Component Value Date    WBC 6.1 2021    HGB 12.5 (L) 2021    HCT 39.0 (L) 2021    MCV 94.3 2021     2021       Lab Results   Component Value Date     2021    K 4.7 2021     2021    CO2 25 2021    BUN 32 2021    CREATININE 1.25 2021    GLUCOSE 145 2021    GLUCOSE 157 2012    CALCIUM 9.5 2021        Lab Results Component Value Date    ALT 18 2021    AST 15 2021    ALKPHOS 104 2021    BILITOT 0.36 2021       Lab Results   Component Value Date    TSH 1.20 2021     Orders Placed This Encounter   Procedures    XR FOOT RIGHT (MIN 3 VIEWS)     Standing Status:   Future     Standing Expiration Date:   9/10/2021    Uric Acid     Standing Status:   Future     Standing Expiration Date:   9/10/2021    Basic Metabolic Panel     Standing Status:   Future     Standing Expiration Date:   9/10/2021    Magnesium     Standing Status:   Future     Standing Expiration Date:   9/10/2021    Brain Natriuretic Peptide     Standing Status:   Future     Standing Expiration Date:   2022       No orders of the defined types were placed in this encounter. Medications Discontinued During This Encounter   Medication Reason    cyclobenzaprine (FLEXERIL) 10 MG tablet Therapy completed           On this date 2021 I have spent 35 minutes reviewing previous notes, test results and face to face with the patient discussing the diagnosis and importance of compliance with the treatment plan as well as documenting on the day of the visit. This note was completed by using the assistance of a speech-recognition program. However, inadvertent computerized transcription errors may be present. Although every effort was made to ensure accuracy, no guarantees can be provided that every mistake has been identified and corrected by editing. An electronic signature was used to authenticate this note.   Electronically signed by Lynann Apgar, MD on 2021 at 9:13 PM

## 2021-07-23 NOTE — PROGRESS NOTES
Visit Information    Have you changed or started any medications since your last visit including any over-the-counter medicines, vitamins, or herbal medicines? no   Are you having any side effects from any of your medications? -  no  Have you stopped taking any of your medications? Is so, why? -  no    Have you seen any other physician or provider since your last visit? No  Have you had any other diagnostic tests since your last visit? No  Have you been seen in the emergency room and/or had an admission to a hospital since we last saw you? No  Have you had your routine dental cleaning in the past 6 months? no    Have you activated your [facility] [employer] account? If not, what are your barriers?  Yes     Patient Care Team:  Piedad Potts MD as PCP - General (Family Medicine)  Piedad Potts MD as PCP - [facility]  Susan Rocha MD as Consulting Physician (Cardiology)  Sanna Escalante OD (Optometry)  Dajuan Breaux MD as Consulting Physician (Nephrology)  Janene Scruggs MD as Consulting Physician (Urology)  Tamiko Hernández MD as Consulting Physician (Cardiology)  Sanna Escalante OD (Optometry)  Raymondo Galeazzi, DPM as Physician (Podiatry)    Medical History Review  Past Medical, Family, and Social History reviewed and does contribute to the patient presenting condition    Health Maintenance   Topic Date Due    Diabetic retinal exam  07/31/2019    COVID-19 Vaccine (2 - Pfizer 2-dose series) 05/03/2021    A1C test (Diabetic or Prediabetic)  09/01/2021    Flu vaccine (1) 09/01/2021    PSA counseling  11/20/2021    Diabetic foot exam  01/12/2022    Diabetic microalbuminuria test  06/01/2022    Lipid screen  06/01/2022    Potassium monitoring  06/01/2022    Creatinine monitoring  06/01/2022    Colon cancer screen colonoscopy  01/15/2023    DTaP/Tdap/Td vaccine (2 - Td or Tdap) 04/03/2024    Pneumococcal 0-64 years Vaccine (2 of 2 - PPSV23) 12/20/2026    Shingles Vaccine  Completed    Hepatitis C screen  Completed    HIV screen  Completed    Hepatitis A vaccine  Aged Out    Hib vaccine  Aged Out    Meningococcal (ACWY) vaccine  Aged Out    Hepatitis B vaccine  Discontinued

## 2021-08-11 ENCOUNTER — NURSE TRIAGE (OUTPATIENT)
Dept: OTHER | Facility: CLINIC | Age: 61
End: 2021-08-11

## 2021-08-11 ENCOUNTER — TELEPHONE (OUTPATIENT)
Dept: FAMILY MEDICINE CLINIC | Age: 61
End: 2021-08-11

## 2021-08-11 DIAGNOSIS — I12.9 BENIGN HYPERTENSION WITH CKD (CHRONIC KIDNEY DISEASE), STAGE II: Primary | ICD-10-CM

## 2021-08-11 DIAGNOSIS — N18.2 BENIGN HYPERTENSION WITH CKD (CHRONIC KIDNEY DISEASE), STAGE II: Primary | ICD-10-CM

## 2021-08-11 DIAGNOSIS — I50.42 CHRONIC COMBINED SYSTOLIC AND DIASTOLIC CHF (CONGESTIVE HEART FAILURE) (HCC): ICD-10-CM

## 2021-08-11 RX ORDER — FUROSEMIDE 40 MG/1
40 TABLET ORAL DAILY
Qty: 90 TABLET | Refills: 1 | Status: SHIPPED | OUTPATIENT
Start: 2021-08-11 | End: 2021-09-10 | Stop reason: SDUPTHER

## 2021-08-11 NOTE — TELEPHONE ENCOUNTER
Please verify if he is taking his meds one by one  Has 2 water pills in there    Make appointment     Current Outpatient Medications on File Prior to Visit   Medication Sig Dispense Refill    dapagliflozin (FARXIGA) 10 MG tablet Take 1 tablet by mouth every morning For diabetes, per your insurance 30 tablet 5    SITagliptin (JANUVIA) 100 MG tablet Take 1 tablet by mouth daily Dose increased 6/1/2021 90 tablet 3    metFORMIN (GLUCOPHAGE XR) 500 MG extended release tablet Take 1 tablet by mouth 2 times daily Stop short acting Metformin. 180 tablet 3    hydrALAZINE (APRESOLINE) 50 MG tablet Take 1 tablet by mouth 3 times daily BP medication 270 tablet 3    chlorthalidone (HYGROTON) 25 MG tablet Take 1 tablet by mouth every morning Water pill and BP pill 90 tablet 3    spironolactone (ALDACTONE) 25 MG tablet Take 1 tablet by mouth every morning BP and CHF 90 tablet 3    atorvastatin (LIPITOR) 40 MG tablet Take 1 tablet by mouth daily For high cholesterol, you need to take it every day 90 tablet 3    amLODIPine (NORVASC) 10 MG tablet Take 1 tablet by mouth once daily 90 tablet 3    aspirin (EQ ASPIRIN ADULT LOW DOSE) 81 MG EC tablet Take 1 tablet by mouth once daily 90 tablet 3    carvedilol (COREG) 25 MG tablet TAKE 1 TABLET BY MOUTH TWICE DAILY WITH MEALS 180 tablet 11    Multiple Vitamins-Minerals (MULTIVITAMIN-MINERALS) TABS tablet Take 1 tablet by mouth daily 90 tablet 3    acetaminophen (EQ ACETAMINOPHEN) 500 MG tablet Take 1 tablet by mouth every 6 hours as needed for Pain 120 tablet 3    sildenafil (VIAGRA) 100 MG tablet Take 1 tablet by mouth daily as needed for Erectile Dysfunction 30 tablet 3    tamsulosin (FLOMAX) 0.4 MG capsule Take 1 capsule by mouth daily 30 capsule 11    ketoconazole (NIZORAL) 2 % cream Apply on the feet, not between the toes, twice a day 1 Tube 1    lidocaine (LMX) 4 % cream Apply 2-3 times a day as needed for pain 120 g 3    Blood Pressure KIT Diagnosis: HTN.  Needs to check blood pressure 1-2 times a day until stable, then once a day. Goal blood pressure less than 135/85, and above 110/60. 1 kit 0    blood glucose test strips (FREESTYLE LITE) strip Checking Blood Glucose twice a day 100 strip 3    nitroGLYCERIN (NITROSTAT) 0.4 MG SL tablet DISSOLVE ONE TABLET UNDER THE TONGUE EVERY 5 MINUTES AS NEEDED FOR CHEST PAIN. DO NOT EXCEED A TOTAL OF 3 DOSES IN 15 MINUTES 25 tablet 0     No current facility-administered medications on file prior to visit.

## 2021-08-11 NOTE — TELEPHONE ENCOUNTER
Reason for Disposition   MODERATE swelling of both ankles (e.g., swelling extends up to the knees) AND new onset or worsening    Answer Assessment - Initial Assessment Questions  1. ONSET: \"When did the swelling start? \" (e.g., minutes, hours, days)  Months    2. LOCATION: \"What part of the leg is swollen? \"  \"Are both legs swollen or just one leg? \"  Both feet    3. SEVERITY: \"How bad is the swelling? \" (e.g., localized; mild, moderate, severe)   - Localized - small area of swelling localized to one leg   - MILD pedal edema - swelling limited to foot and ankle, pitting edema < 1/4 inch (6 mm) deep, rest and elevation eliminate most or all swelling   - MODERATE edema - swelling of lower leg to knee, pitting edema > 1/4 inch (6 mm) deep, rest and elevation only partially reduce swelling   - SEVERE edema - swelling extends above knee, facial or hand swelling present   Moderate    4. REDNESS: \"Does the swelling look red or infected? \"  No    5. PAIN: \"Is the swelling painful to touch? \" If so, ask: \"How painful is it? \"   (Scale 1-10; mild, moderate or severe)   8-9/10    6. FEVER: \"Do you have a fever? \" If so, ask: \"What is it, how was it measured, and when did it start? \"     Doesn't check temperature, doesn't feel warm    7. CAUSE: \"What do you think is causing the leg swelling? \"   not sure, but also didn't do labs as ordered by PCP 3 weeks ago    8. MEDICAL HISTORY: \"Do you have a history of heart failure, kidney disease, liver failure, or cancer? \"  Heart failure    9. RECURRENT SYMPTOM: \"Have you had leg swelling before? \" If so, ask: \"When was the last time? \" \"What happened that time? \"     Yes, already seen but hasn't followed care plan    10. OTHER SYMPTOMS: \"Do you have any other symptoms? \" (e.g., chest pain, difficulty breathing)   dizziness    11. PREGNANCY: \"Is there any chance you are pregnant? \" \"When was your last menstrual period? \"  N/a    Protocols used: LEG SWELLING AND EDEMA-ADULT-OH    Received call from

## 2021-08-11 NOTE — TELEPHONE ENCOUNTER
----- Message from [place] sent at 8/11/2021 11:59 AM EDT -----  Subject: Appointment Request    Reason for Call: Immediate Return from RN Triage    QUESTIONS  Type of Appointment? Established Patient  Reason for appointment request? No appointments available during search  Additional Information for Provider? Patient transferred from [facility] for   swelling in both ankles/feet that is worsening. No available appts and NT   recommended to be seen today. Please contact as soon as possible  ---------------------------------------------------------------------------  --------------  CALL BACK INFO  What is the best way for the office to contact you? OK to leave message on   voicemail  Preferred Call Back Phone Number? [phone]  ---------------------------------------------------------------------------  --------------  SCRIPT ANSWERS  Patient needs to be seen today? Yes  Nurse Name? Sabra Baumgarten  Have you been diagnosed with, awaiting test results for, or told that you   are suspected of having COVID-19 (Coronavirus)? (If patient has tested   negative or was tested as a requirement for work, school, or travel and   not based on symptoms, answer no)? No  Do you currently have flu-like symptoms including fever or chills, cough,   shortness of breath, difficulty breathing, or new loss of taste or smell? No  Have you had close contact with someone with COVID-19 in the last 14 days? No  (Service Expert  click yes below to proceed with [employer] As Usual   Scheduling)?  Yes

## 2021-08-11 NOTE — TELEPHONE ENCOUNTER
Please let the patient know to  prescription from the pharmacy. Please advise the patient to write down to stop chlorthalidone 25 mg, STOP Metformin 500 twice a day    Also, please call the pharmacy and cancel chlorthalidone and Metformin    To start furosemide as the new water pill and continue all the other medications as they are please mention this to him    Orders Placed This Encounter   Medications    furosemide (LASIX) 40 MG tablet     Sig: Take 1 tablet by mouth daily Stop chlorthalidone and Metformin due to leg swelling     Dispense:  90 tablet     Refill:  [address] [facility] ([facility]), [facility] [address] [address] [employer] [facility] ([facility]) [address]  Phone: [phone] Fax: [phone]      No orders of the defined types were placed in this encounter. Future Appointments   Date Time Provider Vianey Malagon   8/27/2021  9:50 AM SCHEDULE, P [facility] UROLOGY [facility]   9/10/2021 10:00 AM Krishna Blanco MD [facility]       Thank you!

## 2021-08-11 NOTE — TELEPHONE ENCOUNTER
Please see  telephone encounter from 8/11/2021.    Medications changed    Future Appointments   Date Time Provider Vianey Vesna   8/27/2021  9:50 AM SCHEDULE, P [facility] UROLOGY [facility]   9/10/2021 10:00 AM [facility]

## 2021-08-11 NOTE — TELEPHONE ENCOUNTER
I just want to make sure I understand this correctly. You want him to stop the Chlorthalidone and the Metformin 500mg twice a day, to cancel both those prescriptions at the pharmacy, and to start the Furosemide, correct?

## 2021-08-11 NOTE — TELEPHONE ENCOUNTER
Noted. Thank you!     Future Appointments   Date Time Provider Vianey Malagon   8/27/2021  9:50 AM SCHEDULE, [facility] UROLOGY [facility]   9/10/2021 10:00 AM [facility]

## 2021-08-16 ENCOUNTER — HOSPITAL ENCOUNTER (OUTPATIENT)
Age: 61
Discharge: HOME | End: 2021-08-16
Payer: COMMERCIAL

## 2021-08-16 DIAGNOSIS — I12.9 BENIGN HYPERTENSION WITH CKD (CHRONIC KIDNEY DISEASE), STAGE II: ICD-10-CM

## 2021-08-16 DIAGNOSIS — N18.2 BENIGN HYPERTENSION WITH CKD (CHRONIC KIDNEY DISEASE), STAGE II: ICD-10-CM

## 2021-08-16 DIAGNOSIS — M79.671 RIGHT FOOT PAIN: ICD-10-CM

## 2021-08-16 DIAGNOSIS — I50.42 CHRONIC COMBINED SYSTOLIC AND DIASTOLIC CHF (CONGESTIVE HEART FAILURE) (HCC): ICD-10-CM

## 2021-08-16 DIAGNOSIS — M79.89 BILATERAL SWELLING OF FEET: ICD-10-CM

## 2021-08-16 DIAGNOSIS — M1A.3710 CHRONIC GOUT OF RIGHT FOOT DUE TO RENAL IMPAIRMENT WITHOUT TOPHUS: Primary | ICD-10-CM

## 2021-08-16 LAB
ANION GAP SERPL CALCULATED.3IONS-SCNC: 9 MMOL/L (ref 9–17)
BNP INTERPRETATION: NORMAL
BUN BLDV-MCNC: 25 MG/DL (ref 6–20)
BUN/CREAT BLD: ABNORMAL (ref 9–20)
CALCIUM SERPL-MCNC: 9.3 MG/DL (ref 8.6–10.4)
CHLORIDE BLD-SCNC: 102 MMOL/L (ref 98–107)
CO2: 25 MMOL/L (ref 20–31)
CREAT SERPL-MCNC: 1.4 MG/DL (ref 0.7–1.2)
GFR AFRICAN AMERICAN: >60 ML/MIN
GFR NON-AFRICAN AMERICAN: 52 ML/MIN
GFR SERPL CREATININE-BSD FRML MDRD: ABNORMAL ML/MIN/{1.73_M2}
GFR SERPL CREATININE-BSD FRML MDRD: ABNORMAL ML/MIN/{1.73_M2}
GLUCOSE BLD-MCNC: 279 MG/DL (ref 70–99)
MAGNESIUM: 2.1 MG/DL (ref 1.6–2.6)
POTASSIUM SERPL-SCNC: 4.3 MMOL/L (ref 3.7–5.3)
PRO-BNP: 45 PG/ML
SODIUM BLD-SCNC: 136 MMOL/L (ref 135–144)
URIC ACID: 7.4 MG/DL (ref 3.4–7)

## 2021-08-16 PROCEDURE — 83735 ASSAY OF MAGNESIUM: CPT

## 2021-08-16 PROCEDURE — 84550 ASSAY OF BLOOD/URIC ACID: CPT

## 2021-08-16 PROCEDURE — 80048 BASIC METABOLIC PNL TOTAL CA: CPT

## 2021-08-16 PROCEDURE — 36415 COLL VENOUS BLD VENIPUNCTURE: CPT

## 2021-08-16 PROCEDURE — 83880 ASSAY OF NATRIURETIC PEPTIDE: CPT

## 2021-08-16 RX ORDER — ALLOPURINOL 100 MG/1
100 TABLET ORAL DAILY
Qty: 30 TABLET | Refills: 3 | Status: SHIPPED | OUTPATIENT
Start: 2021-08-16 | End: 2021-09-10 | Stop reason: SDUPTHER

## 2021-08-16 NOTE — RESULT ENCOUNTER NOTE
Please notify patient. Uric acid is high, discussed with gout, I will send allopurinol to his pharmacy    Blood glucose is very high 279 to stop drinking pop or any alcoholic beverages  Kidney function slightly worse  Otherwise labs within normal limits  Continue current treatment. Otherwise labs within normal limits    To  prescription for allopurinol and to eat low purine diet    GOUT  DIET SHORT COUNSELING:  Stop drinking any pop or alcohol if he drinks any. Avoid eating too much high purine foods like:dried beans and dried peas, Asparagus, cauliflower, spinach, mushrooms, and green peas, Fish and seafood, Oatmeal, wheat bran, and wheat germ, Organ meats, such as liver, kidneys, sweetbreads, and brains, Meats, including hogan, beef, pork, and lamb, Game meats and any other meats in large amounts, Anchovies, sardines, herring, mackerel, and scallops, Gravy, Beer, high fructose syrup and high carbs food.       Future Appointments  8/27/2021  9:50 AM    SCHEDULE, [facility] UROLOGY  [facility] Urology         [facility]  9/10/2021  10:00 AM   Topher Hendrix MD     [facility]

## 2021-08-27 ENCOUNTER — HOSPITAL ENCOUNTER (OUTPATIENT)
Age: 61
Discharge: HOME | End: 2021-08-27
Payer: COMMERCIAL

## 2021-08-27 ENCOUNTER — OFFICE VISIT (OUTPATIENT)
Dept: UROLOGY | Age: 61
End: 2021-08-27
Payer: COMMERCIAL

## 2021-08-27 VITALS
HEIGHT: 73 IN | DIASTOLIC BLOOD PRESSURE: 74 MMHG | BODY MASS INDEX: 33.27 KG/M2 | HEART RATE: 58 BPM | WEIGHT: 251 LBS | SYSTOLIC BLOOD PRESSURE: 118 MMHG

## 2021-08-27 DIAGNOSIS — N52.01 ERECTILE DYSFUNCTION DUE TO ARTERIAL INSUFFICIENCY: ICD-10-CM

## 2021-08-27 DIAGNOSIS — Z12.5 PROSTATE CANCER SCREENING: Primary | ICD-10-CM

## 2021-08-27 DIAGNOSIS — N40.1 BENIGN LOCALIZED PROSTATIC HYPERPLASIA WITH LOWER URINARY TRACT SYMPTOMS (LUTS): ICD-10-CM

## 2021-08-27 DIAGNOSIS — Z12.5 PROSTATE CANCER SCREENING: ICD-10-CM

## 2021-08-27 LAB — PROSTATE SPECIFIC ANTIGEN: 1.95 UG/L

## 2021-08-27 PROCEDURE — 1036F TOBACCO NON-USER: CPT | Performed by: UROLOGY

## 2021-08-27 PROCEDURE — G8417 CALC BMI ABV UP PARAM F/U: HCPCS | Performed by: UROLOGY

## 2021-08-27 PROCEDURE — 99213 OFFICE O/P EST LOW 20 MIN: CPT | Performed by: UROLOGY

## 2021-08-27 PROCEDURE — 84153 ASSAY OF PSA TOTAL: CPT

## 2021-08-27 PROCEDURE — G8427 DOCREV CUR MEDS BY ELIG CLIN: HCPCS | Performed by: UROLOGY

## 2021-08-27 PROCEDURE — 3017F COLORECTAL CA SCREEN DOC REV: CPT | Performed by: UROLOGY

## 2021-08-27 RX ORDER — SILDENAFIL 100 MG/1
100 TABLET, FILM COATED ORAL DAILY PRN
Qty: 30 TABLET | Refills: 3 | Status: SHIPPED | OUTPATIENT
Start: 2021-08-27 | End: 2022-01-21 | Stop reason: SDUPTHER

## 2021-08-27 RX ORDER — TAMSULOSIN HYDROCHLORIDE 0.4 MG/1
0.4 CAPSULE ORAL DAILY
Qty: 30 CAPSULE | Refills: 11 | Status: SHIPPED | OUTPATIENT
Start: 2021-08-27 | End: 2022-07-11

## 2021-08-27 NOTE — PROGRESS NOTES
Patti Calvillo MD   Urology Clinic Office visit      Patient:  Aniceto Parry  YOB: 1960  Date: 8/27/2021    HISTORY OF PRESENT ILLNESS:   The patient is a 62 y.o. male who presents today for follow-up for the following problem(s):      1. Prostate cancer screening    2. Erectile dysfunction due to arterial insufficiency    3. Benign localized prostatic hyperplasia with lower urinary tract symptoms (LUTS)           Overall the problem(s) : are improving. Associated Symptoms: No dysuria, gross hematuria. Pain Severity: Pain Score:   0 - No pain    Summary of old records:      ED- sildenafil PRN. Discussed absolute contraindication to nitroglycerin        Additional History:   Nocturia once to twice a night. Denies snoring. Reports better stream with Flomax. Reports urinating every 3-4 hours. Not bothersome. Erections are good with viagra. Able to achieve them 10 of 10 times. Is able to maintain erections. I independently reviewed and verified the images and reports from:    No results found. Last several PSA's:  Lab Results   Component Value Date    PSA 1.89 11/20/2020    PSA 1.88 05/27/2020    PSA 1.43 02/26/2019       Last total testosterone:  No results found for: TESTOSTERONE    Urinalysis today:  No results found for this visit on 08/27/21.     Last BUN and creatinine:  Lab Results   Component Value Date    BUN 25 (H) 08/16/2021     Lab Results   Component Value Date    CREATININE 1.40 (H) 08/16/2021       Imaging Reviewed during this Office Visit:   (results were independently reviewed by physician and radiology report verified)    PAST MEDICAL, FAMILY AND SOCIAL HISTORY UPDATE:  Past Medical History:   Diagnosis Date    Acute heart failure ([address] 5/20/2018    Anemia     Benign hypertension with CKD (chronic kidney disease), stage II 7/24/2017    CAD (coronary artery disease)     Cardiomyopathy (HCC)     Chronic diastolic CHF (congestive heart failure) ([address] 7/23/2018 Packs/day: 0.50    Years: 2.00    Pack years: 1.00    Types: Cigarettes    Quit date: 1981    Years since quittin.6   Smokeless Tobacco Former User       Social History     Substance and Sexual Activity   Alcohol Use Yes    Alcohol/week: 1.0 - 2.0 standard drinks    Types: 1 - 2 Cans of beer per week    Comment: 1-2 cans a month       REVIEW OF SYSTEMS:  Constitutional: negative  Eyes: negative  Respiratory: negative  Cardiovascular: negative  Gastrointestinal: negative  Musculoskeletal: negative  Genitourinary: negative except for what is in HPI  Skin: negative   Neurological: negative  Hematological/Lymphatic: negative  Psychological: negative    Physical Exam:      Vitals:    21 1009   BP: 118/74   Pulse: 58     Patient is a 62 y.o. male in no acute distress and alert and oriented to person, place and time. NAD, A/o  Non labored respiration  Normal peripheral pulses  Skin- warm and dry  Psych- normal mood and affect      Assessment and Plan      1. Prostate cancer screening    2. Erectile dysfunction due to arterial insufficiency    3. Benign localized prostatic hyperplasia with lower urinary tract symptoms (LUTS)         ED- continue sildenafil. Refill sent. Discussed like threatening interaction and cannot take with nitrates. He understands this. Cannot have erections without it  BPH- continue flomax  pca screening- prostate cancer screening annually. He needs this.  Order replaced  Prescriptions Ordered:  Orders Placed This Encounter   Medications    tamsulosin (FLOMAX) 0.4 MG capsule     Sig: Take 1 capsule by mouth daily     Dispense:  30 capsule     Refill:  11    sildenafil (VIAGRA) 100 MG tablet     Sig: Take 1 tablet by mouth daily as needed for Erectile Dysfunction     Dispense:  30 tablet     Refill:  3      Orders Placed:  Orders Placed This Encounter   Procedures    PSA, Diagnostic     Standing Status:   Future     Standing Expiration Date:   2022          [facility] Raul Villalba M.D, MD    Return in about 6 months (around 2/27/2022) for 6 months with tristan. Horacio Kim M.D  [facility] Urology     I have discussed the care of this patient including pertinent history and exam findings, with the resident. I have seen and examined the patient and the key elements of all parts of the encounter have been performed by me. I agree with the assessment, plan and orders as documented by the resident.     Arslan Pierre M.D, MD, MD

## 2021-09-10 ENCOUNTER — OFFICE VISIT (OUTPATIENT)
Dept: FAMILY MEDICINE CLINIC | Age: 61
End: 2021-09-10
Payer: COMMERCIAL

## 2021-09-10 VITALS
TEMPERATURE: 97.1 F | HEIGHT: 73 IN | HEART RATE: 64 BPM | BODY MASS INDEX: 32.2 KG/M2 | SYSTOLIC BLOOD PRESSURE: 120 MMHG | WEIGHT: 243 LBS | DIASTOLIC BLOOD PRESSURE: 80 MMHG | OXYGEN SATURATION: 98 %

## 2021-09-10 DIAGNOSIS — I50.42 CHRONIC COMBINED SYSTOLIC AND DIASTOLIC CHF (CONGESTIVE HEART FAILURE) (HCC): ICD-10-CM

## 2021-09-10 DIAGNOSIS — E11.65 TYPE 2 DIABETES MELLITUS WITH HYPERGLYCEMIA, WITHOUT LONG-TERM CURRENT USE OF INSULIN (HCC): Primary | ICD-10-CM

## 2021-09-10 DIAGNOSIS — I12.9 BENIGN HYPERTENSION WITH CKD (CHRONIC KIDNEY DISEASE), STAGE II: ICD-10-CM

## 2021-09-10 DIAGNOSIS — M1A.3710 CHRONIC GOUT OF RIGHT FOOT DUE TO RENAL IMPAIRMENT WITHOUT TOPHUS: ICD-10-CM

## 2021-09-10 DIAGNOSIS — N18.2 BENIGN HYPERTENSION WITH CKD (CHRONIC KIDNEY DISEASE), STAGE II: ICD-10-CM

## 2021-09-10 DIAGNOSIS — E11.42 TYPE 2 DIABETES MELLITUS WITH DIABETIC POLYNEUROPATHY, WITHOUT LONG-TERM CURRENT USE OF INSULIN (HCC): ICD-10-CM

## 2021-09-10 DIAGNOSIS — E78.5 HYPERLIPIDEMIA WITH TARGET LDL LESS THAN 70: ICD-10-CM

## 2021-09-10 LAB — HBA1C MFR BLD: 9.7 %

## 2021-09-10 PROCEDURE — 83036 HEMOGLOBIN GLYCOSYLATED A1C: CPT | Performed by: FAMILY MEDICINE

## 2021-09-10 PROCEDURE — 1036F TOBACCO NON-USER: CPT | Performed by: FAMILY MEDICINE

## 2021-09-10 PROCEDURE — 3046F HEMOGLOBIN A1C LEVEL >9.0%: CPT | Performed by: FAMILY MEDICINE

## 2021-09-10 PROCEDURE — G8417 CALC BMI ABV UP PARAM F/U: HCPCS | Performed by: FAMILY MEDICINE

## 2021-09-10 PROCEDURE — G8427 DOCREV CUR MEDS BY ELIG CLIN: HCPCS | Performed by: FAMILY MEDICINE

## 2021-09-10 PROCEDURE — 99214 OFFICE O/P EST MOD 30 MIN: CPT | Performed by: FAMILY MEDICINE

## 2021-09-10 PROCEDURE — 3017F COLORECTAL CA SCREEN DOC REV: CPT | Performed by: FAMILY MEDICINE

## 2021-09-10 PROCEDURE — 2022F DILAT RTA XM EVC RTNOPTHY: CPT | Performed by: FAMILY MEDICINE

## 2021-09-10 RX ORDER — BLOOD-GLUCOSE METER
KIT MISCELLANEOUS
Qty: 1 KIT | Refills: 0 | Status: SHIPPED | OUTPATIENT
Start: 2021-09-10 | End: 2021-10-01 | Stop reason: SDUPTHER

## 2021-09-10 RX ORDER — FUROSEMIDE 40 MG/1
40 TABLET ORAL DAILY
Qty: 90 TABLET | Refills: 3 | Status: SHIPPED | OUTPATIENT
Start: 2021-09-10 | End: 2022-06-14 | Stop reason: SDUPTHER

## 2021-09-10 RX ORDER — ALLOPURINOL 100 MG/1
100 TABLET ORAL DAILY
Qty: 90 TABLET | Refills: 3 | Status: SHIPPED | OUTPATIENT
Start: 2021-09-10 | End: 2022-06-14 | Stop reason: SDUPTHER

## 2021-09-10 RX ORDER — ASPIRIN 81 MG/1
TABLET ORAL
Qty: 90 TABLET | Refills: 3 | Status: SHIPPED | OUTPATIENT
Start: 2021-09-10 | End: 2022-01-31

## 2021-09-10 RX ORDER — BLOOD PRESSURE TEST KIT
KIT MISCELLANEOUS
Qty: 100 EACH | Refills: 3 | Status: SHIPPED | OUTPATIENT
Start: 2021-09-10

## 2021-09-10 RX ORDER — CARVEDILOL 25 MG/1
TABLET ORAL
Qty: 180 TABLET | Refills: 11 | Status: SHIPPED | OUTPATIENT
Start: 2021-09-10 | End: 2022-07-15 | Stop reason: SDUPTHER

## 2021-09-10 RX ORDER — AMLODIPINE BESYLATE 10 MG/1
TABLET ORAL
Qty: 90 TABLET | Refills: 3 | Status: SHIPPED | OUTPATIENT
Start: 2021-09-10 | End: 2022-07-15 | Stop reason: SDUPTHER

## 2021-09-10 RX ORDER — GLUCOSAMINE HCL/CHONDROITIN SU 500-400 MG
CAPSULE ORAL
Qty: 100 STRIP | Refills: 3 | Status: SHIPPED | OUTPATIENT
Start: 2021-09-10 | End: 2021-10-01 | Stop reason: SDUPTHER

## 2021-09-10 RX ORDER — HYDRALAZINE HYDROCHLORIDE 50 MG/1
50 TABLET, FILM COATED ORAL 3 TIMES DAILY
Qty: 270 TABLET | Refills: 3 | Status: SHIPPED | OUTPATIENT
Start: 2021-09-10 | End: 2022-07-11

## 2021-09-10 RX ORDER — ATORVASTATIN CALCIUM 40 MG/1
40 TABLET, FILM COATED ORAL DAILY
Qty: 90 TABLET | Refills: 3 | Status: SHIPPED | OUTPATIENT
Start: 2021-09-10 | End: 2022-06-13

## 2021-09-10 RX ORDER — SPIRONOLACTONE 25 MG/1
25 TABLET ORAL EVERY MORNING
Qty: 90 TABLET | Refills: 3 | Status: SHIPPED | OUTPATIENT
Start: 2021-09-10 | End: 2022-07-15 | Stop reason: SDUPTHER

## 2021-09-10 ASSESSMENT — ENCOUNTER SYMPTOMS
CONSTIPATION: 0
SHORTNESS OF BREATH: 0
DIARRHEA: 0
COUGH: 0
WHEEZING: 0
ABDOMINAL PAIN: 0
NAUSEA: 0
ABDOMINAL DISTENTION: 0
VOMITING: 0
CHEST TIGHTNESS: 0

## 2021-09-10 NOTE — PROGRESS NOTES
Visit Information    Have you changed or started any medications since your last visit including any over-the-counter medicines, vitamins, or herbal medicines? no   Are you having any side effects from any of your medications? -  no  Have you stopped taking any of your medications? Is so, why? -  no    Have you seen any other physician or provider since your last visit? No  Have you had any other diagnostic tests since your last visit? No  Have you been seen in the emergency room and/or had an admission to a hospital since we last saw you? No  Have you had your routine dental cleaning in the past 6 months? no    Have you activated your [employer] account? If not, what are your barriers?  Yes     Patient Care Team:  Piedad Potts MD as PCP - General (Family Medicine)  Piedad Potts MD as PCP - [address] [place] [address] Provider  Susan Rocha MD as Consulting Physician (Cardiology)  Sanna Escalante OD (Optometry)  Dajuan Breaux MD as Consulting Physician (Nephrology)  Janene Scruggs MD as Consulting Physician (Urology)  Tamiko Hernández MD as Consulting Physician (Cardiology)  Sanna Escalante OD (Optometry)  Raymondo Galeazzi, DPM as Physician (Podiatry)    Medical History Review  Past Medical, Family, and Social History reviewed and does contribute to the patient presenting condition    Health Maintenance   Topic Date Due    Diabetic retinal exam  07/31/2019    COVID-19 Vaccine (2 - Pfizer 2-dose series) 05/03/2021    A1C test (Diabetic or Prediabetic)  09/01/2021    Flu vaccine (1) 09/01/2021    Diabetic foot exam  01/12/2022    Diabetic microalbuminuria test  06/01/2022    Lipid screen  06/01/2022    Potassium monitoring  08/16/2022    Creatinine monitoring  08/16/2022    PSA counseling  08/27/2022    Colon cancer screen colonoscopy  01/15/2023    DTaP/Tdap/Td vaccine (2 - Td or Tdap) 04/03/2024    Pneumococcal 0-64 years Vaccine (2 of 2 - PPSV23) 12/20/2026    Shingles Vaccine  Completed    Hepatitis C screen  Completed    HIV screen  Completed    Hepatitis A vaccine  Aged Out    Hib vaccine  Aged Out    Meningococcal (ACWY) vaccine  Aged Out    Hepatitis B vaccine  Discontinued

## 2021-09-10 NOTE — PROGRESS NOTES
Doris Corona (:  1960) is a 62 y.o. male,Established patient, here for evaluation of the following chief complaint(s): Diabetes, Hyperlipidemia, Other (wants to wait on flu shot does not want it today ), Other (had one covid vaccine does not have his card with him today ), and Congestive Heart Failure      ASSESSMENT/PLAN:    1. Type 2 diabetes mellitus with hyperglycemia, without long-term current use of insulin (HCC)  worsening  Uncontrolled diabetes  Lab Results   Component Value Date    LABA1C 9.7 09/10/2021    LABA1C 8.5 2021    LABA1C 7.3 2021       -     POCT glycosylated hemoglobin (Hb A1C) 9.7, worsening, uncontrolled,  -     dapagliflozin (FARXIGA) 10 MG tablet; Take 1 tablet by mouth every morning For diabetes, per your insurance, Disp-30 tablet, R-11Normal  -     SITagliptin (JANUVIA) 100 MG tablet; Take 1 tablet by mouth daily Dose increased 2021, Disp-90 tablet, R-3Normal  -     aspirin (EQ ASPIRIN ADULT LOW DOSE) 81 MG EC tablet; Take 1 tablet by mouth once daily, Disp-90 tablet, R-3Normal  -     glucose monitoring (FREESTYLE FREEDOM) kit; Disp-1 kit, R-0, NormalPlease supply Patient with a glucose monitoring kit that insurance will cover. Testing 3 times a day  -     blood glucose monitor strips; Testing once a day. Please dispense according to patients insurance., Disp-100 strip, R-3, Normal  -     Lancets 30G MISC; Disp-100 each, R-3, NormalTesting once a day. Please dispense according to patients insurance. -     Alcohol Swabs PADS; Disp-100 each, R-3, NormalPlease dispense according to patients insurance/device. Testing once a day  -     [facility] [address] [facility] Metabolic Panel; Future  -advised home blood glucose testing  daily  -daily feet exam, Foot care: advised to wash feet daily, pat dry and apply lotion at night, avoiding between toes.  Need to look at feet daily and report to a physician any signs of inflammation or skin damage  -annual dilated eye exam  -Low carb, low fat diet, increase fruits and vegetables, and exercise 4-5 times a day 30-40 minutes a day discussed  -continue current treatment and improve diet.  -continue Aspirin  -continue statin Lipitor 40 mg  To improve his diet, no alcohol and no pop discussed    Needs diabetic shoes, he would benefit from diabetic shoes, RX given      2. Benign hypertension with CKD (chronic kidney disease), stage II  Stable Chronic kidney disease stage 2  Well controlled HTN. Continue current treatment. Will recheck labs. -     hydrALAZINE (APRESOLINE) 50 MG tablet; Take 1 tablet by mouth 3 times daily BP medication, Disp-270 tablet, R-3Normal  -     spironolactone (ALDACTONE) 25 MG tablet; Take 1 tablet by mouth every morning BP and CHF, Disp-90 tablet, R-3Normal  -     amLODIPine (NORVASC) 10 MG tablet; Take 1 tablet by mouth once daily, Disp-90 tablet, R-3Normal  -     carvedilol (COREG) 25 MG tablet; TAKE 1 TABLET BY MOUTH TWICE DAILY WITH MEALS, Disp-180 tablet, R-11Normal  -     furosemide (LASIX) 40 MG tablet; Take 1 tablet by mouth daily Stop chlorthalidone and Metformin due to leg swelling, Disp-90 tablet, R-3Normal  -     Basic Metabolic Panel; Future    Discussed low salt diet and BP and pulse monitoring. 3. Chronic combined systolic and diastolic CHF (congestive heart failure) (HCC)  Stable    Lab Results   Component Value Date    LVEF 30 01/16/2019    LVEFMODE Echo 01/16/2019     -     hydrALAZINE (APRESOLINE) 50 MG tablet; Take 1 tablet by mouth 3 times daily BP medication, Disp-270 tablet, R-3Normal  -     spironolactone (ALDACTONE) 25 MG tablet; Take 1 tablet by mouth every morning BP and CHF, Disp-90 tablet, R-3Normal  -     amLODIPine (NORVASC) 10 MG tablet;  Take 1 tablet by mouth once daily, Disp-90 tablet, R-3Normal  -     carvedilol (COREG) 25 MG tablet; TAKE 1 TABLET BY MOUTH TWICE DAILY WITH MEALS, Disp-180 tablet, R-11Normal  -     furosemide (LASIX) 40 MG maintenance  Discussed use, benefit, and side effects of prescribed medications. Barriers to medication compliance addressed. Patient given educational materials - see patient instructions  Was a self-tracking handout given in paper form or via [employer]? Yes  All patient questions answered. Patient voiced understanding. The patient's past medical,surgical, social, and family history as well as his current medications and allergies were reviewed as documented in today's encounter. Medications, labs, diagnostic studies, consultations and follow-up as documented in this encounter. Return in about 3 months (around 12/10/2021) for ALWAYS NEEDS 30 MIN. APPT, **POC A1C, DM2, HTN, HLP, CHF. Data Unavailable    Future Appointments   Date Time Provider Vianey Malagon   1[facility]21[facility]2022  2:00 [facility]   2/25/2022 10:00 AM SCHEDULE, [facility]OLOG[facility]         SUBJECTIVE/OBJECTIVE:    Diabetes Mellitus Type II, Follow-up:    Current symptoms/problems include hyperglycemia, paresthesia of the feet and visual disturbances. Symptoms have been present for several years. He would like prescription for diabetic shoes    Known diabetic complications: nephropathy, peripheral neuropathy, impotence and cardiovascular disease  Cardiovascular risk factors: advanced age (older than 54 for men, 72 for women), diabetes mellitus, dyslipidemia, hypertension, male gender, microalbuminuria and obesity (BMI >= 30 kg/m2)    Medication compliance:  compliant most of the time  Current diabetic medications include . Januvia and [place]    Eye exam current (within one year): no  Current diet: in general, a \"healthy\" diet  , drinks Body armor and alcohol    Barriers: impairment:  physical: CHF, feet pain and neuropathy, and lack of motivation    Current monitoring regimen: home blood tests - daily  Home blood sugar records: fasting range: 163 his last reading  Any episodes of hypoglycemia? no    Is He on ACE inhibitor or angiotensin II receptor blocker? No: Taking Aldactone instead     reports that he quit smoking about 40 years ago. His smoking use included cigarettes. He has a 1.00 pack-year smoking history. He has quit using smokeless tobacco.     Counseling given: Yes      Daily Aspirin? Yes      A1c is worsening, uncontrolled  Lab Results   Component Value Date    LABA1C 9.7 09/10/2021    LABA1C 8.5 06/01/2021    LABA1C 7.3 01/12/2021       Urine microabumin is Normal.  Lab Results   Component Value Date    LABMICR 15 05/27/2020        He has lost 8 pounds since the last appointment  Wt Readings from Last 3 Encounters:   09/10/21 243 lb (110.2 kg)   08/27/21 251 lb (113.9 kg)   07/23/21 251 lb (113.9 kg)         Hypertension, congestive heart failure, chronic kidney disease stage II     Freddy  is not  exercising and is adherent to low salt diet. Blood pressure is well controlled at home. Cardiac symptoms  fatigue and lower extremity edema. He says his leg swelling has improved    Patient denies  chest pain, chest pressure/discomfort, claudication, dyspnea, exertional chest pressure/discomfort, irregular heart beat, near-syncope, orthopnea, palpitations, paroxysmal nocturnal dyspnea, syncope and tachypnea. Use of agents associated with hypertension: none. History of target organ damage: chronic kidney disease and heart failure. Didn't see cardiology, despite referral    Patient had 2 admissions due to acute on chronic congestive heart failure in May 2018, then again in January 2019. [facility]iac [facility] at [facility] 6/23/2017 showed 50% stenosis, EF 45%      BP controlled. Nereyda Marley reports compliance with BP medications, and tolerates them well, denies side effects.     BP Readings from Last 3 Encounters:   09/10/21 120/80   08/27/21 118/74   07/23/21 130/80        Pulse is Normal.    Pulse Readings from Last 3 Encounters:   09/10/21 64   08/27/21 58   07/23/21 67         Hyperlipidemia:  No new myalgias or GI upset on atorvastatin (Lipitor). Medication compliance: compliant all of the time. Patient is  following a low fat, low cholesterol diet. LDL is Normal.    Lab Results   Component Value Date    LDLCALC 119 01/16/2019    LDLCHOLESTEROL 64 06/01/2021     Gout and high Uric acid  Taking allopurinol  He admits to drinking alcohol  Denies any flare ups of gout    Lab Results   Component Value Date    URICACID 7.4 (H) 08/16/2021         Prior to Visit Medications    Medication Sig Taking? Authorizing Provider   tamsulosin (FLOMAX) 0.4 MG capsule Take 1 capsule by mouth daily Yes Devonte Sibley MD   sildenafil (VIAGRA) 100 MG tablet Take 1 tablet by mouth daily as needed for Erectile Dysfunction Yes Devonte Sibley MD   allopurinol (ZYLOPRIM) 100 MG tablet Take 1 tablet by mouth daily FOR GOUT. STOP IT IF ANY RASH DEVELOPS!  Yes Giovana Conde MD   dapagliflozin (FARXIGA) 10 MG tablet Take 1 tablet by mouth every morning For diabetes, per your insurance Yes Giovana Conde MD   SITagliptin (JANUVIA) 100 MG tablet Take 1 tablet by mouth daily Dose increased 6/1/2021 Yes Giovana Conde MD   hydrALAZINE (APRESOLINE) 50 MG tablet Take 1 tablet by mouth 3 times daily BP medication Yes Giovana Conde MD   spironolactone (ALDACTONE) 25 MG tablet Take 1 tablet by mouth every morning BP and CHF Yes Giovana Conde MD   atorvastatin (LIPITOR) 40 MG tablet Take 1 tablet by mouth daily For high cholesterol, you need to take it every day Yes Giovana Conde MD   amLODIPine (NORVASC) 10 MG tablet Take 1 tablet by mouth once daily Yes Giovana Conde MD   aspirin (EQ ASPIRIN ADULT LOW DOSE) 81 MG EC tablet Take 1 tablet by mouth once daily Yes Giovana Conde MD   carvedilol (COREG) 25 MG tablet TAKE 1 TABLET BY MOUTH TWICE DAILY WITH MEALS Yes Giovana Conde MD   Multiple Vitamins-Minerals (MULTIVITAMIN-MINERALS) TABS tablet Take 1 tablet by mouth daily Yes Stacey MD Amita   acetaminophen (EQ ACETAMINOPHEN) 500 MG tablet Take 1 tablet by mouth every 6 hours as needed for Pain Yes Phu Trujillo MD   ketoconazole (NIZORAL) 2 % cream Apply on the feet, not between the toes, twice a day Yes Phu Trujillo MD   lidocaine (LMX) 4 % cream Apply 2-3 times a day as needed for pain Yes Phu Trujillo MD   Blood Pressure KIT Diagnosis: HTN. Needs to check blood pressure 1-2 times a day until stable, then once a day. Goal blood pressure less than 135/85, and above 110/60. Yes Phu Trujillo MD   blood glucose test strips (FREESTYLE LITE) strip Checking Blood Glucose twice a day Yes Tyler Platt APRN - CNP   furosemide (LASIX) 40 MG tablet Take 1 tablet by mouth daily Stop chlorthalidone and Metformin due to leg swelling  Patient not taking: Reported on 9/10/2021  Phu Trujillo MD   nitroGLYCERIN (NITROSTAT) 0.4 MG SL tablet DISSOLVE ONE TABLET UNDER THE TONGUE EVERY 5 MINUTES AS NEEDED FOR CHEST PAIN. DO NOT EXCEED A TOTAL OF 3 DOSES IN 15 MINUTES  Phu Trujillo MD            Social History     Tobacco Use    Smoking status: Former Smoker     Packs/day: 0.50     Years: 2.00     Pack years: 1.00     Types: Cigarettes     Quit date: 1981     Years since quittin.7    Smokeless tobacco: Former User   Substance Use Topics    Alcohol use: Yes     Alcohol/week: 1.0 - 2.0 standard drinks     Types: 1 - 2 Cans of beer per week     Comment: 1-2 cans a month    Drug use: Yes     Types: Marijuana     Comment: marijuana every day          Review of Systems   Constitutional: Positive for fatigue. Negative for activity change, appetite change, chills, diaphoresis, fever and unexpected weight change. Eyes: Positive for visual disturbance. Respiratory: Negative for cough, chest tightness, shortness of breath and wheezing. Cardiovascular: Positive for leg swelling. Negative for chest pain and palpitations.    Gastrointestinal: Negative for abdominal distention, abdominal pain, constipation, diarrhea, nausea and vomiting. Endocrine: Negative for cold intolerance, heat intolerance, polydipsia, polyphagia and polyuria. Genitourinary:        Erectile Dysfunction , seeing urology   Musculoskeletal: Positive for arthralgias (feet). Allergic/Immunologic: Positive for immunocompromised state (due to uncontrolled diabetes). Neurological: Positive for numbness (feet, worsening). Hematological: Does not bruise/bleed easily.         -vital signs stable and within normal limits except obesity per BMI  /80   Pulse 64   Temp 97.1 °F (36.2 °C)   Ht 6' 1\" (1.854 m)   Wt 243 lb (110.2 kg)   SpO2 98%   BMI 32.06 kg/m²         Physical Exam  Vitals and nursing note reviewed. Constitutional:       General: He is not in acute distress. Appearance: Normal appearance. He is well-developed. He is obese. He is not diaphoretic. HENT:      Head: Normocephalic and atraumatic. Right Ear: External ear normal.      Left Ear: External ear normal.      Mouth/Throat:      Comments: I did not examine the mouth due to coronavirus pandemic and wearing masks. Eyes:      General: Lids are normal. No scleral icterus. Right eye: No discharge. Left eye: No discharge. Extraocular Movements: Extraocular movements intact. Conjunctiva/sclera: Conjunctivae normal.   Neck:      Thyroid: No thyromegaly. Cardiovascular:      Rate and Rhythm: Normal rate and regular rhythm. Pulses:           Dorsalis pedis pulses are 2+ on the right side and 2+ on the left side. Posterior tibial pulses are 2+ on the right side and 2+ on the left side. Heart sounds: Murmur heard. Crescendo systolic murmur is present with a grade of 3/6. Comments: Trace pitting edema  Pulmonary:      Effort: Pulmonary effort is normal. No respiratory distress. Breath sounds: Normal breath sounds. No wheezing or rales.    Chest:      Chest wall: No tenderness. Abdominal:      General: Bowel sounds are normal. There is no distension. Palpations: Abdomen is soft. There is no hepatomegaly or splenomegaly. Tenderness: There is no abdominal tenderness. Comments: Obese abdomen. Musculoskeletal:         General: No tenderness. Normal range of motion. Cervical back: Normal range of motion and neck supple. Right lower leg: Pitting Edema present. Left lower leg: Pitting Edema present. Right foot: Normal range of motion. Deformity present. No bunion, Charcot foot, foot drop or prominent metatarsal heads. Left foot: Normal range of motion. Deformity present. No bunion, Charcot foot, foot drop or prominent metatarsal heads. Feet:      Right foot:      Protective Sensation: 5 sites tested. 2 sites sensed. Skin integrity: Skin integrity normal.      Toenail Condition: Right toenails are normal.      Left foot:      Protective Sensation: 5 sites tested. 2 sites sensed. Skin integrity: Skin integrity normal.      Comments: Flat feet bilateral   Lymphadenopathy:      Cervical: No cervical adenopathy. Skin:     General: Skin is warm and dry. Capillary Refill: Capillary refill takes less than 2 seconds. Findings: No rash. Neurological:      Mental Status: He is alert and oriented to person, place, and time. Deep Tendon Reflexes: Reflexes are normal and symmetric. Psychiatric:         Mood and Affect: Mood normal.         Behavior: Behavior normal.         Thought Content: Thought content normal.         Judgment: Judgment normal.           I personally reviewed testing with patient.   Anemia, I reprinted the referral to the GI specialist and advised to make appointment for GI work-up    Hyperglycemia  Chronic kidney disease stage II stable    Otherwise labs within normal limits    Hospital Outpatient Visit on 08/27/2021   Component Date Value Ref Range Status    PSA 08/27/2021 1.95  <4.1 ug/L Final Comment: The Roche \"ECLIA\" assay is used. Results obtained with different assay methods cannot be   used interchangeably.          Lab Results   Component Value Date    WBC 6.1 06/01/2021    HGB 12.5 (L) 06/01/2021    HCT 39.0 (L) 06/01/2021    MCV 94.3 06/01/2021     06/01/2021       Lab Results   Component Value Date     08/16/2021    K 4.3 08/16/2021     08/16/2021    CO2 25 08/16/2021    BUN 25 08/16/2021    CREATININE 1.40 08/16/2021    GLUCOSE 279 08/16/2021    GLUCOSE 157 02/06/2012    CALCIUM 9.3 08/16/2021        Lab Results   Component Value Date    ALT 18 06/01/2021    AST 15 06/01/2021    ALKPHOS 104 06/01/2021    BILITOT 0.36 06/01/2021       Lab Results   Component Value Date    TSH 1.20 06/01/2021       Lab Results   Component Value Date    CHOL 128 06/01/2021    CHOL 192 11/20/2020    CHOL 130 05/27/2020     Lab Results   Component Value Date    TRIG 131 06/01/2021    TRIG 72 11/20/2020    TRIG 112 05/27/2020     Lab Results   Component Value Date    HDL 38 (L) 06/01/2021    HDL 49 11/20/2020    HDL 42 05/27/2020     Lab Results   Component Value Date    LDLCALC 119 01/16/2019    LDLCHOLESTEROL 64 06/01/2021    LDLCHOLESTEROL 129 11/20/2020    LDLCHOLESTEROL 66 05/27/2020     Lab Results   Component Value Date    CHOLHDLRATIO 3.4 06/01/2021    CHOLHDLRATIO 3.9 11/20/2020    CHOLHDLRATIO 3.1 05/27/2020         Lab Results   Component Value Date    YMKTCBRZ36 613 06/01/2021     Lab Results   Component Value Date    FOLATE 9.4 06/01/2021     Lab Results   Component Value Date    VITD25 40.0 06/01/2021         Orders Placed This Encounter   Medications    dapagliflozin (FARXIGA) 10 MG tablet     Sig: Take 1 tablet by mouth every morning For diabetes, per your insurance     Dispense:  30 tablet     Refill:  11    SITagliptin (JANUVIA) 100 MG tablet     Sig: Take 1 tablet by mouth daily Dose increased 6/1/2021     Dispense:  90 tablet     Refill:  3    allopurinol (ZYLOPRIM) 100 MG tablet     Sig: Take 1 tablet by mouth daily FOR GOUT. STOP IT IF ANY RASH DEVELOPS! Dispense:  90 tablet     Refill:  3    hydrALAZINE (APRESOLINE) 50 MG tablet     Sig: Take 1 tablet by mouth 3 times daily BP medication     Dispense:  270 tablet     Refill:  3    spironolactone (ALDACTONE) 25 MG tablet     Sig: Take 1 tablet by mouth every morning BP and CHF     Dispense:  90 tablet     Refill:  3    atorvastatin (LIPITOR) 40 MG tablet     Sig: Take 1 tablet by mouth daily For high cholesterol, you need to take it every day     Dispense:  90 tablet     Refill:  3    amLODIPine (NORVASC) 10 MG tablet     Sig: Take 1 tablet by mouth once daily     Dispense:  90 tablet     Refill:  3    aspirin (EQ ASPIRIN ADULT LOW DOSE) 81 MG EC tablet     Sig: Take 1 tablet by mouth once daily     Dispense:  90 tablet     Refill:  3    carvedilol (COREG) 25 MG tablet     Sig: TAKE 1 TABLET BY MOUTH TWICE DAILY WITH MEALS     Dispense:  180 tablet     Refill:  11    furosemide (LASIX) 40 MG tablet     Sig: Take 1 tablet by mouth daily Stop chlorthalidone and Metformin due to leg swelling     Dispense:  90 tablet     Refill:  3    glucose monitoring (FREESTYLE FREEDOM) kit     Sig: Please supply Patient with a glucose monitoring kit that insurance will cover. Testing 3 times a day     Dispense:  1 kit     Refill:  0    blood glucose monitor strips     Sig: Testing once a day. Please dispense according to patients insurance. Dispense:  100 strip     Refill:  3    Lancets 30G MISC     Sig: Testing once a day. Please dispense according to patients insurance. Dispense:  100 each     Refill:  3    Alcohol Swabs PADS     Sig: Please dispense according to patients insurance/device.  Testing once a day     Dispense:  100 each     Refill:  3       Orders Placed This Encounter   Procedures    Basic Metabolic Panel     Standing Status:   Future     Standing Expiration Date:   9/10/2022    Brain Natriuretic Peptide Standing Status:   Future     Standing Expiration Date:   9/10/2022   [facility] Diabetes Education - [facility]     Referral Priority:   Routine     Referral Type:   Eval and Treat     Referral Reason:   Specialty Services Required     Number of Visits Requested:   1    POCT glycosylated hemoglobin (Hb A1C)       Medications Discontinued During This Encounter   Medication Reason    dapagliflozin (FARXIGA) 10 MG tablet REORDER    SITagliptin (JANUVIA) 100 MG tablet REORDER    hydrALAZINE (APRESOLINE) 50 MG tablet REORDER    spironolactone (ALDACTONE) 25 MG tablet REORDER    atorvastatin (LIPITOR) 40 MG tablet REORDER    amLODIPine (NORVASC) 10 MG tablet REORDER    aspirin (EQ ASPIRIN ADULT LOW DOSE) 81 MG EC tablet REORDER    carvedilol (COREG) 25 MG tablet REORDER    furosemide (LASIX) 40 MG tablet REORDER    allopurinol (ZYLOPRIM) 100 MG tablet REORDER    blood glucose test strips (FREESTYLE LITE) strip REORDER         On this date 9/10/2021 I have spent 35 minutes reviewing previous notes, test results and face to face with the patient discussing the diagnosis and importance of compliance with the treatment plan as well as documenting on the day of the visit. This note was completed by using the assistance of a speech-recognition program. However, inadvertent computerized transcription errors may be present. Although every effort was made to ensure accuracy, no guarantees can be provided that every mistake has been identified and corrected by editing . An electronic signature was used to authenticate this note.   Electronically signed by Ryland Orlando MD on 9/11/2021 at 11:36 PM

## 2021-09-10 NOTE — PATIENT INSTRUCTIONS
Patient Education        Learning About Carbohydrate (Carb) Counting and Eating Out When You Have Diabetes  Why plan your meals? Meal planning can be a key part of managing diabetes. Planning meals and snacks with the right balance of carbohydrate, protein, and fat can help you keep your blood sugar at the target level you set with your doctor. You don't have to eat special foods. You can eat what your family eats, including sweets once in a while. But you do have to pay attention to how often you eat and how much you eat of certain foods. You may want to work with a dietitian or a certified diabetes educator. He or she can give you tips and meal ideas and can answer your questions about meal planning. This health professional can also help you reach a healthy weight if that is one of your goals. What should you know about eating carbs? Managing the amount of carbohydrate (carbs) you eat is an important part of healthy meals when you have diabetes. Carbohydrate is found in many foods. · Learn which foods have carbs. And learn the amounts of carbs in different foods. ? Bread, cereal, pasta, and rice have about 15 grams of carbs in a serving. A serving is 1 slice of bread (1 ounce), ½ cup of cooked cereal, or 1/3 cup of cooked pasta or rice. ? Fruits have 15 grams of carbs in a serving. A serving is 1 small fresh fruit, such as an apple or orange; ½ of a banana; ½ cup of cooked or canned fruit; ½ cup of fruit juice; 1 cup of melon or raspberries; or 2 tablespoons of dried fruit. ? Milk and no-sugar-added yogurt have 15 grams of carbs in a serving. A serving is 1 cup of milk or 2/3 cup of no-sugar-added yogurt. ? Starchy vegetables have 15 grams of carbs in a serving. A serving is ½ cup of mashed potatoes or sweet potato; 1 cup winter squash; ½ of a small baked potato; ½ cup of cooked beans; or ½ cup cooked corn or green peas.   · Learn how much carbs to eat each day and at each meal. A dietitian or CDE can teach you how to keep track of the amount of carbs you eat. This is called carbohydrate counting. · If you are not sure how to count carbohydrate grams, use the Plate Method to plan meals. It is a good, quick way to make sure that you have a balanced meal. It also helps you spread carbs throughout the day. ? Divide your plate by types of foods. Put non-starchy vegetables on half the plate, meat or other protein food on one-quarter of the plate, and a grain or starchy vegetable in the final quarter of the plate. To this you can add a small piece of fruit and 1 cup of milk or yogurt, depending on how many carbs you are supposed to eat at a meal.  · Try to eat about the same amount of carbs at each meal. Do not \"save up\" your daily allowance of carbs to eat at one meal.  · Proteins have very little or no carbs per serving. Examples of proteins are beef, chicken, turkey, fish, eggs, tofu, cheese, cottage cheese, and peanut butter. A serving size of meat is 3 ounces, which is about the size of a deck of cards. Examples of meat substitute serving sizes (equal to 1 ounce of meat) are 1/4 cup of cottage cheese, 1 egg, 1 tablespoon of peanut butter, and ½ cup of tofu. How can you eat out and still eat healthy? · Learn to estimate the serving sizes of foods that have carbohydrate. If you measure food at home, it will be easier to estimate the amount in a serving of restaurant food. · If the meal you order has too much carbohydrate (such as potatoes, corn, or baked beans), ask to have a low-carbohydrate food instead. Ask for a salad or green vegetables. · If you use insulin, check your blood sugar before and after eating out to help you plan how much to eat in the future. · If you eat more carbohydrate at a meal than you had planned, take a walk or do other exercise. This will help lower your blood sugar. What are some tips for eating healthy? · Limit saturated fat, such as the fat from meat and dairy products.  This is a healthy choice because people who have diabetes are at higher risk of heart disease. So choose lean cuts of meat and nonfat or low-fat dairy products. Use olive or canola oil instead of butter or shortening when cooking. · Don't skip meals. Your blood sugar may drop too low if you skip meals and take insulin or certain medicines for diabetes. · Check with your doctor before you drink alcohol. Alcohol can cause your blood sugar to drop too low. Alcohol can also cause a bad reaction if you take certain diabetes medicines. Follow-up care is a key part of your treatment and safety. Be sure to make and go to all appointments, and call your doctor if you are having problems. It's also a good idea to know your test results and keep a list of the medicines you take. Where can you learn more? Go to https://XMarketmareneb.BigSwerve. org and sign in to your velingo account. Enter T101 in the [employer] box to learn more about \"Learning About Carbohydrate (Carb) Counting and Eating Out When You Have Diabetes. \"     If you do not have an account, please click on the \"Sign Up Now\" link. Current as of: August 31, 2020               Content Version: 12.9  © 2006-2021 Healthwise, [employer]. Care instructions adapted under license by [facility] [place] ([facility]). If you have questions about a medical condition or this instruction, always ask your healthcare professional. [place] [address] any warranty or liability for your use of this information. Patient Education        DASH Diet: Care Instructions  Your Care Instructions     The DASH diet is an eating plan that can help lower your blood pressure. DASH stands for Dietary Approaches to Stop Hypertension. Hypertension is high blood pressure. The DASH diet focuses on eating foods that are high in calcium, potassium, and magnesium. These nutrients can lower blood pressure.  The foods that are highest in these nutrients are fruits, vegetables, low-fat dairy products, nuts, seeds, and legumes. But taking calcium, potassium, and magnesium supplements instead of eating foods that are high in those nutrients does not have the same effect. The DASH diet also includes whole grains, fish, and poultry. The DASH diet is one of several lifestyle changes your doctor may recommend to lower your high blood pressure. Your doctor may also want you to decrease the amount of sodium in your diet. Lowering sodium while following the DASH diet can lower blood pressure even further than just the DASH diet alone. Follow-up care is a key part of your treatment and safety. Be sure to make and go to all appointments, and call your doctor if you are having problems. It's also a good idea to know your test results and keep a list of the medicines you take. How can you care for yourself at home? Following the DASH diet  · Eat 4 to 5 servings of fruit each day. A serving is 1 medium-sized piece of fruit, ½ cup chopped or canned fruit, 1/4 cup dried fruit, or 4 ounces (½ cup) of fruit juice. Choose fruit more often than fruit juice. · Eat 4 to 5 servings of vegetables each day. A serving is 1 cup of lettuce or raw leafy vegetables, ½ cup of chopped or cooked vegetables, or 4 ounces (½ cup) of vegetable juice. Choose vegetables more often than vegetable juice. · Get 2 to 3 servings of low-fat and fat-free dairy each day. A serving is 8 ounces of milk, 1 cup of yogurt, or 1 ½ ounces of cheese. · Eat 6 to 8 servings of grains each day. A serving is 1 slice of bread, 1 ounce of dry cereal, or ½ cup of cooked rice, pasta, or cooked cereal. Try to choose whole-grain products as much as possible. · Limit lean meat, poultry, and fish to 2 servings each day. A serving is 3 ounces, about the size of a deck of cards. · Eat 4 to 5 servings of nuts, seeds, and legumes (cooked dried beans, lentils, and split peas) each week.  A serving is 1/3 cup of nuts, 2 tablespoons of seeds, or ½ cup of cooked beans or peas. · Limit fats and oils to 2 to 3 servings each day. A serving is 1 teaspoon of vegetable oil or 2 tablespoons of salad dressing. · Limit sweets and added sugars to 5 servings or less a week. A serving is 1 tablespoon jelly or jam, ½ cup sorbet, or 1 cup of lemonade. · Eat less than 2,300 milligrams (mg) of sodium a day. If you limit your sodium to 1,500 mg a day, you can lower your blood pressure even more. · Be aware that all of these are the suggested number of servings for people who eat 1,800 to 2,000 calories a day. Your recommended number of servings may be different if you need more or fewer calories. Tips for success  · Start small. Do not try to make dramatic changes to your diet all at once. You might feel that you are missing out on your favorite foods and then be more likely to not follow the plan. Make small changes, and stick with them. Once those changes become habit, add a few more changes. · Try some of the following:  ? Make it a goal to eat a fruit or vegetable at every meal and at snacks. This will make it easy to get the recommended amount of fruits and vegetables each day. ? Try yogurt topped with fruit and nuts for a snack or healthy dessert. ? Add lettuce, tomato, cucumber, and onion to sandwiches. ? Combine a ready-made pizza crust with low-fat mozzarella cheese and lots of vegetable toppings. Try using tomatoes, squash, spinach, broccoli, carrots, cauliflower, and onions. ? Have a variety of cut-up vegetables with a low-fat dip as an appetizer instead of chips and dip. ? Sprinkle sunflower seeds or chopped almonds over salads. Or try adding chopped walnuts or almonds to cooked vegetables. ? Try some vegetarian meals using beans and peas. Add garbanzo or kidney beans to salads. Make burritos and tacos with mashed saavedra beans or black beans. Where can you learn more? Go to https://leonardo.Wein der Woche. org and sign in to your [employer] account.  Enter U181 in the [facility] box to learn more about \"DASH Diet: Care Instructions. \"     If you do not have an account, please click on the \"Sign Up Now\" link. Current as of: August 31, 2020               Content Version: 12.9  © 2006-2021 Healthwise, [employer]. Care instructions adapted under license by [facility] ([facility]). If you have questions about a medical condition or this instruction, always ask your healthcare professional. [address] any warranty or liability for your use of this information. Patient Education        Heart Failure: Care Instructions  Your Care Instructions     Heart failure occurs when your heart does not pump as much blood as the body needs. Failure does not mean that the heart has stopped pumping but rather that it is not pumping as well as it should. Over time, this causes fluid buildup in your lungs and other parts of your body. Fluid buildup can cause shortness of breath, fatigue, swollen ankles, and other problems. By taking medicines regularly, reducing sodium (salt) in your diet, checking your weight every day, and making lifestyle changes, you can feel better and live longer. Follow-up care is a key part of your treatment and safety. Be sure to make and go to all appointments, and call your doctor if you are having problems. It's also a good idea to know your test results and keep a list of the medicines you take. How can you care for yourself at home? Medicines    · Be safe with medicines. Take your medicines exactly as prescribed. Call your doctor if you think you are having a problem with your medicine.     · Do not take any vitamins, over-the-counter medicine, or herbal products without talking to your doctor first. Nayeli Toneys not take ibuprofen (Advil or Motrin) and naproxen (Aleve) without talking to your doctor first. They could make your heart failure worse.     · You may take some of the following medicine. ?  Angiotensin-converting enzyme inhibitors (ACEIs) or angiotensin II receptor blockers (ARBs) reduce the heart's workload, lower blood pressure, and reduce swelling. Taking an ACEI or ARB may lower your chance of needing to be hospitalized. ? Beta-blockers can slow heart rate, decrease blood pressure, and improve your condition. Taking a beta-blocker may lower your chance of needing to be hospitalized. ? Diuretics, also called water pills, reduce swelling. You will get more details on the specific medicines your doctor prescribes. Diet    · Your doctor may suggest that you limit sodium. Your doctor can tell you how much sodium is right for you. An example is less than 3,000 mg a day. This includes all the salt you eat in cooking or in packaged foods. People get most of their sodium from processed foods. Fast food and restaurant meals also tend to be very high in sodium.     · Ask your doctor how much liquid you can drink each day. You may have to limit liquids. Weight    · Weigh yourself without clothing at the same time each day. Record your weight. Call your doctor if you have a sudden weight gain, such as more than 2 to 3 pounds in a day or 5 pounds in a week. (Your doctor may suggest a different range of weight gain.) A sudden weight gain may mean that your heart failure is getting worse. Activity level    · Start light exercise (if your doctor says it is okay). Even if you can only do a small amount, exercise will help you get stronger, have more energy, and manage your weight and your stress. Walking is an easy way to get exercise. Start out by walking a little more than you did before. Bit by bit, increase the amount you walk.     · When you exercise, watch for signs that your heart is working too hard. You are pushing yourself too hard if you cannot talk while you are exercising.  If you become short of breath or dizzy or have chest pain, stop, sit down, and rest.     · If you feel \"wiped out\" the day after you exercise, account, please click on the \"Sign Up Now\" link. Current as of: August 31, 2020               Content Version: 12.9  © 2006-2021 [employer]. Care instructions adapted under license by [facility] ([facility]). If you have questions about a medical condition or this instruction, always ask your healthcare professional. [address] any warranty or liability for your use of this information. Patient Education        Gout: Care Instructions  Overview     Gout is a form of arthritis caused by a buildup of uric acid crystals in a joint. It causes sudden attacks of pain, swelling, redness, and stiffness, usually in one joint, especially the big toe. Gout usually comes on without a cause. But it can be brought on by drinking alcohol (especially beer), eating or drinking things made with high-fructose corn syrup, or eating seafood or red meat. Taking certain medicines, such as diuretics, can also trigger an attack of gout. Taking your medicines as prescribed and following up with your doctor regularly can help you avoid gout attacks in the future. Follow-up care is a key part of your treatment and safety. Be sure to make and go to all appointments, and call your doctor if you are having problems. It's also a good idea to know your test results and keep a list of the medicines you take. How can you care for yourself at home? · If the joint is swollen, put ice or a cold pack on the area for 10 to 20 minutes at a time. Put a thin cloth between the ice and your skin. · Prop up the sore limb on a pillow when you ice it or anytime you sit or lie down during the next 3 days. Try to keep it above the level of your heart. This can help reduce swelling. · Rest sore joints. Avoid activities that put weight or strain on the joints for a few days. Take short rest breaks from your regular activities during the day. · Take your medicines exactly as prescribed.  Call your doctor if you think you are having a problem with your medicine. · Take pain medicines exactly as directed. ? If the doctor gave you a prescription medicine for pain, take it as prescribed. ? If you are not taking a prescription pain medicine, ask your doctor if you can take an over-the-counter medicine. · Eat less seafood and red meat. · Avoid foods or drinks that are made with high-fructose corn syrup. · Check with your doctor before drinking alcohol. · Losing weight, if you are overweight, may help reduce attacks of gout. But do not go on a diet that causes rapid weight loss. Losing a lot of weight in a short amount of time can cause a gout attack. When should you call for help? Call your doctor now or seek immediate medical care if:    · You have a fever.     · The joint is so painful you cannot use it.     · You have sudden, unexplained swelling, redness, warmth, or severe pain in one or more joints. Watch closely for changes in your health, and be sure to contact your doctor if:    · You have joint pain.     · Your symptoms get worse or are not improving after 2 or 3 days. Where can you learn more? Go to https://Arccos Golf.[employer]. org and sign in to your [employer] account. Enter L221 in the [employer] box to learn more about \"Gout: Care Instructions. \"     If you do not have an account, please click on the \"Sign Up Now\" link. Current as of: August 5, 2020               Content Version: 12.9  © 6725-0124 Healthwise, [facility]. Care instructions adapted under license by [facility] ([facility]). If you have questions about a medical condition or this instruction, always ask your healthcare professional. [address] any warranty or liability for your use of this information.

## 2021-09-10 NOTE — RESULT ENCOUNTER NOTE
Addressed during office visit today, A1c 9.7, abnormal, worsening diabetes, continue treatment recommended during the office visit

## 2021-09-15 ENCOUNTER — TELEPHONE (OUTPATIENT)
Dept: FAMILY MEDICINE CLINIC | Age: 61
End: 2021-09-15

## 2021-09-15 NOTE — TELEPHONE ENCOUNTER
Did he receive the glucometer?   Media says it is not covered      Patient needs to make appointment with diabetic education  Osiris Boothe, please give him contact information to call    Lab Results   Component Value Date    LABA1C 9.7 09/10/2021    LABA1C 8.5 06/01/2021    LABA1C 7.3 01/12/2021       Future Appointments   Date Time Provider Vianey Malagon   1/21/2022  2:00 PM Patrick Hong MD [facility]   2/25/2022 10:00 AM SCHEDULE, [facility] [address] [facility] [address] [facility]

## 2021-10-01 ENCOUNTER — TELEPHONE (OUTPATIENT)
Dept: FAMILY MEDICINE CLINIC | Age: 61
End: 2021-10-01

## 2021-10-01 DIAGNOSIS — E11.65 TYPE 2 DIABETES MELLITUS WITH HYPERGLYCEMIA, WITHOUT LONG-TERM CURRENT USE OF INSULIN (HCC): Primary | ICD-10-CM

## 2021-10-01 RX ORDER — GLUCOSAMINE HCL/CHONDROITIN SU 500-400 MG
CAPSULE ORAL
Qty: 100 STRIP | Refills: 3 | Status: SHIPPED | OUTPATIENT
Start: 2021-10-01 | End: 2022-01-21 | Stop reason: SDUPTHER

## 2021-10-01 RX ORDER — BLOOD-GLUCOSE METER
KIT MISCELLANEOUS
Qty: 1 KIT | Refills: 0 | Status: SHIPPED | OUTPATIENT
Start: 2021-10-01

## 2021-10-01 NOTE — TELEPHONE ENCOUNTER
Pt states that his glucometer is broken and he is in need of a new one. He is wondering if you can reorder the same one.

## 2021-10-01 NOTE — TELEPHONE ENCOUNTER
Please let the patient know to  prescription from the pharmacy. Orders Placed This Encounter   Medications    glucose monitoring (FREESTYLE FREEDOM) kit     Sig: Please supply Patient with a glucose monitoring kit that insurance will cover. Dispense:  1 kit     Refill:  0    blood glucose monitor strips     Sig: Testing once a day. Please dispense according to patients insurance. Dispense:  100 strip     Refill:  [address] ([place]), [place] - [id number] [facility]  [address]  [facility] ([place]) [address]  Phone: [phone] Fax: [phone]      No orders of the defined types were placed in this encounter. Future Appointments   Date Time Provider Vianey Malagon   10/5/2021 10:00 AM Robin Subramanian RN [facility] DIAB ED [facility]   1/21/2022  2:00 PM Vinayak Norman MD fp sc [facility]   2/25/2022 10:00 AM SCHEDULE, [facility] [place] [facility] UROLOGY [facility] Sandra Garcia       Thank you!

## 2021-10-05 ENCOUNTER — HOSPITAL ENCOUNTER (OUTPATIENT)
Dept: DIABETES SERVICES | Age: 61
Setting detail: THERAPIES SERIES
Discharge: HOME | End: 2021-10-05
Payer: COMMERCIAL

## 2021-10-05 DIAGNOSIS — E11.42 TYPE 2 DIABETES MELLITUS WITH DIABETIC POLYNEUROPATHY, WITHOUT LONG-TERM CURRENT USE OF INSULIN (HCC): Primary | ICD-10-CM

## 2021-10-05 PROCEDURE — G0108 DIAB MANAGE TRN  PER INDIV: HCPCS

## 2021-10-05 NOTE — PROGRESS NOTES
This visit is a Telephone  session (During FNAWR-10 public health emergency). Pursuant to the emergency declaration under the [facility], [address] [place] [address] and the [employer] Act, this Telephone visit was conducted with patient's (and/or legal guardian's) consent, to reduce the patient's risk of exposure to COVID-19 and provide necessary medical care. The patient (and/or legal guardian) has also been advised to contact this office for worsening conditions or problems, and seek emergency medical treatment and/or call 911 if deemed necessary. Patient identification was verified at the start of the visit: Yes    Total time spent for this encounter: 1 hour    - this encounter was done as one on one virtual /  phone visit as no group sessions being offered for 2 months due to covid - 19 pandemic    Services were provided through via phone discussion to substitute for in-person clinic visit. Patient and provider were located at their individual home/office. Janice Franklin       MEDICAL HISTORY:  Past Medical History:   Diagnosis Date    Acute heart failure ([address] 5/20/2018    Anemia     Benign hypertension with CKD (chronic kidney disease), stage II 7/24/2017    CAD (coronary artery disease)     Cardiomyopathy (HCC)     Chronic diastolic CHF (congestive heart failure) ([address] 7/23/2018    Chronic kidney disease     Coronary artery disease involving native coronary artery of native heart without angina pectoris 11/19/2016    Diabetic nephropathy associated with type 2 diabetes mellitus ([address] 1/12/2016    Diabetic polyneuropathy associated with type 2 diabetes mellitus ([address]     DM (diabetes mellitus), type 2 ([address]     for 15-20 yrs    Erectile dysfunction     Grief 7/24/2017    HTN (hypertension)     Hypertensive urgency     Lipidemia     Mild episode of recurrent major depressive disorder ([address] 1/13/2019    Obesity     Obesity (BMI 30-39.9) 2016    MORENA on CPAP 2013    Sleep apnea 2013    Slow transit constipation     Uncontrolled type 2 diabetes mellitus, without long-term current use of insulin ([facility] 10/13/2016     Family History   Problem Relation Age of Onset    Asthma Mother         /78    High Blood Pressure Mother     Heart Disease Mother     High Blood Pressure Father     Diabetes Sister     High Blood Pressure Sister     Diabetes Brother     High Blood Pressure Brother     Cancer Sister         breast    Cancer Sister         lung/smoker     Colon Cancer Other      Lisinopril   Immunization History   Administered Date(s) Administered    COVID-19, Pfizer, PF, 30mcg/0.3mL 2021    Influenza Virus Vaccine 10/02/2017, 2019    Influenza Whole 10/16/2007    Influenza, Quadv, 6 mo and older, IM, PF (Flulaval, Fluarix) 2019    Influenza, Quadv, IM, PF (6 mo and older Fluzone, Flulaval, Fluarix, and 3 yrs and older Afluria) 10/13/2016, 2017, 2019, 2020    Pneumococcal Polysaccharide (Wrdvnjkrs02) 2006    Tdap (Boostrix, Adacel) 2014    Zoster Recombinant (Shingrix) 2019, 2020     Current Medications  Current Outpatient Medications   Medication Sig Dispense Refill    glucose monitoring (FREESTYLE FREEDOM) kit Please supply Patient with a glucose monitoring kit that insurance will cover. 1 kit 0    blood glucose monitor strips Testing once a day. Please dispense according to patients insurance. 100 strip 3    dapagliflozin (FARXIGA) 10 MG tablet Take 1 tablet by mouth every morning For diabetes, per your insurance 30 tablet 11    SITagliptin (JANUVIA) 100 MG tablet Take 1 tablet by mouth daily Dose increased 2021 90 tablet 3    allopurinol (ZYLOPRIM) 100 MG tablet Take 1 tablet by mouth daily FOR GOUT.  STOP IT IF ANY RASH DEVELOPS! 90 tablet 3    hydrALAZINE (APRESOLINE) 50 MG tablet Take 1 tablet by mouth 3 times daily BP medication 270 tablet 3    spironolactone (ALDACTONE) 25 MG tablet Take 1 tablet by mouth every morning BP and CHF 90 tablet 3    atorvastatin (LIPITOR) 40 MG tablet Take 1 tablet by mouth daily For high cholesterol, you need to take it every day 90 tablet 3    amLODIPine (NORVASC) 10 MG tablet Take 1 tablet by mouth once daily 90 tablet 3    aspirin (EQ ASPIRIN ADULT LOW DOSE) 81 MG EC tablet Take 1 tablet by mouth once daily 90 tablet 3    carvedilol (COREG) 25 MG tablet TAKE 1 TABLET BY MOUTH TWICE DAILY WITH MEALS 180 tablet 11    furosemide (LASIX) 40 MG tablet Take 1 tablet by mouth daily Stop chlorthalidone and Metformin due to leg swelling 90 tablet 3    Lancets 30G MISC Testing once a day. Please dispense according to patients insurance. 100 each 3    Alcohol Swabs PADS Please dispense according to patients insurance/device. Testing once a day 100 each 3    tamsulosin (FLOMAX) 0.4 MG capsule Take 1 capsule by mouth daily 30 capsule 11    sildenafil (VIAGRA) 100 MG tablet Take 1 tablet by mouth daily as needed for Erectile Dysfunction 30 tablet 3    Multiple Vitamins-Minerals (MULTIVITAMIN-MINERALS) TABS tablet Take 1 tablet by mouth daily 90 tablet 3    acetaminophen (EQ ACETAMINOPHEN) 500 MG tablet Take 1 tablet by mouth every 6 hours as needed for Pain 120 tablet 3    ketoconazole (NIZORAL) 2 % cream Apply on the feet, not between the toes, twice a day 1 Tube 1    lidocaine (LMX) 4 % cream Apply 2-3 times a day as needed for pain 120 g 3    Blood Pressure KIT Diagnosis: HTN. Needs to check blood pressure 1-2 times a day until stable, then once a day. Goal blood pressure less than 135/85, and above 110/60. 1 kit 0    nitroGLYCERIN (NITROSTAT) 0.4 MG SL tablet DISSOLVE ONE TABLET UNDER THE TONGUE EVERY 5 MINUTES AS NEEDED FOR CHEST PAIN.   DO NOT EXCEED A TOTAL OF 3 DOSES IN 15 MINUTES 25 tablet 0     No current facility-administered medications for this encounter.   : Comments:  Allergies: Allergies   Allergen Reactions    Lisinopril      Patient is on Aldactone       A1C blood level   Lab Results   Component Value Date    LABA1C 9.7 09/10/2021    LABA1C 8.5 06/01/2021    LABA1C 7.3 01/12/2021     Lab Results   Component Value Date    LABMICR 15 05/27/2020    LDLCALC 119 01/16/2019    CREATININE 1.40 (H) 08/16/2021       Blood pressure   BP Readings from Last 3 Encounters:   09/10/21 120/80   08/27/21 118/74   07/23/21 130/80        Cholesterol  Lab Results   Component Value Date    LDLCALC 119 01/16/2019    LDLCHOLESTEROL 64 06/01/2021        Diabetes Self- Management Education Record    Participant Name: Lorin Salazar  Referring Provider: Domingo Yeh MD   Assessment/Evaluation Ratings:  1=Needs Instruction   4=Demonstrates Understanding/Competency  2=Needs Review   NC=Not Covered    3=Comprehends Key Points  N/A=Not Applicable  Topics/Learning Objectives Pre-session Assess Date:  6/9/20rs     10/5/21rs ( re assessment)    Instr. Date Reinforce Date Post- session Eval Comments   Diabetes disease process & Treatment process: Define diabetes & pre-diabetes; Identify own type of diabetes; role of the pancreas; signs/symptoms; diagnostic criteria; prevention & treatment options; contributing factors. 1  2    Dx 20 -years ago   Incorporating nutritional management into lifestyle: Describe effect of type, amount & timing of food on blood glucose; Describe basic meal planning techniques & current nutrition guideline   1-   1- patient with many questions on what to eat for  Diabetic and low purine diet  ( lower risk of gout) - stated he stopped all beer intake     What to eat - Food groups, When to eat - timing of meals and snacks, and How much to eat - portions control.        calories/ day   CHO choices/ meal   CHO choices/  day   grams of protein /day   gram of fat /day     Correctly read food labels & demonstrate CHO counting & portion control with personalized meal plan. Identify dining out strategies, & dietary sick day guidelines. 1  1       Incorporating physical activity into lifestyle:   Verbalize effect of exercise on blood glucose levels; benefits of regular exercise; safety considerations; contraindications; maintenance of activity. 1  1    Walking - daily - gardening  - encouraged more    10/5/21rs - having difficulty with walking due to gout -had ref to PT but did not follow up this summer - willing to try now - given info from ref: [facility] at [address], , [facility], [address] [place] [address]  [phone]    Using medications safely:  Identify effects of diabetes medicines on blood glucose levels; List diabetes medication taken, action & side effects;    1  1    - metformin xr\  - januvia daily  - 10/5/21 rs added Ricardo Schumacher recently  - noted by patient more urination, reinforced need to drink more water and watch good care genial urinary care    Insulin / Injectable - Appropriate injection sites; proper storage; supplies needed; proper technique; safe needle disposal guidelines. 1  1       Monitoring blood glucose, interpreting and using results:  Identify recommended & personal blood glucose targets; importance of testing; testing supplies; HgbA1C target levels; Factors affecting blood glucose; Importance of logging blood glucose levels for pattern recognition; ketone testing; safe lancet disposal.   1  1    Fasting 130 - post meal 170 's - checks BG 1- 2 times per day    10/5/21rs - getting new BG meter today - stated he had hard time use of last meter and starting to check- sending out to pt new log book    Prevention, detection & treatment of acute complications:  Identify symptoms of hyper & hypoglycemia, and prevention & treatment strategies. 1  1    -10/5/21 rs  no lows   Describe sick day guidelines & indications for  physician notification. Identify short term consequences of poor control. Disaster preparedness strategies    1  1       Prevention, detection & treatment of chronic complications:  Define the natural course of diabetes & describe the relationship of blood glucose levels to long term complications of diabetes. Identify preventative measures & standards of care. 1  1    - following with foot specialist  - right foot also has gout    Developing strategies to address psychosocial issues:  Describe feelings about living with diabetes; Describe how stress, depression & anxiety affect blood glucose; Identify coping strategies; Identify support needed & support network available. 1  1    10/5/21 rs - stated has 5 grandkids and want to be there for him   Developing strategies to promote health/change behavior: Identify 7 self-care behaviors; Personal health risk factors; Benefits, challenges & strategies for behavioral change;    1  1      10/5/21rs - expressed he wanted to enjoy life and and small things in life and wants to see grandkids - ie wants to take grandkids to zoo   Individualized goal selection. My goal , to help me improve my health, I will:   1. Healthy eating  Stay away from pop and candy- eat less fried food - follow diet for     2. Monitoring: restart home BG checks - fasting and 2 pp          Given general advice for gout: Info from https://www.olivas.info/  Organ and glandular meats. Avoid meats such as liver, kidney and sweetbreads, which have high purine levels and contribute to high blood levels of uric acid. Red meat. Limit serving sizes of beef, lamb and pork. Seafood. Some types of seafood -- such as anchovies, shellfish, sardines and tuna -- are higher in purines than are other types. But the overall health benefits of eating fish may outweigh the risks for people with gout. Moderate portions of fish can be part of a gout diet. High-purine vegetables.  Studies have shown that vegetables high in purines, such as asparagus and spinach, don't increase the risk of gout or recurring gout attacks. Alcohol. Beer and distilled liquors are associated with an increased risk of gout and recurring attacks. Moderate consumption of wine doesn't appear to increase the risk of gout attacks. Avoid alcohol during gout attacks, and limit alcohol, especially beer, between attacks. Sugary foods and beverages. Limit or avoid sugar-sweetened foods such as sweetened cereals, bakery goods and candies. Limit consumption of naturally sweet fruit juices. Vitamin C. Vitamin C may help lower uric acid levels. Talk to your doctor about whether a 500-milligram vitamin C supplement fits into your diet and medication plan. Coffee. Some research suggests that drinking coffee in moderation, especially regular caffeinated coffee, may be associated with a reduced risk of gout. Drinking coffee may not be appropriate if you have other medical conditions. Talk to your doctor about how much coffee is right for you. Cherries. There is some evidence that eating cherries is associated with a reduced risk of gout attacks. Plan  Follow-up Appointments planned - Per VV in 2 week     Instruction Method: [x]Lecture/Discussion  []Power Point Presentation  [x]Handouts  []Return Demonstration    Education Materials/Equipment Provided (VIA Mail for phone visits)  :    [x]Self-Management - Initial assessment - Enrolment in to ADA  Where do I Begin, Living with Type 2 diabetes ADA home support program and  handout on diabetes education classes.  -- 6/9/20RS -  10/5/21rs   []Self-Management  Class 1 -Self-Management  Class 1 - \"Diabetes - your take control guide\" - ADA booklet -  o  \"ready, set, start counting\" teaching sheet in diet section   o  GLP-1 understanding 8 core deficits puzzle sheet in the medication section  o one day food diary and envelope for return of diet HX   o  You tube and website resource sheet-Understanding Type 2 Diabetes from Animated Diabetes Patient https://youtu. be/[email or web address]      [] Self-Management  Class 2 - Meal Plan and handout for serving sizes, smarter snacking, Ready Set Carb Counting / Plate Method, Nutrient Conversion and International Diabetes [address] Eating for People with Diabetes and Nutrition in the [employer] - fast facts about fast food    [] Self-Management  Class 3 -  Diabetes your take control guide pages 20 - 30,  type 2 diabetes and the role of GLP- 1,  Individualized Diabetes report card     [] Self-Management Class 4 - BD Booklet  Sick Day Rules and  Dinning Out Guide , recipe hand outs and tips, diabetes Cookbooks  ( when available)     []Self-Management - 3 month follow -  AADE7 Self care behaviors work sheets, 2020 [employer],  Online resource list - March 2020      []Self-Management  Gestational - RN class -Resource materials sent out : care booklet - \" Gestational Diabetes Mellitus ( GDM) toolkit form [place] gestational diabetes postpartum care learning collaborative 2018. \"Simple Guidelines for meal planning with gestational diabetes. SMBG sheets to fax back to MFM weekly. BD  healthy injection site selection and rotation with 6 mm insulin syringe and 4 mm pen needle. Gestational diabetes handout from [facility] 2016. Did you have gestational diabetes when you were pregnant? Handout from [facility]  April 2014    []Self-Management Gestational - RD class - My Food Plan for Gestational diabetes    []Glucose Meter     []Insulin Kit     []Other      Encounter Type Date Start Time End Time Comments No Show Dates   Assessment 6/9/20rs    10/5/21rs     8 27     1000   9 27    1100   []In Person  [x]Telephone-VV     Class 1 - Understanding diabetes     []TelephoneAmerican Diabetes Association  www. diabetes. org    Class 2- Nutrition and diabetes      []Telephone  Healthy Eating with Diabetes- Automatic Data of Diabetes and Digestive and Kidney   https://youtu. be/[email or web address]    Class 3 - Preventing Complications     []Telephone    Class 4 -  In depth Nutrition and sick day care    []Telephone  Diabetes Food hub  www. diabetesfoodhub.org     Class 5 - 3 month follow up / goal reassessment        Gestational - RN         Gestational - RD        Individual MNT         Shared Med Appt         Yearly Follow-up        Meter Instrx      How to Measure Your Blood Sugar - [facility] Patient Education  https://youtu. be/[email or web address]    Insulin Instrx      []Pen  []Vial & Syringe   BD Diabetes Care: How to Inject Insulin with a Pen Needle  https://youtu. be/[email or web address]    Diabetes Care: How to Inject Insulin with a Syringe  https://youtu. be/9uSSBu-[email or web address]       DSMS Support :   [] MNT      [] Annual update     [] Starting Fresh  adults living with diabetes or pre diabetes. [address] [place], [place]    [id number]:30pm- on 6 week rotation  Free -  REGISTRATION IS REQUIRED - [facility] [phone] call for dates    []  Diabetes Group at  [address] [place] - Free 6 week diabetes education support   classes - contact : Lilliana Joe [phone]  for dates and locations      []ADA  Where do I Begin, Living with Type 2 diabetes ADA home support program    Web site: diabetes. org/living    Call: 1800 DIABETES  e-mail: Noam@[employer]. org     []  Internet web sites - ADAWeb site: www.diabetes. org                  Post Education Referrals:      [] [place] Tobacco Quit information sheet and [address] [facility] , 21       [] Dental care - [facility]     [] [facility] ([facility]) link  phone number - for information and referral to [facility]  Clinically  [facility] [address] [facility], WEIGHT MANAGEMENT        []Other  Huber Kennedy RN  CDE

## 2021-10-05 NOTE — LETTER
[facility]  [address]  [facility] [address]  Phone: [phone]         October 5, 2021    To :Yossi Rankin MD    From: Betty Irvin RN    Patient: Cristobal Macedo   YOB: 1960   Date of Visit: 10/5/21     An initial assessment to determine diabetes education needs was completed. Education plan includes:interpretation of lab results, blood sugar goals, complications of diabetes mellitus, hypoglycemia prevention and treatment, exercise, illness management, self-monitoring of blood glucose skills, nutrition and carbohydrate counting and role of diabetes medications. An ongoing plan was created to include: follow up via VV in 2 weeks    Thank you for the opportunity to provide Diabetes Self Management Education to your patient.

## 2021-10-18 ENCOUNTER — TELEPHONE (OUTPATIENT)
Dept: GASTROENTEROLOGY | Age: 61
End: 2021-10-18

## 2021-10-18 NOTE — TELEPHONE ENCOUNTER
NP - Anemia - [facility]    Appt was scheduled @ [address] on 7/22/2020, pt was a no show     1st attempt - Spoke w/pt and he decline to schedule states he needs to f/u with PCP first, sending referral back to PCP.

## 2021-11-09 ENCOUNTER — NURSE TRIAGE (OUTPATIENT)
Dept: OTHER | Facility: CLINIC | Age: 61
End: 2021-11-09

## 2021-11-09 NOTE — TELEPHONE ENCOUNTER
This triage is a result of a call to David manriquez Nurse. Please do not respond to the triage nurse through this encounter. Any subsequent communication should be directly with the patient. Reason for Call: Israel Marroquin states Dizzy, nauseated    Symptoms: Abd pain - middle near belly button, comes and goes, dull,   Dizzy - feels light headed     Pain Description and Rating: Abd pain rating 8-9    What has been tried for treatment of symptoms?: Tums    Pertinent history or diagnoses: Diabetes, CHF, cardiomyopathy    Disposition: ER    Reason for Disposition   Patient wants to be seen    Answer Assessment - Initial Assessment Questions  1. LOCATION: \"Where does it hurt? \"       Middle in the center    2. RADIATION: \"Does the pain shoot anywhere else? \" (e.g., chest, back)      No    3. ONSET: \"When did the pain begin? \" (Minutes, hours or days ago)       Dizziness a couple weeks and the abd pain last week    4. SUDDEN: \"Gradual or sudden onset? \"      Sudden    5. PATTERN \"Does the pain come and go, or is it constant? \"     - If constant: \"Is it getting better, staying the same, or worsening? \"       (Note: Constant means the pain never goes away completely; most serious pain is constant and it progresses)      - If intermittent: \"How long does it last?\" \"Do you have pain now? \"      (Note: Intermittent means the pain goes away completely between bouts)      Comes and goes, dull    6. SEVERITY: \"How bad is the pain? \"  (e.g., Scale 1-10; mild, moderate, or severe)     - MILD (1-3): doesn't interfere with normal activities, abdomen soft and not tender to touch      - MODERATE (4-7): interferes with normal activities or awakens from sleep, tender to touch      - SEVERE (8-10): excruciating pain, doubled over, unable to do any normal activities        8-9    7. RECURRENT SYMPTOM: \"Have you ever had this type of abdominal pain before? \" If so, ask: \"When was the last time? \" and \"What happened that time? \"       In the past he had small infarct    8. CAUSE: \"What do you think is causing the abdominal pain? \"      Unsure    9. RELIEVING/AGGRAVATING FACTORS: \"What makes it better or worse? \" (e.g., movement, antacids, bowel movement)      Smokes weed, helps with the pain a little, TUMS    10. OTHER SYMPTOMS: \"Has there been any vomiting, diarrhea, constipation, or urine problems? \"        Nausea    Protocols used: ABDOMINAL PAIN - MALE-ADULT-[place]

## 2021-11-10 ENCOUNTER — TELEPHONE (OUTPATIENT)
Dept: FAMILY MEDICINE CLINIC | Age: 61
End: 2021-11-10

## 2021-11-10 NOTE — TELEPHONE ENCOUNTER
Please schedule patient for emergency room follow-up in 7 to 10 days, okay for virtual appointment with me or Tyler , preferably with myself      Reason for Visit    Reason Comments   Abdominal Pain     Dizziness         Encounter Details    Date Type Department Care Team Description   11/09/2021 - 11/10/2021 Emergency [address], [address] [facility]   [phone]   Jian Knight MD    [address] [place] [address]   [phone] (Work)    [phone] ([address] , [address], [place]    [address] [facility]   John wyatt, [address] [facility]   [phone] [facility]    [phone] (Fax)   Enteritis (Primary Dx); Dehydration;   Non-intractable vomiting with nausea, unspecified vomiting type;    Enlarged prostate

## 2021-11-17 PROBLEM — N40.0 ENLARGED PROSTATE: Status: ACTIVE | Noted: 2021-11-10

## 2021-11-18 ENCOUNTER — TELEPHONE (OUTPATIENT)
Dept: FAMILY MEDICINE CLINIC | Age: 61
End: 2021-11-18

## 2021-11-18 NOTE — TELEPHONE ENCOUNTER
Spoke with patient about his missed appointment and he states his phone was off accidentally as it is a new phone. I asked if he would like to reschedule or wait until his next appointment. He states he will wait until his next appointment.

## 2021-11-19 ENCOUNTER — TELEPHONE (OUTPATIENT)
Dept: FAMILY MEDICINE CLINIC | Age: 61
End: 2021-11-19

## 2021-11-19 NOTE — TELEPHONE ENCOUNTER
Patient is on Aldactone, cannot take ACE inhibitor      Allergies   Allergen Reactions    Lisinopril      Patient is on Aldactone               FYI  Therapy Consideration: ARNI, ACE-I, or ARB in Heart Failure Your patient has heart failure and may not be receiving an inhibitor of the renin-angiotensin system based on claims records. Addition of an angiotensin receptor-neprilysin inhibitor (ARNI), ACE inhibitor (ACE-I), or angiotensin receptor blocker (ARB) is recommended by guidelines to reduce morbidity and mortality in patients with heart failure and left ventricular dysfunction.  ARNIs are the preferred agents, but ACE-I or ARB may be used as an alter

## 2022-01-17 ENCOUNTER — TELEPHONE (OUTPATIENT)
Dept: FAMILY MEDICINE CLINIC | Age: 62
End: 2022-01-17

## 2022-01-17 NOTE — TELEPHONE ENCOUNTER
Patient is on Aldactone, will avoid taking ACE or ARB due to high risk of hyperkalemia    FYI  Therapy Consideration: ACE Inhibitor or Angiotensin Receptor Blocker in Diabetes with Renal Manifestations Your patient has been diagnosed with diabetes and renal impairment based on claims records. Please consider prescribing an ACE inhibitor (ACE-I) or angiotensin receptor blocker (ARB) to reduce further loss of kidney function. Guidelines recommend ACE-I or ARB therapy to slow the progression of nephropathy in nonpregnant patients with diabetes and albuminuria. Monitoring of renal function and serum potassium levels is advised if an ACE-I or ARB is used.

## 2022-01-21 ENCOUNTER — TELEPHONE (OUTPATIENT)
Dept: FAMILY MEDICINE CLINIC | Age: 62
End: 2022-01-21

## 2022-01-21 ENCOUNTER — OFFICE VISIT (OUTPATIENT)
Dept: FAMILY MEDICINE CLINIC | Age: 62
End: 2022-01-21
Payer: COMMERCIAL

## 2022-01-21 VITALS
WEIGHT: 231.2 LBS | OXYGEN SATURATION: 98 % | HEART RATE: 62 BPM | TEMPERATURE: 97.3 F | BODY MASS INDEX: 30.64 KG/M2 | SYSTOLIC BLOOD PRESSURE: 118 MMHG | HEIGHT: 73 IN | DIASTOLIC BLOOD PRESSURE: 72 MMHG

## 2022-01-21 DIAGNOSIS — M21.961 DEFORMITY OF RIGHT FOOT: ICD-10-CM

## 2022-01-21 DIAGNOSIS — M1A.3710 CHRONIC GOUT OF RIGHT FOOT DUE TO RENAL IMPAIRMENT WITHOUT TOPHUS: ICD-10-CM

## 2022-01-21 DIAGNOSIS — Z23 ENCOUNTER FOR IMMUNIZATION: ICD-10-CM

## 2022-01-21 DIAGNOSIS — I50.42 CHRONIC COMBINED SYSTOLIC AND DIASTOLIC CHF (CONGESTIVE HEART FAILURE) (HCC): ICD-10-CM

## 2022-01-21 DIAGNOSIS — N18.2 BENIGN HYPERTENSION WITH CKD (CHRONIC KIDNEY DISEASE), STAGE II: ICD-10-CM

## 2022-01-21 DIAGNOSIS — E11.65 TYPE 2 DIABETES MELLITUS WITH HYPERGLYCEMIA, WITHOUT LONG-TERM CURRENT USE OF INSULIN (HCC): ICD-10-CM

## 2022-01-21 DIAGNOSIS — N52.01 ERECTILE DYSFUNCTION DUE TO ARTERIAL INSUFFICIENCY: ICD-10-CM

## 2022-01-21 DIAGNOSIS — I12.9 BENIGN HYPERTENSION WITH CKD (CHRONIC KIDNEY DISEASE), STAGE II: ICD-10-CM

## 2022-01-21 DIAGNOSIS — E11.42 TYPE 2 DIABETES MELLITUS WITH DIABETIC POLYNEUROPATHY, WITHOUT LONG-TERM CURRENT USE OF INSULIN (HCC): Primary | ICD-10-CM

## 2022-01-21 LAB — HBA1C MFR BLD: 7.3 %

## 2022-01-21 PROCEDURE — 99214 OFFICE O/P EST MOD 30 MIN: CPT | Performed by: FAMILY MEDICINE

## 2022-01-21 PROCEDURE — 83036 HEMOGLOBIN GLYCOSYLATED A1C: CPT | Performed by: FAMILY MEDICINE

## 2022-01-21 PROCEDURE — G8427 DOCREV CUR MEDS BY ELIG CLIN: HCPCS | Performed by: FAMILY MEDICINE

## 2022-01-21 PROCEDURE — 3017F COLORECTAL CA SCREEN DOC REV: CPT | Performed by: FAMILY MEDICINE

## 2022-01-21 PROCEDURE — 3051F HG A1C>EQUAL 7.0%<8.0%: CPT | Performed by: FAMILY MEDICINE

## 2022-01-21 PROCEDURE — G8417 CALC BMI ABV UP PARAM F/U: HCPCS | Performed by: FAMILY MEDICINE

## 2022-01-21 PROCEDURE — G8484 FLU IMMUNIZE NO ADMIN: HCPCS | Performed by: FAMILY MEDICINE

## 2022-01-21 PROCEDURE — 2022F DILAT RTA XM EVC RTNOPTHY: CPT | Performed by: FAMILY MEDICINE

## 2022-01-21 PROCEDURE — 1036F TOBACCO NON-USER: CPT | Performed by: FAMILY MEDICINE

## 2022-01-21 RX ORDER — BNT162B2 0.23 MG/2.25ML
30 INJECTION, SUSPENSION INTRAMUSCULAR ONCE
Qty: 0.3 ML | Refills: 0 | Status: SHIPPED | OUTPATIENT
Start: 2022-01-21 | End: 2022-01-21

## 2022-01-21 RX ORDER — SILDENAFIL 100 MG/1
100 TABLET, FILM COATED ORAL DAILY PRN
Qty: 30 TABLET | Refills: 3 | Status: SHIPPED | OUTPATIENT
Start: 2022-01-21 | End: 2022-09-06 | Stop reason: HOSPADM

## 2022-01-21 RX ORDER — FAMOTIDINE 20 MG/1
TABLET, FILM COATED ORAL
COMMUNITY
Start: 2021-11-10 | End: 2022-06-14

## 2022-01-21 RX ORDER — GLUCOSAMINE HCL/CHONDROITIN SU 500-400 MG
CAPSULE ORAL
Qty: 100 STRIP | Refills: 3 | Status: SHIPPED | OUTPATIENT
Start: 2022-01-21

## 2022-01-21 ASSESSMENT — ENCOUNTER SYMPTOMS
NAUSEA: 0
VOMITING: 0
WHEEZING: 0
CONSTIPATION: 0
SHORTNESS OF BREATH: 0
ABDOMINAL PAIN: 0
ABDOMINAL DISTENTION: 0
DIARRHEA: 0
COUGH: 0
CHEST TIGHTNESS: 0

## 2022-01-21 ASSESSMENT — PATIENT HEALTH QUESTIONNAIRE - PHQ9
SUM OF ALL RESPONSES TO PHQ QUESTIONS 1-9: 0
1. LITTLE INTEREST OR PLEASURE IN DOING THINGS: 0
SUM OF ALL RESPONSES TO PHQ QUESTIONS 1-9: 0
SUM OF ALL RESPONSES TO PHQ9 QUESTIONS 1 & 2: 0
2. FEELING DOWN, DEPRESSED OR HOPELESS: 0

## 2022-01-21 NOTE — RESULT ENCOUNTER NOTE
Addressed during office visit today, A1c 7.3 greatly improved diabetes,, continue treatment recommended during the office visit.

## 2022-01-21 NOTE — PROGRESS NOTES
Visit Information    Have you changed or started any medications since your last visit including any over-the-counter medicines, vitamins, or herbal medicines? no   Have you stopped taking any of your medications? Is so, why? -  no  Are you having any side effects from any of your medications? - no    Have you seen any other physician or provider since your last visit? yes -    Have you had any other diagnostic tests since your last visit?  no   Have you been seen in the emergency room and/or had an admission in a hospital since we last saw you?  yes -    Have you had your routine dental cleaning in the past 6 months?  no     Do you have an active MyChart account? If no, what is the barrier?   Yes    Patient Care Team:  Uli Kendall MD as PCP - General (Family Medicine)  Uli Kendall MD as PCP - [facility]  Ronny Bojorquez MD as Consulting Physician (Cardiology)  Susana Del Valle OD (Optometry)  Joey Villalba MD as Consulting Physician (Nephrology)  Carlyn Seay MD as Consulting Physician (Urology)  Simran Knight MD as Consulting Physician (Cardiology)  Susana Del Valle OD (Optometry)  Radha Slaughter DPM as Physician (Podiatry)    Medical History Review  Past Medical, Family, and Social History reviewed and does contribute to the patient presenting condition    Health Maintenance   Topic Date Due    Diabetic retinal exam  07/31/2019    COVID-19 Vaccine (2 - Pfizer 3-dose series) 05/03/2021    Flu vaccine (1) 09/01/2021    A1C test (Diabetic or Prediabetic)  12/10/2021    Diabetic foot exam  01/12/2022    Diabetic microalbuminuria test  06/01/2022    Lipid screen  06/01/2022    Depression Monitoring  06/01/2022    Potassium monitoring  08/16/2022    Creatinine monitoring  08/16/2022    PSA counseling  08/27/2022    Colon cancer screen colonoscopy  01/15/2023    DTaP/Tdap/Td vaccine (2 - Td or Tdap) 04/03/2024    Pneumococcal 0-64 years Vaccine (2 of 2 - PPSV23) 12/20/2026    Shingles Vaccine  Completed    Hepatitis C screen  Completed    HIV screen  Completed    Hepatitis A vaccine  Aged Out    Hib vaccine  Aged Out    Meningococcal (ACWY) vaccine  Aged Out

## 2022-01-21 NOTE — TELEPHONE ENCOUNTER
Noted    Future Appointments   Date Time Provider Vianey Leonardi   1/28/2022  9:15 AM Kristian Ugalde DPM [facility]   3/25/2022 10:00 AM SCHEDULE, [facility]   7/15/2022  8:00 AM [facility]

## 2022-01-21 NOTE — PROGRESS NOTES
Keely Marc (:  1960) is a 62 y.o. male,Established patient, here for evaluation of the following chief complaint(s): Diabetes, Hypertension, Hyperlipidemia, Congestive Heart Failure, and Other (HAD DM FOOT EXAM)      ASSESSMENT/PLAN:    1. Type 2 diabetes mellitus with diabetic polyneuropathy, without long-term current use of insulin (HCC)  Improving diabetes  Stable polyneuropathy     -     POCT glycosylated hemoglobin (Hb A1C) 7.3-improving diabetes  Lab Results   Component Value Date    LABA1C 7.3 2022    LABA1C 9.7 09/10/2021    LABA1C 8.5 2021       -     Lancets 30G MISC; Disp-100 each, R-3, NormalTesting once a day. Please dispense according to patients insurance. -     blood glucose monitor strips; Testing once a day. Please dispense according to patients insurance., Disp-100 strip, R-3, Normal  -     Handicap Placard MISC; Starting 2022, Disp-1 each, R-0, PrintCan't walk greater than 200 feet. Expires in 5 years. -     Joy Verde DPM, Livan Conner MD, Ophthalmology, [facility]; Future  -     Comprehensive Metabolic Panel; Future  -     Vitamin B12 & Folate; Future  -     Microalbumin / Creatinine Urine Ratio; Future  -      DIABETES FOOT EXAM  -advised home blood glucose testing  daily  -daily feet exam, Foot care: advised to wash feet daily, pat dry and apply lotion at night, avoiding between toes. Need to look at feet daily and report to a physician any signs of inflammation or skin damage  -annual dilated eye exam  -Low carb, low fat diet, increase fruits and vegetables, and exercise 4-5 times a day 30-40 minutes a day discussed  -continue current treatment Farxiga 10 mg, Januvia 100 mg  -continue Aspirin   -continue statin Lipitor 40 mg      2. Benign hypertension with CKD (chronic kidney disease), stage II  Stable Chronic kidney disease stage 2  Well controlled HTN.   Continue current treatment hydralazine 50 mg 3 times a day, Aldactone 25 mg daily, amlodipine 10 Mg daily, Coreg 25 mg twice a day, furosemide 40 mg daily         . Will recheck labs. -     CBC; Future  -     Comprehensive Metabolic Panel; Future  -     Vitamin B12 & Folate; Future  -     Magnesium; Future  3. Chronic combined systolic and diastolic CHF (congestive heart failure) ([facility] [address]  He says he has seen cardiologist recently  Stable CHF    Lab Results   Component Value Date    LVEF 30 01/16/2019    LVEFMODE Echo 01/16/2019   Continue current medications  -     Handicap Placard MISC; Starting Fri 1/21/2022, Disp-1 each, R-0, PrintCan't walk greater than 200 feet. Expires in 5 years.  -     Brain Natriuretic Peptide; Future  4. Type 2 diabetes mellitus with hyperglycemia, without long-term current use of insulin ([facility])   Improved diabetes  Continue current medications, low-carb diet, attempt to exercise  -     POCT glycosylated hemoglobin (Hb A1C) 7.3  Lab Results   Component Value Date    LABA1C 7.3 01/21/2022    LABA1C 9.7 09/10/2021    LABA1C 8.5 06/01/2021       -     Lancets 30G MISC; Disp-100 each, R-3, NormalTesting once a day. Please dispense according to patients insurance. -     blood glucose monitor strips; Testing once a day. Please dispense according to patients insurance., Disp-100 strip, R-3, Normal  -     [facility] Cash DPM, Mark Cortez MD, Ophthalmology, [place]  -     CBC; Future  -     Comprehensive Metabolic Panel; Future  -     Vitamin B12 & Folate; Future  -     Microalbumin / Creatinine Urine Ratio; Future  -     XR FOOT RIGHT (MIN 3 VIEWS); Future  -      DIABETES FOOT EXAM  5. 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  Deformity of right foot  worsening  I suspect Charcot foot, we helped him made an appointment   -     Miri Gonzalez DPM, Podiatry, [place]  -     XR FOOT RIGHT (MIN 3 VIEWS); Future  7. Erectile dysfunction due to arterial insufficiency  Improved with meds  -     sildenafil (VIAGRA) 100 MG tablet; Take 1 tablet by mouth daily as needed for Erectile Dysfunction NOT TO TAKE WITH NITROGLYCERIN, CAN CAUSE COLLAPSE, Disp-30 tablet, R-3Normal  8. Encounter for immunization  -     COVID-19 mRNA Vac-Derrell,Pfizer, (PFIZER-BIONT COVID-19 VAC-DERRELL) 30 MCG/0.3ML SUSP; Inject 30 mcg into the muscle once for 1 dose PLEASE LET PT KNOW WHEN READY, Disp-0.3 mL, R-0Normal      Freddy received counseling on the following healthy behaviors: nutrition, exercise, medication adherence and weight loss    Reviewed prior labs and health maintenance  Discussed use, benefit, and side effects of prescribed medications. Barriers to medication compliance addressed. Patient given educational materials - see patient instructions  Was a self-tracking handout given in paper form or via [employer]? Yes  All patient questions answered. Patient voiced understanding. The patient's past medical,surgical, social, and family history as well as his current medications and allergies were reviewed as documented in today's encounter. Medications, labs, diagnostic studies, consultations and follow-up as documented in this encounter. Return in about 4 months (around 5/21/2022) for Face-2F-30mins PHYSICAL, VISION screen, PHQ9., **POC A1C, diabetes. Please obtain cardio report from [facility].  PLEASE HELP PT WITH APPT W PODIATRY ASAP, POSSIBLE CHARCOT FOOT, WILL GET XRAY ON MONDAY    Future Appointments   Date Time Provider Vianey Malagon   1/28/2022  9:15 AM Lee Brewer DPM [facility]   3/25/2022 10:00 AM SCHEDULE, [facility] UROLOGY [facility]   7/15/2022  8:00 [facility]         SUBJECTIVE/OBJECTIVE:    Diabetes Mellitus Type II, Follow-up:    Current symptoms/problems include hyperglycemia, paresthesia of the feet and visual disturbances. Symptoms have been present for several years. Known diabetic complications: nephropathy, peripheral neuropathy, impotence and cardiovascular disease  Cardiovascular risk factors: advanced age (older than 54 for men, 72 for women), diabetes mellitus, dyslipidemia, hypertension, male gender, microalbuminuria, obesity (BMI >= 30 kg/m2) and sedentary lifestyle    Medication compliance:  compliant most of the time  Current diabetic medications include: Farxiga 10 mg, Januvia 100 mg      Eye exam current (within one year): no  Current diet: in general, a \"healthy\" diet      Barriers: impairment:  physical: CHF, neuropathy in the feet, right foot swollen, lack of motivation. Current monitoring regimen: home blood tests - daily  Home blood sugar records: fasting range: in low 100's  105, 110, 107, 150, 140  Any episodes of hypoglycemia? no    Is He on ACE inhibitor or angiotensin II receptor blocker? No, he is on aldactone      reports that he quit smoking about 41 years ago. His smoking use included cigarettes. He has a 1.00 pack-year smoking history. He has quit using smokeless tobacco.     Counseling given: Yes      Daily Aspirin? Yes      A1c is improved. Lab Results   Component Value Date    LABA1C 7.3 01/21/2022    LABA1C 9.7 09/10/2021    LABA1C 8.5 06/01/2021       Urine microabumin is Normal.  Lab Results   Component Value Date    LABMICR 15 05/27/2020      Lost 12 lbs intentionally since he quit drinking    Wt Readings from Last 3 Encounters:   01/21/22 231 lb 3.2 oz (104.9 kg)   09/10/21 243 lb (110.2 kg)   08/27/21 251 lb (113.9 kg)         Hypertension, CHF, Chronic kidney disease stage 2:   Alexi Alba  is not  exercising and is adherent to low salt diet. Cardiac symptoms  fatigue. He says his leg swelling resolved since he stopped drinking.   Patient denies  chest pain, chest pressure/discomfort, claudication, dyspnea, exertional chest pressure/discomfort, irregular heart beat, lower extremity edema, near-syncope, orthopnea, palpitations, paroxysmal nocturnal dyspnea, syncope and tachypnea. Use of agents associated with hypertension: none. History of target organ damage: chronic kidney disease and heart failure. Patient was admitted for acute on chronic congestive heart failure May, 2018, and January 2019. Had cardiac [facility]/23/[facility], which showed 50% stenosis and ejection fraction 45%    SAW CARDIOLOGY 1 MONTH AGO AT [facility]    BP controlled. Conrado Wilkes reports compliance with BP medications, and tolerates them well, denies side effects. BP Readings from Last 3 Encounters:   01/21/22 118/72   09/10/21 120/80   08/27/21 118/74        Pulse is Normal.    Pulse Readings from Last 3 Encounters:   01/21/22 62   09/10/21 64   08/27/21 58       Freddy complains of Right foot flat and swollen since July, not tender, not getting better  Xrays foot on 7/23/21 ordered, but not done  He says he follows with podiatry, Dr. Susie Le, but he was last seen  In 2019    Found to have gout. But says no got attack since on Allopurinol    Lab Results   Component Value Date    URICACID 7.4 (H) 08/16/2021     Has Erectile Dysfunction   He says Viagra helps. He says has a new girlfriend who helps him take care of his diabetes         [x]Negative depression screening. PHQ Scores 1/21/2022 6/1/2021 1/12/2021 5/27/2020 4/11/2019 1/11/2019 10/31/2017   PHQ2 Score 0 0 0 0 0 3 0   PHQ9 Score 0 0 0 0 0 9 0       Prior to Visit Medications    Medication Sig Taking? Authorizing Provider   famotidine (PEPCID) 20 MG tablet  Yes Historical Provider, MD   glucose monitoring ([employer]) kit Please supply Patient with a glucose monitoring kit that insurance will cover. Yes Vijay Trujillo MD   blood glucose monitor strips Testing once a day. Please dispense according to patients insurance.  Yes Vijay Trujillo MD   dapagliflozin (FARXIGA) 10 MG tablet Take 1 tablet by mouth every morning For diabetes, per your insurance Yes Michelle Calero MD   SITagliptin (JANUVIA) 100 MG tablet Take 1 tablet by mouth daily Dose increased 6/1/2021 Yes Michelle Calero MD   allopurinol (ZYLOPRIM) 100 MG tablet Take 1 tablet by mouth daily FOR GOUT. STOP IT IF ANY RASH DEVELOPS! Yes Michelle Calero MD   hydrALAZINE (APRESOLINE) 50 MG tablet Take 1 tablet by mouth 3 times daily BP medication Yes Michelle Calero MD   spironolactone (ALDACTONE) 25 MG tablet Take 1 tablet by mouth every morning BP and CHF Yes Michelle Calero MD   atorvastatin (LIPITOR) 40 MG tablet Take 1 tablet by mouth daily For high cholesterol, you need to take it every day Yes Michelle Calero MD   amLODIPine (NORVASC) 10 MG tablet Take 1 tablet by mouth once daily Yes Michelle Calero MD   aspirin (EQ ASPIRIN ADULT LOW DOSE) 81 MG EC tablet Take 1 tablet by mouth once daily Yes Michelle Calero MD   carvedilol (COREG) 25 MG tablet TAKE 1 TABLET BY MOUTH TWICE DAILY WITH MEALS Yes Michelle Calero MD   furosemide (LASIX) 40 MG tablet Take 1 tablet by mouth daily Stop chlorthalidone and Metformin due to leg swelling Yes Michelle Calero MD   Lancets 30G MISC Testing once a day. Please dispense according to patients insurance. Yes Michelle Calero MD   Alcohol Swabs PADS Please dispense according to patients insurance/device.  Testing once a day Yes Michelle Calero MD   tamsulosin (FLOMAX) 0.4 MG capsule Take 1 capsule by mouth daily Yes Lina Smith MD   sildenafil (VIAGRA) 100 MG tablet Take 1 tablet by mouth daily as needed for Erectile Dysfunction Yes Lina Smith MD   Multiple Vitamins-Minerals (MULTIVITAMIN-MINERALS) TABS tablet Take 1 tablet by mouth daily Yes Michelle Calero MD   acetaminophen (EQ ACETAMINOPHEN) 500 MG tablet Take 1 tablet by mouth every 6 hours as needed for Pain Yes Michelle Calero MD   ketoconazole (NIZORAL) 2 % cream Apply on the feet, not between the toes, twice a day Yes Maliha Solis MD   lidocaine (LMX) 4 % cream Apply 2-3 times a day as needed for pain Yes Maliha Solis MD   Blood Pressure KIT Diagnosis: HTN. Needs to check blood pressure 1-2 times a day until stable, then once a day. Goal blood pressure less than 135/85, and above 110/60. Yes Maliha Solis MD   nitroGLYCERIN (NITROSTAT) 0.4 MG SL tablet DISSOLVE ONE TABLET UNDER THE TONGUE EVERY 5 MINUTES AS NEEDED FOR CHEST PAIN. DO NOT EXCEED A TOTAL OF 3 DOSES IN 15 MINUTES Yes Maliha Solis MD            Social History     Tobacco Use    Smoking status: Former Smoker     Packs/day: 0.50     Years: 2.00     Pack years: 1.00     Types: Cigarettes     Quit date: 1981     Years since quittin.0    Smokeless tobacco: Former User   Substance Use Topics    Alcohol use: Yes     Alcohol/week: 1.0 - 2.0 standard drink     Types: 1 - 2 Cans of beer per week     Comment: 1-2 cans a month    Drug use: Yes     Types: Marijuana Chalino Mayfield     Comment: marijuana every day          Review of Systems   Constitutional: Positive for fatigue. Negative for activity change, appetite change, chills, diaphoresis, fever and unexpected weight change. Eyes: Positive for visual disturbance. Respiratory: Negative for cough, chest tightness, shortness of breath and wheezing. Cardiovascular: Negative for chest pain, palpitations and leg swelling. Gastrointestinal: Negative for abdominal distention, abdominal pain, constipation, diarrhea, nausea and vomiting. Endocrine: Negative for cold intolerance, heat intolerance, polydipsia, polyphagia and polyuria. Genitourinary:        Erectile Dysfunction     Musculoskeletal: Positive for arthralgias (right foot) and joint swelling (right foot). Allergic/Immunologic: Positive for immunocompromised state (due to uncontrolled diabetes). Neurological: Positive for numbness (feet, worsening).    Hematological: Does not bruise/bleed easily. Psychiatric/Behavioral: Negative for dysphoric mood. Stopped drinking, praise given         -vital signs stable and within normal limits except Obesity per BMI. /72   Pulse 62   Temp 97.3 °F (36.3 °C)   Ht 6' 1\" (1.854 m)   Wt 231 lb 3.2 oz (104.9 kg)   SpO2 98%   BMI 30.50 kg/m²         Physical Exam  Vitals and nursing note reviewed. Constitutional:       General: He is not in acute distress. Appearance: Normal appearance. He is well-developed. He is obese. He is not diaphoretic. HENT:      Head: Normocephalic and atraumatic. Right Ear: External ear normal.      Left Ear: External ear normal.      Mouth/Throat:      Comments: I did not examine the mouth due to coronavirus pandemic and wearing masks. Eyes:      General: Lids are normal. No scleral icterus. Right eye: No discharge. Left eye: No discharge. Extraocular Movements: Extraocular movements intact. Conjunctiva/sclera: Conjunctivae normal.   Neck:      Thyroid: No thyromegaly. Cardiovascular:      Rate and Rhythm: Normal rate and regular rhythm. Pulses:           Dorsalis pedis pulses are 2+ on the right side and 2+ on the left side. Posterior tibial pulses are 2+ on the right side and 2+ on the left side. Heart sounds: Murmur heard. Crescendo systolic murmur is present with a grade of 3/6. Pulmonary:      Effort: Pulmonary effort is normal. No respiratory distress. Breath sounds: Normal breath sounds. No wheezing or rales. Chest:      Chest wall: No tenderness. Abdominal:      General: Bowel sounds are normal. There is no distension. Palpations: Abdomen is soft. There is no hepatomegaly or splenomegaly. Tenderness: There is no abdominal tenderness. Comments: Obese abdomen. Musculoskeletal:         General: No tenderness. Cervical back: Normal range of motion and neck supple. Right lower leg: No edema.       Left lower leg: No edema. Right foot: Decreased range of motion. Deformity and Charcot foot present. No bunion, foot drop or prominent metatarsal heads. Left foot: Normal range of motion. Deformity present. No bunion, Charcot foot, foot drop or prominent metatarsal heads. Feet:      Right foot:      Protective Sensation: 5 sites tested. 2 sites sensed. Skin integrity: Skin integrity normal.      Toenail Condition: Right toenails are abnormally thick. Left foot:      Protective Sensation: 5 sites tested. 2 sites sensed. Skin integrity: Skin integrity normal.      Toenail Condition: Left toenails are abnormally thick. Comments:  right foot looks deformed, swollen, collapsed arch  Lymphadenopathy:      Cervical: No cervical adenopathy. Skin:     General: Skin is warm and dry. Capillary Refill: Capillary refill takes less than 2 seconds. Findings: No rash. Neurological:      Mental Status: He is alert and oriented to person, place, and time. Deep Tendon Reflexes: Reflexes are normal and symmetric. Psychiatric:         Mood and Affect: Mood normal.         Behavior: Behavior normal.         Thought Content: Thought content normal.         Judgment: Judgment normal.           I personally reviewed testing with patient.   Anemia  Hyperglycemia  Chronic kidney disease stage II stable    Otherwise labs within normal limits    Office Visit on 09/10/2021   Component Date Value Ref Range Status    Hemoglobin A1C 09/10/2021 9.7  % Final       Lab Results   Component Value Date    WBC 6.1 06/01/2021    HGB 12.5 (L) 06/01/2021    HCT 39.0 (L) 06/01/2021    MCV 94.3 06/01/2021     06/01/2021       Lab Results   Component Value Date     08/16/2021    K 4.3 08/16/2021     08/16/2021    CO2 25 08/16/2021    BUN 25 08/16/2021    CREATININE 1.40 08/16/2021    GLUCOSE 279 08/16/2021    GLUCOSE 157 02/06/2012    CALCIUM 9.3 08/16/2021        Lab Results   Component Value Date    ALT 18 06/01/2021    AST 15 06/01/2021    ALKPHOS 104 06/01/2021    BILITOT 0.36 06/01/2021       Lab Results   Component Value Date    TSH 1.20 06/01/2021       Lab Results   Component Value Date    CHOL 128 06/01/2021    CHOL 192 11/20/2020    CHOL 130 05/27/2020     Lab Results   Component Value Date    TRIG 131 06/01/2021    TRIG 72 11/20/2020    TRIG 112 05/27/2020     Lab Results   Component Value Date    HDL 38 (L) 06/01/2021    HDL 49 11/20/2020    HDL 42 05/27/2020     Lab Results   Component Value Date    LDLCALC 119 01/16/2019    LDLCHOLESTEROL 64 06/01/2021    LDLCHOLESTEROL 129 11/20/2020    LDLCHOLESTEROL 66 05/27/2020     Lab Results   Component Value Date    CHOLHDLRATIO 3.4 06/01/2021    CHOLHDLRATIO 3.9 11/20/2020    CHOLHDLRATIO 3.1 05/27/2020     Lab Results   Component Value Date    LABMICR 15 05/27/2020         Lab Results   Component Value Date    SWQQLCIO01 613 06/01/2021     Lab Results   Component Value Date    FOLATE 9.4 06/01/2021     Lab Results   Component Value Date    VITD25 40.0 06/01/2021         Orders Placed This Encounter   Medications    Lancets 30G MISC     Sig: Testing once a day. Please dispense according to patients insurance. Dispense:  100 each     Refill:  3    blood glucose monitor strips     Sig: Testing once a day. Please dispense according to patients insurance. Dispense:  100 strip     Refill:  3    COVID-19 mRNA Vac-Trini,Pfizer, (PFIZER-BIONT COVID-19 VAC-TRINI) 30 MCG/0.3ML SUSP     Sig: Inject 30 mcg into the muscle once for 1 dose PLEASE LET PT KNOW WHEN READY     Dispense:  0.3 mL     Refill:  0    Handicap Placard MISC     Sig: by Does not apply route Can't walk greater than 200 feet. Expires in 5 years.      Dispense:  1 each     Refill:  0    sildenafil (VIAGRA) 100 MG tablet     Sig: Take 1 tablet by mouth daily as needed for Erectile Dysfunction NOT TO TAKE WITH NITROGLYCERIN, CAN CAUSE COLLAPSE     Dispense:  30 tablet     Refill:  3 Orders Placed This Encounter   Procedures    XR FOOT RIGHT (MIN 3 VIEWS)     Standing Status:   Future     Standing Expiration Date:   2/5/2022    CBC     Standing Status:   Future     Standing Expiration Date:   2/5/2022    Comprehensive Metabolic Panel     Standing Status:   Future     Standing Expiration Date:   2/5/2022    Uric Acid     Standing Status:   Future     Standing Expiration Date:   2/5/2022    Vitamin B12 & Folate     Standing Status:   Future     Standing Expiration Date:   2/5/2022    Magnesium     Standing Status:   Future     Standing Expiration Date:   2/5/2022    Microalbumin / Creatinine Urine Ratio     Standing Status:   Future     Standing Expiration Date:   2/5/2022    Brain Natriuretic Peptide     Standing Status:   Future     Standing Expiration Date:   2/5/2022   [facility] Marely Cash DPM, [address] [place] [address]     Referral Priority:   Urgent     Referral Type:   Eval and Treat     Referral Reason:   Specialty Services Required     Referred to Provider:   Judy Pratt DPM     Requested Specialty:   Podiatry     Number of Visits Requested:   Kellen Oglesby MD, Ophthalmology, [facility]     Referral Priority:   Routine     Referral Type:   Eval and Treat     Referral Reason:   Specialty Services Required     Referred to Provider:   Coralee Sever, MD     Requested Specialty:   Ophthalmology     Number of Visits Requested:   1    POCT glycosylated hemoglobin (Hb A1C)    HM DIABETES FOOT EXAM       Medications Discontinued During This Encounter   Medication Reason    Lancets 30G MISC REORDER    blood glucose monitor strips REORDER    sildenafil (VIAGRA) 100 MG tablet REORDER         On this date 1/21/2022 I have spent 35 minutes reviewing previous notes, test results and face to face with the patient discussing the diagnosis and importance of compliance with the treatment plan as well as documenting on the day of the visit.       This note was completed by using the assistance of a speech-recognition program. However, inadvertent computerized transcription errors may be present. Although every effort was made to ensure accuracy, no guarantees can be provided that every mistake has been identified and corrected by editing . An electronic signature was used to authenticate this note.   Electronically signed by Katja Russell MD on 1/22/2022 at 9:54 PM

## 2022-01-22 PROBLEM — M21.961 DEFORMITY OF RIGHT FOOT: Status: ACTIVE | Noted: 2022-01-22

## 2022-01-30 DIAGNOSIS — E11.42 TYPE 2 DIABETES MELLITUS WITH DIABETIC POLYNEUROPATHY, WITHOUT LONG-TERM CURRENT USE OF INSULIN (HCC): ICD-10-CM

## 2022-01-30 DIAGNOSIS — E11.65 TYPE 2 DIABETES MELLITUS WITH HYPERGLYCEMIA, WITHOUT LONG-TERM CURRENT USE OF INSULIN (HCC): ICD-10-CM

## 2022-01-31 RX ORDER — ASPIRIN 81 MG/1
TABLET ORAL
Qty: 90 TABLET | Refills: 3 | Status: SHIPPED | OUTPATIENT
Start: 2022-01-31 | End: 2022-07-15 | Stop reason: SDUPTHER

## 2022-01-31 NOTE — TELEPHONE ENCOUNTER
Please Approve or Refuse.   Send to Pharmacy per Pt's Request: [facility]     Next Visit Date:  7/15/2022   Last Visit Date: 1/21/2022    Hemoglobin A1C (%)   Date Value   01/21/2022 7.3   09/10/2021 9.7   06/01/2021 8.5             ( goal A1C is < 7)   BP Readings from Last 3 Encounters:   01/21/22 118/72   09/10/21 120/80   08/27/21 118/74          (goal 120/80)  BUN   Date Value Ref Range Status   08/16/2021 25 (H) 6 - 20 mg/dL Final     CREATININE   Date Value Ref Range Status   08/16/2021 1.40 (H) 0.70 - 1.20 mg/dL Final     Potassium   Date Value Ref Range Status   08/16/2021 4.3 3.7 - 5.3 mmol/L Final

## 2022-02-16 ENCOUNTER — HOSPITAL ENCOUNTER (OUTPATIENT)
Age: 62
Setting detail: SPECIMEN
Discharge: HOME | End: 2022-02-16
Payer: COMMERCIAL

## 2022-02-16 ENCOUNTER — OFFICE VISIT (OUTPATIENT)
Dept: PODIATRY | Age: 62
End: 2022-02-16
Payer: COMMERCIAL

## 2022-02-16 VITALS — WEIGHT: 231 LBS | BODY MASS INDEX: 30.62 KG/M2 | HEIGHT: 73 IN

## 2022-02-16 DIAGNOSIS — N18.2 BENIGN HYPERTENSION WITH CKD (CHRONIC KIDNEY DISEASE), STAGE II: ICD-10-CM

## 2022-02-16 DIAGNOSIS — I73.9 PERIPHERAL VASCULAR DISORDER (HCC): ICD-10-CM

## 2022-02-16 DIAGNOSIS — E11.49 OTHER DIABETIC NEUROLOGICAL COMPLICATION ASSOCIATED WITH TYPE 2 DIABETES MELLITUS (HCC): ICD-10-CM

## 2022-02-16 DIAGNOSIS — B35.1 DERMATOPHYTOSIS OF NAIL: Primary | ICD-10-CM

## 2022-02-16 DIAGNOSIS — E11.65 TYPE 2 DIABETES MELLITUS WITH HYPERGLYCEMIA, WITHOUT LONG-TERM CURRENT USE OF INSULIN (HCC): ICD-10-CM

## 2022-02-16 DIAGNOSIS — I89.0 LYMPHEDEMA: ICD-10-CM

## 2022-02-16 DIAGNOSIS — I12.9 BENIGN HYPERTENSION WITH CKD (CHRONIC KIDNEY DISEASE), STAGE II: ICD-10-CM

## 2022-02-16 LAB
ANION GAP SERPL CALCULATED.3IONS-SCNC: 12 MMOL/L (ref 9–17)
BUN BLDV-MCNC: 20 MG/DL (ref 8–23)
BUN/CREAT BLD: ABNORMAL (ref 9–20)
CALCIUM SERPL-MCNC: 9.4 MG/DL (ref 8.6–10.4)
CHLORIDE BLD-SCNC: 106 MMOL/L (ref 98–107)
CO2: 21 MMOL/L (ref 20–31)
CREAT SERPL-MCNC: 1.1 MG/DL (ref 0.7–1.2)
GFR AFRICAN AMERICAN: >60 ML/MIN
GFR NON-AFRICAN AMERICAN: >60 ML/MIN
GFR SERPL CREATININE-BSD FRML MDRD: ABNORMAL ML/MIN/{1.73_M2}
GFR SERPL CREATININE-BSD FRML MDRD: ABNORMAL ML/MIN/{1.73_M2}
GLUCOSE BLD-MCNC: 114 MG/DL (ref 70–99)
POTASSIUM SERPL-SCNC: 4.2 MMOL/L (ref 3.7–5.3)
SODIUM BLD-SCNC: 139 MMOL/L (ref 135–144)

## 2022-02-16 PROCEDURE — 36415 COLL VENOUS BLD VENIPUNCTURE: CPT

## 2022-02-16 PROCEDURE — 3017F COLORECTAL CA SCREEN DOC REV: CPT | Performed by: PODIATRIST

## 2022-02-16 PROCEDURE — 99204 OFFICE O/P NEW MOD 45 MIN: CPT | Performed by: PODIATRIST

## 2022-02-16 PROCEDURE — 2022F DILAT RTA XM EVC RTNOPTHY: CPT | Performed by: PODIATRIST

## 2022-02-16 PROCEDURE — 3051F HG A1C>EQUAL 7.0%<8.0%: CPT | Performed by: PODIATRIST

## 2022-02-16 PROCEDURE — 80048 BASIC METABOLIC PNL TOTAL CA: CPT

## 2022-02-16 PROCEDURE — G8427 DOCREV CUR MEDS BY ELIG CLIN: HCPCS | Performed by: PODIATRIST

## 2022-02-16 PROCEDURE — G8484 FLU IMMUNIZE NO ADMIN: HCPCS | Performed by: PODIATRIST

## 2022-02-16 PROCEDURE — 1036F TOBACCO NON-USER: CPT | Performed by: PODIATRIST

## 2022-02-16 PROCEDURE — G8417 CALC BMI ABV UP PARAM F/U: HCPCS | Performed by: PODIATRIST

## 2022-02-16 PROCEDURE — 11721 DEBRIDE NAIL 6 OR MORE: CPT | Performed by: PODIATRIST

## 2022-02-16 RX ORDER — UREA 40 %
CREAM (GRAM) TOPICAL
Qty: 1 EACH | Refills: 0 | Status: SHIPPED | OUTPATIENT
Start: 2022-02-16

## 2022-02-16 NOTE — PATIENT INSTRUCTIONS
Schedule a Vaccine  When you qualify to receive the vaccine, call the [facility]) COVID-19 Vaccination Hotline to schedule your appointment or to get additional information about the [facility]) locations which are offering the COVID-19 vaccine. To be 94% effective, it's important that you receive two doses of one of the COVID-19 vaccines. -If you are receiving the [place] vaccine, your second shot will be scheduled as close to 21 days after the first shot as possible. -If you are receiving the Moderna vaccine, your second shot will be scheduled as close to 28 days after the first shot as possible. [facility]) COVID-19 Vaccination Hotline: [phone]    Links to [facility]) website and [facility] website:    [email or web address] [email or web address]-[email or web address]    [email or web address]

## 2022-02-16 NOTE — RESULT ENCOUNTER NOTE
Noted  Future Appointments  3/25/2022  10:00 AM   SCHEDULE, [facility]  [facility] Urology         [facility]  5/11/2022  8:30 AM    Juma Cummins DPM            [facility]        [facility]  7/15/2022  8:00 AM    [facility]ning [facility]     [facility]

## 2022-02-16 NOTE — PROGRESS NOTES
[address] [facility] PODIATRY [facility]  [address] [place] [address]  Dept: [phone]  Dept Fax: [phone]    NEW PATIENT DIABETIC PROGRESS NOTE  Date of patient's visit: 2/16/2022  Patient's Name:  Jacqueline Ashley YOB: 1960            Patient Care Team:  Rob Rubio MD as PCP - General (Family Medicine)  Rob Rubio MD as PCP - [facility]  Amee Hernandez MD as Consulting Physician (Cardiology)  Clide Burkitt, OD (Optometry)  Winston Rendon MD as Consulting Physician (Nephrology)  Dorothy Cabrera MD as Consulting Physician (Urology)  Luís Olvera MD as Consulting Physician (Cardiology)  Clide Burkitt, [address] (Optometry)  Ton Knox DPM as Physician (Podiatry)  Ton Knox DPM as Physician (Podiatry)          Chief Complaint   Patient presents with    New Patient    Diabetes     [facility] [place]     Peripheral Neuropathy       Subjective:   Jacqueline Ashley comes to clinic for New Patient, Diabetes [facility] [place] ), and Peripheral Neuropathy    he is a diabetic and states that need toenails trimmed today. Also has numbness and tingling in b/l feet. Pt currently has complaint of thickened, elongated nails that they cannot manage by themselves. Pt's primary care physician is Rob Rubio MD last seen 1/22/22   Pt's last blood sugar/a1c was 7.3 - 1/21/22. He also c/o increased swelling to naomi LE and increased dry skin to naomi LE       Lab Results   Component Value Date    LABA1C 7.3 01/21/2022      Complains of numbness in the feet bilat.   Past Medical History:   Diagnosis Date    Acute heart failure ([address] 5/20/2018    Anemia     Benign hypertension with CKD (chronic kidney disease), stage II 7/24/2017    CAD (coronary artery disease)     Cardiomyopathy (HCC)     Chronic diastolic CHF (congestive heart failure) ([address] 7/23/2018    Chronic kidney disease     Coronary artery disease involving native coronary artery of native heart without angina pectoris 11/19/2016    Diabetic nephropathy associated with type 2 diabetes mellitus ([facility] 1/12/2016    Diabetic polyneuropathy associated with type 2 diabetes mellitus ([facility]     DM (diabetes mellitus), type 2 ([facility]     for 15-20 yrs    Erectile dysfunction     Grief 7/24/2017    HTN (hypertension)     Hypertensive urgency     Lipidemia     Mild episode of recurrent major depressive disorder ([facility] 1/13/2019    Obesity     Obesity (BMI 30-39.9) 11/19/2016    MORENA on CPAP 2/4/2013    Sleep apnea 2/4/2013    Slow transit constipation     Uncontrolled type 2 diabetes mellitus, without long-term current use of insulin ([facility] 10/13/2016       Allergies   Allergen Reactions    Lisinopril      Patient is on Aldactone     Current Outpatient Medications on File Prior to Visit   Medication Sig Dispense Refill    aspirin (EQ ASPIRIN ADULT LOW DOSE) 81 MG EC tablet Take 1 tablet by mouth once daily 90 tablet 3    famotidine (PEPCID) 20 MG tablet       Lancets 30G MISC Testing once a day. Please dispense according to patients insurance. 100 each 3    blood glucose monitor strips Testing once a day. Please dispense according to patients insurance. 100 strip 3    Handicap Placard MISC by Does not apply route Can't walk greater than 200 feet. Expires in 5 years. 1 each 0    sildenafil (VIAGRA) 100 MG tablet Take 1 tablet by mouth daily as needed for Erectile Dysfunction NOT TO TAKE WITH NITROGLYCERIN, CAN CAUSE COLLAPSE 30 tablet 3    glucose monitoring (FREESTYLE FREEDOM) kit Please supply Patient with a glucose monitoring kit that insurance will cover.  1 kit 0    dapagliflozin (FARXIGA) 10 MG tablet Take 1 tablet by mouth every morning For diabetes, per your insurance 30 tablet 11    SITagliptin (JANUVIA) 100 MG tablet Take 1 tablet by mouth daily Dose increased 6/1/2021 90 tablet 3    allopurinol (ZYLOPRIM) 100 MG tablet Take 1 tablet by mouth daily FOR GOUT. STOP IT IF ANY RASH DEVELOPS! 90 tablet 3    hydrALAZINE (APRESOLINE) 50 MG tablet Take 1 tablet by mouth 3 times daily BP medication 270 tablet 3    spironolactone (ALDACTONE) 25 MG tablet Take 1 tablet by mouth every morning BP and CHF 90 tablet 3    atorvastatin (LIPITOR) 40 MG tablet Take 1 tablet by mouth daily For high cholesterol, you need to take it every day 90 tablet 3    amLODIPine (NORVASC) 10 MG tablet Take 1 tablet by mouth once daily 90 tablet 3    carvedilol (COREG) 25 MG tablet TAKE 1 TABLET BY MOUTH TWICE DAILY WITH MEALS 180 tablet 11    furosemide (LASIX) 40 MG tablet Take 1 tablet by mouth daily Stop chlorthalidone and Metformin due to leg swelling 90 tablet 3    Alcohol Swabs PADS Please dispense according to patients insurance/device. Testing once a day 100 each 3    tamsulosin (FLOMAX) 0.4 MG capsule Take 1 capsule by mouth daily 30 capsule 11    Multiple Vitamins-Minerals (MULTIVITAMIN-MINERALS) TABS tablet Take 1 tablet by mouth daily 90 tablet 3    acetaminophen (EQ ACETAMINOPHEN) 500 MG tablet Take 1 tablet by mouth every 6 hours as needed for Pain 120 tablet 3    ketoconazole (NIZORAL) 2 % cream Apply on the feet, not between the toes, twice a day 1 Tube 1    lidocaine (LMX) 4 % cream Apply 2-3 times a day as needed for pain 120 g 3    Blood Pressure KIT Diagnosis: HTN. Needs to check blood pressure 1-2 times a day until stable, then once a day. Goal blood pressure less than 135/85, and above 110/60. 1 kit 0    nitroGLYCERIN (NITROSTAT) 0.4 MG SL tablet DISSOLVE ONE TABLET UNDER THE TONGUE EVERY 5 MINUTES AS NEEDED FOR CHEST PAIN. DO NOT EXCEED A TOTAL OF 3 DOSES IN 15 MINUTES 25 tablet 0     No current facility-administered medications on file prior to visit.        Review of Systems    Review of Systems:   History obtained from chart review and the patient  General ROS: negative for - chills, fatigue, fever, night sweats or weight gain  Constitutional: Negative for chills, diaphoresis, fatigue, fever and unexpected weight change. Musculoskeletal: Positive for arthralgias, gait problem and joint swelling. Neurological ROS: negative for - behavioral changes, confusion, headaches or seizures. Negative for weakness and numbness. Dermatological ROS: negative for - mole changes, rash  Cardiovascular: Negative for leg swelling. Gastrointestinal: Negative for constipation, diarrhea, nausea and vomiting. Objective:  Dermatologic Exam:  Skin lesion/ulceration Absent . Skin No rashes or nodules noted. .   Skin is thin, with flaky sloughing skin as well as decreased hair growth to the lower leg  Small red hemosiderin deposits seen dorsal foot   Musculoskeletal:     1st MPJ ROM decreased, Bilateral.  Muscle strength 5/5, Bilateral.  Pain present upon palpation of toenails 1-5, Bilateral. decreased medial longitudinal arch, Bilateral.  Ankle ROM decreased,Bilateral.    Dorsally contracted digits present digits 2, Bilateral.     Vascular: DP pulses 1/4 bilateral.  PT pulses 0/4 bilateral.   CFT <5 seconds, Bilateral.  Hair growth absent to the level of the digits, Bilateral.  Edema present, Bilateral.  Varicosities absent, Bilateral. Erythema absent, Bilateral    Neurological: Sensation diminshed to light touch to level of digits, Bilateral.  Protective sensation intact 6/10 sites via 5.07/10g [place] Monofilament, Bilateral.  negative Tinel's, Bilateral.  negative Valleix sign, Bilateral.      Integument: Warm, dry, supple, Bilateral.  Open lesion absent, Bilateral.  Interdigital maceration absent to web spaces 4, Bilateral.  Nails 1-5 left and 1-5 right thickened > 3.0 mm, dystrophic and crumbly, discolored with subungual debris. Fissures absent, Bilateral.   General: AAO x 3 in NAD.     Derm  Toenail Description  Sites of Onychomycosis Involvement (Check affected area)  [x] [x] [x] [x] [x] [x] [x] [x] [x] [x]  5 4 3 2 1 1 2 3 4 5                          Right Left    Thickness  [x] [x] [x] [x] [x] [x] [x] [x] [x] [x]  5 4 3 2 1 1 2 3 4 5                         Right                                        Left    Dystrophic Changes   [x] [x] [x] [x] [x] [x] [x] [x] [x] [x]  5 4 3 2 1 1 2 3 4 5                         Right                                        Left    Color   [x] [x] [x] [x] [x] [x] [x] [x] [x] [x]  5 4 3 2 1 1 2 3 4 5                          Right                                        Left    Incurvation/Ingrowin   [] [] [] [] [] [] [] [] [] []  5 4 3 2 1 1 2 3 4 5                         Right                                        Left    Inflammation/Pain   [x] [x] [x] [x] [x] [x] [x] [x] [x] [x]  5 4 3 2 1 1 2 3 4 5                         Right                                        Left        Visual inspection:  Deformity: hammertoe deformity naomi feet  amputation: absent  Skin lesions: absent  Edema: right- 2+ pitting edema, left- 2+ pitting edema    Sensory exam:  Monofilament sensation: abnormal - 6/10 via SW 5.07/10g monofilament to the plantar foot bilateral feet    Pulses: abnormal - 1/4 dorsalis pedis pulse and 0/4 Posterior tibial pulse,   Pinprick: Impaired  Proprioception: Impaired  Vibration (128 Hz): Impaired       DM with PVD       [x]Yes    []No      Assessment:  62 y.o. male with:   Diagnosis Orders   1. Dermatophytosis of nail  [address] - ID DEBRIDEMENT OF NAILS, 6 OR MORE    HM DIABETES FOOT EXAM   2. Other diabetic neurological complication associated with type 2 diabetes mellitus ([facility]  [address] - ID DEBRIDEMENT OF NAILS, 6 OR MORE    HM DIABETES FOOT EXAM   3. Peripheral vascular disorder (HCC)  [address] - ID DEBRIDEMENT OF NAILS, 6 OR MORE    HM DIABETES FOOT EXAM   4. Lymphedema  Compression Stockings MISC         Q7   []Yes    []No                Q8   [x]Yes    []No                     Q9   []Yes    []No    Plan:   Pt was evaluated and examined.  Patient was given personalized discharge instructions. To address increased numbness and neuropathy pain, advised pt to closely monitor blood glucose. Recommend pt to discuss with PCP regarding oral medication treatment of neuropathy if needed. Recommend Metanx, one tab po BID. To address new complaint of increased swelling, recommend patient to wear compression stockings. Dispensed tubi  for naomi LE and instructed pt to wear at all times when walking. Pt may take NSAIDs as needed. Nails 1-10 were debrided sharply in length and thickness with a nipper and , without incident. RX: DM shoes with accomodative inserts. Pt will follow up in 9 weeks or sooner if any problems arise. Diagnosis was discussed with the pt and all of their questions were answered in detail. Proper foot hygiene and care was discussed with the pt. Informed patient on proper diabetic foot care and importance of tight glycemic control. Patient to check feet daily and contact the office with any questions/problems/concerns.    Other comorbidity noted and will be managed by PCP.  2/16/2022    Electronically signed by Clay Kinsey DPM on 2/16/2022 at 8:41 AM  2/16/2022

## 2022-02-16 NOTE — RESULT ENCOUNTER NOTE
Please notify patient: Blood glucose greatly improved 114     To get his Pfizer vaccine second dosage, it is overdue, if you would like me to send the prescription to the pharmacy I could do that  Otherwise labs within normal limits  continue current treatment    Future Appointments  3/25/2022  10:00 AM   SCHEDULE, P [facility] UROLOGY  [facility] Urology         Silvia Moore  5/11/2022  8:30 AM    Deborah Diego DPM            [facility]        [facility]  7/15/2022  8:00 AM    Jarret Borden MD     [facility]

## 2022-03-25 ENCOUNTER — OFFICE VISIT (OUTPATIENT)
Dept: UROLOGY | Age: 62
End: 2022-03-25
Payer: COMMERCIAL

## 2022-03-25 VITALS
HEART RATE: 60 BPM | DIASTOLIC BLOOD PRESSURE: 76 MMHG | SYSTOLIC BLOOD PRESSURE: 131 MMHG | HEIGHT: 73 IN | WEIGHT: 231 LBS | BODY MASS INDEX: 30.62 KG/M2

## 2022-03-25 DIAGNOSIS — Z12.5 SCREENING PSA (PROSTATE SPECIFIC ANTIGEN): ICD-10-CM

## 2022-03-25 DIAGNOSIS — N40.1 BENIGN LOCALIZED PROSTATIC HYPERPLASIA WITH LOWER URINARY TRACT SYMPTOMS (LUTS): ICD-10-CM

## 2022-03-25 DIAGNOSIS — N52.01 ERECTILE DYSFUNCTION DUE TO ARTERIAL INSUFFICIENCY: Primary | ICD-10-CM

## 2022-03-25 LAB
BILIRUBIN, POC: NEGATIVE
BLOOD URINE, POC: NEGATIVE
CLARITY, POC: CLEAR
COLOR, POC: YELLOW
GLUCOSE URINE, POC: NEGATIVE
KETONES, POC: NEGATIVE
LEUKOCYTE EST, POC: ABNORMAL
NITRITE, POC: NEGATIVE
PH, POC: 5.5
PROTEIN, POC: NEGATIVE
SPECIFIC GRAVITY, POC: 1.02
UROBILINOGEN, POC: 0.2

## 2022-03-25 PROCEDURE — G8427 DOCREV CUR MEDS BY ELIG CLIN: HCPCS | Performed by: UROLOGY

## 2022-03-25 PROCEDURE — 99214 OFFICE O/P EST MOD 30 MIN: CPT | Performed by: UROLOGY

## 2022-03-25 PROCEDURE — 1036F TOBACCO NON-USER: CPT | Performed by: UROLOGY

## 2022-03-25 PROCEDURE — 81002 URINALYSIS NONAUTO W/O SCOPE: CPT | Performed by: UROLOGY

## 2022-03-25 PROCEDURE — G8417 CALC BMI ABV UP PARAM F/U: HCPCS | Performed by: UROLOGY

## 2022-03-25 PROCEDURE — G8484 FLU IMMUNIZE NO ADMIN: HCPCS | Performed by: UROLOGY

## 2022-03-25 PROCEDURE — 3017F COLORECTAL CA SCREEN DOC REV: CPT | Performed by: UROLOGY

## 2022-03-25 NOTE — PROGRESS NOTES
Ana Luisa Cook MD   Urology Clinic Office visit      Patient:  Blu Villanueva  YOB: 1960  Date: 3/25/2022    HISTORY OF PRESENT ILLNESS:   The patient is a 62 y.o. male who presents today for follow-up for the following problem(s):      1. Erectile dysfunction due to arterial insufficiency    2. Benign localized prostatic hyperplasia with lower urinary tract symptoms (LUTS)    3. Screening PSA (prostate specific antigen)           Overall the problem(s) : are improving. Associated Symptoms: No dysuria, gross hematuria. Pain Severity:   0/10    Patient is very satisfied with his urination while on Flomax. He is able to sleep better at nighttime and urinates less frequently during the day. He is also satisfied with his erections on sildenafil as well. Denies dysuria or gross hematuria. Summary of old records:      ED- sildenafil PRN. Discussed absolute contraindication to nitroglycerin     Additional History:   Nocturia once to twice a night. Denies snoring. Reports better stream with Flomax. Reports urinating every 3-4 hours. Not bothersome. Erections are good with viagra. Able to achieve them 10 of 10 times. Is able to maintain erections    I independently reviewed and verified the images and reports from:    No results found.       Last several PSA's:  Lab Results   Component Value Date    PSA 1.95 08/27/2021    PSA 1.89 11/20/2020    PSA 1.88 05/27/2020       Last total testosterone:  No results found for: TESTOSTERONE    Urinalysis today:  Results for POC orders placed in visit on 03/25/22   POCT Urinalysis no Micro   Result Value Ref Range    Color, UA yellow     Clarity, UA clear     Glucose, UA POC negative     Bilirubin, UA negative     Ketones, UA negative     Spec Grav, UA 1.020     Blood, UA POC negative     pH, UA 5.5     Protein, UA POC negative     Urobilinogen, UA 0.2     Leukocytes, UA small     Nitrite, UA negative        Last BUN and creatinine:  Lab Results   Component Value Date    BUN 20 2022     Lab Results   Component Value Date    CREATININE 1.10 2022       Imaging Reviewed during this Office Visit:   (results were independently reviewed by physician and radiology report verified)    PAST MEDICAL, FAMILY AND SOCIAL HISTORY UPDATE:  Past Medical History:   Diagnosis Date    Acute heart failure ([address] 2018    Anemia     Benign hypertension with CKD (chronic kidney disease), stage II 2017    CAD (coronary artery disease)     Cardiomyopathy (HCC)     Chronic diastolic CHF (congestive heart failure) ([address] 2018    Chronic kidney disease     Coronary artery disease involving native coronary artery of native heart without angina pectoris 2016    Diabetic nephropathy associated with type 2 diabetes mellitus ([address] 2016    Diabetic polyneuropathy associated with type 2 diabetes mellitus ([address]     DM (diabetes mellitus), type 2 ([address]     for 15-20 yrs    Erectile dysfunction     Grief 2017    HTN (hypertension)     Hypertensive urgency     Lipidemia     Mild episode of recurrent major depressive disorder ([address] 2019    Obesity     Obesity (BMI 30-39.9) 2016    MORENA on CPAP 2013    Sleep apnea 2013    Slow transit constipation     Uncontrolled type 2 diabetes mellitus, without long-term current use of insulin ([address] 10/13/2016     Past Surgical History:   Procedure Laterality Date    CARDIAC CATHETERIZATION  2017    at [facility] per DR Joe Montero note in Media from 18, showed 50% stenosis PDA, EF 45%    CARDIAC CATHETERIZATION  2011    per Dr. Enrique Gomes note in media: non-obstructive CAD, EF 45%    COLONOSCOPY  01/15/2013    hemorrhoids, good prep, scanned     Family History   Problem Relation Age of Onset    Asthma Mother         /78    High Blood Pressure Mother     Heart Disease Mother     High Blood Pressure Father     Diabetes Sister     High Blood Pressure Sister     Diabetes Brother  High Blood Pressure Brother     Cancer Sister         breast    Cancer Sister         lung/smoker     Colon Cancer Other      Outpatient Medications Marked as Taking for the 3/25/22 encounter (Office Visit) with Zen Chowdhury MD   Medication Sig Dispense Refill    Urea (CARMOL) 40 % cream Apply to affected area once daily 1 each 0    Compression Stockings MISC 20-30 mm/hg 2 each 0    Misc. Devices MISC 1 PAIR OF DIABETIC SHOES (1 LEFT/ 1 RIGHT)  1-3 PAIRS OF INSERTS (LEFT/ RIGHT) 2 each 0    aspirin (EQ ASPIRIN ADULT LOW DOSE) 81 MG EC tablet Take 1 tablet by mouth once daily 90 tablet 3    famotidine (PEPCID) 20 MG tablet       Lancets 30G MISC Testing once a day. Please dispense according to patients insurance. 100 each 3    blood glucose monitor strips Testing once a day. Please dispense according to patients insurance. 100 strip 3    Handicap Placard MISC by Does not apply route Can't walk greater than 200 feet. Expires in 5 years. 1 each 0    sildenafil (VIAGRA) 100 MG tablet Take 1 tablet by mouth daily as needed for Erectile Dysfunction NOT TO TAKE WITH NITROGLYCERIN, CAN CAUSE COLLAPSE 30 tablet 3    glucose monitoring (FREESTYLE FREEDOM) kit Please supply Patient with a glucose monitoring kit that insurance will cover. 1 kit 0    dapagliflozin (FARXIGA) 10 MG tablet Take 1 tablet by mouth every morning For diabetes, per your insurance 30 tablet 11    SITagliptin (JANUVIA) 100 MG tablet Take 1 tablet by mouth daily Dose increased 6/1/2021 90 tablet 3    allopurinol (ZYLOPRIM) 100 MG tablet Take 1 tablet by mouth daily FOR GOUT.  STOP IT IF ANY RASH DEVELOPS! 90 tablet 3    hydrALAZINE (APRESOLINE) 50 MG tablet Take 1 tablet by mouth 3 times daily BP medication 270 tablet 3    spironolactone (ALDACTONE) 25 MG tablet Take 1 tablet by mouth every morning BP and CHF 90 tablet 3    atorvastatin (LIPITOR) 40 MG tablet Take 1 tablet by mouth daily For high cholesterol, you need to take it every day 90 tablet 3    amLODIPine (NORVASC) 10 MG tablet Take 1 tablet by mouth once daily 90 tablet 3    carvedilol (COREG) 25 MG tablet TAKE 1 TABLET BY MOUTH TWICE DAILY WITH MEALS 180 tablet 11    furosemide (LASIX) 40 MG tablet Take 1 tablet by mouth daily Stop chlorthalidone and Metformin due to leg swelling 90 tablet 3    Alcohol Swabs PADS Please dispense according to patients insurance/device. Testing once a day 100 each 3    tamsulosin (FLOMAX) 0.4 MG capsule Take 1 capsule by mouth daily 30 capsule 11    Multiple Vitamins-Minerals (MULTIVITAMIN-MINERALS) TABS tablet Take 1 tablet by mouth daily 90 tablet 3    acetaminophen (EQ ACETAMINOPHEN) 500 MG tablet Take 1 tablet by mouth every 6 hours as needed for Pain 120 tablet 3    ketoconazole (NIZORAL) 2 % cream Apply on the feet, not between the toes, twice a day 1 Tube 1    lidocaine (LMX) 4 % cream Apply 2-3 times a day as needed for pain 120 g 3    Blood Pressure KIT Diagnosis: HTN. Needs to check blood pressure 1-2 times a day until stable, then once a day. Goal blood pressure less than 135/85, and above 110/60. 1 kit 0    nitroGLYCERIN (NITROSTAT) 0.4 MG SL tablet DISSOLVE ONE TABLET UNDER THE TONGUE EVERY 5 MINUTES AS NEEDED FOR CHEST PAIN.   DO NOT EXCEED A TOTAL OF 3 DOSES IN 15 MINUTES 25 tablet 0       Lisinopril  Social History     Tobacco Use   Smoking Status Former Smoker    Packs/day: 0.50    Years: 2.00    Pack years: 1.00    Types: Cigarettes    Quit date: 1981    Years since quittin.2   Smokeless Tobacco Former User       Social History     Substance and Sexual Activity   Alcohol Use Yes    Alcohol/week: 1.0 - 2.0 standard drink    Types: 1 - 2 Cans of beer per week    Comment: 1-2 cans a month       REVIEW OF SYSTEMS:  Constitutional: negative  Eyes: negative  Respiratory: negative  Cardiovascular: negative  Gastrointestinal: negative  Musculoskeletal: negative  Genitourinary: negative except for what is in HPI  Skin: negative   Neurological: negative  Hematological/Lymphatic: negative  Psychological: negative    Physical Exam:      Vitals:    03/25/22 1025   BP: 131/76   Pulse: 60     Patient is a 62 y.o. male in no acute distress and alert and oriented to person, place and time. NAD, A/o  Non labored respiration  Normal peripheral pulses  Skin- warm and dry  Psych- normal mood and affect      Assessment and Plan      1. Erectile dysfunction due to arterial insufficiency    2. Benign localized prostatic hyperplasia with lower urinary tract symptoms (LUTS)    3. Screening PSA (prostate specific antigen)           Prescriptions Ordered:  No orders of the defined types were placed in this encounter. Orders Placed:  Orders Placed This Encounter   Procedures    PSA Screening     To be obtained in 6 months, August or Sept 2022     Standing Status:   Future     Standing Expiration Date:   3/25/2023    POCT Urinalysis no Micro     Pleased with his urination on flomax and erections on sildenafil. Return in about 6 months (around 9/25/2022) for follow-up PSA. Continue sildenafil PRN for erections  Continue Flomax 0.4mg daily    Jacquie Sena M.D  [facility] Urology    I have discussed the care of this patient including pertinent history and exam findings, with the resident. I have seen and examined the patient and the key elements of all parts of the encounter have been performed by me. I agree with the assessment, plan and orders as documented by the resident.     Christina Rocha M.D, MD, MD

## 2022-05-11 ENCOUNTER — OFFICE VISIT (OUTPATIENT)
Dept: PODIATRY | Age: 62
End: 2022-05-11
Payer: COMMERCIAL

## 2022-05-11 VITALS — HEIGHT: 73 IN | WEIGHT: 231 LBS | BODY MASS INDEX: 30.62 KG/M2

## 2022-05-11 DIAGNOSIS — I89.0 LYMPHEDEMA: ICD-10-CM

## 2022-05-11 DIAGNOSIS — B35.1 DERMATOPHYTOSIS OF NAIL: Primary | ICD-10-CM

## 2022-05-11 DIAGNOSIS — E11.49 OTHER DIABETIC NEUROLOGICAL COMPLICATION ASSOCIATED WITH TYPE 2 DIABETES MELLITUS (HCC): ICD-10-CM

## 2022-05-11 DIAGNOSIS — I73.9 PERIPHERAL VASCULAR DISORDER (HCC): ICD-10-CM

## 2022-05-11 PROCEDURE — 1036F TOBACCO NON-USER: CPT | Performed by: PODIATRIST

## 2022-05-11 PROCEDURE — 3017F COLORECTAL CA SCREEN DOC REV: CPT | Performed by: PODIATRIST

## 2022-05-11 PROCEDURE — 2022F DILAT RTA XM EVC RTNOPTHY: CPT | Performed by: PODIATRIST

## 2022-05-11 PROCEDURE — G8417 CALC BMI ABV UP PARAM F/U: HCPCS | Performed by: PODIATRIST

## 2022-05-11 PROCEDURE — G8427 DOCREV CUR MEDS BY ELIG CLIN: HCPCS | Performed by: PODIATRIST

## 2022-05-11 PROCEDURE — 99213 OFFICE O/P EST LOW 20 MIN: CPT | Performed by: PODIATRIST

## 2022-05-11 PROCEDURE — 11721 DEBRIDE NAIL 6 OR MORE: CPT | Performed by: PODIATRIST

## 2022-05-11 PROCEDURE — 3051F HG A1C>EQUAL 7.0%<8.0%: CPT | Performed by: PODIATRIST

## 2022-05-11 RX ORDER — GABAPENTIN 100 MG/1
100 CAPSULE ORAL 3 TIMES DAILY
Qty: 90 CAPSULE | Refills: 0 | Status: SHIPPED | OUTPATIENT
Start: 2022-05-11 | End: 2022-06-30 | Stop reason: ALTCHOICE

## 2022-05-11 NOTE — PATIENT INSTRUCTIONS
Schedule a Vaccine  When you qualify to receive the vaccine, call the [facility]) COVID-19 Vaccination Hotline to schedule your appointment or to get additional information about the [facility]) locations which are offering the COVID-19 vaccine. To be 94% effective, it's important that you receive two doses of one of the COVID-19 vaccines. -If you are receiving the [place] vaccine, your second shot will be scheduled as close to 21 days after the first shot as possible. -If you are receiving the Moderna vaccine, your second shot will be scheduled as close to 28 days after the first shot as possible. [facility]) COVID-19 Vaccination Hotline: [phone]    Links to [facility]) website and [facility] website:    [email or web address]    [email or web address]

## 2022-05-11 NOTE — PROGRESS NOTES
heart without angina pectoris 11/19/2016    Diabetic nephropathy associated with type 2 diabetes mellitus ([facility] 1/12/2016    Diabetic polyneuropathy associated with type 2 diabetes mellitus ([facility]     DM (diabetes mellitus), type 2 ([facility] [address] [facility]     for 15-20 yrs    Erectile dysfunction     Grief 7/24/2017    HTN (hypertension)     Hypertensive urgency     Lipidemia     Mild episode of recurrent major depressive disorder ([facility] [address] 1/13/2019    Obesity     Obesity (BMI 30-39.9) 11/19/2016    MORENA on CPAP 2/4/2013    Sleep apnea 2/4/2013    Slow transit constipation     Uncontrolled type 2 diabetes mellitus, without long-term current use of insulin [facility] 10/13/2016       Allergies   Allergen Reactions    Lisinopril      Patient is on Aldactone     Current Outpatient Medications on File Prior to Visit   Medication Sig Dispense Refill    Urea (CARMOL) 40 % cream Apply to affected area once daily 1 each 0    Compression Stockings MISC 20-30 mm/hg 2 each 0    Misc. Devices MISC 1 PAIR OF DIABETIC SHOES (1 LEFT/ 1 RIGHT)  1-3 PAIRS OF INSERTS (LEFT/ RIGHT) 2 each 0    aspirin (EQ ASPIRIN ADULT LOW DOSE) 81 MG EC tablet Take 1 tablet by mouth once daily 90 tablet 3    famotidine (PEPCID) 20 MG tablet       Lancets 30G MISC Testing once a day. Please dispense according to patients insurance. 100 each 3    blood glucose monitor strips Testing once a day. Please dispense according to patients insurance. 100 strip 3    Handicap Placard MISC by Does not apply route Can't walk greater than 200 feet. Expires in 5 years. 1 each 0    sildenafil (VIAGRA) 100 MG tablet Take 1 tablet by mouth daily as needed for Erectile Dysfunction NOT TO TAKE WITH NITROGLYCERIN, CAN CAUSE COLLAPSE 30 tablet 3    glucose monitoring (FREESTYLE FREEDOM) kit Please supply Patient with a glucose monitoring kit that insurance will cover.  1 kit 0    dapagliflozin (FARXIGA) 10 MG tablet Take 1 tablet by mouth every morning For diabetes, per your insurance 30 tablet 11    SITagliptin (JANUVIA) 100 MG tablet Take 1 tablet by mouth daily Dose increased 6/1/2021 90 tablet 3    allopurinol (ZYLOPRIM) 100 MG tablet Take 1 tablet by mouth daily FOR GOUT. STOP IT IF ANY RASH DEVELOPS! 90 tablet 3    hydrALAZINE (APRESOLINE) 50 MG tablet Take 1 tablet by mouth 3 times daily BP medication 270 tablet 3    spironolactone (ALDACTONE) 25 MG tablet Take 1 tablet by mouth every morning BP and CHF 90 tablet 3    atorvastatin (LIPITOR) 40 MG tablet Take 1 tablet by mouth daily For high cholesterol, you need to take it every day 90 tablet 3    amLODIPine (NORVASC) 10 MG tablet Take 1 tablet by mouth once daily 90 tablet 3    carvedilol (COREG) 25 MG tablet TAKE 1 TABLET BY MOUTH TWICE DAILY WITH MEALS 180 tablet 11    furosemide (LASIX) 40 MG tablet Take 1 tablet by mouth daily Stop chlorthalidone and Metformin due to leg swelling 90 tablet 3    Alcohol Swabs PADS Please dispense according to patients insurance/device. Testing once a day 100 each 3    tamsulosin (FLOMAX) 0.4 MG capsule Take 1 capsule by mouth daily 30 capsule 11    Multiple Vitamins-Minerals (MULTIVITAMIN-MINERALS) TABS tablet Take 1 tablet by mouth daily 90 tablet 3    acetaminophen (EQ ACETAMINOPHEN) 500 MG tablet Take 1 tablet by mouth every 6 hours as needed for Pain 120 tablet 3    ketoconazole (NIZORAL) 2 % cream Apply on the feet, not between the toes, twice a day 1 Tube 1    lidocaine (LMX) 4 % cream Apply 2-3 times a day as needed for pain 120 g 3    Blood Pressure KIT Diagnosis: HTN. Needs to check blood pressure 1-2 times a day until stable, then once a day. Goal blood pressure less than 135/85, and above 110/60. 1 kit 0    nitroGLYCERIN (NITROSTAT) 0.4 MG SL tablet DISSOLVE ONE TABLET UNDER THE TONGUE EVERY 5 MINUTES AS NEEDED FOR CHEST PAIN. DO NOT EXCEED A TOTAL OF 3 DOSES IN 15 MINUTES 25 tablet 0     No current facility-administered medications on file prior to visit. Review of Systems    Review of Systems:   History obtained from chart review and the patient  General ROS: negative for - chills, fatigue, fever, night sweats or weight gain  Constitutional: Negative for chills, diaphoresis, fatigue, fever and unexpected weight change. Musculoskeletal: Positive for arthralgias, gait problem and joint swelling. Neurological ROS: negative for - behavioral changes, confusion, headaches or seizures. Negative for weakness and numbness. Dermatological ROS: negative for - mole changes, rash  Cardiovascular: Negative for leg swelling. Gastrointestinal: Negative for constipation, diarrhea, nausea and vomiting. Objective:  Dermatologic Exam:  Skin lesion/ulceration Absent . Skin No rashes or nodules noted. .   Skin is thin, with flaky sloughing skin as well as decreased hair growth to the lower leg  Small red hemosiderin deposits seen dorsal foot   Musculoskeletal:     1st MPJ ROM decreased, Bilateral.  Muscle strength 5/5, Bilateral.  Pain present upon palpation of toenails 1-5, Bilateral. decreased medial longitudinal arch, Bilateral.  Ankle ROM decreased,Bilateral.    Dorsally contracted digits present digits 2, Bilateral.     Vascular: DP pulses 1/4 bilateral.  PT pulses 0/4 bilateral.   CFT <5 seconds, Bilateral.  Hair growth absent to the level of the digits, Bilateral.  Edema present, Bilateral.  Varicosities absent, Bilateral. Erythema absent, Bilateral    Neurological: Sensation diminshed to light touch to level of digits, Bilateral.  Protective sensation intact 6/10 sites via 5.07/10g [place]-Tejinder Monofilament, Bilateral.  negative Tinel's, Bilateral.  negative Valleix sign, Bilateral.      Integument: Warm, dry, supple, Bilateral.  Open lesion absent, Bilateral.  Interdigital maceration absent to web spaces 4, Bilateral.  Nails 1-5 left and 1-5 right thickened > 3.0 mm, dystrophic and crumbly, discolored with subungual debris.   Fissures absent, Bilateral. General: AAO x 3 in NAD. Derm  Toenail Description  Sites of Onychomycosis Involvement (Check affected area)  [x] [x] [x] [x] [x] [x] [x] [x] [x] [x]  5 4 3 2 1 1 2 3 4 5                          Right                                        Left    Thickness  [x] [x] [x] [x] [x] [x] [x] [x] [x] [x]  5 4 3 2 1 1 2 3 4 5                         Right                                        Left    Dystrophic Changes   [x] [x] [x] [x] [x] [x] [x] [x] [x] [x]  5 4 3 2 1 1 2 3 4 5                         Right                                        Left    Color   [x] [x] [x] [x] [x] [x] [x] [x] [x] [x]  5 4 3 2 1 1 2 3 4 5                          Right                                        Left    Incurvation/Ingrowin   [] [] [] [] [] [] [] [] [] []  5 4 3 2 1 1 2 3 4 5                         Right                                        Left    Inflammation/Pain   [x] [x] [x] [x] [x] [x] [x] [x] [x] [x]  5 4 3 2 1 1 2 3 4 5                         Right                                        Left        Visual inspection:  Deformity: hammertoe deformity naomi feet  amputation: absent  Skin lesions: absent  Edema: right- 2+ pitting edema, left- 2+ pitting edema    Sensory exam:  Monofilament sensation: abnormal - 6/10 via SW 5.07/10g monofilament to the plantar foot bilateral feet    Pulses: abnormal - 1/4 dorsalis pedis pulse and 0/4 Posterior tibial pulse,   Pinprick: Impaired  Proprioception: Impaired  Vibration (128 Hz): Impaired       DM with PVD       [x]Yes    []No      Assessment:  62 y.o. male with:   Diagnosis Orders   1. Dermatophytosis of nail  40023 - VT DEBRIDEMENT OF NAILS, 6 OR MORE    HM DIABETES FOOT EXAM   2. Other diabetic neurological complication associated with type 2 diabetes mellitus ([facility] [address]  59774 - VT DEBRIDEMENT OF NAILS, 6 OR MORE    HM DIABETES FOOT EXAM   3.  Peripheral vascular disorder (HCC)  00432 - VT DEBRIDEMENT OF NAILS, 6 OR MORE    HM DIABETES FOOT EXAM   4. Lymphedema  40820 - VT DEBRIDEMENT OF NAILS, 6 OR MORE    HM DIABETES FOOT EXAM         Q7   []Yes    []No                Q8   [x]Yes    []No                     Q9   []Yes    []No    Plan:   Pt was evaluated and examined. Patient was given personalized discharge instructions. To address increased numbness and neuropathy pain, advised pt to closely monitor blood glucose. Recommend pt to discuss with PCP regarding oral medication treatment of neuropathy if needed. Recommend gabapentin 100 mg, one tab po TD. Nails 1-10 were debrided sharply in length and thickness with a nipper and , without incident. Pt will follow up in 9 weeks or sooner if any problems arise. Diagnosis was discussed with the pt and all of their questions were answered in detail. Proper foot hygiene and care was discussed with the pt. Informed patient on proper diabetic foot care and importance of tight glycemic control. Patient to check feet daily and contact the office with any questions/problems/concerns.    Other comorbidity noted and will be managed by PCP.  5/11/2022    Electronically signed by Raymondo Galeazzi, DPM on 5/11/2022 at 8:58 AM  5/11/2022

## 2022-06-13 DIAGNOSIS — E78.5 HYPERLIPIDEMIA WITH TARGET LDL LESS THAN 70: ICD-10-CM

## 2022-06-13 RX ORDER — ATORVASTATIN CALCIUM 40 MG/1
TABLET, FILM COATED ORAL
Qty: 90 TABLET | Refills: 0 | Status: SHIPPED | OUTPATIENT
Start: 2022-06-13 | End: 2022-07-15 | Stop reason: SDUPTHER

## 2022-06-14 ENCOUNTER — HOSPITAL ENCOUNTER (OUTPATIENT)
Dept: GENERAL RADIOLOGY | Age: 62
Discharge: HOME | End: 2022-06-16
Payer: COMMERCIAL

## 2022-06-14 ENCOUNTER — HOSPITAL ENCOUNTER (OUTPATIENT)
Age: 62
Discharge: HOME | End: 2022-06-16
Payer: COMMERCIAL

## 2022-06-14 ENCOUNTER — OFFICE VISIT (OUTPATIENT)
Dept: FAMILY MEDICINE CLINIC | Age: 62
End: 2022-06-14
Payer: COMMERCIAL

## 2022-06-14 ENCOUNTER — NURSE TRIAGE (OUTPATIENT)
Dept: OTHER | Facility: CLINIC | Age: 62
End: 2022-06-14

## 2022-06-14 ENCOUNTER — HOSPITAL ENCOUNTER (OUTPATIENT)
Age: 62
Discharge: HOME | End: 2022-06-14
Payer: COMMERCIAL

## 2022-06-14 VITALS
DIASTOLIC BLOOD PRESSURE: 60 MMHG | OXYGEN SATURATION: 93 % | HEIGHT: 73 IN | TEMPERATURE: 97.3 F | SYSTOLIC BLOOD PRESSURE: 110 MMHG | BODY MASS INDEX: 31.01 KG/M2 | HEART RATE: 84 BPM | WEIGHT: 234 LBS

## 2022-06-14 DIAGNOSIS — E78.5 HYPERLIPIDEMIA WITH TARGET LDL LESS THAN 70: ICD-10-CM

## 2022-06-14 DIAGNOSIS — E11.65 TYPE 2 DIABETES MELLITUS WITH HYPERGLYCEMIA, WITHOUT LONG-TERM CURRENT USE OF INSULIN (HCC): ICD-10-CM

## 2022-06-14 DIAGNOSIS — M16.10 ARTHRITIS PAIN, HIP: ICD-10-CM

## 2022-06-14 DIAGNOSIS — N18.2 BENIGN HYPERTENSION WITH CKD (CHRONIC KIDNEY DISEASE), STAGE II: ICD-10-CM

## 2022-06-14 DIAGNOSIS — I12.9 BENIGN HYPERTENSION WITH CKD (CHRONIC KIDNEY DISEASE), STAGE II: ICD-10-CM

## 2022-06-14 DIAGNOSIS — M1A.3710 CHRONIC GOUT OF RIGHT FOOT DUE TO RENAL IMPAIRMENT WITHOUT TOPHUS: ICD-10-CM

## 2022-06-14 DIAGNOSIS — R60.9 PERIPHERAL EDEMA: ICD-10-CM

## 2022-06-14 DIAGNOSIS — I50.42 CHRONIC COMBINED SYSTOLIC AND DIASTOLIC CHF (CONGESTIVE HEART FAILURE) (HCC): Primary | ICD-10-CM

## 2022-06-14 DIAGNOSIS — I50.42 CHRONIC COMBINED SYSTOLIC AND DIASTOLIC CHF (CONGESTIVE HEART FAILURE) (HCC): ICD-10-CM

## 2022-06-14 PROBLEM — F32.9 MAJOR DEPRESSIVE DISORDER, SINGLE EPISODE, UNSPECIFIED: Status: ACTIVE | Noted: 2022-06-14

## 2022-06-14 LAB
ABSOLUTE EOS #: 0 K/UL (ref 0–0.4)
ABSOLUTE LYMPH #: 1.7 K/UL (ref 1–4.8)
ABSOLUTE MONO #: 0.8 K/UL (ref 0.1–1.3)
ALBUMIN SERPL-MCNC: 3.9 G/DL (ref 3.5–5.2)
ALP BLD-CCNC: 90 U/L (ref 40–129)
ALT SERPL-CCNC: 14 U/L (ref 5–41)
ANION GAP SERPL CALCULATED.3IONS-SCNC: 10 MMOL/L (ref 9–17)
AST SERPL-CCNC: 15 U/L
BACTERIA: NORMAL
BASOPHILS # BLD: 1 % (ref 0–2)
BASOPHILS ABSOLUTE: 0 K/UL (ref 0–0.2)
BILIRUB SERPL-MCNC: 0.42 MG/DL (ref 0.3–1.2)
BILIRUBIN URINE: NEGATIVE
BUN BLDV-MCNC: 20 MG/DL (ref 8–23)
CALCIUM SERPL-MCNC: 9.3 MG/DL (ref 8.6–10.4)
CASTS UA: NORMAL /LPF
CHLORIDE BLD-SCNC: 105 MMOL/L (ref 98–107)
CHOLESTEROL, FASTING: 136 MG/DL
CHOLESTEROL/HDL RATIO: 2.8
CO2: 24 MMOL/L (ref 20–31)
COLOR: YELLOW
CREAT SERPL-MCNC: 0.95 MG/DL (ref 0.7–1.2)
EOSINOPHILS RELATIVE PERCENT: 1 % (ref 0–4)
EPITHELIAL CELLS UA: NORMAL /HPF
GFR AFRICAN AMERICAN: >60 ML/MIN
GFR NON-AFRICAN AMERICAN: >60 ML/MIN
GFR SERPL CREATININE-BSD FRML MDRD: ABNORMAL ML/MIN/{1.73_M2}
GLUCOSE BLD-MCNC: 125 MG/DL (ref 70–99)
GLUCOSE URINE: NEGATIVE
HCT VFR BLD CALC: 39.1 % (ref 41–53)
HDLC SERPL-MCNC: 49 MG/DL
HEMOGLOBIN: 12.9 G/DL (ref 13.5–17.5)
KETONES, URINE: NEGATIVE
LDL CHOLESTEROL: 69 MG/DL (ref 0–130)
LEUKOCYTE ESTERASE, URINE: ABNORMAL
LYMPHOCYTES # BLD: 28 % (ref 24–44)
MAGNESIUM: 1.9 MG/DL (ref 1.6–2.6)
MCH RBC QN AUTO: 30.3 PG (ref 26–34)
MCHC RBC AUTO-ENTMCNC: 33 G/DL (ref 31–37)
MCV RBC AUTO: 91.7 FL (ref 80–100)
MONOCYTES # BLD: 13 % (ref 1–7)
NITRITE, URINE: NEGATIVE
PDW BLD-RTO: 14.5 % (ref 11.5–14.9)
PH UA: 5 (ref 5–8)
PLATELET # BLD: 202 K/UL (ref 150–450)
PMV BLD AUTO: 9.6 FL (ref 6–12)
POTASSIUM SERPL-SCNC: 3.9 MMOL/L (ref 3.7–5.3)
PRO-BNP: 137 PG/ML
PROTEIN UA: ABNORMAL
RBC # BLD: 4.26 M/UL (ref 4.5–5.9)
RBC UA: NORMAL /HPF
SEG NEUTROPHILS: 57 % (ref 36–66)
SEGMENTED NEUTROPHILS ABSOLUTE COUNT: 3.6 K/UL (ref 1.3–9.1)
SODIUM BLD-SCNC: 139 MMOL/L (ref 135–144)
SPECIFIC GRAVITY UA: 1.02 (ref 1–1.03)
TOTAL PROTEIN: 7.2 G/DL (ref 6.4–8.3)
TRIGLYCERIDE, FASTING: 92 MG/DL
TURBIDITY: ABNORMAL
URIC ACID: 4.8 MG/DL (ref 3.4–7)
URINE HGB: NEGATIVE
UROBILINOGEN, URINE: NORMAL
WBC # BLD: 6.1 K/UL (ref 3.5–11)
WBC UA: NORMAL /HPF

## 2022-06-14 PROCEDURE — 1036F TOBACCO NON-USER: CPT | Performed by: FAMILY MEDICINE

## 2022-06-14 PROCEDURE — 83735 ASSAY OF MAGNESIUM: CPT

## 2022-06-14 PROCEDURE — 80061 LIPID PANEL: CPT

## 2022-06-14 PROCEDURE — 85025 COMPLETE CBC W/AUTO DIFF WBC: CPT

## 2022-06-14 PROCEDURE — 87086 URINE CULTURE/COLONY COUNT: CPT

## 2022-06-14 PROCEDURE — G8427 DOCREV CUR MEDS BY ELIG CLIN: HCPCS | Performed by: FAMILY MEDICINE

## 2022-06-14 PROCEDURE — 83880 ASSAY OF NATRIURETIC PEPTIDE: CPT

## 2022-06-14 PROCEDURE — 83036 HEMOGLOBIN GLYCOSYLATED A1C: CPT

## 2022-06-14 PROCEDURE — 99214 OFFICE O/P EST MOD 30 MIN: CPT | Performed by: FAMILY MEDICINE

## 2022-06-14 PROCEDURE — 36415 COLL VENOUS BLD VENIPUNCTURE: CPT

## 2022-06-14 PROCEDURE — 84550 ASSAY OF BLOOD/URIC ACID: CPT

## 2022-06-14 PROCEDURE — 2022F DILAT RTA XM EVC RTNOPTHY: CPT | Performed by: FAMILY MEDICINE

## 2022-06-14 PROCEDURE — 81001 URINALYSIS AUTO W/SCOPE: CPT

## 2022-06-14 PROCEDURE — 80053 COMPREHEN METABOLIC PANEL: CPT

## 2022-06-14 PROCEDURE — 3051F HG A1C>EQUAL 7.0%<8.0%: CPT | Performed by: FAMILY MEDICINE

## 2022-06-14 PROCEDURE — 3017F COLORECTAL CA SCREEN DOC REV: CPT | Performed by: FAMILY MEDICINE

## 2022-06-14 PROCEDURE — 73502 X-RAY EXAM HIP UNI 2-3 VIEWS: CPT

## 2022-06-14 PROCEDURE — G8417 CALC BMI ABV UP PARAM F/U: HCPCS | Performed by: FAMILY MEDICINE

## 2022-06-14 RX ORDER — FUROSEMIDE 40 MG/1
40 TABLET ORAL DAILY
Qty: 90 TABLET | Refills: 3 | Status: SHIPPED | OUTPATIENT
Start: 2022-06-14

## 2022-06-14 RX ORDER — ALLOPURINOL 100 MG/1
100 TABLET ORAL DAILY
Qty: 90 TABLET | Refills: 3 | Status: SHIPPED | OUTPATIENT
Start: 2022-06-14

## 2022-06-14 RX ORDER — METHYLPREDNISOLONE 4 MG/1
TABLET ORAL
Qty: 1 KIT | Refills: 0 | Status: SHIPPED | OUTPATIENT
Start: 2022-06-14 | End: 2022-07-15 | Stop reason: ALTCHOICE

## 2022-06-14 ASSESSMENT — ENCOUNTER SYMPTOMS
SHORTNESS OF BREATH: 0
NAUSEA: 0
COUGH: 0
CHEST TIGHTNESS: 0
CONSTIPATION: 0
ABDOMINAL PAIN: 0
WHEEZING: 0
ABDOMINAL DISTENTION: 0
DIARRHEA: 0
VOMITING: 0

## 2022-06-14 ASSESSMENT — PATIENT HEALTH QUESTIONNAIRE - PHQ9
6. FEELING BAD ABOUT YOURSELF - OR THAT YOU ARE A FAILURE OR HAVE LET YOURSELF OR YOUR FAMILY DOWN: 0
SUM OF ALL RESPONSES TO PHQ QUESTIONS 1-9: 4
10. IF YOU CHECKED OFF ANY PROBLEMS, HOW DIFFICULT HAVE THESE PROBLEMS MADE IT FOR YOU TO DO YOUR WORK, TAKE CARE OF THINGS AT HOME, OR GET ALONG WITH OTHER PEOPLE: 0
5. POOR APPETITE OR OVEREATING: 0
SUM OF ALL RESPONSES TO PHQ QUESTIONS 1-9: 4
9. THOUGHTS THAT YOU WOULD BE BETTER OFF DEAD, OR OF HURTING YOURSELF: 0
3. TROUBLE FALLING OR STAYING ASLEEP: 1
8. MOVING OR SPEAKING SO SLOWLY THAT OTHER PEOPLE COULD HAVE NOTICED. OR THE OPPOSITE, BEING SO FIGETY OR RESTLESS THAT YOU HAVE BEEN MOVING AROUND A LOT MORE THAN USUAL: 0
2. FEELING DOWN, DEPRESSED OR HOPELESS: 3
4. FEELING TIRED OR HAVING LITTLE ENERGY: 0
SUM OF ALL RESPONSES TO PHQ QUESTIONS 1-9: 4
1. LITTLE INTEREST OR PLEASURE IN DOING THINGS: 0
SUM OF ALL RESPONSES TO PHQ QUESTIONS 1-9: 4
SUM OF ALL RESPONSES TO PHQ9 QUESTIONS 1 & 2: 3
7. TROUBLE CONCENTRATING ON THINGS, SUCH AS READING THE NEWSPAPER OR WATCHING TELEVISION: 0

## 2022-06-14 NOTE — TELEPHONE ENCOUNTER
Future Appointments   Date Time Provider Vianey Vesna   6/14/2022  2:30 PM VENTURA Majano - CNP [facility]   7/13/2022  8:30 AM Noam Heredia DPM [facility]   7/15/2022  8:00 AM Meng Castelan MD fp [facility]   9/23/2022  8:30 AM SCHEDULE, [facility] [address]

## 2022-06-14 NOTE — PATIENT INSTRUCTIONS
New Updates for [facility] MyChart/ [employer] ([facility] SOHAN    Thank you for choosing [facility] to give you the best care! [facility] ([facility]) is always trying to think of new ways to help their patients. We are asking all patients to try out the new digital registration that is now available through your [facility] account or the new SOHAN, [employer] ([facility] Via the sohan you're now able to update your personal and registration information prior to your upcoming appointment. This will save you time once you arrive at the office to check-in, not to mention your information remains safe!! Many other perks come from signing up for an account, such as:   Requesting refills   Scheduling an appointment   Completing an [address] Sending a message to the office/provider   Having access to your medication list   Paying your bill/copay prior to your appointment   Scheduling your yearly mammogram   Review your test results    If you are not familiar with [facility] or the [employer] [facility] SOHAN, please ask one of us and we will be happy to answer any questions or help you set-up your account. [facility],  [facility]    Patient Education        Purine-Restricted Diet: Care Instructions  Your Care Instructions     Purines are substances that are found in some foods. Your body turns purines into uric acid. High levels of uric acid can cause gout, which is a form ofarthritis that causes pain and inflammation in joints. You may be able to help control the amount of uric acid in your body bylimiting high-purine foods in your diet. Follow-up care is a key part of your treatment and safety. Be sure to make and go to all appointments, and call your doctor if you are having problems. It's also a good idea to know your test results and keep alist of the medicines you take. How can you care for yourself at home?  Plan your meals and snacks around foods that are low in purines and are safe for you to eat. These foods include:  ? Green vegetables and tomatoes. ? Fruits. ? Whole-grain breads, rice, and cereals. ? Eggs, peanut butter, and nuts. ? Low-fat milk, cheese, and other milk products. ? Popcorn. ? Gelatin desserts, chocolate, cocoa, and cakes and sweets, in small amounts.  You can eat certain foods that are medium-high in purines, but eat them only once in a while. These foods include:  ? Legumes, such as dried beans and dried peas. You can have 1 cup cooked legumes each day. ? Asparagus, cauliflower, spinach, mushrooms, and green peas. ? Fish and seafood (other than very high-purine seafood). ? Oatmeal, wheat bran, and wheat germ.  Limit very high-purine foods, including:  ? Organ meats, such as liver, kidneys, sweetbreads, and brains. ? Meats, including hogan, beef, pork, and lamb. ? Game meats and any other meats in large amounts. ? Anchovies, sardines, herring, mackerel, and scallops. ? Gravy. ? Beer. Where can you learn more? Go to [email or web address]://[email or web address]. org and sign in to your [employer] account. Enter F448 in the [facility] box to learn more about \"Purine-Restricted Diet: Care Instructions. \"     If you do not have an account, please click on the \"Sign Up Now\" link. Current as of: September 8, 2021               Content Version: 13.2  © 2006-2022 Healthwise, Incorporated. Care instructions adapted under license by [facility] ([facility]). If you have questions about a medical condition or this instruction, always ask your healthcare professional. [place] [address] any warranty or liability for your use of this information.

## 2022-06-14 NOTE — TELEPHONE ENCOUNTER
Received call from Howard at [facility] with [facility]. Subjective: Caller states \"I have swelling in my legs\"     Current Symptoms: bilateral swelling of feet and legs    Onset: 1 month ago; unchanged    Pain Severity: numbness    Temperature: deneis     What has been tried: elevating    Recommended disposition: See in Office Today    Care advice provided, patient verbalizes understanding; denies any other questions or concerns; instructed to call back for any new or worsening symptoms. Patient/Caller agrees with recommended disposition; writer provided warm transfer to Noman Villarreal at [facility] for appointment scheduling     Attention Provider: Thank you for allowing me to participate in the care of your patient. The patient was connected to triage in response to information provided to the ECC/PSC. Please do not respond through this encounter as the response is not directed to a shared pool.       Reason for Disposition   Patient wants to be seen    Protocols used: LEG SWELLING AND EDEMA-ADULT-OH

## 2022-06-14 NOTE — PROGRESS NOTES
[facility] PHYSICIANS  [facility] PHYSICIANS [facility]  [address]  [address] [address]  Dept: [address] [facility] (:  1960) is a 62 y.o. male. Patient is here for evaluation of the following chief complaint(s):  Chief Complaint   Patient presents with    Swelling     FEET AND LEGS X 1 MONTH         SUBJECTIVE/OBJECTIVE:  HPI  Donald Caruso is a 62 y.o. male patient. Patient is an established patient of Dr. Eunice Osborn . Patient has a known history of Congestive Heart Failure, ckd, Hypertension , gout, Hyperlipidemia and hip arthritis. 1-2 months Drinking punch with liquor one day. HIP ARTHRITIS  Donald Caruso is a 62 y.o. male patient complain of right hip pain that's been going on for a few weeks. He's had issues in the past. Patient describes the pain as achy, gets exacerbated with activities especially walking. Uses a cane with ambulation. CHF/ EDEMA  Donald Caruso is a 62 y.o. male with a known history of congestive heart failure. Current symptoms include: exertional chest pressure/discomfort and peripheral edema. He denies chest pain, dyspnea, irregular heart beat, and palpitations. The symptoms are aggravated by  exertion, and relieved by rest, . Patient's current treatment includes : Spironolactone and lasix which he had not taken for awhile- ran out. .  Patient is compliant all of the time with his therapy/medications. He states he is compliant with low salt diet. .     Lab Results   Component Value Date    LVEF 30 2019    LVEFMODE Echo 2019   . Lab Results   Component Value Date/Time    PROBNP 137 2022 03:33 PM    PROBNP 45 2021 09:38 AM     DIABETES MELLITUS  Patient has a  stable Diabetes Mellitus. Current therapy includes Deatrice Gauss-- did not start- will send. Patient is responding well with this therapy. Patient reports home glucose monitoring as Stable readings.  Patient denieshypoglycemia episodes such as{symptoms. Patient denies neuropathy. Eye Exam: due  Foot Exam:due  Lab Results   Component Value Date/Time    LABA1C 7.3 01/21/2022 02:19 PM    LABA1C 9.7 09/10/2021 10:25 AM      CHRONIC KIDNEY DISEASE  Becka Hodge has a stage 2 CKD. Patient is under the care of DR. Jacobo. Lab Results   Component Value Date/Time    K 3.9 06/14/2022 03:33 PM    BUN 20 06/14/2022 03:33 PM    CREATININE 0.95 06/14/2022 03:33 PM    LABGLOM >60 06/14/2022 03:33 PM    GFRAA >60 06/14/2022 03:33 PM      HYPERTENSION/ CKD  Maya Bell has a well controlled hypertension. he is currently on hydralazine, Coreg, Amlodipine, . Patient's most recent BP in the office was stable. he reports stable BP readings at home. Patient denies any adverse reaction to this therapy. he denies any CP, SOB, HA, or palpitations. Patient admits to exercising and adheres to low salt diet. No history of organ damage due to condition noted. Lab Results   Component Value Date/Time    CREATININE 0.95 06/14/2022 03:33 PM     HYPERLIPIDEMIA  Maya Bell is currently on atorvastatin (Lipitor). Patient denies adverse reaction to this medication. Compliance with treatment thus far has been good. The patient is known to have coexisting coronary artery disease. The CVD Risk score (D'Agostino, et al., 2008) failed to calculate for the following reasons: The patient has a prior MI, stroke, CHF, or peripheral vascular disease diagnosis  Lab Results   Component Value Date/Time    CHOLFAST 136 06/14/2022 03:33 PM    CHOL 128 06/01/2021 08:52 AM    CHOL 177 01/16/2019 12:00 AM    HDL 49 06/14/2022 03:33 PM    LDLCHOLESTEROL 69 06/14/2022 03:33 PM    TRIGLYCFAST 92 06/14/2022 03:33 PM    CHOLHDLRATIO 2.8 06/14/2022 03:33 PM    TRIG 131 06/01/2021 08:52 AM    VLDL NOT REPORTED 06/01/2021 08:52 AM     GOUT  Patient with known gout. Stopped taking his allopurinol.    Lab Results   Component Value Date/Time    URICACID 4.8 06/14/2022 03:33 PM    URICACID 7.4 (H) 08/16/2021 09:38 AM        DEPRESSION SCREENING: Negative  PHQ Scores 6/14/2022 1/21/2022 6/1/2021 1/12/2021 5/27/2020 4/11/2019 1/11/2019   PHQ2 Score 3 0 0 0 0 0 3   PHQ9 Score 4 0 0 0 0 0 9     VITAL SIGNS:  Vitals:    06/14/22 1410   BP: 110/60   Pulse: 84   Temp: 97.3 °F (36.3 °C)   SpO2: 93%   Weight: 234 lb (106.1 kg)   Height: 6' 1\" (1.854 m)   Estimated body mass index is 30.87 kg/m² as calculated from the following:    Height as of this encounter: 6' 1\" (1.854 m). Weight as of this encounter: 234 lb (106.1 kg). Review of Systems   Constitutional: Positive for activity change and fatigue. Negative for chills and unexpected weight change. Eyes: Positive for visual disturbance. Respiratory: Negative for cough, chest tightness, shortness of breath and wheezing. Cardiovascular: Positive for leg swelling. Negative for chest pain and palpitations. Gastrointestinal: Negative for abdominal distention, abdominal pain, constipation, diarrhea, nausea and vomiting. Genitourinary:        Erectile Dysfunction     Musculoskeletal: Positive for arthralgias (right foot), gait problem, joint swelling (right foot) and myalgias. Allergic/Immunologic: Positive for immunocompromised state (due to uncontrolled diabetes). Neurological: Positive for numbness (feet, worsening). Hematological: Bruises/bleeds easily. Psychiatric/Behavioral: Positive for sleep disturbance. Negative for dysphoric mood. The patient is nervous/anxious. Stopped drinking, praise given       Physical Exam  Vitals and nursing note reviewed. HENT:      Head: Normocephalic. Nose: Nose normal.   Cardiovascular:      Rate and Rhythm: Normal rate and regular rhythm. Heart sounds: Heart sounds are distant. Pulmonary:      Effort: Pulmonary effort is normal.   Abdominal:      General: Abdomen is protuberant. There is no distension. Tenderness: There is no abdominal tenderness.    Musculoskeletal:      Right hip: Tenderness and bony tenderness present. No crepitus. Decreased range of motion. Decreased strength. Right lower leg: 3+ Edema present. Left lower leg: 3+ Edema present. Skin:     General: Skin is warm and dry. Neurological:      Mental Status: He is alert. Psychiatric:         Mood and Affect: Mood is anxious. Speech: Speech is rapid and pressured. Thought Content: Thought content does not include suicidal ideation. MEDICAL HISTORY      Diagnosis Date    Acute heart failure ([address] 5/20/2018    Anemia     Benign hypertension with CKD (chronic kidney disease), stage II 7/24/2017    CAD (coronary artery disease)     Cardiomyopathy (HCC)     Chronic diastolic CHF (congestive heart failure) ([address] 7/23/2018    Chronic kidney disease     Coronary artery disease involving native coronary artery of native heart without angina pectoris 11/19/2016    Diabetic nephropathy associated with type 2 diabetes mellitus ([address] 1/12/2016    Diabetic polyneuropathy associated with type 2 diabetes mellitus ([address]     DM (diabetes mellitus), type 2 ([address]     for 15-20 yrs    Erectile dysfunction     Grief 7/24/2017    HTN (hypertension)     Hypertensive urgency     Lipidemia     Mild episode of recurrent major depressive disorder ([address] 1/13/2019    Obesity     Obesity (BMI 30-39.9) 11/19/2016    MORENA on CPAP 2/4/2013    Sleep apnea 2/4/2013    Slow transit constipation     Uncontrolled type 2 diabetes mellitus, without long-term current use of insulin ([address] 10/13/2016      MEDICATIONS  Prior to Visit Medications    Medication Sig Taking? Authorizing Provider   dapagliflozin (FARXIGA) 10 MG tablet Take 1 tablet by mouth every morning For diabetes, per your insurance Yes VENTURA Majano - CNP   allopurinol (ZYLOPRIM) 100 MG tablet Take 1 tablet by mouth daily FOR GOUT. STOP IT IF ANY RASH DEVELOPS!  Yes VENTURA Majano CNP   furosemide (LASIX) 40 MG tablet Take 1 tablet by mouth daily Stop chlorthalidone and Metformin due to leg swelling Yes VENTURA Majano CNP   methylPREDNISolone (MEDROL DOSEPACK) 4 MG tablet Take by mouth. Yes VENTURA Majano CNP   atorvastatin (LIPITOR) 40 MG tablet TAKE 1 TABLET BY MOUTH ONCE DAILY FOR HIGH CHOLESTEROL *YOU  NEED  TO  TAKE  IT  EVERY  DAY Yes Mirela Sanon MD   Compression Stockings MISC 20-30 mm/hg Yes Felicia Hines DPM   Misc. Devices MISC 1 PAIR OF DIABETIC SHOES (1 LEFT/ 1 RIGHT)  1-3 PAIRS OF INSERTS (LEFT/ RIGHT) Yes Felicia Hines DPM   aspirin (EQ ASPIRIN ADULT LOW DOSE) 81 MG EC tablet Take 1 tablet by mouth once daily Yes Mirela Sanon MD   Lancets 30G MISC Testing once a day. Please dispense according to patients insurance. Yes Mirela Sanon MD   blood glucose monitor strips Testing once a day. Please dispense according to patients insurance. Yes Mirela Sanon MD   Handicap Andrés MISC by Does not apply route Can't walk greater than 200 feet. Expires in 5 years. Yes Mirela Sanon MD   sildenafil (VIAGRA) 100 MG tablet Take 1 tablet by mouth daily as needed for Erectile Dysfunction NOT TO TAKE WITH NITROGLYCERIN, CAN CAUSE COLLAPSE Yes Mirela Sanon MD   glucose monitoring (FREESTYLE FREEDOM) kit Please supply Patient with a glucose monitoring kit that insurance will cover.  Yes Mirela Sanon MD   SITagliptin (JANUVIA) 100 MG tablet Take 1 tablet by mouth daily Dose increased 6/1/2021 Yes Mirela Sanon MD   hydrALAZINE (APRESOLINE) 50 MG tablet Take 1 tablet by mouth 3 times daily BP medication Yes Mirela Sanon MD   spironolactone (ALDACTONE) 25 MG tablet Take 1 tablet by mouth every morning BP and CHF Yes Mirela Sanon MD   amLODIPine (NORVASC) 10 MG tablet Take 1 tablet by mouth once daily Yes Mirela Sanon MD   carvedilol (COREG) 25 MG tablet TAKE 1 TABLET BY MOUTH TWICE DAILY WITH MEALS Yes Mirela Sanon MD   Alcohol Swabs PADS Please dispense according to patients insurance/device. Testing once a day Yes Kika Smith MD   tamsulosin (FLOMAX) 0.4 MG capsule Take 1 capsule by mouth daily Yes Pao Das MD   Multiple Vitamins-Minerals (MULTIVITAMIN-MINERALS) TABS tablet Take 1 tablet by mouth daily Yes Kika Smith MD   acetaminophen (EQ ACETAMINOPHEN) 500 MG tablet Take 1 tablet by mouth every 6 hours as needed for Pain Yes Kika Smith MD   ketoconazole (NIZORAL) 2 % cream Apply on the feet, not between the toes, twice a day Yes Kika Smith MD   Blood Pressure KIT Diagnosis: HTN. Needs to check blood pressure 1-2 times a day until stable, then once a day. Goal blood pressure less than 135/85, and above 110/60. Yes Kika Smith MD   nitroGLYCERIN (NITROSTAT) 0.4 MG SL tablet DISSOLVE ONE TABLET UNDER THE TONGUE EVERY 5 MINUTES AS NEEDED FOR CHEST PAIN. DO NOT EXCEED A TOTAL OF 3 DOSES IN 15 MINUTES Yes Kika Smith MD   gabapentin (NEURONTIN) 100 MG capsule Take 1 capsule by mouth 3 times daily for 30 days. Intended supply: 30 days  Van Hayward DPM   Urea (CARMOL) 40 % cream Apply to affected area once daily  Patient not taking: Reported on 6/14/2022  Van Hayward DPM   lidocaine (LMX) 4 % cream Apply 2-3 times a day as needed for pain  Patient not taking: Reported on 6/14/2022  Kika Smith MD     Controlled Substance Monitoring:  Acute and Chronic Pain Monitoring:   RX Monitoring 8/27/2020   Periodic Controlled Substance Monitoring No signs of potential drug abuse or diversion identified. ;Possible medication side effects, risk of tolerance/dependence & alternative treatments discussed. ;Assessed functional status. ASSESSMENT/PLAN:  1. Chronic combined systolic and diastolic CHF (congestive heart failure) (HCC)  Worsening  Restart lasix and farxiga  Continue with the therapy. DISCUSSED and ADVISED TO:  Weigh daily and log. Keep regular activity as tolerated. Cut down on salt intake.    Keep appointment with the cardiologist.  Go to the ER for CP and SOB not relieved with rest.    - Brain Natriuretic Peptide; Future  - furosemide (LASIX) 40 MG tablet; Take 1 tablet by mouth daily Stop chlorthalidone and Metformin due to leg swelling  Dispense: 90 tablet; Refill: 3  - Magnesium; Future    2. Benign hypertension with CKD (chronic kidney disease), stage II  Failure to Improve  Continue current therapy. DISCUSSED AND ADVISED TO:  Cut down on your salt intake. Cut down on caffeinated drinks, sports drinks. Instructed to check BP at home regularly. Report for any chest pains, shortness of breath, headaches, and lightheadedness. Call the office if your blood pressure continue to be higher than 140/90 or 90/50.      - Comprehensive Metabolic Panel; Future  - CBC with Auto Differential; Future  - Urinalysis with Reflex to Culture; Future  - furosemide (LASIX) 40 MG tablet; Take 1 tablet by mouth daily Stop chlorthalidone and Metformin due to leg swelling  Dispense: 90 tablet; Refill: 3  - Uric Acid; Future  - Magnesium; Future    3. Type 2 diabetes mellitus with hyperglycemia, without long-term current use of insulin ([address]  Stable  Restart farxiga  Continue therapy. We will continue to monitor Hemoglobin A1c   DISCUSSED and ADVISED TO:  Continue to check Glucose levels at home. Report increased and low levels as discussed. Decrease carbohydrates, sugary drinks, desserts in your diet. Exercise regularly, as tolerated. Try to lose weight.      - dapagliflozin (FARXIGA) 10 MG tablet; Take 1 tablet by mouth every morning For diabetes, per your insurance  Dispense: 30 tablet; Refill: 11  - Hemoglobin A1C; Future    4. 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 DISCUSSED AND ADVISED TO:  Limit or avoid Foods High In Purine- list given  Such as Beer and other alcoholic beverages. Also gravies and sauces made with meat.   Anchovies, sardines, caviar, herring, mussels, tuna, Codfish, Trout, [address], [place],   [employer], (such as liver or kidneys), tripe, sweet bread, wild game, goose,   High fructose corn syrup in foods and drinks    - allopurinol (ZYLOPRIM) 100 MG tablet; Take 1 tablet by mouth daily FOR GOUT. STOP IT IF ANY RASH DEVELOPS! Dispense: 90 tablet; Refill: 3  - methylPREDNISolone (MEDROL DOSEPACK) 4 MG tablet; Take by mouth. Dispense: 1 kit; Refill: 0    5. Hyperlipidemia with target LDL less than 70  Stable  Continue therapy. Advised to decrease the consumption of red meats, fried foods, trans fats, sweets, sugary beverages. Advised to increase fish, vegetables, and fruits consumption. Advised to add fiber or OTC supplements in diet. Discussed weight loss which will result in improvement of lipids levels. Advised to increase daily physical activities and add regular exercises. - Comprehensive Metabolic Panel; Future  - CBC with Auto Differential; Future  - Lipid, Fasting; Future    6. Arthritis pain, hip  Worsening  Continue current therapy. DISCUSSED and ADVISED TO:  Stay at a healthy weight. Continue exercises/PT  Stretch to help prevent stiffness and to prevent injury before exercise. Gentle forms of yoga help keep joints and muscles flexible. Walk instead of jog, ride a bike, swim, and water exercise. Lift weights as tolerated. strong muscles help reduce stress on joints. Take pain medicines exactly as directed and only as needed. - XR HIP RIGHT (2-3 VIEWS); Future  - methylPREDNISolone (MEDROL DOSEPACK) 4 MG tablet; Take by mouth. Dispense: 1 kit; Refill: 0     7. Peripheral edema  Worsening  DISCUSSED AND ADVISED TO:  Raise legs above the swollen area when resting. Take frequent breaks from standing and sitting positions. Walk around to promote blood flow to legs. Cut down salt on diet.            On this date 6/14/2022 I have spent 35 minutes reviewing previous notes, test results and face to face with the patient discussing the diagnosis and importance of compliance with the treatment plan as well as documenting on the day of the visit. Return for Keep Prev Appt, Appt w/ Dr. Harvinder Villanueva. This note was completed by using the assistance of a speech-recognition program. However, inadvertent computerized transcription errors may be present. Although every effort was made to ensure accuracy, no guarantees can be provided that every mistake has been identified and corrected by editing.   Electronically signed by VENTURA Kearns CNP on 6/14/22 at 1:01 PM EDT     --VENTURA Kearns CNP

## 2022-06-15 ENCOUNTER — TELEPHONE (OUTPATIENT)
Dept: FAMILY MEDICINE CLINIC | Age: 62
End: 2022-06-15

## 2022-06-15 DIAGNOSIS — N30.90 CYSTITIS: Primary | ICD-10-CM

## 2022-06-15 LAB
ESTIMATED AVERAGE GLUCOSE: 163 MG/DL
HBA1C MFR BLD: 7.3 % (ref 4–6)

## 2022-06-15 RX ORDER — NITROFURANTOIN MACROCRYSTALS 100 MG/1
100 CAPSULE ORAL 4 TIMES DAILY
Qty: 20 CAPSULE | Refills: 0 | Status: SHIPPED | OUTPATIENT
Start: 2022-06-15 | End: 2022-06-20

## 2022-06-15 NOTE — TELEPHONE ENCOUNTER
Please let the patient know of the recent results. These can also be discussed further on the future visits. Patient's blood count is blood count no change  Lab Results   Component Value Date    WBC 6.1 06/14/2022    HGB 12.9 (L) 06/14/2022    HCT 39.1 (L) 06/14/2022    MCV 91.7 06/14/2022     06/14/2022    LYMPHOPCT 28 06/14/2022    RBC 4.26 (L) 06/14/2022    MCH 30.3 06/14/2022    MCHC 33.0 06/14/2022    RDW 14.5 06/14/2022     Patient's kidney function is WNL      Patient's liver function is WNL  . Lab Results   Component Value Date     06/14/2022    K 3.9 06/14/2022     06/14/2022    CO2 24 06/14/2022    BUN 20 06/14/2022    CREATININE 0.95 06/14/2022    GLUCOSE 125 (H) 06/14/2022    CALCIUM 9.3 06/14/2022    PROT 7.2 06/14/2022    LABALBU 3.9 06/14/2022    BILITOT 0.42 06/14/2022    ALKPHOS 90 06/14/2022    AST 15 06/14/2022    ALT 14 06/14/2022    LABGLOM >60 06/14/2022    GFRAA >60 06/14/2022       Patient's urinalysis results Please let the patient know that his  urinalysis resulted in a bladder infection. I sent a prescription for his  to take. We are going to recheck her urine for clearance. Please remind his  to drink more fluids. Call us for other concerns. Thanks.   Lab Results   Component Value Date    COLORU Yellow 06/14/2022    NITRU NEGATIVE 06/14/2022    LEUKOCYTESUR MOD 06/14/2022    GLUCOSEU NEGATIVE 06/14/2022    KETUA NEGATIVE 06/14/2022    UROBILINOGEN Normal 06/14/2022    BILIRUBINUR NEGATIVE 06/14/2022    BILIRUBINUR negative 03/25/2022       Diabetes screening no change- continue therapy, make sure restart farxiga  Lab Results   Component Value Date/Time    LABA1C 7.3 (H) 06/14/2022 03:33 PM        =    Patient's lipids test results stable- continue lipitor  Lab Results   Component Value Date/Time    CHOLFAST 136 06/14/2022 03:33 PM    CHOL 128 06/01/2021 08:52 AM    CHOL 177 01/16/2019 12:00 AM    HDL 49 06/14/2022 03:33 PM    LDLCHOLESTEROL 69 06/14/2022 03:33 PM TRIG 131 06/01/2021 08:52 AM    CHOLHDLRATIO 2.8 06/14/2022 03:33 PM    VLDL NOT REPORTED 06/01/2021 08:52 AM WDL

## 2022-06-16 DIAGNOSIS — M16.11 ARTHRITIS OF RIGHT HIP: Primary | ICD-10-CM

## 2022-06-30 ENCOUNTER — OFFICE VISIT (OUTPATIENT)
Dept: ORTHOPEDIC SURGERY | Age: 62
End: 2022-06-30
Payer: COMMERCIAL

## 2022-06-30 VITALS — HEIGHT: 73 IN | BODY MASS INDEX: 31.01 KG/M2 | WEIGHT: 234 LBS

## 2022-06-30 DIAGNOSIS — M54.16 LUMBAR RADICULOPATHY, CHRONIC: ICD-10-CM

## 2022-06-30 DIAGNOSIS — M51.36 DDD (DEGENERATIVE DISC DISEASE), LUMBAR: ICD-10-CM

## 2022-06-30 DIAGNOSIS — M25.552 HIP PAIN, LEFT: Primary | ICD-10-CM

## 2022-06-30 PROCEDURE — 1036F TOBACCO NON-USER: CPT | Performed by: PHYSICIAN ASSISTANT

## 2022-06-30 PROCEDURE — 99204 OFFICE O/P NEW MOD 45 MIN: CPT | Performed by: PHYSICIAN ASSISTANT

## 2022-06-30 PROCEDURE — G8427 DOCREV CUR MEDS BY ELIG CLIN: HCPCS | Performed by: PHYSICIAN ASSISTANT

## 2022-06-30 PROCEDURE — G8417 CALC BMI ABV UP PARAM F/U: HCPCS | Performed by: PHYSICIAN ASSISTANT

## 2022-06-30 PROCEDURE — 3017F COLORECTAL CA SCREEN DOC REV: CPT | Performed by: PHYSICIAN ASSISTANT

## 2022-06-30 RX ORDER — GABAPENTIN 100 MG/1
100 CAPSULE ORAL 2 TIMES DAILY
Qty: 60 CAPSULE | Refills: 0 | Status: SHIPPED | OUTPATIENT
Start: 2022-06-30 | End: 2022-07-26

## 2022-06-30 NOTE — PROGRESS NOTES
[facility], [address] [place] [address] [address], [address] [place] [address], [id number] [facility]           Dept Phone: [phone]           Dept Fax:  [address], [place]          Dept Phone: [phone]           Dept Fax:  [phone]      Chief Compliant:  Chief Complaint   Patient presents with    Hip Pain     Right        History of Present Illness: This is a 62 y.o. male who presents to the clinic today for evaluation of right leg and right-sided low back pain. Patient reports this has been present for approximately 1 year denies any known injury prior to the onset of pain but states due to this pain he has had a couple falls over the last year. Patient reports pain is most severe to the right lumbar/buttock area. He notes radiation down the posterior lateral thigh all the way into the foot. Associated with numbness and tingling along the same distribution. Patient does have chronic neuropathy bilaterally but denies any radicular symptoms down the left leg. Patient denies any bowel or bladder dysfunction, fever, chills, nausea or vomiting no saddle anesthesia. Patient without history of back or hip surgery, physical therapy, injections. Past History:    Current Outpatient Medications:     gabapentin (NEURONTIN) 100 MG capsule, Take 1 capsule by mouth 2 times daily for 30 days. Intended supply: 30 days, Disp: 60 capsule, Rfl: 0    dapagliflozin (FARXIGA) 10 MG tablet, Take 1 tablet by mouth every morning For diabetes, per your insurance, Disp: 30 tablet, Rfl: 11    allopurinol (ZYLOPRIM) 100 MG tablet, Take 1 tablet by mouth daily FOR GOUT.  STOP IT IF ANY RASH DEVELOPS!, Disp: 90 tablet, Rfl: 3    furosemide (LASIX) 40 MG tablet, Take 1 tablet by mouth daily Stop chlorthalidone and Metformin due to leg swelling, Disp: 90 tablet, Rfl: 3    methylPREDNISolone (MEDROL DOSEPACK) 4 MG tablet, Take by mouth., Disp: 1 kit, Rfl: 0    atorvastatin (LIPITOR) 40 MG tablet, TAKE 1 TABLET BY MOUTH ONCE DAILY FOR HIGH CHOLESTEROL *YOU  NEED  TO  TAKE  IT  EVERY  DAY, Disp: 90 tablet, Rfl: 0    Urea (CARMOL) 40 % cream, Apply to affected area once daily (Patient not taking: Reported on 6/14/2022), Disp: 1 each, Rfl: 0    Compression Stockings MISC, 20-30 mm/hg, Disp: 2 each, Rfl: 0    Misc. Devices MISC, 1 PAIR OF DIABETIC SHOES (1 LEFT/ 1 RIGHT) 1-3 PAIRS OF INSERTS (LEFT/ RIGHT), Disp: 2 each, Rfl: 0    aspirin (EQ ASPIRIN ADULT LOW DOSE) 81 MG EC tablet, Take 1 tablet by mouth once daily, Disp: 90 tablet, Rfl: 3    Lancets 30G MISC, Testing once a day. Please dispense according to patients insurance., Disp: 100 each, Rfl: 3    blood glucose monitor strips, Testing once a day. Please dispense according to patients insurance., Disp: 100 strip, Rfl: 3    Handicap Placard MISC, by Does not apply route Can't walk greater than 200 feet. Expires in 5 years. , Disp: 1 each, Rfl: 0    sildenafil (VIAGRA) 100 MG tablet, Take 1 tablet by mouth daily as needed for Erectile Dysfunction NOT TO TAKE WITH NITROGLYCERIN, CAN CAUSE COLLAPSE, Disp: 30 tablet, Rfl: 3    glucose monitoring (FREESTYLE FREEDOM) kit, Please supply Patient with a glucose monitoring kit that insurance will cover. , Disp: 1 kit, Rfl: 0    SITagliptin (JANUVIA) 100 MG tablet, Take 1 tablet by mouth daily Dose increased 6/1/2021, Disp: 90 tablet, Rfl: 3    hydrALAZINE (APRESOLINE) 50 MG tablet, Take 1 tablet by mouth 3 times daily BP medication, Disp: 270 tablet, Rfl: 3    spironolactone (ALDACTONE) 25 MG tablet, Take 1 tablet by mouth every morning BP and CHF, Disp: 90 tablet, Rfl: 3    amLODIPine (NORVASC) 10 MG tablet, Take 1 tablet by mouth once daily, Disp: 90 tablet, Rfl: 3    carvedilol (COREG) 25 MG tablet, TAKE 1 TABLET BY MOUTH TWICE DAILY WITH MEALS, Disp: 180 tablet, Rfl: 11    Alcohol Swabs PADS, Please dispense according to patients insurance/device. Testing once a day, Disp: 100 each, Rfl: 3    tamsulosin (FLOMAX) 0.4 MG capsule, Take 1 capsule by mouth daily, Disp: 30 capsule, Rfl: 11    Multiple Vitamins-Minerals (MULTIVITAMIN-MINERALS) TABS tablet, Take 1 tablet by mouth daily, Disp: 90 tablet, Rfl: 3    acetaminophen (EQ ACETAMINOPHEN) 500 MG tablet, Take 1 tablet by mouth every 6 hours as needed for Pain, Disp: 120 tablet, Rfl: 3    ketoconazole (NIZORAL) 2 % cream, Apply on the feet, not between the toes, twice a day, Disp: 1 Tube, Rfl: 1    lidocaine (LMX) 4 % cream, Apply 2-3 times a day as needed for pain (Patient not taking: Reported on 2022), Disp: 120 g, Rfl: 3    Blood Pressure KIT, Diagnosis: HTN. Needs to check blood pressure 1-2 times a day until stable, then once a day. Goal blood pressure less than 135/85, and above 110/60., Disp: 1 kit, Rfl: 0    nitroGLYCERIN (NITROSTAT) 0.4 MG SL tablet, DISSOLVE ONE TABLET UNDER THE TONGUE EVERY 5 MINUTES AS NEEDED FOR CHEST PAIN. DO NOT EXCEED A TOTAL OF 3 DOSES IN 15 MINUTES, Disp: 25 tablet, Rfl: 0  Allergies   Allergen Reactions    Lisinopril      Patient is on Aldactone     Social History     Socioeconomic History    Marital status: Single     Spouse name: Not on file    Number of children: Not on file    Years of education: Not on file    Highest education level: Not on file   Occupational History    Not on file   Tobacco Use    Smoking status: Former Smoker     Packs/day: 0.50     Years: 2.00     Pack years: 1.00     Types: Cigarettes     Quit date: 1981     Years since quittin.5    Smokeless tobacco: Former User   Substance and Sexual Activity    Alcohol use:  Yes     Alcohol/week: 1.0 - 2.0 standard drink     Types: 1 - 2 Cans of beer per week     Comment: 1-2 cans a month    Drug use: Yes     Types: Marijuana Rocío Robertson     Comment: marijuana every day     Sexual activity: Yes Partners: Female   Other Topics Concern    Not on file   Social History Narrative    Not on file     Social Determinants of Health     Financial Resource Strain: Low Risk     Difficulty of Paying Living Expenses: Not hard at all   Food Insecurity: No Food Insecurity    Worried About Running Out of Food in the Last Year: Never true    [address] in the Last Year: Never true   Transportation Needs: No Transportation Needs    Lack of Transportation (Medical): No    Lack of Transportation (Non-Medical):  No   Physical Activity:     Days of Exercise per Week: Not on file    Minutes of Exercise per Session: Not on file   Stress:     Feeling of Stress : Not on file   Social Connections:     Frequency of Communication with Friends and Family: Not on file    Frequency of Social Gatherings with Friends and Family: Not on file    Attends Sikhism Services: Not on file    Active Member of [address] [place] [address] or Organizations: Not on file    Attends Club or Organization Meetings: Not on file    Marital Status: Not on file   Intimate Partner Violence:     Fear of Current or Ex-Partner: Not on file    Emotionally Abused: Not on file    Physically Abused: Not on file    Sexually Abused: Not on file   Housing Stability: Unknown    Unable to Pay for Housing in the Last Year: No    Number of [place] in the Last Year: Not on file    Unstable Housing in the Last Year: No     Past Medical History:   Diagnosis Date    [facility] 5/20/2018    Anemia     Benign hypertension with CKD (chronic kidney disease), stage II 7/24/2017    CAD (coronary artery disease)     Cardiomyopathy ([facility]     Chronic diastolic CHF (congestive heart failure) ([facility] 7/23/2018    Chronic kidney disease     Coronary artery disease involving native coronary artery of native heart without angina pectoris 11/19/2016    Diabetic nephropathy associated with type 2 diabetes mellitus ([facility] 1/12/2016    Diabetic polyneuropathy associated with type 2 diabetes mellitus ([facility]     DM (diabetes mellitus), type 2 ([facility]     for 15-20 yrs    Erectile dysfunction     Grief 2017    HTN (hypertension)     Hypertensive urgency     Lipidemia     Mild episode of recurrent major depressive disorder ([facility] 2019    Obesity     Obesity (BMI 30-39.9) 2016    MORENA on CPAP 2013    Sleep apnea 2013    Slow transit constipation     Uncontrolled type 2 diabetes mellitus, without long-term current use of insulin ([facility] 10/13/2016     Past Surgical History:   Procedure Laterality Date    CARDIAC CATHETERIZATION  2017    at Sunday per DR Ray Houston note in Media from 18, showed 50% stenosis PDA, EF 45%    CARDIAC CATHETERIZATION  2011    per Dr. India Fernández note in media: non-obstructive CAD, EF 45%    COLONOSCOPY  01/15/2013    hemorrhoids, good prep, scanned     Family History   Problem Relation Age of Onset    Asthma Mother             High Blood Pressure Mother     Heart Disease Mother     High Blood Pressure Father     Diabetes Sister     High Blood Pressure Sister     Diabetes Brother     High Blood Pressure Brother     Cancer Sister         breast    Cancer Sister         lung/smoker     Colon Cancer Other         Review of Systems   Constitutional: Negative for fever, chills, sweats. Eyes: Negative for changes in vision, or pain. HENT: Negative for ear ache, epistaxis, or sore throat. Respiratory/Cardio: Negative for Chest pain, palpitations, SOB, or cough. Gastrointestinal: Negative for abdominal pain, N/V/D. Genitourinary: Negative for dysuria, frequency, urgency, or hematuria. Neurological: Negative for headache, numbness, or weakness. Integumentary: Negative for rash, itching, laceration, or abrasion. Musculoskeletal: Positive for Hip Pain (Right)       Physical Exam:  Constitutional: Patient is oriented to person, place, and time. Patient appears well-developed and well nourished. HENT: Negative otherwise noted  Head: Normocephalic and Atraumatic  Nose: Normal  Eyes: Conjunctivae and EOM are normal  Neck: Normal range of motion Neck supple. Respiratory/Cardio: Effort normal. No respiratory distress. Musculoskeletal:    LUMBAR/SACRAL EXAMINATION:  · Inspection: Local inspection shows no step-off or bruising. Lumbar alignment is normal.  Sagittal and Coronal balance is neutral.      · Palpation:   Moderate tenderness right lumbar paraspinal area. Mild tenderness to the right SI joint and the right ischial tuberosity. No evidence of tenderness at the midline. There is no step-off or paraspinal spasm. · Range of Motion: Lumbar flexion, extension and rotation are mildly limited due to pain. · Strength:   Strength testing is 5/5 in all muscle groups tested. · Special Tests:   Straight leg raise and crossed SLR negative. Leg length and pelvis level.  0 out of 5 Carol's signs. · Skin: There are no rashes, ulcerations or lesions. · Reflexes: Reflexes are symmetrically 2+ at the patellar and ankle tendons. Clonus absent bilaterally at the feet. · Gait & station: normal, patient ambulates without assistance  · Additional Examinations:   · RIGHT LOWER EXTREMITY: Inspection/examination of the right lower extremity does not show any tenderness, deformity or injury. Range of motion is unremarkable. There is no gross instability. There are no rashes, ulcerations or lesions. Strength and tone are normal.  · LEFT LOWER EXTREMITY:  Inspection/examination of the left lower extremity does not show any tenderness, deformity or injury. Range of motion is unremarkable. There is no gross instability. There are no rashes, ulcerations or lesions. Strength and tone are normal.    right Hip    Tenderness: Mild tenderness over the right greater trochanter. Mild tenderness over the proximal IT band.   No tenderness to the anterior posterior hip       Range of Motion:      Extension: 10 Flexion: 120     Internal Rotation: 30     External Rotation: 40     Abduction: 40     Adduction:  30       Muscle Strength      Abduction: 5/5     Adduction: 5/5     Flexion: 5/5         Gait: Antalgic   Devi Test: Negative   Estella Test: Positive         Neurological: Patient is alert and oriented to person, place, and time. Normal strenght. No sensory deficit. Skin: Skin is warm and dry  Psychiatric: Behavior is normal. Thought content normal.  Nursing note and vitals reviewed. Labs and Imaging:     XR taken today:  No results found. X-rays taken in clinic today and preliminarily reviewed by me 6/30/22:  AP and lateral views of the lumbar spine taken in clinic today demonstrate mild multilevel lumbar DDD most severe at L4-5 and L5-S1. Anterior osteophytes present at the lower lumbar levels. Hips are visualized on AP view demonstrate early degenerative changes of bilateral hips overall maintenance of joint space however. No evidence of acute fracture or other acute osseous abnormality. Orders Placed This Encounter   Procedures    XR LUMBAR SPINE (2-3 VIEWS)     Standing Status:   Future     Number of Occurrences:   1     Standing Expiration Date:   6/30/2023   [facility] - [facility]     Referral Priority:   Routine     Referral Type:   Eval and Treat     Referral Reason:   Specialty Services Required     Requested Specialty:   Physical Therapist     Number of Visits Requested:   1       Assessment and Plan:  1. Hip pain, left    2. DDD (degenerative disc disease), lumbar    3. Lumbar radiculopathy, chronic          PLAN:  Sia Casey is a 62 y.o. old malewho presents today for evaluation of low back pain and right leg pain. Pain has been present for 1 year. Differential includes lumbar strain, fracture, spondylitic spondylolisthesis, lumbar DDD, lumbar radiculopathy, epidural abscess, epidural hematoma and cauda equina syndrome.   Based on examination there is no evidence of cord compression syndrome or infectious etiology. Examination is consistent with lumbar radiculopathy. I do question the possibility of right trochanteric bursitis however the majority of his pain actually seems to be posterior in the leg rather than laterally. .    We discussed treatment options available to him, including nonoperative and operative intervention. At this time I believe he will benefit from conservative management. Consequently, a prescription for therapy was provided and a prescription for gabapentin was given. Also discussed possibility of muscle relaxants and oral steroid however patient declines at this time as he was recently on prednisone and does not respond well to muscle relaxants. I'll see him back my clinic in 6 weeks for reevaluation but he was encouraged to return or call earlier with questions and/or concerns. Please note that this chart was generated using voice recognition Dragon dictation software. Although every effort was made to ensure the accuracy of this automated transcription, some errors in transcription may have occurred.

## 2022-07-10 DIAGNOSIS — N40.1 BENIGN LOCALIZED PROSTATIC HYPERPLASIA WITH LOWER URINARY TRACT SYMPTOMS (LUTS): ICD-10-CM

## 2022-07-10 DIAGNOSIS — I50.42 CHRONIC COMBINED SYSTOLIC AND DIASTOLIC CHF (CONGESTIVE HEART FAILURE) (HCC): ICD-10-CM

## 2022-07-10 DIAGNOSIS — I12.9 BENIGN HYPERTENSION WITH CKD (CHRONIC KIDNEY DISEASE), STAGE II: ICD-10-CM

## 2022-07-10 DIAGNOSIS — N18.2 BENIGN HYPERTENSION WITH CKD (CHRONIC KIDNEY DISEASE), STAGE II: ICD-10-CM

## 2022-07-11 RX ORDER — HYDRALAZINE HYDROCHLORIDE 50 MG/1
TABLET, FILM COATED ORAL
Qty: 270 TABLET | Refills: 3 | Status: SHIPPED | OUTPATIENT
Start: 2022-07-11

## 2022-07-11 RX ORDER — TAMSULOSIN HYDROCHLORIDE 0.4 MG/1
CAPSULE ORAL
Qty: 30 CAPSULE | Refills: 0 | Status: SHIPPED | OUTPATIENT
Start: 2022-07-11 | End: 2022-08-09

## 2022-07-13 ENCOUNTER — OFFICE VISIT (OUTPATIENT)
Dept: PODIATRY | Age: 62
End: 2022-07-13
Payer: COMMERCIAL

## 2022-07-13 VITALS — WEIGHT: 234 LBS | BODY MASS INDEX: 31.01 KG/M2 | HEIGHT: 73 IN

## 2022-07-13 DIAGNOSIS — B35.1 DERMATOPHYTOSIS OF NAIL: Primary | ICD-10-CM

## 2022-07-13 DIAGNOSIS — I73.9 PERIPHERAL VASCULAR DISORDER (HCC): ICD-10-CM

## 2022-07-13 DIAGNOSIS — I89.0 LYMPHEDEMA: ICD-10-CM

## 2022-07-13 DIAGNOSIS — E11.49 OTHER DIABETIC NEUROLOGICAL COMPLICATION ASSOCIATED WITH TYPE 2 DIABETES MELLITUS (HCC): ICD-10-CM

## 2022-07-13 PROCEDURE — 2022F DILAT RTA XM EVC RTNOPTHY: CPT | Performed by: PODIATRIST

## 2022-07-13 PROCEDURE — 11721 DEBRIDE NAIL 6 OR MORE: CPT | Performed by: PODIATRIST

## 2022-07-13 PROCEDURE — 3051F HG A1C>EQUAL 7.0%<8.0%: CPT | Performed by: PODIATRIST

## 2022-07-13 PROCEDURE — G8417 CALC BMI ABV UP PARAM F/U: HCPCS | Performed by: PODIATRIST

## 2022-07-13 PROCEDURE — 1036F TOBACCO NON-USER: CPT | Performed by: PODIATRIST

## 2022-07-13 PROCEDURE — 3017F COLORECTAL CA SCREEN DOC REV: CPT | Performed by: PODIATRIST

## 2022-07-13 PROCEDURE — G8427 DOCREV CUR MEDS BY ELIG CLIN: HCPCS | Performed by: PODIATRIST

## 2022-07-13 PROCEDURE — 99213 OFFICE O/P EST LOW 20 MIN: CPT | Performed by: PODIATRIST

## 2022-07-13 NOTE — PROGRESS NOTES
[address] [facility] PODIATRY [facility]  [address] [facility] [address] [place] [address]  Dept: [phone]  Dept Fax: [phone]    DIABETIC PROGRESS NOTE  Date of patient's visit: 7/13/2022  Patient's Name:  Phillip Cohen YOB: 1960            Patient Care Team:  Hannah Guidry MD as PCP - General (Family Medicine)  Hannah Guidry MD as PCP - [facility] Provider  Vilma Quinones MD as Consulting Physician (Cardiology)  Alvaro aCmp OD (Optometry)  Arianne Bear MD as Consulting Physician (Nephrology)  Mirna Miller MD as Consulting Physician (Urology)  Lyndsay Lutz MD as Consulting Physician (Cardiology)  Alvaro Camp OD (Optometry)  Ranulfo Gilbert DPM as Physician (Podiatry)          Chief Complaint   Patient presents with    Diabetes     diabetic foot care    Peripheral Neuropathy     bilateral feet       Subjective:   Phillip Cohen comes to clinic for Diabetes (diabetic foot care) and Peripheral Neuropathy (bilateral feet)    he is a diabetic and states that he is doing well. Pt currently has complaint of thickened, elongated nails that they cannot manage by themselves. Pt's primary care physician is Hannah Guidry MD last seen 06/14/2022   Pt's last blood sugar was \"good\". Pt has a new complaint of increased swelling to naomi LE. Lab Results   Component Value Date    LABA1C 7.3 (H) 06/14/2022      Complains of numbness in the feet bilat.   Past Medical History:   Diagnosis Date    Acute heart failure ([address] 5/20/2018    Anemia     Benign hypertension with CKD (chronic kidney disease), stage II 7/24/2017    CAD (coronary artery disease)     Cardiomyopathy (HCC)     Chronic diastolic CHF (congestive heart failure) ([address] 7/23/2018    Chronic kidney disease     Coronary artery disease involving native coronary artery of native heart without angina pectoris 11/19/2016    Diabetic nephropathy associated with type 2 diabetes mellitus ([facility] 1/12/2016    Diabetic polyneuropathy associated with type 2 diabetes mellitus ([facility]     DM (diabetes mellitus), type 2 ([facility]     for 15-20 yrs    Erectile dysfunction     Grief 7/24/2017    HTN (hypertension)     Hypertensive urgency     Lipidemia     Mild episode of recurrent major depressive disorder ([facility] 1/13/2019    Obesity     Obesity (BMI 30-39.9) 11/19/2016    MORENA on CPAP 2/4/2013    Sleep apnea 2/4/2013    Slow transit constipation     Uncontrolled type 2 diabetes mellitus, without long-term current use of insulin ([facility] 10/13/2016       Allergies   Allergen Reactions    Lisinopril      Patient is on Aldactone     Current Outpatient Medications on File Prior to Visit   Medication Sig Dispense Refill    tamsulosin (FLOMAX) 0.4 MG capsule Take 1 capsule by mouth once daily 30 capsule 0    hydrALAZINE (APRESOLINE) 50 MG tablet TAKE 1 TABLET BY MOUTH THREE TIMES DAILY 270 tablet 3    gabapentin (NEURONTIN) 100 MG capsule Take 1 capsule by mouth 2 times daily for 30 days. Intended supply: 30 days 60 capsule 0    dapagliflozin (FARXIGA) 10 MG tablet Take 1 tablet by mouth every morning For diabetes, per your insurance 30 tablet 11    allopurinol (ZYLOPRIM) 100 MG tablet Take 1 tablet by mouth daily FOR GOUT. STOP IT IF ANY RASH DEVELOPS! 90 tablet 3    furosemide (LASIX) 40 MG tablet Take 1 tablet by mouth daily Stop chlorthalidone and Metformin due to leg swelling 90 tablet 3    methylPREDNISolone (MEDROL DOSEPACK) 4 MG tablet Take by mouth. 1 kit 0    atorvastatin (LIPITOR) 40 MG tablet TAKE 1 TABLET BY MOUTH ONCE DAILY FOR HIGH CHOLESTEROL *YOU  NEED  TO  TAKE  IT  EVERY  DAY 90 tablet 0    Urea (CARMOL) 40 % cream Apply to affected area once daily 1 each 0    Compression Stockings MISC 20-30 mm/hg 2 each 0    Misc.  Devices MISC 1 PAIR OF DIABETIC SHOES (1 LEFT/ 1 RIGHT)  1-3 PAIRS OF INSERTS (LEFT/ RIGHT) 2 each 0    aspirin (EQ ASPIRIN ADULT LOW DOSE) 81 MG EC tablet Take 1 tablet by mouth once daily 90 tablet 3    Lancets 30G MISC Testing once a day. Please dispense according to patients insurance. 100 each 3    blood glucose monitor strips Testing once a day. Please dispense according to patients insurance. 100 strip 3    Handicap Placard MISC by Does not apply route Can't walk greater than 200 feet. Expires in 5 years. 1 each 0    sildenafil (VIAGRA) 100 MG tablet Take 1 tablet by mouth daily as needed for Erectile Dysfunction NOT TO TAKE WITH NITROGLYCERIN, CAN CAUSE COLLAPSE 30 tablet 3    glucose monitoring (FREESTYLE FREEDOM) kit Please supply Patient with a glucose monitoring kit that insurance will cover. 1 kit 0    SITagliptin (JANUVIA) 100 MG tablet Take 1 tablet by mouth daily Dose increased 6/1/2021 90 tablet 3    spironolactone (ALDACTONE) 25 MG tablet Take 1 tablet by mouth every morning BP and CHF 90 tablet 3    amLODIPine (NORVASC) 10 MG tablet Take 1 tablet by mouth once daily 90 tablet 3    carvedilol (COREG) 25 MG tablet TAKE 1 TABLET BY MOUTH TWICE DAILY WITH MEALS 180 tablet 11    Alcohol Swabs PADS Please dispense according to patients insurance/device. Testing once a day 100 each 3    Multiple Vitamins-Minerals (MULTIVITAMIN-MINERALS) TABS tablet Take 1 tablet by mouth daily 90 tablet 3    acetaminophen (EQ ACETAMINOPHEN) 500 MG tablet Take 1 tablet by mouth every 6 hours as needed for Pain 120 tablet 3    ketoconazole (NIZORAL) 2 % cream Apply on the feet, not between the toes, twice a day 1 Tube 1    lidocaine (LMX) 4 % cream Apply 2-3 times a day as needed for pain 120 g 3    Blood Pressure KIT Diagnosis: HTN. Needs to check blood pressure 1-2 times a day until stable, then once a day. Goal blood pressure less than 135/85, and above 110/60. 1 kit 0    nitroGLYCERIN (NITROSTAT) 0.4 MG SL tablet DISSOLVE ONE TABLET UNDER THE TONGUE EVERY 5 MINUTES AS NEEDED FOR CHEST PAIN.   DO NOT EXCEED A TOTAL OF 3 DOSES IN 15 MINUTES 25 tablet 0     No current facility-administered medications on file prior to visit. Review of Systems    Review of Systems:   History obtained from chart review and the patient  General ROS: negative for - chills, fatigue, fever, night sweats or weight gain  Constitutional: Negative for chills, diaphoresis, fatigue, fever and unexpected weight change. Musculoskeletal: Positive for arthralgias, gait problem and joint swelling. Neurological ROS: negative for - behavioral changes, confusion, headaches or seizures. Negative for weakness and numbness. Dermatological ROS: negative for - mole changes, rash  Cardiovascular: Negative for leg swelling. Gastrointestinal: Negative for constipation, diarrhea, nausea and vomiting. Objective:  Dermatologic Exam:  Skin lesion/ulceration Absent . Skin No rashes or nodules noted. .   Skin is thin, with flaky sloughing skin as well as decreased hair growth to the lower leg  Small red hemosiderin deposits seen dorsal foot   Musculoskeletal:     1st MPJ ROM decreased, Bilateral.  Muscle strength 5/5, Bilateral.  Pain present upon palpation of toenails 1-5, Bilateral. decreased medial longitudinal arch, Bilateral.  Ankle ROM decreased,Bilateral.    Dorsally contracted digits present digits 2, Bilateral.     Vascular: DP pulses 1/4 bilateral.  PT pulses 0/4 bilateral.   CFT <5 seconds, Bilateral.  Hair growth absent to the level of the digits, Bilateral.  Edema present, Bilateral.  Varicosities absent, Bilateral. Erythema absent, Bilateral    Neurological: Sensation diminshed to light touch to level of digits, Bilateral.  Protective sensation intact 6/10 sites via 5.07/10g [place] Monofilament, Bilateral.  negative Tinel's, Bilateral.  negative Valleix sign, Bilateral.      Integument: Warm, dry, supple, Bilateral.  Open lesion absent, Bilateral.  Interdigital maceration absent to web spaces 4, Bilateral.  Nails 1-5 left and 1-5 right thickened > 3.0 mm, dystrophic and crumbly, discolored with subungual debris. Fissures absent, Bilateral.   General: AAO x 3 in NAD. Derm  Toenail Description  Sites of Onychomycosis Involvement (Check affected area)  [x] [x] [x] [x] [x] [x] [x] [x] [x] [x]  5 4 3 2 1 1 2 3 4 5                          Right                                        Left    Thickness  [x] [x] [x] [x] [x] [x] [x] [x] [x] [x]  5 4 3 2 1 1 2 3 4 5                         Right                                        Left    Dystrophic Changes   [x] [x] [x] [x] [x] [x] [x] [x] [x] [x]  5 4 3 2 1 1 2 3 4 5                         Right                                        Left    Color   [x] [x] [x] [x] [x] [x] [x] [x] [x] [x]  5 4 3 2 1 1 2 3 4 5                          Right                                        Left    Incurvation/Ingrowin   [] [] [] [] [] [] [] [] [] []  5 4 3 2 1 1 2 3 4 5                         Right                                        Left    Inflammation/Pain   [x] [x] [x] [x] [x] [x] [x] [x] [x] [x]  5 4 3 2 1 1 2 3 4 5                         Right                                        Left        Visual inspection:  Deformity: hammertoe deformity naomi feet  amputation: absent  Skin lesions: absent  Edema: right- 2+ pitting edema, left- 2+ pitting edema    Sensory exam:  Monofilament sensation: abnormal - 6/10 via SW 5.07/10g monofilament to the plantar foot bilateral feet    Pulses: abnormal - 1/4 dorsalis pedis pulse and 0/4 Posterior tibial pulse,   Pinprick: Impaired  Proprioception: Impaired  Vibration (128 Hz): Impaired       DM with PVD       [x]Yes    []No      Assessment:  62 y.o. male with:   Diagnosis Orders   1. Dermatophytosis of nail   DIABETES FOOT EXAM    59644 - CT DEBRIDEMENT OF NAILS, 6 OR MORE      2. Other diabetic neurological complication associated with type 2 diabetes mellitus (HCC)   DIABETES FOOT EXAM    14269 - CT DEBRIDEMENT OF NAILS, 6 OR MORE      3.  Peripheral vascular disorder (HCC)   DIABETES FOOT EXAM 42233 - [place] DEBRIDEMENT OF NAILS, 6 OR MORE      4. Lymphedema  HM DIABETES FOOT EXAM    05725 - [place] DEBRIDEMENT OF NAILS, 6 OR MORE              Q7   []Yes    []No                Q8   [x]Yes    []No                     Q9   []Yes    []No    Plan:   Pt was evaluated and examined. Patient was given personalized discharge instructions. To address new complaint of increased swelling, recommend patient to wear compression stockings and instructed pt to wear at all times when walking. Pt may take NSAIDs as needed. Nails 1-10 were debrided sharply in length and thickness with a nipper and , without incident. Pt will follow up in 9 weeks or sooner if any problems arise. Diagnosis was discussed with the pt and all of their questions were answered in detail. Proper foot hygiene and care was discussed with the pt. Informed patient on proper diabetic foot care and importance of tight glycemic control. Patient to check feet daily and contact the office with any questions/problems/concerns.    Other comorbidity noted and will be managed by PCP.  7/13/2022    Electronically signed by Claudine Dickey DPM on 7/13/2022 at 8:40 AM  7/13/2022

## 2022-07-15 ENCOUNTER — OFFICE VISIT (OUTPATIENT)
Dept: FAMILY MEDICINE CLINIC | Age: 62
End: 2022-07-15
Payer: COMMERCIAL

## 2022-07-15 ENCOUNTER — HOSPITAL ENCOUNTER (OUTPATIENT)
Age: 62
Discharge: HOME | End: 2022-07-15
Payer: COMMERCIAL

## 2022-07-15 VITALS
DIASTOLIC BLOOD PRESSURE: 60 MMHG | SYSTOLIC BLOOD PRESSURE: 120 MMHG | HEIGHT: 73 IN | OXYGEN SATURATION: 98 % | TEMPERATURE: 97.1 F | BODY MASS INDEX: 30.22 KG/M2 | HEART RATE: 58 BPM | WEIGHT: 228 LBS

## 2022-07-15 DIAGNOSIS — E11.42 TYPE 2 DIABETES MELLITUS WITH DIABETIC POLYNEUROPATHY, WITHOUT LONG-TERM CURRENT USE OF INSULIN (HCC): ICD-10-CM

## 2022-07-15 DIAGNOSIS — Z00.01 ENCOUNTER FOR WELL ADULT EXAM WITH ABNORMAL FINDINGS: Primary | ICD-10-CM

## 2022-07-15 DIAGNOSIS — E78.5 HYPERLIPIDEMIA WITH TARGET LDL LESS THAN 70: ICD-10-CM

## 2022-07-15 DIAGNOSIS — N18.2 BENIGN HYPERTENSION WITH CKD (CHRONIC KIDNEY DISEASE), STAGE II: ICD-10-CM

## 2022-07-15 DIAGNOSIS — I12.9 BENIGN HYPERTENSION WITH CKD (CHRONIC KIDNEY DISEASE), STAGE II: ICD-10-CM

## 2022-07-15 DIAGNOSIS — I50.42 CHRONIC COMBINED SYSTOLIC AND DIASTOLIC CHF (CONGESTIVE HEART FAILURE) (HCC): ICD-10-CM

## 2022-07-15 DIAGNOSIS — E11.65 TYPE 2 DIABETES MELLITUS WITH HYPERGLYCEMIA, WITHOUT LONG-TERM CURRENT USE OF INSULIN (HCC): ICD-10-CM

## 2022-07-15 DIAGNOSIS — D64.9 ANEMIA, UNSPECIFIED TYPE: ICD-10-CM

## 2022-07-15 DIAGNOSIS — Z23 ENCOUNTER FOR IMMUNIZATION: ICD-10-CM

## 2022-07-15 LAB
ABSOLUTE EOS #: 0.1 K/UL (ref 0–0.4)
ABSOLUTE LYMPH #: 1.3 K/UL (ref 1–4.8)
ABSOLUTE MONO #: 0.4 K/UL (ref 0.1–1.3)
ABSOLUTE RETIC #: 0.07 M/UL (ref 0.02–0.1)
ALBUMIN SERPL-MCNC: 4.2 G/DL (ref 3.5–5.2)
ALP BLD-CCNC: 102 U/L (ref 40–129)
ALT SERPL-CCNC: 16 U/L (ref 5–41)
ANION GAP SERPL CALCULATED.3IONS-SCNC: 11 MMOL/L (ref 9–17)
AST SERPL-CCNC: 17 U/L
BACTERIA: NORMAL
BASOPHILS # BLD: 1 % (ref 0–2)
BASOPHILS ABSOLUTE: 0 K/UL (ref 0–0.2)
BILIRUB SERPL-MCNC: 0.44 MG/DL (ref 0.3–1.2)
BILIRUBIN URINE: NEGATIVE
BUN BLDV-MCNC: 28 MG/DL (ref 8–23)
CALCIUM SERPL-MCNC: 9.3 MG/DL (ref 8.6–10.4)
CASTS UA: NORMAL /LPF
CHLORIDE BLD-SCNC: 103 MMOL/L (ref 98–107)
CO2: 25 MMOL/L (ref 20–31)
COLOR: YELLOW
CREAT SERPL-MCNC: 1.41 MG/DL (ref 0.7–1.2)
CREATININE URINE: 26.5 MG/DL (ref 39–259)
EOSINOPHILS RELATIVE PERCENT: 2 % (ref 0–4)
EPITHELIAL CELLS UA: NORMAL /HPF
FERRITIN: 189 NG/ML (ref 30–400)
FOLATE: 19.5 NG/ML
GFR AFRICAN AMERICAN: >60 ML/MIN
GFR NON-AFRICAN AMERICAN: 51 ML/MIN
GFR SERPL CREATININE-BSD FRML MDRD: ABNORMAL ML/MIN/{1.73_M2}
GLUCOSE BLD-MCNC: 164 MG/DL (ref 70–99)
GLUCOSE URINE: ABNORMAL
HCT VFR BLD CALC: 52.8 % (ref 41–53)
HEMOGLOBIN: 16.8 G/DL (ref 13.5–17.5)
IRON SATURATION: 19 % (ref 20–55)
IRON: 47 UG/DL (ref 59–158)
KETONES, URINE: NEGATIVE
LEUKOCYTE ESTERASE, URINE: ABNORMAL
LYMPHOCYTES # BLD: 31 % (ref 24–44)
MAGNESIUM: 2.2 MG/DL (ref 1.6–2.6)
MCH RBC QN AUTO: 29.8 PG (ref 26–34)
MCHC RBC AUTO-ENTMCNC: 31.8 G/DL (ref 31–37)
MCV RBC AUTO: 93.7 FL (ref 80–100)
MICROALBUMIN/CREAT 24H UR: <12 MG/L
MICROALBUMIN/CREAT UR-RTO: ABNORMAL MCG/MG CREAT
MONOCYTES # BLD: 10 % (ref 1–7)
NITRITE, URINE: NEGATIVE
PDW BLD-RTO: 14.2 % (ref 11.5–14.9)
PH UA: 5 (ref 5–8)
PLATELET # BLD: 146 K/UL (ref 150–450)
PMV BLD AUTO: 10 FL (ref 6–12)
POTASSIUM SERPL-SCNC: 3.8 MMOL/L (ref 3.7–5.3)
PRO-BNP: 80 PG/ML
PROTEIN UA: NEGATIVE
RBC # BLD: 5.63 M/UL (ref 4.5–5.9)
RBC UA: NORMAL /HPF
RETIC %: 1.3 % (ref 0.5–2)
SEG NEUTROPHILS: 56 % (ref 36–66)
SEGMENTED NEUTROPHILS ABSOLUTE COUNT: 2.4 K/UL (ref 1.3–9.1)
SODIUM BLD-SCNC: 139 MMOL/L (ref 135–144)
SPECIFIC GRAVITY UA: 1.01 (ref 1–1.03)
TOTAL IRON BINDING CAPACITY: 244 UG/DL (ref 250–450)
TOTAL PROTEIN: 7.8 G/DL (ref 6.4–8.3)
TSH SERPL DL<=0.05 MIU/L-ACNC: 1.29 UIU/ML (ref 0.3–5)
TURBIDITY: CLEAR
UNSATURATED IRON BINDING CAPACITY: 197 UG/DL (ref 112–347)
URIC ACID: 6.4 MG/DL (ref 3.4–7)
URINE HGB: NEGATIVE
UROBILINOGEN, URINE: NORMAL
VITAMIN B-12: 930 PG/ML (ref 232–1245)
WBC # BLD: 4.3 K/UL (ref 3.5–11)
WBC UA: NORMAL /HPF

## 2022-07-15 PROCEDURE — 84550 ASSAY OF BLOOD/URIC ACID: CPT

## 2022-07-15 PROCEDURE — 82746 ASSAY OF FOLIC ACID SERUM: CPT

## 2022-07-15 PROCEDURE — 84443 ASSAY THYROID STIM HORMONE: CPT

## 2022-07-15 PROCEDURE — 1036F TOBACCO NON-USER: CPT | Performed by: FAMILY MEDICINE

## 2022-07-15 PROCEDURE — 83880 ASSAY OF NATRIURETIC PEPTIDE: CPT

## 2022-07-15 PROCEDURE — 80053 COMPREHEN METABOLIC PANEL: CPT

## 2022-07-15 PROCEDURE — 83550 IRON BINDING TEST: CPT

## 2022-07-15 PROCEDURE — 99214 OFFICE O/P EST MOD 30 MIN: CPT | Performed by: FAMILY MEDICINE

## 2022-07-15 PROCEDURE — 85025 COMPLETE CBC W/AUTO DIFF WBC: CPT

## 2022-07-15 PROCEDURE — 3017F COLORECTAL CA SCREEN DOC REV: CPT | Performed by: FAMILY MEDICINE

## 2022-07-15 PROCEDURE — 84155 ASSAY OF PROTEIN SERUM: CPT

## 2022-07-15 PROCEDURE — G8417 CALC BMI ABV UP PARAM F/U: HCPCS | Performed by: FAMILY MEDICINE

## 2022-07-15 PROCEDURE — 83735 ASSAY OF MAGNESIUM: CPT

## 2022-07-15 PROCEDURE — 81001 URINALYSIS AUTO W/SCOPE: CPT

## 2022-07-15 PROCEDURE — 85045 AUTOMATED RETICULOCYTE COUNT: CPT

## 2022-07-15 PROCEDURE — G8427 DOCREV CUR MEDS BY ELIG CLIN: HCPCS | Performed by: FAMILY MEDICINE

## 2022-07-15 PROCEDURE — 2022F DILAT RTA XM EVC RTNOPTHY: CPT | Performed by: FAMILY MEDICINE

## 2022-07-15 PROCEDURE — 82043 UR ALBUMIN QUANTITATIVE: CPT

## 2022-07-15 PROCEDURE — 36415 COLL VENOUS BLD VENIPUNCTURE: CPT

## 2022-07-15 PROCEDURE — 82728 ASSAY OF FERRITIN: CPT

## 2022-07-15 PROCEDURE — 99396 PREV VISIT EST AGE 40-64: CPT | Performed by: FAMILY MEDICINE

## 2022-07-15 PROCEDURE — 84156 ASSAY OF PROTEIN URINE: CPT

## 2022-07-15 PROCEDURE — 82570 ASSAY OF URINE CREATININE: CPT

## 2022-07-15 PROCEDURE — 84165 PROTEIN E-PHORESIS SERUM: CPT

## 2022-07-15 PROCEDURE — 84166 PROTEIN E-PHORESIS/URINE/CSF: CPT

## 2022-07-15 PROCEDURE — 83540 ASSAY OF IRON: CPT

## 2022-07-15 PROCEDURE — 82607 VITAMIN B-12: CPT

## 2022-07-15 RX ORDER — BNT162B2 0.23 MG/2.25ML
0.3 INJECTION, SUSPENSION INTRAMUSCULAR ONCE
Qty: 0.3 ML | Refills: 0 | Status: SHIPPED | OUTPATIENT
Start: 2022-07-15 | End: 2022-07-15

## 2022-07-15 RX ORDER — SPIRONOLACTONE 25 MG/1
25 TABLET ORAL EVERY MORNING
Qty: 90 TABLET | Refills: 3 | Status: SHIPPED | OUTPATIENT
Start: 2022-07-15

## 2022-07-15 RX ORDER — CARVEDILOL 25 MG/1
TABLET ORAL
Qty: 180 TABLET | Refills: 11 | Status: SHIPPED | OUTPATIENT
Start: 2022-07-15

## 2022-07-15 RX ORDER — AMLODIPINE BESYLATE 10 MG/1
TABLET ORAL
Qty: 90 TABLET | Refills: 3 | Status: SHIPPED | OUTPATIENT
Start: 2022-07-15

## 2022-07-15 RX ORDER — ATORVASTATIN CALCIUM 40 MG/1
40 TABLET, FILM COATED ORAL
Qty: 90 TABLET | Refills: 3 | Status: SHIPPED | OUTPATIENT
Start: 2022-07-15

## 2022-07-15 RX ORDER — ASPIRIN 81 MG/1
TABLET ORAL
Qty: 90 TABLET | Refills: 3 | Status: SHIPPED | OUTPATIENT
Start: 2022-07-15

## 2022-07-15 ASSESSMENT — ENCOUNTER SYMPTOMS
NAUSEA: 0
COUGH: 0
VOMITING: 0
BACK PAIN: 0
ABDOMINAL DISTENTION: 0
ABDOMINAL PAIN: 0
DIARRHEA: 0
WHEEZING: 0
SHORTNESS OF BREATH: 1
CHEST TIGHTNESS: 0
CONSTIPATION: 0

## 2022-07-15 ASSESSMENT — PATIENT HEALTH QUESTIONNAIRE - PHQ9
SUM OF ALL RESPONSES TO PHQ QUESTIONS 1-9: 0
4. FEELING TIRED OR HAVING LITTLE ENERGY: 0
1. LITTLE INTEREST OR PLEASURE IN DOING THINGS: 0
SUM OF ALL RESPONSES TO PHQ QUESTIONS 1-9: 0
10. IF YOU CHECKED OFF ANY PROBLEMS, HOW DIFFICULT HAVE THESE PROBLEMS MADE IT FOR YOU TO DO YOUR WORK, TAKE CARE OF THINGS AT HOME, OR GET ALONG WITH OTHER PEOPLE: 0
3. TROUBLE FALLING OR STAYING ASLEEP: 0
6. FEELING BAD ABOUT YOURSELF - OR THAT YOU ARE A FAILURE OR HAVE LET YOURSELF OR YOUR FAMILY DOWN: 0
5. POOR APPETITE OR OVEREATING: 0
SUM OF ALL RESPONSES TO PHQ QUESTIONS 1-9: 0
2. FEELING DOWN, DEPRESSED OR HOPELESS: 0
SUM OF ALL RESPONSES TO PHQ9 QUESTIONS 1 & 2: 0
9. THOUGHTS THAT YOU WOULD BE BETTER OFF DEAD, OR OF HURTING YOURSELF: 0
SUM OF ALL RESPONSES TO PHQ QUESTIONS 1-9: 0
7. TROUBLE CONCENTRATING ON THINGS, SUCH AS READING THE NEWSPAPER OR WATCHING TELEVISION: 0
8. MOVING OR SPEAKING SO SLOWLY THAT OTHER PEOPLE COULD HAVE NOTICED. OR THE OPPOSITE, BEING SO FIGETY OR RESTLESS THAT YOU HAVE BEEN MOVING AROUND A LOT MORE THAN USUAL: 0

## 2022-07-15 NOTE — PATIENT INSTRUCTIONS
Well Visit, Men 48 to 72: Care Instructions  Overview     Well visits can help you stay healthy. Your doctor has checked your overall health and may have suggested ways to take good care of yourself. Your doctor also may have recommended tests. At home, you can help prevent illness withhealthy eating, regular exercise, and other steps. Follow-up care is a key part of your treatment and safety. Be sure to make and go to all appointments, and call your doctor if you are having problems. It's also a good idea to know your test results and keep alist of the medicines you take. How can you care for yourself at home? Get screening tests that you and your doctor decide on. Screening helps find diseases before any symptoms appear. Eat healthy foods. Choose fruits, vegetables, whole grains, protein, and low-fat dairy foods. Limit fat, especially saturated fat. Reduce salt in your diet. Limit alcohol. Have no more than 2 drinks a day or 14 drinks a week. Get at least 30 minutes of exercise on most days of the week. Walking is a good choice. You also may want to do other activities, such as running, swimming, cycling, or playing tennis or team sports. Reach and stay at a healthy weight. This will lower your risk for many problems, such as obesity, diabetes, heart disease, and high blood pressure. Do not smoke. Smoking can make health problems worse. If you need help quitting, talk to your doctor about stop-smoking programs and medicines. These can increase your chances of quitting for good. Care for your mental health. It is easy to get weighed down by worry and stress. Learn strategies to manage stress, like deep breathing and mindfulness, and stay connected with your family and community. If you find you often feel sad or hopeless, talk with your doctor. Treatment can help. Talk to your doctor about whether you have any risk factors for sexually transmitted infections (STIs).  You can help prevent STIs if you wait to have sex with a new partner (or partners) until you've each been tested for STIs. It also helps if you use condoms (male or female condoms) and if you limit your sex partners to one person who only has sex with you. Vaccines are available for some STIs. If it's important to you to prevent pregnancy with your partner, talk with your doctor about birth control options that might be best for you. If you think you may have a problem with alcohol or drug use, talk to your doctor. This includes prescription medicines (such as amphetamines and opioids) and illegal drugs (such as cocaine and methamphetamine). Your doctor can help you figure out what type of treatment is best for you. Protect your skin from too much sun. When you're outdoors from 10 a.m. to 4 p.m., stay in the shade or cover up with clothing and a hat with a wide brim. Wear sunglasses that block UV rays. Even when it's cloudy, put broad-spectrum sunscreen (SPF 30 or higher) on any exposed skin. See a dentist one or two times a year for checkups and to have your teeth cleaned. Wear a seat belt in the car. When should you call for help? Watch closely for changes in your health, and be sure to contact your doctor if you have any problems or symptoms that concern you. Where can you learn more? Go to https://[email or web address]. org and sign in to your [employer] account. Enter W615 in the [facility] box to learn more about \"Well Visit, Men 48 to 72: Care Instructions. \"     If you do not have an account, please click on the \"Sign Up Now\" link. Current as of: October 6, 2021               Content Version: 13.3  © 2262-0241 Health[employer], [employer]. Care instructions adapted under license by [address] If you have questions about a medical condition or this instruction, always ask your healthcare professional. [place] [address] any warranty or liability for your use of this information.       High-Fiber Diet What Is Fiber? Dietary fiber is a form of carbohydrate found in plants that cannot be digested by humans. All plants contain fiber, including fruits, vegetables, grains, and legumes. Fiber is often classified into two categories: soluble and insoluble. Soluble fiber draws water into the bowel and can help slow digestion. Examples of foods that are high in soluble fiber include oatmeal, oat bran, barley, legumes (eg, beans and peas), apples, and strawberries. Insoluble fiber speeds digestion and can add bulk to the stool. Examples of foods that are high in insoluble fiber include whole-wheat products, wheat bran, cauliflower, green beans, and potatoes. Why Follow a High-Fiber Diet? A high-fiber diet is often recommended to prevent and treat constipation , hemorrhoids , diverticulitis , and irritable bowel syndrome . Eating a high-fiber diet can also help improve your cholesterol levels, lower your risk of coronary heart disease , reduce your risk of type 2 diabetes , and lower your weight. For people with type 1 or 2 diabetes, a high-fiber diet can also help stabilize blood sugar levels. How Much Fiber Should I Eat? A high-fiber diet should contain  20-35 grams  of fiber a day. This is actually the amount recommended for the general adult population; however, most Americans eat only 15 grams of fiber per day. Digestion of Fiber   Eating a higher fiber diet than usual can take some getting used to by your body's digestive system. To avoid the side effects of sudden increases in dietary fiber (eg, gas, cramping, bloating, and diarrhea), increase fiber gradually and be sure to drink plenty of fluids every day. Tips for Increasing Fiber Intake   Whenever possible, choose whole grains over refined grains (eg, brown rice instead of white rice, whole-wheat bread instead of white bread). Include a variety of grains in your diet, such as wheat, rye, barley, oats, quinoa, and bulgur.     Eat more vegetarian-based meals. Here are some ideas: black bean burgers, eggplant lasagna, and veggie tofu stir-liu. Choose high-fiber snacks, such as fruits, popcorn, whole-grain crackers, and nuts. Make whole-grain cereal or whole-grain toast part of your daily breakfast regime. When eating out, whether ordering a sandwich or dinner, ask for extra vegetables. When baking, replace part of the white flour with whole-wheat flour. Whole-wheat flour is particularly easy to incorporate into a recipe. High-Fiber Diet Eating Guide   Food Category   Foods Recommended   Notes   Grains   Whole-grain breads, muffins, bagels, or richelle bread Rye bread Whole-wheat crackers or crisp breads Whole-grain or bran cereals Oatmeal, oat bran, or grits Wheat germ Whole-wheat pasta and brown rice   Read the ingredients list on food labels. Look for products that list \"whole\" as the first ingredient (eg, whole-wheat, whole oats). Choose cereals with at least 2 grams of fiber per serving. Vegetables   All vegetables, especially asparagus, bean sprouts, broccoli, [place] sprouts, cabbage, carrots, cauliflower, celery, corn, greens, green beans, green pepper, onions, peas, potatoes (with skin), snow peas, spinach, squash, sweet potatoes, tomatoes, zucchini   For maximum fiber intake, eat the peels of fruits and vegetablesjust be sure to wash them well first.   Fruits   All fruits, especially apples, berries, grapefruits, mangoes, nectarines, oranges, peaches, pears, dried fruits (figs, dates, prunes, raisins)   Choose raw fruits and vegetables over juice, cooked, or cannedraw fruit has more fiber. Dried fruit is also a good source of fiber. Milk   With the exception of yogurt containing inulin (a type of fiber), dairy foods provide little fiber. Add more fiber by topping your yogurt or cottage cheese with fresh fruit, whole grain or bran cereals, nuts, or seeds.    Meats and Beans   All beans and peas, especially Garbanzo beans, glasses that are right for you. Speak to your doctor or pharmacist about the possible side effects of your medicines. A number of medicines can cause dizziness. If you have problems with sleep, talk to your doctor. Limit your intake of alcohol. If necessary, use a cane or walker to help maintain your balance. Wear supportive, rubber-soled shoes, even at home. If you live in a region that gets wintry weather, you may want to put special cleats on your shoes to prevent you from slipping on the snow and ice. Exercise regularly to help maintain muscle tone, agility, and balance. Always hold the banister when going up or down stairs. Also, use  bars when getting in or out of the bath or shower, or using the toilet. To avoid dizziness, get up slowly from a lying down position. Sit up first, dangling your legs for a minute or two before rising to a standing position. Overall Home Safety Check   According to the Consumer Product Safety Commision's \"Older Consumer Home Safety Checklist,\" it is important to check for potential hazards in each room. And remember, proper lighting is an essential factor in home safety. If you cannot see clearly, you are more likely to fall. Important questions to ask yourself include:   Are lamp, electric, extension, and telephone cords placed out of the flow of traffic and maintained in good condition? Have frayed cords been replaced? Are all small rugs and runners slip resistant? If not, you can secure them to the floor with a special double-sided carpet tape. Are smoke detectors properly locatedone on every floor of your home and one outside of every sleeping area? Are they in good working order? Are batteries replaced at least once a year? Do you have a well-maintained carbon monoxide detector outside every sleeping are in your home? Does your furniture layout leave plenty of space to maneuver between and around chairs, tables, beds, and sofas?    Are hallways, stairs and passages between rooms well lit? Can you reach a lamp without getting out of bed? Are floor surfaces well maintained? Shag rugs, high-pile carpeting, tile floors, and polished wood floors can be particularly slippery. Stairs should always have handrails and be carpeted or fitted with a non-skid tread. Is your telephone easily reachable. Is the cord safely tucked away? Room by Room   According to the Association of Aging, bathrooms and pelon are the two most potentially hazardous rooms in your home. In the Kitchen    Be sure your stove is in proper working order and always make sure burners and the oven are off before you go out or go to sleep. Keep pots on the back burners, turn handles away from the front of the stove, and keep stove clean and free of grease build-up. Kitchen ventilation systems and range exhausts should be working properly. Keep flammable objects such as towels and pot holders away from the cooking area except when in use. Make sure kitchen curtains are tied back. Move cords and appliances away from the sink and hot surfaces. If extension cords are needed, install wiring guides so they do not hang over the sink, range, or working areas. Look for coffee pots, kettles and toaster ovens with automatic shut-offs. Keep a mop handy in the kitchen so you can wipe up spills instantly. You should also have a small fire extinguisher. Arrange your kitchen with frequently used items on lower shelves to avoid the need to stand on a stepstool to reach them. Make sure countertops are well-lit to avoid injuries while cutting and preparing food. In the Bathroom    Use a non-slip mat or decals in the tub and shower, since wet, soapy tile or porcelain surfaces are extremely slippery. Make sure bathroom rugs are non-skid or tape them firmly to the floor. Bathtubs should have at least one, preferably two, grab bars, firmly attached to structural supports in the wall. (Do not use built-in soap holders or glass shower doors as grab bars.)    Tub seats fitted with non-slip material on the legs allow you to wash sitting down. For people with limited mobility, bathtub transfer benches allow you to slide safely into the tub. Raised toilet seats and toilet safety rails are helpful for those with knee or hip problems. In the [facility]    Make sure you use a nightlight and that the area around your bed is clear of potential obstacles. Be careful with electric blankets and never go to sleep with a heating pad, which can cause serious burns even if on a low setting. Use fire-resistant mattress covers and pillows, and NEVER smoke in bed. Keep a phone next to the bed that is programmed to dial 911 at the push of a button. If you have a chronic condition, you may want to sign on with an automatic call-in service. Typically the system includes a small pendant that connects directly to an emergency medical voice-response system. You should also make arrangements to stay in contact with someonefriend, neighbor, family memberon a regular schedule. Fire Prevention   According to the [place]. (Smoke Alarms for Every) [address] [place] [address], senior citizens are one of the two highest risk groups for death and serious injuries due to residential fires. When cooking, wear short-sleeved items, never a bulky long-sleeved robe. The [facility]'s Safety Checklist for Older Consumers emphasizes the importance of checking basements, garages, workshops and storage areas for fire hazards, such as volatile liquids, piles of old rags or clothing and overloaded circuits. Never smoke in bed or when lying down on a couch or recliner chair. Small portable electric or kerosene heaters are responsible for many home fires and should be used cautiously if at all. If you do use one, be sure to keep them away from flammable materials.     In case of fire, make sure you have a pre-established emergency exit plan. Have a professional check your fireplace and other fuel-burning appliances yearly. [employer]   Baby boomers entering the izquierdo years will continue to see the development of new products to help older adults live safely and independently in spite of age-related changes. Making Life More Livable  , by Caro Matthews, lists over 1,000 products for \"living well in the mature years,\" such as bathing and mobility aids, household security devices, ergonomically designed knives and peelers, and faucet valves and knobs for temperature control. Medical supply stores and organizations are good sources of information about products that improve your quality of life and insure your safety.      Last Reviewed: November 2009 Missy Alfaro MD   Updated: 3/7/2011

## 2022-07-15 NOTE — PROGRESS NOTES
7/15/2022      Justin Calles (:  1960) is a 62 y.o. male, here for a preventive medicine evaluation, Annual Exam, Congestive Heart Failure, Diabetes, and Hypertension   . ASSESSMENT/PLAN:    1. Encounter for well adult exam with abnormal findings    Low carb, low fat diet, increase fruits and vegetables, and exercise 4-5 times a week 30-40 minutes a day, or walk 1-2 hours per day, or wear a pedometer and get at least 10,000 steps per day. Dental exam 2-3 times /year advised. Immunizations reviewed. Health Maintenance reviewed   Smoking cessation counseling given, not to start smoking  Continue to abstain from alcoholic beverages  Annual eye exam advised. 2. Type 2 diabetes mellitus with diabetic polyneuropathy, without long-term current use of insulin ([facility])  Stable diabetes mellitus  Fairly controlled diabetes  Lab Results   Component Value Date    LABA1C 7.3 (H) 2022    LABA1C 7.3 2022    LABA1C 9.7 09/10/2021   Polyneuropathy improved with gabapentin, he would benefit from diabetic shoes. -     aspirin (EQ ASPIRIN ADULT LOW DOSE) 81 MG EC tablet; Take 1 tablet by mouth once daily, Disp-90 tablet, R-3Normal  -     SITagliptin (JANUVIA) 100 MG tablet; Take 1 tablet by mouth in the morning. Dose increased 2021., Disp-90 tablet, R-3Normal  -     Handicap Placard MISC; Starting Fri 7/15/2022, Disp-1 each, R-0, PrintCan't walk greater than 200 feet. Expires in 5 years. -     Diabetic Shoe MISC; Starting Fri 7/15/2022, Disp-1 each, R-0, PrintNeeds diabetic shoes  -     CBC with Auto Differential; Future  -     Comprehensive Metabolic Panel; Future  -     TSH; Future  -     Microalbumin / Creatinine Urine Ratio; Future  -     Vitamin B12 & Folate; Future  -     CO OFFICE/OUTPATIENT ESTABLISHED MOD MDM 30-39 MIN  -advised home blood glucose testing  daily  -daily feet exam, Foot care: advised to wash feet daily, pat dry and apply lotion at night, avoiding between toes. Need to look at feet daily and report to a physician any signs of inflammation or skin damage  -annual dilated eye exam  -Low carb, low fat diet, increase fruits and vegetables, and exercise 4-5 times a day 30-40 minutes a day discussed  -continue current treatment Januvia 100 mg, Farxiga 10 Mg daily,  -continue Aspirin 81 mg  -continue  statin Lipitor 40 mg  Continue Gabapentin 100 mg BID  Would benefit from diabetic shoes    3. Hyperlipidemia with target LDL less than 70  Improved  Continue current medication, low-carb diet, low-fat diet  Lab Results   Component Value Date    LDLCALC 119 01/16/2019    LDLCHOLESTEROL 69 06/14/2022       -     atorvastatin (LIPITOR) 40 MG tablet; Take 1 tablet by mouth Daily with supper, Disp-90 tablet, R-3Normal  -     [place] OFFICE/OUTPATIENT ESTABLISHED MOD MDM 30-39 MIN  4. Type 2 diabetes mellitus with hyperglycemia, without long-term current use of insulin (HCC)  Stable type 2 diabetes mellitus    Lab Results   Component Value Date    LABA1C 7.3 (H) 06/14/2022    LABA1C 7.3 01/21/2022    LABA1C 9.7 09/10/2021   To continue current medications, Januvia, Farxiga    -     aspirin (EQ ASPIRIN ADULT LOW DOSE) 81 MG EC tablet; Take 1 tablet by mouth once daily, Disp-90 tablet, R-3Normal  -     SITagliptin (JANUVIA) 100 MG tablet; Take 1 tablet by mouth in the morning. Dose increased 6/1/2021., Disp-90 tablet, R-3Normal  -     CBC with Auto Differential; Future  -     Comprehensive Metabolic Panel; Future  -     TSH; Future  -     Microalbumin / Creatinine Urine Ratio; Future  -     Vitamin B12 & Folate; Future  -     TX OFFICE/OUTPATIENT ESTABLISHED MOD MDM 30-39 MIN  5.  Benign hypertension with CKD (chronic kidney disease), stage II  Stable chronic kidney disease stage II  Well-controlled hypertension  Compliance with blood pressure medications discussed, he says he has been more compliant and has been taking his medications, and he has stopped drinking alcohol, praise given  - Auto Differential; Future  -     Comprehensive Metabolic Panel; Future  -     TSH; Future  -     MD OFFICE/OUTPATIENT ESTABLISHED MOD MDM 30-39 MIN  7. Anemia, unspecified type  Failing to improve  We will do anemia work-up    -     CBC with Auto Differential; Future  -     Vitamin B12 & Folate; Future  -     Electrophoresis Protein, Serum; Future  -     Protein Electrophoresis, Urine; Future  -     Iron and TIBC; Future  -     Ferritin; Future  -     Path Review, Smear; Future  -     MD OFFICE/OUTPATIENT ESTABLISHED MOD MDM 30-39 MIN  8. Encounter for immunization  -     pneumococcal 20-valent conjugat (PREVNAR) 0.5 ML PAUL inj; Inject 0.5 mLs into the muscle once for 1 dose, Disp-0.5 mL, R-0Normal  -     COVID-19 mRNA Vac-Derrell,Pfizer, (PFIZER-BIONT COVID-19 VAC-DERRELL) 30 MCG/0.3ML SUSP injection; Inject 0.3 mLs into the muscle once for 1 dose DUE FOR BOOSTER, PLEASE LET PT KNOW WHEN READY, Disp-0.3 mL, R-0Normal    Freddy received counseling on the following healthy behaviors: nutrition, exercise, medication adherence, and weight loss and abstain from alcohol. Reviewed prior labs and health maintenance  Discussed use, benefit, and side effects of prescribed medications. Barriers to medication compliance addressed. Patient given educational materials - see patient instructions  All patient questions answered. Patient voiced understanding. The patient's past medical, surgical, social, and family history as well as his  current medications and allergies were reviewed as documented in today's encounter. Medications, labs, diagnostic studies, consultations and follow-up as documented in thisencounter. Return in 3 months (on 10/15/2022) for Visit type diabetes, **POC A1C, DM2, HTN, HLP, CHF. Coverage not showing up,please update insurance.  PLEASE [facility]TAIN [facility]    Future Appointments   Date Time Provider Vianey Malagon   9/21/2022  8:30 AM [facility] 9/23/2022  8:30 AM SCHEDULE, P [facility]         Patient Active Problem List   Diagnosis    Cardiomyopathy ([facility] [address]    Slow transit constipation    Hyperlipidemia with target LDL less than 70    MORENA on CPAP    Diabetic nephropathy associated with type 2 diabetes mellitus (HCC)    Tinea pedis of both feet    Obesity (BMI 30-39. 9)    Coronary artery disease involving native coronary artery of native heart without angina pectoris    Foot callus    Insomnia    Type 2 diabetes mellitus with hyperglycemia, without long-term current use of insulin (HCC)    Benign hypertension with CKD (chronic kidney disease), stage II    Erectile dysfunction    Type 2 diabetes mellitus with diabetic polyneuropathy, without long-term current use of insulin (HCC)    Chronic combined systolic and diastolic CHF (congestive heart failure) (HCC)    Recurrent major depressive disorder, in partial remission (HCC)    Anemia    Neuropathy of right lower extremity    Increasing prostate specific antigen level    Chronic gout of right foot due to renal impairment without tophus    Enlarged prostate    Deformity of right foot    Major depressive disorder, single episode, unspecified       Prior to Visit Medications    Medication Sig Taking? Authorizing Provider   tamsulosin (FLOMAX) 0.4 MG capsule Take 1 capsule by mouth once daily Yes Jose C Moran MD   hydrALAZINE (APRESOLINE) 50 MG tablet TAKE 1 TABLET BY MOUTH THREE TIMES DAILY Yes Madhu Silva MD   gabapentin (NEURONTIN) 100 MG capsule Take 1 capsule by mouth 2 times daily for 30 days. Intended supply: 30 days Yes REDDY RADFORD   dapagliflozin (FARXIGA) 10 MG tablet Take 1 tablet by mouth every morning For diabetes, per your insurance Yes VENTURA Majano CNP   allopurinol (ZYLOPRIM) 100 MG tablet Take 1 tablet by mouth daily FOR GOUT. STOP IT IF ANY RASH DEVELOPS!  Yes VENTURA Majano - CNP   furosemide (LASIX) 40 MG tablet Take 1 tablet by mouth daily Stop chlorthalidone and Metformin due to leg swelling Yes Tyler Platt, APRN - CNP   atorvastatin (LIPITOR) 40 MG tablet TAKE 1 TABLET BY MOUTH ONCE DAILY FOR HIGH CHOLESTEROL *YOU  NEED  TO  TAKE  IT  EVERY  DAY Yes Placido Anderson MD   Urea (CARMOL) 40 % cream Apply to affected area once daily Yes Rebecca Home, DPM   Compression Stockings MISC 20-30 mm/hg Yes Rebecca Home, DPM   Misc. Devices MISC 1 PAIR OF DIABETIC SHOES (1 LEFT/ 1 RIGHT)  1-3 PAIRS OF INSERTS (LEFT/ RIGHT) Yes Rebecca Home, DPM   aspirin (EQ ASPIRIN ADULT LOW DOSE) 81 MG EC tablet Take 1 tablet by mouth once daily Yes Placido Anderson MD   Lancets 30G MISC Testing once a day. Please dispense according to patients insurance. Yes Placido Anderson MD   blood glucose monitor strips Testing once a day. Please dispense according to patients insurance. Yes Placido Anderson MD   Handjeana Bowen MISC by Does not apply route Can't walk greater than 200 feet. Expires in 5 years. Yes Placido Anderson MD   sildenafil (VIAGRA) 100 MG tablet Take 1 tablet by mouth daily as needed for Erectile Dysfunction NOT TO TAKE WITH NITROGLYCERIN, CAN CAUSE COLLAPSE Yes Placido Anderson MD   glucose monitoring (FREESTYLE FREEDOM) kit Please supply Patient with a glucose monitoring kit that insurance will cover. Yes Placido Anderson MD   SITagliptin (JANUVIA) 100 MG tablet Take 1 tablet by mouth daily Dose increased 6/1/2021 Yes Placido Anderson MD   spironolactone (ALDACTONE) 25 MG tablet Take 1 tablet by mouth every morning BP and CHF Yes Placido Anderson MD   amLODIPine (NORVASC) 10 MG tablet Take 1 tablet by mouth once daily Yes Placido Anderson MD   carvedilol (COREG) 25 MG tablet TAKE 1 TABLET BY MOUTH TWICE DAILY WITH MEALS Yes Placido Anderson MD   Alcohol Swabs PADS Please dispense according to patients insurance/device.  Testing once a day Yes Placido Anderson MD   Multiple Vitamins-Minerals (MULTIVITAMIN-MINERALS) TABS tablet Take 1 tablet by mouth daily Yes Deana Shelton MD   acetaminophen (EQ ACETAMINOPHEN) 500 MG tablet Take 1 tablet by mouth every 6 hours as needed for Pain Yes Deana Shelton MD   ketoconazole (NIZORAL) 2 % cream Apply on the feet, not between the toes, twice a day Yes Deana Shelton MD   lidocaine (LMX) 4 % cream Apply 2-3 times a day as needed for pain Yes Deana Shelton MD   Blood Pressure KIT Diagnosis: HTN. Needs to check blood pressure 1-2 times a day until stable, then once a day. Goal blood pressure less than 135/85, and above 110/60. Yes Deana Shelton MD   nitroGLYCERIN (NITROSTAT) 0.4 MG SL tablet DISSOLVE ONE TABLET UNDER THE TONGUE EVERY 5 MINUTES AS NEEDED FOR CHEST PAIN. DO NOT EXCEED A TOTAL OF 3 DOSES IN 15 MINUTES Yes Stacey Levi MD   methylPREDNISolone (MEDROL DOSEPACK) 4 MG tablet Take by mouth.   Patient not taking: Reported on 7/15/2022  VENTURA Majano - CNP        Allergies   Allergen Reactions    Lisinopril      Patient is on Aldactone       Past Medical History:   Diagnosis Date    Acute heart failure ([facility] [address] 5/20/2018    Anemia     Benign hypertension with CKD (chronic kidney disease), stage II 7/24/2017    CAD (coronary artery disease)     Cardiomyopathy (HCC)     Chronic diastolic CHF (congestive heart failure) ([address] 7/23/2018    Chronic kidney disease     Coronary artery disease involving native coronary artery of native heart without angina pectoris 11/19/2016    Diabetic nephropathy associated with type 2 diabetes mellitus ([address] 1/12/2016    Diabetic polyneuropathy associated with type 2 diabetes mellitus ([address]     DM (diabetes mellitus), type 2 ([address]     for 15-20 yrs    Erectile dysfunction     Grief 7/24/2017    HTN (hypertension)     Hypertensive urgency     Lipidemia     Mild episode of recurrent major depressive disorder ([address] 1/13/2019    Obesity     Obesity (BMI 30-39.9) 11/19/2016    MORENA on CPAP 2/4/2013    Sleep apnea 2/4/2013 Slow transit constipation     Uncontrolled type 2 diabetes mellitus, without long-term current use of insulin ([facility] [address] 10/13/2016       Past Surgical History:   Procedure Laterality Date    CARDIAC CATHETERIZATION  2017    at [facility] per DR Harriet Ponce note in Media from 18, showed 50% stenosis PDA, EF 45%    CARDIAC CATHETERIZATION  2011    per Dr. Edu Arrington note in media: non-obstructive CAD, EF 45%    COLONOSCOPY  01/15/2013    hemorrhoids, good prep, scanned       . Social History     Tobacco Use    Smoking status: Former     Packs/day: 0.50     Years: 2.00     Pack years: 1.00     Types: Cigarettes     Quit date: 1981     Years since quittin.5    Smokeless tobacco: Former   Substance Use Topics    Alcohol use: Yes     Alcohol/week: 1.0 - 2.0 standard drink     Types: 1 - 2 Cans of beer per week     Comment: 1-2 cans a month    Drug use: Yes     Types: Marijuana Lucianne Bars)     Comment: marijuana every day        Family History   Problem Relation Age of Onset    Asthma Mother         /78    High Blood Pressure Mother     Heart Disease Mother     High Blood Pressure Father     Diabetes Sister     High Blood Pressure Sister     Diabetes Brother     High Blood Pressure Brother     Cancer Sister         breast    Cancer Sister         lung/smoker     Colon Cancer Other        ADVANCE DIRECTIVE: N, <no information>    ORIN / Nish Guevaray is here for Annual Exam, Congestive Heart Failure, Diabetes, and Hypertension       Current concerns: Diabetes, congestive heart failure, hypertension, kidney disease. Has known type 2 diabetes mellitus, with polyneuropathy, numbness and tingling in his feet, he is requesting to have diabetic shoes prescription. He does follow with podiatry. Taking farxiga and Januvia  Home Blood Glucose 90, 93, 100, 160   He admits he is still not testing every day but has been more compliant with taking the medications every day and testing more.   Has numbness and tingling in his feet and saw podiatry, told to request diabetic shoes prescription. They have started him on gabapentin which has been helping  Occasionally gets cramps and pain in his feet. Hemoglobin A1c stable  Lab Results   Component Value Date    LABA1C 7.3 (H) 06/14/2022    LABA1C 7.3 01/21/2022    LABA1C 9.7 09/10/2021       Weight is improved, has lost 3 pounds in 6 months  Wt Readings from Last 3 Encounters:   07/15/22 228 lb (103.4 kg)   07/13/22 234 lb (106.1 kg)   06/30/22 234 lb (106.1 kg)     01/21/22 231 lb 3.2 oz (104.9 kg)     Didn't see cardiology since before the pandemic Per records, in media, patient will need a cardiologist last time 7/2/2019    Hypertension, congestive heart failure :    he  is not exercising, but staying active,  and is adherent to low salt diet. Blood pressure is well controlled at home. Cardiac symptoms dyspnea and fatigue. Patient denies chest pain, chest pressure/discomfort, claudication, exertional chest pressure/discomfort, irregular heart beat, lower extremity edema, near-syncope, orthopnea, palpitations, paroxysmal nocturnal dyspnea, syncope, and tachypnea. Cardiovascular risk factors: advanced age (older than 54 for men, 72 for women), diabetes mellitus, dyslipidemia, hypertension, male gender, obesity (BMI >= 30 kg/m2), and chronic kidney disease . Use of agents associated with hypertension: none. History of target organ damage: chronic kidney disease and heart failure, coronary artery disease . Patient had cardiac cath at [facility] 6/23/2017, ejection fraction 45 %  With stenosis 50% of the coronary arteries  blood pressure is Normal.    BP Readings from Last 3 Encounters:   07/15/22 120/60   06/14/22 110/60   03/25/22 131/76        Pulse is Low.     Pulse Readings from Last 3 Encounters:   07/15/22 58   06/14/22 84   03/25/22 60     Lab Results   Component Value Date    LVEF 30 01/16/2019    LVEFMODE Echo 01/16/2019         Urinary symptoms: yes - frequency increased  He does have appointment with urology, taking Flomax. Sexually active: Not currently     Wears seatbelts: yes     Regular exercise: yes  Ever been transfused or tattooed?: no    Last eye exam: up to date: Yes    Abnormal visual screening. Hany Newell  reports he is up-to-date with his eye exam.     Vision Screening    Right eye Left eye Both eyes   Without correction 20/25 20/30 20/20   With correction          Hearing:normal :Yes    Last dental exam and preventative dental cleaning: up to date : No: NEEDS TO [address] [place] [address] per his report. I advised Hany Newell to make appointment with dentist. The patient verbalizes understanding and agrees with the plan. Nutritional assessment: Body mass index is 30.08 kg/m². Obesity per BMI . Healthful diet and Physical activity counseling to prevent CVD- low carb, low fat diet, increase fruits and vegetables, and exercise 4-5 times a day 30-40minutes a day discussed    Nicotine dependence.  no  Counseling given: Yes      Alcohol use: no, patient says \" I stopped drinking 1 year ago\"      Immunization History   Administered Date(s) Administered    COVID-19, PFIZER PURPLE top, DILUTE for use, (age 15 y+), 30mcg/0.3mL 04/12/2021    Influenza Virus Vaccine 10/02/2017, 12/19/2019    Influenza Whole 10/16/2007    Influenza, Quadv, 6 mo and older, IM, PF (Flulaval, Fluarix) 01/11/2019    Influenza, Quadv, IM, PF (6 mo and older Fluzone, Flulaval, Fluarix, and 3 yrs and older Afluria) 10/13/2016, 09/25/2017, 12/19/2019, 12/16/2020    Pneumococcal Polysaccharide ([id number]) 11/03/2006    Tdap (Boostrix, Adacel) 04/03/2014    Zoster Recombinant (Shingrix) 12/19/2019, 12/16/2020         COVID-19 vaccine: No:    Tdap one time, then Td every 10 years-up to date:  Yes    Influenza annually-up to date:  No    PPSV 23 in all adults 19-65 yo with chronic conditions,smokers, alcoholism,  nursing home residents; then PCV 13 at 73 yo-up to date: No: Colon cancer screening recommended at 39years old-up to date 1/15/2013  The patient has family history of colon cancer      The patient has family history of cancer. PSA increasing, seeing urology  Lab Results   Component Value Date    PSA 1.95 08/27/2021    PSA 1.89 11/20/2020    PSA 1.88 05/27/2020       Hyperlipidemia   On lipitor  he does get some cramps eyes:    Lab Results   Component Value Date    CHOL 128 06/01/2021    CHOL 192 11/20/2020    CHOL 130 05/27/2020     Lab Results   Component Value Date    TRIG 131 06/01/2021    TRIG 72 11/20/2020    TRIG 112 05/27/2020     Lab Results   Component Value Date    HDL 49 06/14/2022    HDL 38 (L) 06/01/2021    HDL 49 11/20/2020     Lab Results   Component Value Date    LDLCHOLESTEROL 69 06/14/2022    LDLCHOLESTEROL 64 06/01/2021    LDLCHOLESTEROL 129 11/20/2020    LDLCALC 119 01/16/2019     Lab Results   Component Value Date    CHOLHDLRATIO 2.8 06/14/2022    CHOLHDLRATIO 3.4 06/01/2021    CHOLHDLRATIO 3.9 11/20/2020       Cardiovascular risk is: High    The 10-year ASCVD risk score (Evette Shook et al., 2013) is: 19.5%    Values used to calculate the score:      Age: 62 years      Sex: Male      Is Non- : Yes      Diabetic: Yes      Tobacco smoker: No      Systolic Blood Pressure: 656 mmHg      Is BP treated: Yes      HDL Cholesterol: 49 mg/dL      Total Cholesterol: 136 mg/dL    Hepatitis C screening-nonreactive  Lab Results   Component Value Date    HEPBIGM NONREACTIVE 09/25/2019    HEPCAB NONREACTIVE 10/13/2016            [x]Negative depression screening.   PHQ Scores 7/15/2022 6/14/2022 1/21/2022 6/1/2021 1/12/2021 5/27/2020 4/11/2019   PHQ2 Score 0 3 0 0 0 0 0   PHQ9 Score 0 4 0 0 0 0 0       Health Maintenance   Topic Date Due    Pneumococcal 0-64 years Vaccine (2 - PCV) 11/03/2007    Diabetic retinal exam  07/31/2019    COVID-19 Vaccine (2 - Pfizer series) 05/03/2021    Diabetic microalbuminuria test  06/01/2022    Prostate Specific Antigen (PSA) Screening or Monitoring  08/27/2022    Flu vaccine (1) 09/01/2022    A1C test (Diabetic or Prediabetic)  09/14/2022    Colorectal Cancer Screen  01/15/2023    Diabetic foot exam  05/18/2023    Lipids  06/14/2023    Depression Monitoring  06/14/2023    DTaP/Tdap/Td vaccine (2 - Td or Tdap) 04/03/2024    Shingles vaccine  Completed    Hepatitis C screen  Completed    HIV screen  Completed    Hepatitis A vaccine  Aged Out    Hib vaccine  Aged Out    Meningococcal (ACWY) vaccine  Aged Out       -rest of complaints with corresponding details per ROS    Review of Systems   Constitutional:  Positive for fatigue. Negative for activity change, appetite change, chills, diaphoresis, fever and unexpected weight change. HENT:  Negative for congestion, dental problem and hearing loss. Eyes:  Positive for visual disturbance. Respiratory:  Positive for shortness of breath (NIX). Negative for cough, chest tightness and wheezing. Cardiovascular:  Negative for chest pain, palpitations and leg swelling. Gastrointestinal:  Negative for abdominal distention, abdominal pain, constipation, diarrhea, nausea and vomiting. Endocrine: Negative for cold intolerance, heat intolerance, polydipsia, polyphagia and polyuria. Genitourinary:  Negative for decreased urine volume, difficulty urinating, frequency and urgency. Musculoskeletal:  Positive for arthralgias (feet) and myalgias. Negative for back pain. Skin:  Negative for rash. Allergic/Immunologic: Positive for immunocompromised state (due for diabetes). Neurological:  Positive for numbness (feet). Negative for dizziness, weakness and headaches. Psychiatric/Behavioral:  Negative for decreased concentration, dysphoric mood and sleep disturbance.  The patient is not nervous/anxious.        -vital signs stable and within normal limits except obesity per BMI and mild bradycardia    /60   Pulse 58   Temp 97.1 °F (36.2 °C)   Ht 6' 1\" (1.854 m) Wt 228 lb (103.4 kg)   SpO2 98%   BMI 30.08 kg/m²   Vitals:    07/15/22 0805   BP: 120/60   Pulse: 58   Temp: 97.1 °F (36.2 °C)   SpO2: 98%   Weight: 228 lb (103.4 kg)   Height: 6' 1\" (1.854 m)     Estimated body mass index is 30.08 kg/m² as calculated from the following:    Height as of this encounter: 6' 1\" (1.854 m). Weight as of this encounter: 228 lb (103.4 kg). Physical Exam  Vitals and nursing note reviewed. Constitutional:       General: He is not in acute distress. Appearance: Normal appearance. He is well-developed. He is obese. He is not diaphoretic. HENT:      Head: Normocephalic and atraumatic. Right Ear: Hearing, tympanic membrane, ear canal and external ear normal.      Left Ear: Hearing, tympanic membrane, ear canal and external ear normal.      Nose: No mucosal edema. Mouth/Throat:      Comments: I did not examine the mouth due to coronavirus pandemic and wearing masks. Eyes:      General: Lids are normal. No scleral icterus. Right eye: No discharge. Left eye: No discharge. Extraocular Movements: Extraocular movements intact. Conjunctiva/sclera: Conjunctivae normal.   Neck:      Thyroid: No thyromegaly. Cardiovascular:      Rate and Rhythm: Normal rate and regular rhythm. Heart sounds: Murmur heard. Crescendo systolic murmur is present with a grade of 3/6. Pulmonary:      Effort: Pulmonary effort is normal. No respiratory distress. Breath sounds: Normal breath sounds. No wheezing or rales. Chest:      Chest wall: No tenderness. Abdominal:      General: Bowel sounds are normal. There is no distension. Palpations: Abdomen is soft. Tenderness: There is no abdominal tenderness. Comments: Obese abdomen. Musculoskeletal:         General: No tenderness. Normal range of motion. Cervical back: Normal range of motion and neck supple. Right lower leg: No edema. Left lower leg: No edema.    Feet: Right foot:      Protective Sensation: 5 sites tested. 2 sites sensed. Left foot:      Protective Sensation: 5 sites tested. 2 sites sensed. Comments: Decreased sensation on the plantar aspects  Lymphadenopathy:      Cervical: No cervical adenopathy. Skin:     General: Skin is warm and dry. Capillary Refill: Capillary refill takes less than 2 seconds. Findings: No rash. Neurological:      General: No focal deficit present. Mental Status: He is alert and oriented to person, place, and time. Cranial Nerves: No cranial nerve deficit. Sensory: No sensory deficit. Motor: No abnormal muscle tone. Coordination: Coordination normal.      Gait: Gait normal.      Deep Tendon Reflexes: Reflexes are normal and symmetric. Reflexes normal.   Psychiatric:         Mood and Affect: Mood normal.         Behavior: Behavior normal.         Thought Content: Thought content normal.         Judgment: Judgment normal.       No flowsheet data found. I personally reviewed testing with patient.   Anemia  Hyperglycemia  Chronic kidney disease stage II  Hyperlipidemia improved    Otherwise labs within normal limits      Lab Results   Component Value Date    WBC 6.1 06/14/2022    HGB 12.9 (L) 06/14/2022    HCT 39.1 (L) 06/14/2022    MCV 91.7 06/14/2022     06/14/2022       Lab Results   Component Value Date/Time     06/14/2022 03:33 PM    K 3.9 06/14/2022 03:33 PM     06/14/2022 03:33 PM    CO2 24 06/14/2022 03:33 PM    BUN 20 06/14/2022 03:33 PM    CREATININE 0.95 06/14/2022 03:33 PM    GLUCOSE 125 06/14/2022 03:33 PM    GLUCOSE 157 02/06/2012 10:48 AM    CALCIUM 9.3 06/14/2022 03:33 PM        Lab Results   Component Value Date    ALT 14 06/14/2022    AST 15 06/14/2022    ALKPHOS 90 06/14/2022    BILITOT 0.42 06/14/2022       Lab Results   Component Value Date    TSH 1.20 06/01/2021       Lab Results   Component Value Date    LDLCALC 119 01/16/2019    LDLCHOLESTEROL 69 06/14/2022       Lab Results   Component Value Date    LABA1C 7.3 (H) 06/14/2022       Lab Results   Component Value Date    OCDMGMTW52 613 06/01/2021       Lab Results   Component Value Date    FOLATE 9.4 06/01/2021       No results found for: IRON, TIBC, FERRITIN    Lab Results   Component Value Date    VITD25 40.0 06/01/2021         Orders Placed This Encounter   Medications    atorvastatin (LIPITOR) 40 MG tablet     Sig: Take 1 tablet by mouth Daily with supper     Dispense:  90 tablet     Refill:  3    aspirin (EQ ASPIRIN ADULT LOW DOSE) 81 MG EC tablet     Sig: Take 1 tablet by mouth once daily     Dispense:  90 tablet     Refill:  3    SITagliptin (JANUVIA) 100 MG tablet     Sig: Take 1 tablet by mouth in the morning. Dose increased 6/1/2021. Dispense:  90 tablet     Refill:  3    spironolactone (ALDACTONE) 25 MG tablet     Sig: Take 1 tablet by mouth every morning BP and CHF     Dispense:  90 tablet     Refill:  3    amLODIPine (NORVASC) 10 MG tablet     Sig: Take 1 tablet by mouth once daily     Dispense:  90 tablet     Refill:  3    carvedilol (COREG) 25 MG tablet     Sig: TAKE 1 TABLET BY MOUTH TWICE DAILY WITH MEALS     Dispense:  180 tablet     Refill:  11    Handicap Placard MISC     Sig: by Does not apply route Can't walk greater than 200 feet. Expires in 5 years.      Dispense:  1 each     Refill:  0    Diabetic Shoe MISC     Sig: by Does not apply route Needs diabetic shoes     Dispense:  1 each     Refill:  0    pneumococcal 20-valent conjugat (PREVNAR) 0.5 ML PAUL inj     Sig: Inject 0.5 mLs into the muscle once for 1 dose     Dispense:  0.5 mL     Refill:  0    COVID-19 mRNA Vac-Trini,Pfizer, (PFIZER-BIONT COVID-19 VAC-TRINI) 30 MCG/0.3ML SUSP injection     Sig: Inject 0.3 mLs into the muscle once for 1 dose DUE FOR BOOSTER, PLEASE LET PT KNOW WHEN READY     Dispense:  0.3 mL     Refill:  0           Medications Discontinued During This Encounter   Medication Reason    methylPREDNISolone (MEDROL DOSEPACK) 4 MG tablet Therapy completed    Handicap Placard MISC REORDER    SITagliptin (JANUVIA) 100 MG tablet REORDER    spironolactone (ALDACTONE) 25 MG tablet REORDER    amLODIPine (NORVASC) 10 MG tablet REORDER    carvedilol (COREG) 25 MG tablet REORDER    aspirin (EQ ASPIRIN ADULT LOW DOSE) 81 MG EC tablet REORDER    atorvastatin (LIPITOR) 40 MG tablet REORDER         Orders Placed This Encounter   Procedures    Brain Natriuretic Peptide     Standing Status:   Future     Standing Expiration Date:   7/15/2023    CBC with Auto Differential     Standing Status:   Future     Standing Expiration Date:   7/15/2023    Comprehensive Metabolic Panel     Standing Status:   Future     Standing Expiration Date:   9/11/2022    Urinalysis with Reflex to Culture     Standing Status:   Future     Standing Expiration Date:   7/15/2023     Order Specific Question:   SPECIFY(EX-CATH,MIDSTREAM,CYSTO,ETC)? Answer:   MIDSTREAM    Uric Acid     Standing Status:   Future     Standing Expiration Date:   7/15/2023    TSH     Standing Status:   Future     Standing Expiration Date:   7/15/2023    Microalbumin / Creatinine Urine Ratio     Standing Status:   Future     Standing Expiration Date:   7/15/2023    Magnesium     Standing Status:   Future     Standing Expiration Date:   7/15/2023    Vitamin B12 & Folate     Standing Status:   Future     Standing Expiration Date:   9/11/2022    Electrophoresis Protein, Serum     Standing Status:   Future     Standing Expiration Date:   7/15/2023    Protein Electrophoresis, Urine     Standing Status:   Future     Standing Expiration Date:   7/15/2023    Iron and TIBC     Standing Status:   Future     Standing Expiration Date:   7/15/2023     Order Specific Question:   Is Patient Fasting? Answer:   yes     Order Specific Question:   No of Hours?      Answer:   8    Ferritin     Standing Status:   Future     Standing Expiration Date:   7/15/2023    Path Review, Smear     Standing Status: Future     Standing Expiration Date:   7/15/2023    AFL - Pina Moraes DO, Cardiology, [facility]     Referral Priority:   Routine     Referral Type:   Eval and Treat     Referral Reason:   Specialty Services Required     Referred to Provider:   Antonella Zuniga DO     Requested Specialty:   Cardiology     Number of Visits Requested:   1    ECHO Complete 2D W Doppler W Color     Standing Status:   Future     Standing Expiration Date:   7/15/2023     Order Specific Question:   Reason for exam:     Answer:   EF    VT OFFICE/OUTPATIENT ESTABLISHED MOD [facility] 30-39 MIN           This note was completed by using the assistance of a speech-recognition program. However, inadvertent computerized transcription errors may be present. Although every effort was made to ensure accuracy, no guarantees can be provided that every mistake has been identified and corrected by editing. An electronic signature was used to authenticate this note.     Electronically signed by Domingo Yeh MD on 7/15/2022 at 10:52 PM

## 2022-07-15 NOTE — PROGRESS NOTES
Visit Information    Have you changed or started any medications since your last visit including any over-the-counter medicines, vitamins, or herbal medicines? no   Are you having any side effects from any of your medications? -  no  Have you stopped taking any of your medications? Is so, why? -  no    Have you seen any other physician or provider since your last visit? No  Have you had any other diagnostic tests since your last visit? No  Have you been seen in the emergency room and/or had an admission to a hospital since we last saw you? No  Have you had your routine dental cleaning in the past 6 months? no    Have you activated your walkby account? If not, what are your barriers?  Yes     Patient Care Team:  Deana Shelton MD as PCP - General (Family Medicine)  Deana Shelton MD as PCP - [facility]  Xin Corona MD as Consulting Physician (Cardiology)  Duane Garcia OD (Optometry)  Marbella Hudson MD as Consulting Physician (Nephrology)  Hannah Marquez MD as Consulting Physician (Urology)  Roni Baldwin MD as Consulting Physician (Cardiology)  Duane Garcia OD (Optometry)  Tye uBnch DPM as Physician (Podiatry)    Medical History Review  Past Medical, Family, and Social History reviewed and does contribute to the patient presenting condition    Health Maintenance   Topic Date Due    Pneumococcal 0-64 years Vaccine (2 - PCV) 11/03/2007    Diabetic retinal exam  07/31/2019    COVID-19 Vaccine (2 - Pfizer series) 05/03/2021    Diabetic microalbuminuria test  06/01/2022    Prostate Specific Antigen (PSA) Screening or Monitoring  08/27/2022    Flu vaccine (1) 09/01/2022    A1C test (Diabetic or Prediabetic)  09/14/2022    Colorectal Cancer Screen  01/15/2023    Diabetic foot exam  05/18/2023    Lipids  06/14/2023    Depression Monitoring  06/14/2023    DTaP/Tdap/Td vaccine (2 - Td or Tdap) 04/03/2024    Shingles vaccine  Completed    Hepatitis C screen  Completed    HIV screen Completed    Hepatitis A vaccine  Aged Out    Hib vaccine  Aged Out    Meningococcal (ACWY) vaccine  Aged Out

## 2022-07-18 LAB
ALBUMIN (CALCULATED): 4.3 G/DL (ref 3.2–5.2)
ALBUMIN PERCENT: 59 % (ref 45–65)
ALPHA 1 PERCENT: 3 % (ref 3–6)
ALPHA 2 PERCENT: 11 % (ref 6–13)
ALPHA-1-GLOBULIN: 0.2 G/DL (ref 0.1–0.4)
ALPHA-2-GLOBULIN: 0.8 G/DL (ref 0.5–0.9)
BETA GLOBULIN: 0.8 G/DL (ref 0.5–1.1)
BETA PERCENT: 11 % (ref 11–19)
GAMMA GLOBULIN %: 17 % (ref 9–20)
GAMMA GLOBULIN: 1.2 G/DL (ref 0.5–1.5)
P E INTERPRETATION, U: NORMAL
PATHOLOGIST: NORMAL
PATHOLOGIST: NORMAL
PROTEIN ELECTROPHORESIS, SERUM: NORMAL
SPECIMEN TYPE: NORMAL
SURGICAL PATHOLOGY REPORT: NORMAL
TOTAL PROT. SUM,%: 101 % (ref 98–102)
TOTAL PROT. SUM: 7.3 G/DL (ref 6.3–8.2)
TOTAL PROTEIN: 7.3 G/DL (ref 6.4–8.3)
URINE TOTAL PROTEIN: 4 MG/DL

## 2022-07-19 DIAGNOSIS — D50.8 OTHER IRON DEFICIENCY ANEMIA: Primary | ICD-10-CM

## 2022-07-19 LAB — PATHOLOGIST REVIEW: NORMAL

## 2022-07-19 NOTE — RESULT ENCOUNTER NOTE
Please notify patient: Low iron, needs new referral for GI work-up, colonoscopy and possible EGD  Please let him know I will place referral to Dr. Kayleen Nathan   blood glucose 164, mildly high   Chronic kidney disease stage III, slightly worsening from before  Platelets slightly low, which is new, will continue to monitor and if still low, then will refer him to a blood doctor      Otherwise labs within normal limits  continue current treatment    Future Appointments  9/21/2022  8:30 AM    Tess Riso DPM            [facility]        [facility]  9/23/2022  8:30 AM    SCHEDULE, [facility]  [facility]         [facility]  10/27/2022 2:30 PM    Alberto Soriano MD     [facility]

## 2022-07-24 DIAGNOSIS — M51.36 DDD (DEGENERATIVE DISC DISEASE), LUMBAR: ICD-10-CM

## 2022-07-24 DIAGNOSIS — M54.16 LUMBAR RADICULOPATHY, CHRONIC: Primary | ICD-10-CM

## 2022-07-24 DIAGNOSIS — M25.552 HIP PAIN, LEFT: ICD-10-CM

## 2022-07-25 NOTE — TELEPHONE ENCOUNTER
Pharmacy requested gabapentin refill for hip pain, lumbar DDD, & lumbar radiculopathy.   100mg 60 tabs with 0 refills

## 2022-07-26 RX ORDER — GABAPENTIN 100 MG/1
CAPSULE ORAL
Qty: 60 CAPSULE | Refills: 0 | Status: SHIPPED | OUTPATIENT
Start: 2022-07-26 | End: 2022-09-06 | Stop reason: ALTCHOICE

## 2022-08-09 DIAGNOSIS — N40.1 BENIGN LOCALIZED PROSTATIC HYPERPLASIA WITH LOWER URINARY TRACT SYMPTOMS (LUTS): ICD-10-CM

## 2022-08-09 RX ORDER — TAMSULOSIN HYDROCHLORIDE 0.4 MG/1
CAPSULE ORAL
Qty: 30 CAPSULE | Refills: 0 | Status: SHIPPED | OUTPATIENT
Start: 2022-08-09 | End: 2022-09-09

## 2022-09-06 ENCOUNTER — TELEMEDICINE (OUTPATIENT)
Dept: FAMILY MEDICINE CLINIC | Age: 62
End: 2022-09-06
Payer: COMMERCIAL

## 2022-09-06 DIAGNOSIS — I12.9 BENIGN HYPERTENSION WITH CKD (CHRONIC KIDNEY DISEASE), STAGE II: ICD-10-CM

## 2022-09-06 DIAGNOSIS — I50.42 CHRONIC COMBINED SYSTOLIC AND DIASTOLIC CHF (CONGESTIVE HEART FAILURE) (HCC): ICD-10-CM

## 2022-09-06 DIAGNOSIS — E11.42 TYPE 2 DIABETES MELLITUS WITH DIABETIC POLYNEUROPATHY, WITHOUT LONG-TERM CURRENT USE OF INSULIN (HCC): ICD-10-CM

## 2022-09-06 DIAGNOSIS — B37.42 CANDIDAL BALANITIS: Primary | ICD-10-CM

## 2022-09-06 DIAGNOSIS — N18.2 BENIGN HYPERTENSION WITH CKD (CHRONIC KIDNEY DISEASE), STAGE II: ICD-10-CM

## 2022-09-06 DIAGNOSIS — L30.4 INTERTRIGO: ICD-10-CM

## 2022-09-06 PROCEDURE — 3017F COLORECTAL CA SCREEN DOC REV: CPT | Performed by: FAMILY MEDICINE

## 2022-09-06 PROCEDURE — 3051F HG A1C>EQUAL 7.0%<8.0%: CPT | Performed by: FAMILY MEDICINE

## 2022-09-06 PROCEDURE — G8427 DOCREV CUR MEDS BY ELIG CLIN: HCPCS | Performed by: FAMILY MEDICINE

## 2022-09-06 PROCEDURE — 2022F DILAT RTA XM EVC RTNOPTHY: CPT | Performed by: FAMILY MEDICINE

## 2022-09-06 PROCEDURE — 99214 OFFICE O/P EST MOD 30 MIN: CPT | Performed by: FAMILY MEDICINE

## 2022-09-06 RX ORDER — NYSTATIN 100000 [USP'U]/G
POWDER TOPICAL
Qty: 60 G | Refills: 5 | Status: SHIPPED | OUTPATIENT
Start: 2022-09-06

## 2022-09-06 ASSESSMENT — ENCOUNTER SYMPTOMS
WHEEZING: 0
NAUSEA: 0
SHORTNESS OF BREATH: 0
VOMITING: 0
DIARRHEA: 0
CHEST TIGHTNESS: 0
CONSTIPATION: 0
COUGH: 0
ABDOMINAL DISTENTION: 0
ABDOMINAL PAIN: 0

## 2022-09-06 NOTE — PROGRESS NOTES
[address]    Mobile Phone  Send Link Via Text  No text has been sent  Last text sent2022 2:32 PM  To:[phone]  Email  Send Link Via Email  No email has been sent  Last email sent2022 2:32 PM  [email or web address]          2022    TELEHEALTH EVALUATION -- Audio/Visual (During RICOR- public health emergency)      [address] (:  1960) is a 62 y.o. male,Established patient, here for evaluation of the following chief complaint(s): Rash (Jock itch for 1 week, not going awy)        ASSESSMENT/PLAN:    1. Candidal balanitis  Worsening , likely related to diabetes and Farxiga  -   To start clotrimazole 1 % OINT; Apply on the penis on the gland twice a day for 10 days, Disp-56.7 g, R-0Normal  2. Type 2 diabetes mellitus with diabetic polyneuropathy, without long-term current use of insulin ([facility])  Stable, relatively well controlled  Lab Results   Component Value Date    LABA1C 7.3 (H) 2022    LABA1C 7.3 2022    LABA1C 9.7 09/10/2021       -     CBC; Future  -     Comprehensive Metabolic Panel; Future  -     Magnesium; Future  -     Vitamin B12 & Folate; Future  -     Hemoglobin A1C; Future  -advised home blood glucose testing  daily  -daily feet exam, Foot care: advised to wash feet daily, pat dry and apply lotion at night, avoiding between toes. Need to look at feet daily and report to a physician any signs of inflammation or skin damage  -annual dilated eye exam  -Low carb, low fat diet, increase fruits and vegetables, and exercise 4-5 times a day 30-40 minutes a day discussed  -continue current treatment Farxiga 10 Mg daily, Januvia 100 mg  -continue Aspirin 81 mg  -continue  statin Lipitor 40 Mg  Stop drinking juice    3.  Benign hypertension with CKD (chronic kidney disease), stage II  Stable chronic kidney disease stage II  Likely well-controlled hypertension based on most recent readings    BP Readings from Last 3 Encounters:   07/15/22 120/60   22 110/60 03/25/22 131/76     To continue hydralazine 50 Mg 3 times a day, Aldactone 25 Mg daily, amlodipine 10 Mg daily, Coreg 25 Mg twice a day, furosemide 40 Mg daily  -     CBC; Future  -     Comprehensive Metabolic Panel; Future  -     Magnesium; Future  4. Chronic combined systolic and diastolic CHF (congestive heart failure) (HCC)  Stable  Lab Results   Component Value Date    LVEF 30 01/16/2019    LVEFMODE Echo 01/16/2019   He reports will be seeing cardiologist in 1 week    Continue current medications hydralazine 50 Mg 3 times a day, Aldactone 25 Mg daily, amlodipine 10 Mg daily, Coreg 25 Mg twice a day, furosemide 40 Mg daily  Recheck labs        -     Brain Natriuretic Peptide; Future  -     CBC; Future  -     Comprehensive Metabolic Panel; Future  -     Magnesium; Future  5. Intertrigo  -Worsening     nystatin (MYCOSTATIN) 545067 UNIT/GM powder; Apply 1-2 times daily in the inguinal /groin skin folds long-term, Disp-60 g, R-5, Normal    Freddy received counseling on the following healthy behaviors: nutrition, exercise, medication adherence, and weight loss  Reviewed prior labs and health maintenance  Discussed use, benefit, and side effects of prescribed medications. Barriers to medication compliance addressed. Patient given educational materials - see patient instructions  All patient questions answered. Patient voiced understanding. The patient's past medical,surgical, social, and family history as well as his current medications and allergies were reviewed as documented in today's encounter. Medications, labs, diagnostic studies, consultations and follow-up as documented in this encounter. Return for KEEP APPT.     Data Unavailable    Future Appointments   Date Time Provider Vianey Malagon   9/21/2022  8:30 AM Sukumar Padron DPM [facility]   9/23/2022  8:30 AM SCHEDULE, [facility]   10/27/2022  2:30 PM Pepe Martinez MD [facility] SUBJECTIVE/OBJECTIVE:  Ena Genao (:  1960) has requested an audio/video evaluation for the following concern(s):Rash (Jock itch for 1 week, not going awy)      Patient reports having rash around the gland of penis and in the groin, associated with itching. Patient says the rash seems to be whitish. He denies being sexually active recently, does not have a new girlfriend. Denies any pain      Not using viagra for a long time. Risk of hypotension discussed, advised to stop it anyway, as it interacts with nitroglycerin. Patient wants to know if diabetes can cause his rash. Has known type 2 diabetes mellitus, with numbness and tingling in his feet. Has been checking Home Blood Glucose every other day or more. He does stay Sitagliptin 100 mg and Farxiga 10 Mg    Home blood glucose 123, 164 when checking  Drinking cranberry juice, advised to stop drinking juices, to drink only  water  Doesn't drink pop and alcohol anymore  A1c stable    Lab Results   Component Value Date    LABA1C 7.3 (H) 2022    LABA1C 7.3 2022    LABA1C 9.7 09/10/2021       Hypertension, chronic kidney disease stage III and CHF:    he  is not exercising, but staying active, and is adherent to low salt diet. Cardiac symptoms fatigue. Patient denies chest pain, chest pressure/discomfort, claudication, dyspnea, exertional chest pressure/discomfort, irregular heart beat, lower extremity edema, near-syncope, orthopnea, palpitations, paroxysmal nocturnal dyspnea, syncope, and tachypnea. Cardiovascular risk factors: advanced age (older than 54 for men, 72 for women), diabetes mellitus, dyslipidemia, hypertension, male gender, and obesity (BMI >= 30 kg/m2). Use of agents associated with hypertension: none. History of target organ damage: chronic kidney disease and heart failure.     Patient says he does have appointment with cardiologist in September, in 1 week  blood pressure is Normal.    BP Readings from Last 3 Encounters:   07/15/22 120/60   06/14/22 110/60   03/25/22 131/76        Pulse is Low, mild bradycardia    Pulse Readings from Last 3 Encounters:   07/15/22 58   06/14/22 84   03/25/22 60     Lab Results   Component Value Date    LVEF 30 01/16/2019    LVEFMODE Echo 01/16/2019             Review of Systems   Constitutional:  Positive for fatigue. Negative for activity change, appetite change, chills, diaphoresis, fever and unexpected weight change. Respiratory:  Negative for cough, chest tightness, shortness of breath and wheezing. Cardiovascular:  Negative for chest pain, palpitations and leg swelling. Gastrointestinal:  Negative for abdominal distention, abdominal pain, constipation, diarrhea, nausea and vomiting. Endocrine: Negative for cold intolerance, heat intolerance, polydipsia, polyphagia and polyuria. Genitourinary:  Negative for difficulty urinating, dysuria, frequency and urgency. Skin:  Positive for rash. Neurological:  Positive for numbness (feet). Hematological:  Does not bruise/bleed easily. Prior to Visit Medications    Medication Sig Taking? Authorizing Provider   tamsulosin (FLOMAX) 0.4 MG capsule Take 1 capsule by mouth once daily Yes Sindy Oneal MD   atorvastatin (LIPITOR) 40 MG tablet Take 1 tablet by mouth Daily with supper Yes Naseem Smith MD   aspirin (EQ ASPIRIN ADULT LOW DOSE) 81 MG EC tablet Take 1 tablet by mouth once daily Yes Naseem Smith MD   SITagliptin (JANUVIA) 100 MG tablet Take 1 tablet by mouth in the morning. Dose increased 6/1/2021.  Yes Naseem Smith MD   spironolactone (ALDACTONE) 25 MG tablet Take 1 tablet by mouth every morning BP and CHF Yes Naseem Smith MD   amLODIPine (NORVASC) 10 MG tablet Take 1 tablet by mouth once daily Yes Naseem Smith MD   carvedilol (COREG) 25 MG tablet TAKE 1 TABLET BY MOUTH TWICE DAILY WITH MEALS Yes Naseem Smith MD   Handicap Placard MISC by Does not apply route Can't walk greater than 200 feet. Expires in 5 years. Yes Johnathon Salgado MD   Diabetic Shoe MISC by Does not apply route Needs diabetic shoes Yes Johnathon Salgado MD   hydrALAZINE (APRESOLINE) 50 MG tablet TAKE 1 TABLET BY MOUTH THREE TIMES DAILY Yes Johnathon Salgado MD   dapagliflozin (FARXIGA) 10 MG tablet Take 1 tablet by mouth every morning For diabetes, per your insurance Yes VENTURA Majano CNP   allopurinol (ZYLOPRIM) 100 MG tablet Take 1 tablet by mouth daily FOR GOUT. STOP IT IF ANY RASH DEVELOPS! Yes VENTURA Majano CNP   furosemide (LASIX) 40 MG tablet Take 1 tablet by mouth daily Stop chlorthalidone and Metformin due to leg swelling Yes VENTURA Majano CNP   Urea (CARMOL) 40 % cream Apply to affected area once daily Yes Cristhian Lewis DPM   Compression Stockings MISC 20-30 mm/hg Yes Cristhian Lewis DPM   Misc. Devices MISC 1 PAIR OF DIABETIC SHOES (1 LEFT/ 1 RIGHT)  1-3 PAIRS OF INSERTS (LEFT/ RIGHT) Yes Cristhian Lewis DPM   Lancets 30G MISC Testing once a day. Please dispense according to patients insurance. Yes Johnathon Salgado MD   blood glucose monitor strips Testing once a day. Please dispense according to patients insurance. Yes Johnathon Salgado MD   glucose monitoring (FREESTYLE FREEDOM) kit Please supply Patient with a glucose monitoring kit that insurance will cover. Yes Johnathon Salgado MD   Alcohol Swabs PADS Please dispense according to patients insurance/device.  Testing once a day Yes Johnathon Salgado MD   Multiple Vitamins-Minerals (MULTIVITAMIN-MINERALS) TABS tablet Take 1 tablet by mouth daily Yes Johnathon Salgado MD   acetaminophen (EQ ACETAMINOPHEN) 500 MG tablet Take 1 tablet by mouth every 6 hours as needed for Pain Yes Johnathon Salgado MD   ketoconazole (NIZORAL) 2 % cream Apply on the feet, not between the toes, twice a day Yes Johnathon Salgado MD   lidocaine (LMX) 4 % cream Apply 2-3 times a day as needed for pain Yes Johnathon Salgado MD Musculoskeletal:   [x] Normal gait with no signs of ataxia         [x] Normal range of motion of neck        [] Abnormal-       Neurological:        [x] No Facial Asymmetry (Cranial nerve 7 motor function) (limited exam to video visit)          [x] No gaze palsy        [] Abnormal-            Skin:        [x] No significant exanthematous lesions or discoloration noted on facial skin         [] Abnormal-            Psychiatric:      [x] No Hallucinations       [x]Mood is normal      [x]Behavior is normal      [x]Judgment is normal      [x]Thought content is normal       [] Abnormal-     Other pertinent observable physical exam findings- none    Due to this being a TeleHealth encounter, evaluation of the following organ systems is limited: Vitals/Constitutional/EENT/Resp/CV/GI//MS/Neuro/Skin/Heme-Lymph-Imm. I personally reviewed most recent labs reviewed with the patient and all questions fully answered.    Mild thrombocytopenia  Hyperglycemia  Increased creatinine  Otherwise labs within normal limits        Lab Results   Component Value Date    WBC 4.3 07/15/2022    HGB 16.8 07/15/2022    HCT 52.8 07/15/2022    MCV 93.7 07/15/2022     (L) 07/15/2022       Lab Results   Component Value Date/Time     07/15/2022 09:17 AM    K 3.8 07/15/2022 09:17 AM     07/15/2022 09:17 AM    CO2 25 07/15/2022 09:17 AM    BUN 28 07/15/2022 09:17 AM    CREATININE 1.41 07/15/2022 09:17 AM    GLUCOSE 164 07/15/2022 09:17 AM    GLUCOSE 157 02/06/2012 10:48 AM    CALCIUM 9.3 07/15/2022 09:17 AM        Lab Results   Component Value Date    ALT 16 07/15/2022    AST 17 07/15/2022    ALKPHOS 102 07/15/2022    BILITOT 0.44 07/15/2022       Lab Results   Component Value Date    TSH 1.29 07/15/2022       Lab Results   Component Value Date    CHOL 128 06/01/2021    CHOL 192 11/20/2020    CHOL 130 05/27/2020     Lab Results   Component Value Date    TRIG 131 06/01/2021    TRIG 72 11/20/2020    TRIG 112 05/27/2020     Lab Results   Component Value Date    HDL 49 06/14/2022    HDL 38 (L) 06/01/2021    HDL 49 11/20/2020     Lab Results   Component Value Date    LDLCALC 119 01/16/2019    LDLCHOLESTEROL 69 06/14/2022    LDLCHOLESTEROL 64 06/01/2021    LDLCHOLESTEROL 129 11/20/2020       Lab Results   Component Value Date    CHOLHDLRATIO 2.8 06/14/2022    CHOLHDLRATIO 3.4 06/01/2021    CHOLHDLRATIO 3.9 11/20/2020       Lab Results   Component Value Date    LABA1C 7.3 (H) 06/14/2022    LABA1C 7.3 01/21/2022    LABA1C 9.7 09/10/2021         Lab Results   Component Value Date    WUNVLGAQ33 930 07/15/2022       Lab Results   Component Value Date    VITD25 40.0 06/01/2021       Orders Placed This Encounter   Medications    Clotrimazole 1 % OINT     Sig: Apply on the penis on the gland twice a day for 10 days     Dispense:  56.7 g     Refill:  0    nystatin (MYCOSTATIN) [id number] UNIT/GM powder     Sig: Apply 1-2 times daily in the inguinal /groin skin folds long-term     Dispense:  60 g     Refill:  5       Orders Placed This Encounter   Procedures    Brain Natriuretic Peptide     Standing Status:   Future     Standing Expiration Date:   9/6/2023    CBC     Standing Status:   Future     Standing Expiration Date:   9/6/2023    Comprehensive Metabolic Panel     Standing Status:   Future     Standing Expiration Date:   11/3/2022    Magnesium     Standing Status:   Future     Standing Expiration Date:   9/6/2023    Vitamin B12 & Folate     Standing Status:   Future     Standing Expiration Date:   11/3/2022    Hemoglobin A1C     Standing Status:   Future     Standing Expiration Date:   9/6/2023         Medications Discontinued During This Encounter   Medication Reason    gabapentin (NEURONTIN) 100 MG capsule Therapy completed    sildenafil (VIAGRA) 100 MG tablet Stop Taking at Discharge              Sabine Crowell, was evaluated through a synchronous (real-time) audio-video encounter.  The patient (or guardian if applicable) is aware that this is a billable service, which includes applicable co-pays. The virtual visit was conducted with patient's (and/or legal guardian consent). Verbal consent to proceed has been obtained within the past 12 months. The visit was conducted pursuant to the emergency declaration under the [facility], [address] [place] [address] authority and the [employer] General Act. Patient identification was verified    Patient was alone   Provider was located at primary practice location. The patient was located at home, in a state where the provider was licensed to provide care. On this date 9/6/2022 I have spent 35 minutes reviewing previous notes, test results and face to face with the patient discussing the diagnosis and importance of compliance with the treatment plan as well as documenting on the day of the visit. --Cherri Spurling, MD on 9/12/2022 at 11:10 PM    An electronic signature was used to authenticate this note.

## 2022-09-08 DIAGNOSIS — N40.1 BENIGN LOCALIZED PROSTATIC HYPERPLASIA WITH LOWER URINARY TRACT SYMPTOMS (LUTS): ICD-10-CM

## 2022-09-09 RX ORDER — TAMSULOSIN HYDROCHLORIDE 0.4 MG/1
CAPSULE ORAL
Qty: 30 CAPSULE | Refills: 0 | Status: SHIPPED | OUTPATIENT
Start: 2022-09-09

## 2022-09-12 PROBLEM — B37.42 CANDIDAL BALANITIS: Status: ACTIVE | Noted: 2022-09-12

## 2022-09-21 ENCOUNTER — OFFICE VISIT (OUTPATIENT)
Dept: PODIATRY | Age: 62
End: 2022-09-21
Payer: COMMERCIAL

## 2022-09-21 VITALS — HEIGHT: 73 IN | WEIGHT: 228 LBS | BODY MASS INDEX: 30.22 KG/M2

## 2022-09-21 DIAGNOSIS — B35.1 DERMATOPHYTOSIS OF NAIL: Primary | ICD-10-CM

## 2022-09-21 DIAGNOSIS — I89.0 LYMPHEDEMA: ICD-10-CM

## 2022-09-21 DIAGNOSIS — E11.49 OTHER DIABETIC NEUROLOGICAL COMPLICATION ASSOCIATED WITH TYPE 2 DIABETES MELLITUS (HCC): ICD-10-CM

## 2022-09-21 DIAGNOSIS — I73.9 PERIPHERAL VASCULAR DISORDER (HCC): ICD-10-CM

## 2022-09-21 PROCEDURE — 2022F DILAT RTA XM EVC RTNOPTHY: CPT | Performed by: PODIATRIST

## 2022-09-21 PROCEDURE — 99213 OFFICE O/P EST LOW 20 MIN: CPT | Performed by: PODIATRIST

## 2022-09-21 PROCEDURE — 3017F COLORECTAL CA SCREEN DOC REV: CPT | Performed by: PODIATRIST

## 2022-09-21 PROCEDURE — 3051F HG A1C>EQUAL 7.0%<8.0%: CPT | Performed by: PODIATRIST

## 2022-09-21 PROCEDURE — 11721 DEBRIDE NAIL 6 OR MORE: CPT | Performed by: PODIATRIST

## 2022-09-21 PROCEDURE — G8427 DOCREV CUR MEDS BY ELIG CLIN: HCPCS | Performed by: PODIATRIST

## 2022-09-21 PROCEDURE — 1036F TOBACCO NON-USER: CPT | Performed by: PODIATRIST

## 2022-09-21 PROCEDURE — G8417 CALC BMI ABV UP PARAM F/U: HCPCS | Performed by: PODIATRIST

## 2022-09-21 NOTE — PROGRESS NOTES
[address] [facility] PODIATRY [facility]  [address] [address] [place] [address]  Dept: [phone]  Dept Fax: [phone]    DIABETIC PROGRESS NOTE  Date of patient's visit: 9/21/2022  Patient's Name:  Ravinder Cardoso YOB: 1960            Patient Care Team:  Brenda Sprague MD as PCP - General (Family Medicine)  Brenda Sprague MD as PCP - [facility]  Claudell Cable, MD as Consulting Physician (Cardiology)  Frida Brar OD (Optometry)  Gris Gomez MD as Consulting Physician (Nephrology)  Eddie Quick MD as Consulting Physician (Cardiology)  Sharlene Barba DPM as Physician ([facility])  Zaina Nichols MD as Consulting Physician (Urology)          Chief Complaint   Patient presents with    Diabetes     Diabetic foot care    Peripheral Neuropathy     Bilateral feet       Subjective:   Ravinder Cardoso comes to clinic for Diabetes (Diabetic foot care) and Peripheral Neuropathy (Bilateral feet)    he is a diabetic and states that he is doing well. Pt currently has complaint of thickened, elongated nails that they cannot manage by themselves. Pt's primary care physician is Brenda Sprague MD last seen 09/06/2022   Pt's last blood sugar was 111 this morning. Pt has a new complaint of increased swelling to naomi LE. Pt has tried changing shoes but it has not helped the pain       Lab Results   Component Value Date    LABA1C 7.3 (H) 06/14/2022      Complains of numbness in the feet bilat.   Past Medical History:   Diagnosis Date    Acute heart failure ([address] 5/20/2018    Anemia     Benign hypertension with CKD (chronic kidney disease), stage II 7/24/2017    CAD (coronary artery disease)     Cardiomyopathy (HCC)     Chronic diastolic CHF (congestive heart failure) ([address] 7/23/2018    Chronic kidney disease     Coronary artery disease involving native coronary artery of native heart without angina pectoris 11/19/2016 Diabetic nephropathy associated with type 2 diabetes mellitus ([facility] 1/12/2016    Diabetic polyneuropathy associated with type 2 diabetes mellitus ([facility]     DM (diabetes mellitus), type 2 ([facility]     for 15-20 yrs    Erectile dysfunction     Grief 7/24/2017    HTN (hypertension)     Hypertensive urgency     Lipidemia     Mild episode of recurrent major depressive disorder ([facility] 1/13/2019    Obesity     Obesity (BMI 30-39.9) 11/19/2016    MORENA on CPAP 2/4/2013    Sleep apnea 2/4/2013    Slow transit constipation     Uncontrolled type 2 diabetes mellitus, without long-term current use of insulin ([facility] 10/13/2016       Allergies   Allergen Reactions    Lisinopril      Patient is on Aldactone     Current Outpatient Medications on File Prior to Visit   Medication Sig Dispense Refill    tamsulosin (FLOMAX) 0.4 MG capsule Take 1 capsule by mouth once daily 30 capsule 0    Clotrimazole 1 % OINT Apply on the penis on the gland twice a day for 10 days 56.7 g 0    nystatin (MYCOSTATIN) 522074 UNIT/GM powder Apply 1-2 times daily in the inguinal /groin skin folds long-term 60 g 5    atorvastatin (LIPITOR) 40 MG tablet Take 1 tablet by mouth Daily with supper 90 tablet 3    aspirin (EQ ASPIRIN ADULT LOW DOSE) 81 MG EC tablet Take 1 tablet by mouth once daily 90 tablet 3    SITagliptin (JANUVIA) 100 MG tablet Take 1 tablet by mouth in the morning. Dose increased 6/1/2021. 90 tablet 3    spironolactone (ALDACTONE) 25 MG tablet Take 1 tablet by mouth every morning BP and CHF 90 tablet 3    amLODIPine (NORVASC) 10 MG tablet Take 1 tablet by mouth once daily 90 tablet 3    carvedilol (COREG) 25 MG tablet TAKE 1 TABLET BY MOUTH TWICE DAILY WITH MEALS 180 tablet 11    Handicap Placard MISC by Does not apply route Can't walk greater than 200 feet. Expires in 5 years.  1 each 0    Diabetic Shoe MISC by Does not apply route Needs diabetic shoes 1 each 0    hydrALAZINE (APRESOLINE) 50 MG tablet TAKE 1 TABLET BY MOUTH THREE TIMES DAILY 270 tablet 3    dapagliflozin (FARXIGA) 10 MG tablet Take 1 tablet by mouth every morning For diabetes, per your insurance 30 tablet 11    allopurinol (ZYLOPRIM) 100 MG tablet Take 1 tablet by mouth daily FOR GOUT. STOP IT IF ANY RASH DEVELOPS! 90 tablet 3    furosemide (LASIX) 40 MG tablet Take 1 tablet by mouth daily Stop chlorthalidone and Metformin due to leg swelling 90 tablet 3    Urea (CARMOL) 40 % cream Apply to affected area once daily 1 each 0    Compression Stockings MISC 20-30 mm/hg 2 each 0    Misc. Devices MISC 1 PAIR OF DIABETIC SHOES (1 LEFT/ 1 RIGHT)  1-3 PAIRS OF INSERTS (LEFT/ RIGHT) 2 each 0    Lancets 30G MISC Testing once a day. Please dispense according to patients insurance. 100 each 3    blood glucose monitor strips Testing once a day. Please dispense according to patients insurance. 100 strip 3    glucose monitoring (FREESTYLE FREEDOM) kit Please supply Patient with a glucose monitoring kit that insurance will cover. 1 kit 0    Alcohol Swabs PADS Please dispense according to patients insurance/device. Testing once a day 100 each 3    Multiple Vitamins-Minerals (MULTIVITAMIN-MINERALS) TABS tablet Take 1 tablet by mouth daily 90 tablet 3    acetaminophen (EQ ACETAMINOPHEN) 500 MG tablet Take 1 tablet by mouth every 6 hours as needed for Pain 120 tablet 3    ketoconazole (NIZORAL) 2 % cream Apply on the feet, not between the toes, twice a day 1 Tube 1    lidocaine (LMX) 4 % cream Apply 2-3 times a day as needed for pain 120 g 3    Blood Pressure KIT Diagnosis: HTN. Needs to check blood pressure 1-2 times a day until stable, then once a day. Goal blood pressure less than 135/85, and above 110/60. 1 kit 0    nitroGLYCERIN (NITROSTAT) 0.4 MG SL tablet DISSOLVE ONE TABLET UNDER THE TONGUE EVERY 5 MINUTES AS NEEDED FOR CHEST PAIN. DO NOT EXCEED A TOTAL OF 3 DOSES IN 15 MINUTES 25 tablet 0     No current facility-administered medications on file prior to visit.        Review of Systems    Review of Systems:   History obtained from chart review and the patient  General ROS: negative for - chills, fatigue, fever, night sweats or weight gain  Constitutional: Negative for chills, diaphoresis, fatigue, fever and unexpected weight change. Musculoskeletal: Positive for arthralgias, gait problem and joint swelling. Neurological ROS: negative for - behavioral changes, confusion, headaches or seizures. Negative for weakness and numbness. Dermatological ROS: negative for - mole changes, rash  Cardiovascular: Negative for leg swelling. Gastrointestinal: Negative for constipation, diarrhea, nausea and vomiting. Objective:  Dermatologic Exam:  Skin lesion/ulceration Absent . Skin No rashes or nodules noted. .   Skin is thin, with flaky sloughing skin as well as decreased hair growth to the lower leg  Small red hemosiderin deposits seen dorsal foot   Musculoskeletal:     1st MPJ ROM decreased, Bilateral.  Muscle strength 5/5, Bilateral.  Pain present upon palpation of toenails 1-5, Bilateral. decreased medial longitudinal arch, Bilateral.  Ankle ROM decreased,Bilateral.    Dorsally contracted digits present digits 2, Bilateral.     Vascular: DP pulses 1/4 bilateral.  PT pulses 0/4 bilateral.   CFT <5 seconds, Bilateral.  Hair growth absent to the level of the digits, Bilateral.  Edema present, Bilateral.  Varicosities absent, Bilateral. Erythema absent, Bilateral    Neurological: Sensation diminshed to light touch to level of digits, Bilateral.  Protective sensation intact 6/10 sites via 5.07/10g [place] Monofilament, Bilateral.  negative Tinel's, Bilateral.  negative Valleix sign, Bilateral.      Integument: Warm, dry, supple, Bilateral.  Open lesion absent, Bilateral.  Interdigital maceration absent to web spaces 4, Bilateral.  Nails 1-5 left and 1-5 right thickened > 3.0 mm, dystrophic and crumbly, discolored with subungual debris. Fissures absent, Bilateral.   General: AAO x 3 in NAD. Derm  Toenail Description  Sites of Onychomycosis Involvement (Check affected area)  [x] [x] [x] [x] [x] [x] [x] [x] [x] [x]  5 4 3 2 1 1 2 3 4 5                          Right                                        Left    Thickness  [x] [x] [x] [x] [x] [x] [x] [x] [x] [x]  5 4 3 2 1 1 2 3 4 5                         Right                                        Left    Dystrophic Changes   [x] [x] [x] [x] [x] [x] [x] [x] [x] [x]  5 4 3 2 1 1 2 3 4 5                         Right                                        Left    Color   [x] [x] [x] [x] [x] [x] [x] [x] [x] [x]  5 4 3 2 1 1 2 3 4 5                          Right                                        Left    Incurvation/Ingrowin   [] [] [] [] [] [] [] [] [] []  5 4 3 2 1 1 2 3 4 5                         Right                                        Left    Inflammation/Pain   [x] [x] [x] [x] [x] [x] [x] [x] [x] [x]  5 4 3 2 1 1 2 3 4 5                         Right                                        Left        Visual inspection:  Deformity: hammertoe deformity naomi feet  amputation: absent  Skin lesions: absent  Edema: right- 2+ pitting edema, left- 2+ pitting edema    Sensory exam:  Monofilament sensation: abnormal - 6/10 via SW 5.07/10g monofilament to the plantar foot bilateral feet    Pulses: abnormal - 1/4 dorsalis pedis pulse and 0/4 Posterior tibial pulse,   Pinprick: Impaired  Proprioception: Impaired  Vibration (128 Hz): Impaired       DM with PVD       [x]Yes    []No      Assessment:  62 y.o. male with:   Diagnosis Orders   1. Dermatophytosis of nail  [facility] DIABETES FOOT EXAM    12074 - [place] DEBRIDEMENT OF NAILS, 6 OR MORE      2. Other diabetic neurological complication associated with type 2 diabetes mellitus (HCC)  [facility] DIABETES FOOT EXAM    72924 - [place] DEBRIDEMENT OF NAILS, 6 OR MORE      3. Peripheral vascular disorder (HCC)  [facility] DIABETES FOOT EXAM    02609 - [place] DEBRIDEMENT OF NAILS, 6 OR MORE      4.  Lymphedema  [facility] DIABETES FOOT EXAM 88446 - [place] DEBRIDEMENT OF NAILS, 6 OR MORE                Q7   []Yes    []No                Q8   [x]Yes    []No                     Q9   []Yes    []No    Plan:   Pt was evaluated and examined. Patient was given personalized discharge instructions. To address new complaint of increased swelling, recommend patient to wear compression stockings and instructed pt to wear at all times when walking. Pt may take NSAIDs as needed. Nails 1-10 were debrided sharply in length and thickness with a nipper and , without incident. Pt will follow up in 9 weeks or sooner if any problems arise. Diagnosis was discussed with the pt and all of their questions were answered in detail. Proper foot hygiene and care was discussed with the pt. Informed patient on proper diabetic foot care and importance of tight glycemic control. Patient to check feet daily and contact the office with any questions/problems/concerns.    Other comorbidity noted and will be managed by PCP.  9/21/2022    Electronically signed by Oscar Hernadez DPM on 9/21/2022 at 8:34 AM  9/21/2022

## 2022-09-23 ENCOUNTER — OFFICE VISIT (OUTPATIENT)
Dept: UROLOGY | Age: 62
End: 2022-09-23
Payer: COMMERCIAL

## 2022-09-23 VITALS
WEIGHT: 228 LBS | DIASTOLIC BLOOD PRESSURE: 82 MMHG | HEART RATE: 55 BPM | BODY MASS INDEX: 30.22 KG/M2 | HEIGHT: 73 IN | SYSTOLIC BLOOD PRESSURE: 150 MMHG

## 2022-09-23 DIAGNOSIS — N52.01 ERECTILE DYSFUNCTION DUE TO ARTERIAL INSUFFICIENCY: ICD-10-CM

## 2022-09-23 DIAGNOSIS — N40.1 BENIGN LOCALIZED PROSTATIC HYPERPLASIA WITH LOWER URINARY TRACT SYMPTOMS (LUTS): Primary | ICD-10-CM

## 2022-09-23 DIAGNOSIS — Z12.5 PROSTATE CANCER SCREENING: ICD-10-CM

## 2022-09-23 PROCEDURE — 3017F COLORECTAL CA SCREEN DOC REV: CPT | Performed by: UROLOGY

## 2022-09-23 PROCEDURE — G8427 DOCREV CUR MEDS BY ELIG CLIN: HCPCS | Performed by: UROLOGY

## 2022-09-23 PROCEDURE — G8417 CALC BMI ABV UP PARAM F/U: HCPCS | Performed by: UROLOGY

## 2022-09-23 PROCEDURE — 99214 OFFICE O/P EST MOD 30 MIN: CPT | Performed by: UROLOGY

## 2022-09-23 PROCEDURE — 1036F TOBACCO NON-USER: CPT | Performed by: UROLOGY

## 2022-09-23 RX ORDER — SILDENAFIL CITRATE 20 MG/1
20 TABLET ORAL DAILY PRN
Qty: 90 TABLET | Refills: 1 | Status: SHIPPED | OUTPATIENT
Start: 2022-09-23

## 2022-09-23 RX ORDER — SILDENAFIL 100 MG/1
100 TABLET, FILM COATED ORAL DAILY PRN
Qty: 30 TABLET | Refills: 2 | Status: SHIPPED | OUTPATIENT
Start: 2022-09-23

## 2022-09-23 RX ORDER — TAMSULOSIN HYDROCHLORIDE 0.4 MG/1
0.4 CAPSULE ORAL DAILY
Qty: 90 CAPSULE | Refills: 1 | Status: SHIPPED | OUTPATIENT
Start: 2022-09-23

## 2022-09-23 NOTE — PROGRESS NOTES
each 0    Lancets 30G MISC Testing once a day. Please dispense according to patients insurance. 100 each 3    blood glucose monitor strips Testing once a day. Please dispense according to patients insurance. 100 strip 3    glucose monitoring (FREESTYLE FREEDOM) kit Please supply Patient with a glucose monitoring kit that insurance will cover. 1 kit 0    Alcohol Swabs PADS Please dispense according to patients insurance/device. Testing once a day 100 each 3    Multiple Vitamins-Minerals (MULTIVITAMIN-MINERALS) TABS tablet Take 1 tablet by mouth daily 90 tablet 3    acetaminophen (EQ ACETAMINOPHEN) 500 MG tablet Take 1 tablet by mouth every 6 hours as needed for Pain 120 tablet 3    ketoconazole (NIZORAL) 2 % cream Apply on the feet, not between the toes, twice a day 1 Tube 1    lidocaine (LMX) 4 % cream Apply 2-3 times a day as needed for pain 120 g 3    Blood Pressure KIT Diagnosis: HTN. Needs to check blood pressure 1-2 times a day until stable, then once a day. Goal blood pressure less than 135/85, and above 110/60. 1 kit 0    nitroGLYCERIN (NITROSTAT) 0.4 MG SL tablet DISSOLVE ONE TABLET UNDER THE TONGUE EVERY 5 MINUTES AS NEEDED FOR CHEST PAIN.   DO NOT EXCEED A TOTAL OF 3 DOSES IN 15 MINUTES 25 tablet 0       Lisinopril  Social History     Tobacco Use   Smoking Status Former    Packs/day: 0.50    Years: 2.00    Pack years: 1.00    Types: Cigarettes    Quit date: 1981    Years since quittin.7   Smokeless Tobacco Former       Social History     Substance and Sexual Activity   Alcohol Use Yes    Alcohol/week: 1.0 - 2.0 standard drink    Types: 1 - 2 Cans of beer per week    Comment: 1-2 cans a month       REVIEW OF SYSTEMS:  Constitutional: negative  Eyes: negative  Respiratory: negative  Cardiovascular: negative  Gastrointestinal: negative  Musculoskeletal: negative  Genitourinary: negative except for what is in HPI  Skin: negative   Neurological: negative  Hematological/Lymphatic:

## 2022-11-20 ASSESSMENT — ENCOUNTER SYMPTOMS
ABDOMINAL PAIN: 0
WHEEZING: 0
CONSTIPATION: 0
DIARRHEA: 0
VOMITING: 0
NAUSEA: 0
COUGH: 0
CHEST TIGHTNESS: 0
ABDOMINAL DISTENTION: 0
SHORTNESS OF BREATH: 0

## 2022-11-20 NOTE — PROGRESS NOTES
[facility] PHYSICIANS  [facility]  [address] [facility]  [address] [facility] [address]  Dept: [phone] [facility] (:  1960) is a 62 y.o. male. Patient is here for evaluation of the following chief complaint(s):  Chief Complaint   Patient presents with    Diabetes     Follow Up      SUBJECTIVE/OBJECTIVE:  HPI  Wendi Cervantes is a 62 y.o. male patient. Patient is an established patient of Dr. Stefani Silva . Patient has a known history of Congestive Heart Failure, ckd, DIABETES MELLITUS, Hypertension , gout, Hyperlipidemia , marijuana use, depression, obesity,    HISTORY OF DRINKING   1-2 months Drinking punch with liquor one day. Cause some gout exacerbation- stopped cold turkey. Have not drank for a few months    CHF/ EDEMA / CAD  Wendi Cervantes is a 62 y.o. male with a known history of congestive heart failure. Current symptoms include: exertional chest pressure/discomfort and peripheral edema. Improved after he restarted all of his medications which were all refilled on his previous visit. Arthur Tee He denies chest pain, dyspnea, irregular heart beat, and palpitations. The symptoms are aggravated by  exertion, and relieved by rest, . Patient's current treatment includes :  NTG, Spironolactone and lasix. Patient is compliant all of the time with his therapy/medications. He states he is compliant with low salt diet. .Dr Iftikhar Durham seen last month    Lab Results   Component Value Date    LVEF 30 2019    LVEFMODE Echo 2019   . Lab Results   Component Value Date/Time    PROBNP 80 07/15/2022 09:17 AM    PROBNP 137 2022 03:33 PM     BP Readings from Last 3 Encounters:   22 130/77   22 (!) 150/82   07/15/22 120/60     DIABETES MELLITUS/ NEUROPATHY--improved after restarting Farxiga  Patient has a  stable Diabetes Mellitus. Current therapy includes [place]- farxiga-. Patient is responding well with this therapy.  Patient reports home glucose monitoring as Stable readings. Patient denieshypoglycemia episodes such as{symptoms. Patient denies neuropathy. Patient reported some neuropathy on both of the feet worse on the right due to gout. Patient discussed the possibility of starting gabapentin however, since patient admitted to using marijuana patient is advised to either stop using marijuana or provide a legal documentation of the use. Eye Exam: due  Foot Exam:due  Lab Results   Component Value Date/Time    LABA1C 7.0 11/22/2022 03:32 PM    LABA1C 7.3 (H) 06/14/2022 03:33 PM      CHRONIC KIDNEY DISEASE recently seen specialist  B[place] has a stage 2 CKD. Patient is under the care of DR. Paola Cash- September doing well 6 months to a year. Lab Results   Component Value Date/Time    K 3.8 07/15/2022 09:17 AM    BUN 28 (H) 07/15/2022 09:17 AM    CREATININE 1.41 (H) 07/15/2022 09:17 AM    LABGLOM 51 (L) 07/15/2022 09:17 AM    GFRAA >60 07/15/2022 09:17 AM      HYPERTENSION/ CKD--improved blood pressure  Freddy Murphy has a well controlled hypertension. he is currently on hydralazine, Coreg, Amlodipine, . Patient's most recent BP in the office was stable. he reports stable BP readings at home. Patient denies any adverse reaction to this therapy. he denies any CP, SOB, HA, or palpitations. Patient admits to exercising and adheres to low salt diet. No history of organ damage due to condition noted. Lab Results   Component Value Date/Time    CREATININE 1.41 (H) 07/15/2022 09:17 AM     BP Readings from Last 3 Encounters:   11/22/22 130/77   09/23/22 (!) 150/82   07/15/22 120/60      HYPERLIPIDEMIA  Endy Caicedo is currently on atorvastatin (Lipitor). Patient denies adverse reaction to this medication. Compliance with treatment thus far has been good. The patient is known to have coexisting coronary artery disease. The CVD Risk score (DAY'Agostino, et al., 2008) failed to calculate for the following reasons:     The patient has a prior MI, stroke, CHF, or peripheral vascular disease diagnosis  Lab Results   Component Value Date/Time    CHOLFAST 136 06/14/2022 03:33 PM    CHOL 128 06/01/2021 08:52 AM    CHOL 177 01/16/2019 12:00 AM    HDL 49 06/14/2022 03:33 PM    LDLCHOLESTEROL 69 06/14/2022 03:33 PM    TRIGLYCFAST 92 06/14/2022 03:33 PM    CHOLHDLRATIO 2.8 06/14/2022 03:33 PM    TRIG 131 06/01/2021 08:52 AM    VLDL NOT REPORTED 06/01/2021 08:52 AM     GOUT  Patient with known gout. Stopped taking his allopurinol. Right foot pain. Smoke weed. Two exacerbations. Lab Results   Component Value Date/Time    URICACID 6.4 07/15/2022 09:17 AM    URICACID 4.8 06/14/2022 03:33 PM      HIP ARTHRITIS  Errol Rose is a 62 y.o. male patient complain of right hip pain that's been going on for a few weeks. He's had issues in the past. Patient describes the pain as achy, gets exacerbated with activities especially walking. Uses a cane with ambulation. MARIJUANA  Errol Rose is a 62 y.o. male patient. Admits to using. Unable to gabapentin due to neuropathy. DEPRESSION SCREENING: Negative  PHQ Scores 7/15/2022 6/14/2022 1/21/2022 6/1/2021 1/12/2021 5/27/2020 4/11/2019   PHQ2 Score 0 3 0 0 0 0 0   PHQ9 Score 0 4 0 0 0 0 0     WEIGHT  Patient's BMI is Body mass index is 31.88 kg/m². kg/m2. BMI is increasing. Patient understands that this condition increases the patient's risk for chronic conditions. Wt Readings from Last 3 Encounters:   11/22/22 241 lb 9.6 oz (109.6 kg)   09/23/22 228 lb (103.4 kg)   09/21/22 228 lb (103.4 kg)     VITAL SIGNS:  Vitals:    11/22/22 1504   BP: 130/77   Pulse: 51   Temp: 97.6 °F (36.4 °C)   TempSrc: Infrared   SpO2: 98%   Weight: 241 lb 9.6 oz (109.6 kg)   Height: 6' 1\" (1.854 m)     Estimated body mass index is 31.88 kg/m² as calculated from the following:    Height as of this encounter: 6' 1\" (1.854 m). Weight as of this encounter: 241 lb 9.6 oz (109.6 kg). Review of Systems   Constitutional:  Positive for activity change and fatigue.  Negative for chills and unexpected weight change. Respiratory:  Negative for cough, chest tightness, shortness of breath and wheezing. Cardiovascular:  Positive for leg swelling. Negative for chest pain and palpitations. Gastrointestinal:  Negative for abdominal distention, abdominal pain, constipation, diarrhea, nausea and vomiting. Genitourinary:         Erectile Dysfunction     Musculoskeletal:  Positive for arthralgias (right foot), back pain, gait problem, joint swelling (right foot) and myalgias. Bilateral hip pain bilateral leg pain bilateral foot pain worse on right   Allergic/Immunologic: Positive for immunocompromised state (due to uncontrolled diabetes). Neurological:  Positive for numbness (feet, worsening). Negative for weakness. Psychiatric/Behavioral:  Positive for sleep disturbance. Negative for dysphoric mood and suicidal ideas. The patient is nervous/anxious. Physical Exam  Vitals and nursing note reviewed. HENT:      Head: Normocephalic. Nose: Nose normal.   Cardiovascular:      Rate and Rhythm: Normal rate and regular rhythm. Heart sounds: Heart sounds are distant. Pulmonary:      Effort: Pulmonary effort is normal.   Abdominal:      General: Abdomen is protuberant. There is no distension. Tenderness: There is no abdominal tenderness. Musculoskeletal:      Right hip: Tenderness and bony tenderness present. No crepitus. Decreased range of motion. Decreased strength. Right lower leg: 3+ Edema present. Left lower le+ Edema present. Skin:     General: Skin is warm and dry. Neurological:      Mental Status: He is alert. Psychiatric:         Mood and Affect: Mood is anxious and elated. Thought Content: Thought content does not include suicidal ideation.        MEDICAL HISTORY      Diagnosis Date    Acute heart failure ([facility] [address] 2018    Anemia     Benign hypertension with CKD (chronic kidney disease), stage II 2017    CAD (coronary artery disease)     Cardiomyopathy ([facility]     Chronic diastolic CHF (congestive heart failure) ([facility] 7/23/2018    Chronic kidney disease     Coronary artery disease involving native coronary artery of native heart without angina pectoris 11/19/2016    Diabetic nephropathy associated with type 2 diabetes mellitus ([facility] 1/12/2016    Diabetic polyneuropathy associated with type 2 diabetes mellitus ([facility]     DM (diabetes mellitus), type 2 ([facility]     for 15-20 yrs    Erectile dysfunction     Grief 7/24/2017    HTN (hypertension)     Hypertensive urgency     Lipidemia     Mild episode of recurrent major depressive disorder ([facility] 1/13/2019    Obesity     Obesity (BMI 30-39.9) 11/19/2016    MORENA on CPAP 2/4/2013    Sleep apnea 2/4/2013    Slow transit constipation     Uncontrolled type 2 diabetes mellitus, without long-term current use of insulin 10/13/2016          Controlled Substance Monitoring:  Acute and Chronic Pain Monitoring:   RX Monitoring 11/22/2022   Periodic Controlled Substance Monitoring No signs of potential drug abuse or diversion identified. ASSESSMENT/PLAN:  1. Chronic combined systolic and diastolic CHF (congestive heart failure) (HCC)  Improving  Continue with the therapy. DISCUSSED and ADVISED TO:  Weigh daily and log. Keep regular activity as tolerated. Cut down on salt intake. Keep appointment with the cardiologist.  Deborah Berman to the ER for CP and SOB not relieved with rest.    - POCT glycosylated hemoglobin (Hb A1C)    2. Coronary artery disease involving native coronary artery of native heart without angina pectoris  Improving  Continue current therapy  DISCUSSED and ADVISED TO:  Discussed serious causes of CP. Advised to go to the ER for worsening CP/SOB           3. Type 2 diabetes mellitus with hyperglycemia, without long-term current use of insulin (HCC)  Improving  Continue therapy.   We will continue to monitor Hemoglobin A1c   DISCUSSED and ADVISED TO:  Continue to check Glucose levels at home.  Report increased and low levels as discussed. Decrease carbohydrates, sugary drinks, desserts in your diet. Exercise regularly, as tolerated. Try to lose weight.    - POCT glycosylated hemoglobin (Hb A1C)    4. Hyperlipidemia with target LDL less than 70  Stable  Continue therapy. Advised to decrease the consumption of red meats, fried foods, trans fats, sweets, sugary beverages. Advised to increase fish, vegetables, and fruits consumption. Advised to add fiber or OTC supplements in diet. Discussed weight loss which will result in improvement of lipids levels. Advised to increase daily physical activities and add regular exercises. 5. Diabetic nephropathy associated with type 2 diabetes mellitus ([facility] [address]  Stable  Continue current therapy  Encouraged to maintain glucose levels  Encouraged to wear shoes all the time  Yearly foot exam      6. 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 DISCUSSED AND ADVISED TO:  Limit or avoid Foods High In Purine- list given  Such as Beer and other alcoholic beverages. Also gravies and sauces made with meat. Anchovies, sardines, caviar, herring, mussels, tuna, Codfish, Trout, [address], [place],   [employer], (such as liver or kidneys), tripe, sweet bread, wild game, goose,   High fructose corn syrup in foods and drinks      7. Marijuana use  Failure to Improve  Ongoing  Discussed treatment/cessation and manage underlying problems. Discussed to take control of life appropriately and not depend on drugs. Counseling often is part of treatment. - Patient declined. 8. Class 1 obesity due to excess calories with serious comorbidity and body mass index (BMI) of 31.0 to 31.9 in adult  Failure to Improve  BMI increasing  DISCUSSED AND ADVISED TO:  Eat a low-fat and low carbohydrates diet. Avoid fried foods especially fast food. Choose healthier options for snacks.   Have 5-6 servings of fruits and vegetables per day. Cut down on eating processed food. Add 30 minutes to 1 hour aerobic exercise for 3-4 days a week. 9. Need for influenza vaccination  Stable  Recommended by CDC. No current infection. Denies previous adverse reaction to vaccination.    - Influenza, FLUCELVAX, (age 10 mo+), IM, PF, 0.5 mL    On this date 11/22/2022 I have spent 35 minutes reviewing previous notes, test results and face to face with the patient discussing the diagnosis and importance of compliance with the treatment plan as well as documenting on the day of the visit. Return in about 3 months (around 2/22/2023) for Chronic conditions, 30mins, Appt w/ Dr. Eduardo Branch. This note was completed by using the assistance of a speech-recognition program. However, inadvertent computerized transcription errors may be present. Although every effort was made to ensure accuracy, no guarantees can be provided that every mistake has been identified and corrected by editing.   Electronically signed by VENTURA Friend CNP on 6/14/22 at 1:01 PM EDT     --VENTURA Friend CNP

## 2022-11-22 ENCOUNTER — OFFICE VISIT (OUTPATIENT)
Dept: FAMILY MEDICINE CLINIC | Age: 62
End: 2022-11-22
Payer: COMMERCIAL

## 2022-11-22 VITALS
HEART RATE: 51 BPM | SYSTOLIC BLOOD PRESSURE: 130 MMHG | DIASTOLIC BLOOD PRESSURE: 77 MMHG | HEIGHT: 73 IN | OXYGEN SATURATION: 98 % | BODY MASS INDEX: 32.02 KG/M2 | TEMPERATURE: 97.6 F | WEIGHT: 241.6 LBS

## 2022-11-22 DIAGNOSIS — E66.09 CLASS 1 OBESITY DUE TO EXCESS CALORIES WITH SERIOUS COMORBIDITY AND BODY MASS INDEX (BMI) OF 31.0 TO 31.9 IN ADULT: ICD-10-CM

## 2022-11-22 DIAGNOSIS — E11.65 TYPE 2 DIABETES MELLITUS WITH HYPERGLYCEMIA, WITHOUT LONG-TERM CURRENT USE OF INSULIN (HCC): ICD-10-CM

## 2022-11-22 DIAGNOSIS — F12.90 MARIJUANA USE: ICD-10-CM

## 2022-11-22 DIAGNOSIS — Z23 NEED FOR INFLUENZA VACCINATION: ICD-10-CM

## 2022-11-22 DIAGNOSIS — M1A.3710 CHRONIC GOUT OF RIGHT FOOT DUE TO RENAL IMPAIRMENT WITHOUT TOPHUS: ICD-10-CM

## 2022-11-22 DIAGNOSIS — I25.10 CORONARY ARTERY DISEASE INVOLVING NATIVE CORONARY ARTERY OF NATIVE HEART WITHOUT ANGINA PECTORIS: ICD-10-CM

## 2022-11-22 DIAGNOSIS — E78.5 HYPERLIPIDEMIA WITH TARGET LDL LESS THAN 70: ICD-10-CM

## 2022-11-22 DIAGNOSIS — I50.42 CHRONIC COMBINED SYSTOLIC AND DIASTOLIC CHF (CONGESTIVE HEART FAILURE) (HCC): Primary | ICD-10-CM

## 2022-11-22 DIAGNOSIS — E11.21 DIABETIC NEPHROPATHY ASSOCIATED WITH TYPE 2 DIABETES MELLITUS (HCC): ICD-10-CM

## 2022-11-22 LAB — HBA1C MFR BLD: 7 %

## 2022-11-22 PROCEDURE — 90674 CCIIV4 VAC NO PRSV 0.5 ML IM: CPT | Performed by: FAMILY MEDICINE

## 2022-11-22 PROCEDURE — G8482 FLU IMMUNIZE ORDER/ADMIN: HCPCS | Performed by: FAMILY MEDICINE

## 2022-11-22 PROCEDURE — G8427 DOCREV CUR MEDS BY ELIG CLIN: HCPCS | Performed by: FAMILY MEDICINE

## 2022-11-22 PROCEDURE — 90471 IMMUNIZATION ADMIN: CPT | Performed by: FAMILY MEDICINE

## 2022-11-22 PROCEDURE — 2022F DILAT RTA XM EVC RTNOPTHY: CPT | Performed by: FAMILY MEDICINE

## 2022-11-22 PROCEDURE — 83036 HEMOGLOBIN GLYCOSYLATED A1C: CPT | Performed by: FAMILY MEDICINE

## 2022-11-22 PROCEDURE — 1036F TOBACCO NON-USER: CPT | Performed by: FAMILY MEDICINE

## 2022-11-22 PROCEDURE — G8417 CALC BMI ABV UP PARAM F/U: HCPCS | Performed by: FAMILY MEDICINE

## 2022-11-22 PROCEDURE — 3017F COLORECTAL CA SCREEN DOC REV: CPT | Performed by: FAMILY MEDICINE

## 2022-11-22 PROCEDURE — 99214 OFFICE O/P EST MOD 30 MIN: CPT | Performed by: FAMILY MEDICINE

## 2022-11-22 PROCEDURE — 3051F HG A1C>EQUAL 7.0%<8.0%: CPT | Performed by: FAMILY MEDICINE

## 2022-11-22 ASSESSMENT — ENCOUNTER SYMPTOMS: BACK PAIN: 1

## 2022-11-22 NOTE — PATIENT INSTRUCTIONS
New Updates for [facility] MyChart/ [employer] ([facility]) SOHAN    Thank you for choosing US to give you the best care! [employer] ([facility]) is always trying to think of new ways to help their patients. We are asking all patients to try out the new digital registration that is now available through your [facility] account or the new SOHAN, [employer] ([facility]). Via the sohan you're now able to update your personal and registration information prior to your upcoming appointment. This will save you time once you arrive at the office to check-in, not to mention your information remains safe!! Many other perks come from signing up for an account, such as:  Requesting refills  Scheduling an appointment  Completing an E-Visit  Sending a message to the office/provider  Having access to your medication list  Paying your bill/copay prior to your appointment  Scheduling your yearly mammogram  Review your test results    If you are not familiar with [facility] or the [employer] ([facility]) SOHAN, please ask one of us and we will be happy to answer any questions or help you set-up your account.       Your [facility] office,  [place]

## 2022-11-22 NOTE — PROGRESS NOTES
Visit Information    Have you changed or started any medications since your last visit including any over-the-counter medicines, vitamins, or herbal medicines? no   Have you stopped taking any of your medications? Is so, why? -  no  Are you having any side effects from any of your medications? - no    Have you seen any other physician or provider since your last visit? yes - Urology and Cardiology  Have you had any other diagnostic tests since your last visit? yes - Stress test  Have you been seen in the emergency room and/or had an admission in a hospital since we last saw you?  no   Have you had your routine dental cleaning in the past 6 months?  no     Do you have an active MyChart account? If no, what is the barrier?   Yes    Patient Care Team:  Dawn Rolon MD as PCP - General (Family Medicine)  Dawn Rolon MD as PCP - [facility]  John Lomeli MD as Consulting Physician (Cardiology)  Ellen Sullivan OD (Optometry)  Ayah Vargas MD as Consulting Physician (Nephrology)  Veronica Lomeli MD as Consulting Physician (Cardiology)  Susann Alpers, DPM as Physician (Podiatry)  Steve Maria MD as Consulting Physician (Urology)    Medical History Review  Past Medical, Family, and Social History reviewed and does contribute to the patient presenting condition    Health Maintenance   Topic Date Due    Diabetic retinal exam  07/31/2019    COVID-19 Vaccine (2 - Pfizer series) 05/03/2021    Flu vaccine (1) 08/01/2022    Prostate Specific Antigen (PSA) Screening or Monitoring  08/27/2022    A1C test (Diabetic or Prediabetic)  09/14/2022    Colorectal Cancer Screen  01/15/2023    Lipids  06/14/2023    Diabetic microalbuminuria test  07/15/2023    Depression Monitoring  07/15/2023    Diabetic foot exam  09/27/2023    DTaP/Tdap/Td vaccine (2 - Td or Tdap) 04/03/2024    Shingles vaccine  Completed    Pneumococcal 0-64 years Vaccine  Completed    Hepatitis C screen  Completed    HIV screen

## 2022-11-30 ENCOUNTER — OFFICE VISIT (OUTPATIENT)
Dept: PODIATRY | Age: 62
End: 2022-11-30

## 2022-11-30 VITALS — WEIGHT: 228 LBS | HEIGHT: 73 IN | BODY MASS INDEX: 30.22 KG/M2

## 2022-11-30 DIAGNOSIS — I73.9 PERIPHERAL VASCULAR DISORDER (HCC): ICD-10-CM

## 2022-11-30 DIAGNOSIS — B35.1 DERMATOPHYTOSIS OF NAIL: Primary | ICD-10-CM

## 2022-11-30 DIAGNOSIS — E11.51 TYPE II DIABETES MELLITUS WITH PERIPHERAL CIRCULATORY DISORDER (HCC): ICD-10-CM

## 2022-11-30 DIAGNOSIS — I89.0 LYMPHEDEMA: ICD-10-CM

## 2022-11-30 NOTE — PROGRESS NOTES
[address] [facility],[address] PODIATRY [address] [address]  Dept: [phone]  Dept Fax: [phone]    DIABETIC PROGRESS NOTE  Date of patient's visit: 11/30/2022  Patient's Name:  Christian Ochoa YOB: 1960            Patient Care Team:  Thuy Rajput MD as PCP - General (Family Medicine)  Thuy Rajput MD as PCP - [facility] Provider  Idania Alexander MD as Consulting Physician (Cardiology)  Severo Malling, OD (Optometry)  Rajinder Sutton MD as Consulting Physician (Nephrology)  Francisco Funes MD as Consulting Physician (Cardiology)  Angel Alegria DPM as Physician (Podiatry)  Ilene Troy MD as Consulting Physician (Urology)          Chief Complaint   Patient presents with    Diabetes     Diabetic foot care    Peripheral Neuropathy     Bilateral foot       Subjective:   Christian Ochoa comes to clinic for Diabetes (Diabetic foot care) and Peripheral Neuropathy (Bilateral foot)    he is a diabetic and states that needs toenails trimmed today. Pt currently has complaint of thickened, elongated nails that they cannot manage by themselves. Pt's primary care physician is Thuy Rajput MD last seen 11/22/22   Pt's last blood sugar/ a1c was 7.0-11/22/22. Pt has a new complaint of increased swelling to naomi LE. Lab Results   Component Value Date    LABA1C 7.0 11/22/2022      Complains of numbness in the feet bilat.   Past Medical History:   Diagnosis Date    Acute heart failure ([address] 5/20/2018    Anemia     Benign hypertension with CKD (chronic kidney disease), stage II 7/24/2017    CAD (coronary artery disease)     Cardiomyopathy (HCC)     Chronic diastolic CHF (congestive heart failure) ([address] 7/23/2018    Chronic kidney disease     Coronary artery disease involving native coronary artery of native heart without angina pectoris 11/19/2016    Diabetic nephropathy associated with type 2 diabetes mellitus ([facility] 1/12/2016    Diabetic polyneuropathy associated with type 2 diabetes mellitus ([facility]     DM (diabetes mellitus), type 2 ([facility]     for 15-20 yrs    Erectile dysfunction     Grief 7/24/2017    HTN (hypertension)     Hypertensive urgency     Lipidemia     Mild episode of recurrent major depressive disorder ([facility] 1/13/2019    Obesity     Obesity (BMI 30-39.9) 11/19/2016    MORENA on CPAP 2/4/2013    Sleep apnea 2/4/2013    Slow transit constipation     Uncontrolled type 2 diabetes mellitus, without long-term current use of insulin 10/13/2016       Allergies   Allergen Reactions    Lisinopril      Patient is on Aldactone     Current Outpatient Medications on File Prior to Visit   Medication Sig Dispense Refill    tamsulosin (FLOMAX) 0.4 MG capsule Take 1 capsule by mouth daily 90 capsule 1    sildenafil (REVATIO) 20 MG tablet Take 1 tablet by mouth daily as needed (erectile dysfunction) 90 tablet 1    sildenafil (VIAGRA) 100 MG tablet Take 1 tablet by mouth daily as needed for Erectile Dysfunction 30 tablet 2    tamsulosin (FLOMAX) 0.4 MG capsule Take 1 capsule by mouth once daily 30 capsule 0    Clotrimazole 1 % OINT Apply on the penis on the gland twice a day for 10 days 56.7 g 0    nystatin (MYCOSTATIN) 103202 UNIT/GM powder Apply 1-2 times daily in the inguinal /groin skin folds long-term 60 g 5    atorvastatin (LIPITOR) 40 MG tablet Take 1 tablet by mouth Daily with supper 90 tablet 3    aspirin (EQ ASPIRIN ADULT LOW DOSE) 81 MG EC tablet Take 1 tablet by mouth once daily 90 tablet 3    SITagliptin (JANUVIA) 100 MG tablet Take 1 tablet by mouth in the morning.  Dose increased 6/1/2021. 90 tablet 3    spironolactone (ALDACTONE) 25 MG tablet Take 1 tablet by mouth every morning BP and CHF 90 tablet 3    amLODIPine (NORVASC) 10 MG tablet Take 1 tablet by mouth once daily 90 tablet 3    carvedilol (COREG) 25 MG tablet TAKE 1 TABLET BY MOUTH TWICE DAILY WITH MEALS 180 tablet 11    Handicap Placard MISC by Does not apply route Can't walk greater than 200 feet. Expires in 5 years. 1 each 0    Diabetic Shoe MISC by Does not apply route Needs diabetic shoes 1 each 0    hydrALAZINE (APRESOLINE) 50 MG tablet TAKE 1 TABLET BY MOUTH THREE TIMES DAILY 270 tablet 3    dapagliflozin (FARXIGA) 10 MG tablet Take 1 tablet by mouth every morning For diabetes, per your insurance 30 tablet 11    allopurinol (ZYLOPRIM) 100 MG tablet Take 1 tablet by mouth daily FOR GOUT. STOP IT IF ANY RASH DEVELOPS! 90 tablet 3    furosemide (LASIX) 40 MG tablet Take 1 tablet by mouth daily Stop chlorthalidone and Metformin due to leg swelling 90 tablet 3    Urea (CARMOL) 40 % cream Apply to affected area once daily 1 each 0    Compression Stockings MISC 20-30 mm/hg 2 each 0    Misc. Devices MISC 1 PAIR OF DIABETIC SHOES (1 LEFT/ 1 RIGHT)  1-3 PAIRS OF INSERTS (LEFT/ RIGHT) 2 each 0    Lancets 30G MISC Testing once a day. Please dispense according to patients insurance. 100 each 3    blood glucose monitor strips Testing once a day. Please dispense according to patients insurance. 100 strip 3    glucose monitoring (FREESTYLE FREEDOM) kit Please supply Patient with a glucose monitoring kit that insurance will cover. 1 kit 0    Alcohol Swabs PADS Please dispense according to patients insurance/device. Testing once a day 100 each 3    Multiple Vitamins-Minerals (MULTIVITAMIN-MINERALS) TABS tablet Take 1 tablet by mouth daily 90 tablet 3    acetaminophen (EQ ACETAMINOPHEN) 500 MG tablet Take 1 tablet by mouth every 6 hours as needed for Pain 120 tablet 3    ketoconazole (NIZORAL) 2 % cream Apply on the feet, not between the toes, twice a day 1 Tube 1    lidocaine (LMX) 4 % cream Apply 2-3 times a day as needed for pain 120 g 3    Blood Pressure KIT Diagnosis: HTN. Needs to check blood pressure 1-2 times a day until stable, then once a day.  Goal blood pressure less than 135/85, and above 110/60. 1 kit 0    nitroGLYCERIN (NITROSTAT) 0.4 MG SL tablet DISSOLVE ONE TABLET UNDER THE TONGUE EVERY 5 MINUTES AS NEEDED FOR CHEST PAIN. DO NOT EXCEED A TOTAL OF 3 DOSES IN 15 MINUTES 25 tablet 0     No current facility-administered medications on file prior to visit. Review of Systems    Review of Systems:   History obtained from chart review and the patient  General ROS: negative for - chills, fatigue, fever, night sweats or weight gain  Constitutional: Negative for chills, diaphoresis, fatigue, fever and unexpected weight change. Musculoskeletal: Positive for arthralgias, gait problem and joint swelling. Neurological ROS: negative for - behavioral changes, confusion, headaches or seizures. Negative for weakness and numbness. Dermatological ROS: negative for - mole changes, rash  Cardiovascular: Negative for leg swelling. Gastrointestinal: Negative for constipation, diarrhea, nausea and vomiting. Objective:  Dermatologic Exam:  Skin lesion/ulceration Absent . Skin No rashes or nodules noted. .   Skin is thin, with flaky sloughing skin as well as decreased hair growth to the lower leg  Small red hemosiderin deposits seen dorsal foot   Musculoskeletal:     1st MPJ ROM decreased, Bilateral.  Muscle strength 5/5, Bilateral.  Pain present upon palpation of toenails 1-5, Bilateral. decreased medial longitudinal arch, Bilateral.  Ankle ROM decreased,Bilateral.    Dorsally contracted digits present digits 2, Bilateral.     Vascular: DP pulses 1/4 bilateral.  PT pulses 0/4 bilateral.   CFT <5 seconds, Bilateral.  Hair growth absent to the level of the digits, Bilateral.  Edema present, Bilateral.  Varicosities absent, Bilateral. Erythema absent, Bilateral    Neurological: Sensation diminshed to light touch to level of digits, Bilateral.  Protective sensation intact 6/10 sites via 5.07/10g [place]-Tejinder Monofilament, Bilateral.  negative Tinel's, Bilateral.  negative Valleix sign, Bilateral.      Integument: Warm, dry, supple, Bilateral.  Open lesion absent, Bilateral.  Interdigital maceration absent to web spaces 4, Bilateral.  Nails 1-5 left and 1-5 right thickened > 3.0 mm, dystrophic and crumbly, discolored with subungual debris. Fissures absent, Bilateral.   General: AAO x 3 in NAD. Derm  Toenail Description  Sites of Onychomycosis Involvement (Check affected area)  [x] [x] [x] [x] [x] [x] [x] [x] [x] [x]  5 4 3 2 1 1 2 3 4 5                          Right                                        Left    Thickness  [x] [x] [x] [x] [x] [x] [x] [x] [x] [x]  5 4 3 2 1 1 2 3 4 5                         Right                                        Left    Dystrophic Changes   [x] [x] [x] [x] [x] [x] [x] [x] [x] [x]  5 4 3 2 1 1 2 3 4 5                         Right                                        Left    Color   [x] [x] [x] [x] [x] [x] [x] [x] [x] [x]  5 4 3 2 1 1 2 3 4 5                          Right                                        Left    Incurvation/Ingrowin   [] [] [] [] [] [] [] [] [] []  5 4 3 2 1 1 2 3 4 5                         Right                                        Left    Inflammation/Pain   [x] [x] [x] [x] [x] [x] [x] [x] [x] [x]  5 4 3 2 1 1 2 3 4 5                         Right                                        Left        Visual inspection:  Deformity: hammertoe deformity naomi feet  amputation: absent  Skin lesions: absent  Edema: right- 2+ pitting edema, left- 2+ pitting edema    Sensory exam:  Monofilament sensation: abnormal - 6/10 via SW 5.07/10g monofilament to the plantar foot bilateral feet    Pulses: abnormal - 1/4 dorsalis pedis pulse and 0/4 Posterior tibial pulse,   Pinprick: Impaired  Proprioception: Impaired  Vibration (128 Hz): Impaired       DM with PVD       [x]Yes    []No      Assessment:  62 y.o. male with:   Diagnosis Orders   1. Dermatophytosis of nail  47389 - [place] DEBRIDEMENT OF NAILS, 6 OR MORE    HM DIABETES FOOT EXAM      2.  Peripheral vascular disorder (HCC)  00429 - [place] DEBRIDEMENT OF NAILS, 6 OR MORE  DIABETES FOOT EXAM      3. Lymphedema  00806 - [place] DEBRIDEMENT OF NAILS, 6 OR MORE     DIABETES FOOT EXAM      4. Type II diabetes mellitus with peripheral circulatory disorder (HCC)  92827 - [place] DEBRIDEMENT OF NAILS, 6 OR MORE     DIABETES FOOT EXAM              Q7   []Yes    []No                Q8   [x]Yes    []No                     Q9   []Yes    []No    Plan:   Pt was evaluated and examined. Patient was given personalized discharge instructions. To address new complaint of increased swelling, recommend patient to wear compression stockings and instructed pt to wear at all times when walking. Pt may take NSAIDs as needed. Nails 1-10 were debrided sharply in length and thickness with a nipper and , without incident. Pt will follow up in 9 weeks or sooner if any problems arise. Diagnosis was discussed with the pt and all of their questions were answered in detail. Proper foot hygiene and care was discussed with the pt. Informed patient on proper diabetic foot care and importance of tight glycemic control. Patient to check feet daily and contact the office with any questions/problems/concerns. Other comorbidity noted and will be managed by PCP.   11/30/2022    Electronically signed by Della Lofton DPM on 11/30/2022 at 8:50 AM  11/30/2022

## 2022-11-30 NOTE — PATIENT INSTRUCTIONS
Schedule a Vaccine  When you qualify to receive the vaccine, call the [facility]) COVID-19 Vaccination Hotline to schedule your appointment or to get additional information about the [facility]) locations which are offering the COVID-19 vaccine. To be 94% effective, it's important that you receive two doses of one of the COVID-19 vaccines. -If you are receiving the [place] vaccine, your second shot will be scheduled as close to 21 days after the first shot as possible. -If you are receiving the Moderna vaccine, your second shot will be scheduled as close to 28 days after the first shot as possible. [facility]) COVID-19 Vaccination Hotline: [phone]    Links to [facility]) website and [facility] website:    [email or web address]    [email or web address]

## 2023-01-06 ENCOUNTER — TELEPHONE (OUTPATIENT)
Dept: FAMILY MEDICINE CLINIC | Age: 63
End: 2023-01-06

## 2023-01-06 NOTE — TELEPHONE ENCOUNTER
Trim the nail, put triple antibiotic cream, if he gets infected he needs to go to an urgent care    Future Appointments   Date Time Provider Vianey Malagon   2/1/2023 11:00 AM Yoni Villalobos DPM [facility] Podiatry [facility]   3/2/2023  8:00 AM Krishna Blanco MD [facility]

## 2023-01-06 NOTE — TELEPHONE ENCOUNTER
PATIENT CALLED IN STATING HE HAS A HANG NAIL ON HIS 4TH INDEX FINGER.  HE IS ASKING WHAT IS A GOOD TREATMENT TO HELP HEAL THIS PLEASE ADVISE

## 2023-02-27 ENCOUNTER — OFFICE VISIT (OUTPATIENT)
Dept: PODIATRY | Age: 63
End: 2023-02-27
Payer: COMMERCIAL

## 2023-02-27 VITALS — HEIGHT: 73 IN | WEIGHT: 228 LBS | BODY MASS INDEX: 30.22 KG/M2

## 2023-02-27 DIAGNOSIS — E11.51 TYPE II DIABETES MELLITUS WITH PERIPHERAL CIRCULATORY DISORDER (HCC): ICD-10-CM

## 2023-02-27 DIAGNOSIS — E11.49 OTHER DIABETIC NEUROLOGICAL COMPLICATION ASSOCIATED WITH TYPE 2 DIABETES MELLITUS (HCC): ICD-10-CM

## 2023-02-27 DIAGNOSIS — I73.9 PERIPHERAL VASCULAR DISORDER (HCC): ICD-10-CM

## 2023-02-27 DIAGNOSIS — B35.1 DERMATOPHYTOSIS OF NAIL: Primary | ICD-10-CM

## 2023-02-27 PROCEDURE — 99213 OFFICE O/P EST LOW 20 MIN: CPT | Performed by: PODIATRIST

## 2023-02-27 PROCEDURE — G8417 CALC BMI ABV UP PARAM F/U: HCPCS | Performed by: PODIATRIST

## 2023-02-27 PROCEDURE — 2022F DILAT RTA XM EVC RTNOPTHY: CPT | Performed by: PODIATRIST

## 2023-02-27 PROCEDURE — 3046F HEMOGLOBIN A1C LEVEL >9.0%: CPT | Performed by: PODIATRIST

## 2023-02-27 PROCEDURE — 1036F TOBACCO NON-USER: CPT | Performed by: PODIATRIST

## 2023-02-27 PROCEDURE — G8482 FLU IMMUNIZE ORDER/ADMIN: HCPCS | Performed by: PODIATRIST

## 2023-02-27 PROCEDURE — 3017F COLORECTAL CA SCREEN DOC REV: CPT | Performed by: PODIATRIST

## 2023-02-27 PROCEDURE — G8427 DOCREV CUR MEDS BY ELIG CLIN: HCPCS | Performed by: PODIATRIST

## 2023-02-27 PROCEDURE — 11721 DEBRIDE NAIL 6 OR MORE: CPT | Performed by: PODIATRIST

## 2023-02-27 NOTE — PROGRESS NOTES
[address] [facility] PODIATRY [facility]  [address]  [address]  Dept: [phone]  Dept Fax: [phone]    DIABETIC PROGRESS NOTE  Date of patient's visit: 2/27/2023  Patient's Name:  Yoon Newell YOB: 1960            Patient Care Team:  Mika Odonnell MD as PCP - General (Family Medicine)  [facility]nell MD [facility]  Tomy Essex, MD as Consulting Physician (Cardiology)  Leena Yanes OD (Optometry)  Yan Guzman MD as Consulting Physician (Nephrology)  Mark Mauro MD as Consulting Physician (Cardiology)  Marj Andrew DPM as Physician ([facility])  Reggie Perez MD as Consulting Physician (Urology)          Chief Complaint   Patient presents with    Diabetes     Diabetic foot care    Peripheral Neuropathy     Bilateral feet       Subjective:   Yoon Newell comes to clinic for Diabetes (Diabetic foot care) and Peripheral Neuropathy (Bilateral feet)    he is a diabetic and states that he is doing well. Pt currently has complaint of thickened, elongated nails that they cannot manage by themselves. Pt's primary care physician is Mika Odonnell MD last seen 11/22/2022   Pt's last blood sugar was 135 yesterday evening. Pt has a new complaint of numbness to naomi feet. Pt has tried changing shoes but it has not helped the pain       Lab Results   Component Value Date    LABA1C 7.0 11/22/2022      Complains of numbness in the feet bilat.   Past Medical History:   Diagnosis Date    Acute heart failure ([address] 5/20/2018    Anemia     Benign hypertension with CKD (chronic kidney disease), stage II 7/24/2017    CAD (coronary artery disease)     Cardiomyopathy (HCC)     Chronic diastolic CHF (congestive heart failure) ([address] 7/23/2018    Chronic kidney disease     Coronary artery disease involving native coronary artery of native heart without angina pectoris 11/19/2016    Diabetic nephropathy associated with type 2 diabetes mellitus ([facility] 1/12/2016    Diabetic polyneuropathy associated with type 2 diabetes mellitus ([facility]     DM (diabetes mellitus), type 2 ([facility]     for 15-20 yrs    Erectile dysfunction     Grief 7/24/2017    HTN (hypertension)     Hypertensive urgency     Lipidemia     Mild episode of recurrent major depressive disorder ([facility] 1/13/2019    Obesity     Obesity (BMI 30-39.9) 11/19/2016    MROENA on CPAP 2/4/2013    Sleep apnea 2/4/2013    Slow transit constipation     Uncontrolled type 2 diabetes mellitus, without long-term current use of insulin 10/13/2016       Allergies   Allergen Reactions    Lisinopril      Patient is on Aldactone     Current Outpatient Medications on File Prior to Visit   Medication Sig Dispense Refill    tamsulosin (FLOMAX) 0.4 MG capsule Take 1 capsule by mouth daily 90 capsule 1    sildenafil (REVATIO) 20 MG tablet Take 1 tablet by mouth daily as needed (erectile dysfunction) 90 tablet 1    sildenafil (VIAGRA) 100 MG tablet Take 1 tablet by mouth daily as needed for Erectile Dysfunction 30 tablet 2    tamsulosin (FLOMAX) 0.4 MG capsule Take 1 capsule by mouth once daily 30 capsule 0    Clotrimazole 1 % OINT Apply on the penis on the gland twice a day for 10 days 56.7 g 0    nystatin (MYCOSTATIN) 950540 UNIT/GM powder Apply 1-2 times daily in the inguinal /groin skin folds long-term 60 g 5    atorvastatin (LIPITOR) 40 MG tablet Take 1 tablet by mouth Daily with supper 90 tablet 3    aspirin (EQ ASPIRIN ADULT LOW DOSE) 81 MG EC tablet Take 1 tablet by mouth once daily 90 tablet 3    SITagliptin (JANUVIA) 100 MG tablet Take 1 tablet by mouth in the morning.  Dose increased 6/1/2021. 90 tablet 3    spironolactone (ALDACTONE) 25 MG tablet Take 1 tablet by mouth every morning BP and CHF 90 tablet 3    amLODIPine (NORVASC) 10 MG tablet Take 1 tablet by mouth once daily 90 tablet 3    carvedilol (COREG) 25 MG tablet TAKE 1 TABLET BY MOUTH TWICE DAILY WITH MEALS 180 tablet 11    Handicap Placard MISC by Does not apply route Can't walk greater than 200 feet. Expires in 5 years. 1 each 0    Diabetic Shoe MISC by Does not apply route Needs diabetic shoes 1 each 0    hydrALAZINE (APRESOLINE) 50 MG tablet TAKE 1 TABLET BY MOUTH THREE TIMES DAILY 270 tablet 3    dapagliflozin (FARXIGA) 10 MG tablet Take 1 tablet by mouth every morning For diabetes, per your insurance 30 tablet 11    allopurinol (ZYLOPRIM) 100 MG tablet Take 1 tablet by mouth daily FOR GOUT. STOP IT IF ANY RASH DEVELOPS! 90 tablet 3    furosemide (LASIX) 40 MG tablet Take 1 tablet by mouth daily Stop chlorthalidone and Metformin due to leg swelling 90 tablet 3    Urea (CARMOL) 40 % cream Apply to affected area once daily 1 each 0    Compression Stockings MISC 20-30 mm/hg 2 each 0    Misc. Devices MISC 1 PAIR OF DIABETIC SHOES (1 LEFT/ 1 RIGHT)  1-3 PAIRS OF INSERTS (LEFT/ RIGHT) 2 each 0    Lancets 30G MISC Testing once a day. Please dispense according to patients insurance. 100 each 3    blood glucose monitor strips Testing once a day. Please dispense according to patients insurance. 100 strip 3    glucose monitoring (FREESTYLE FREEDOM) kit Please supply Patient with a glucose monitoring kit that insurance will cover. 1 kit 0    Alcohol Swabs PADS Please dispense according to patients insurance/device. Testing once a day 100 each 3    Multiple Vitamins-Minerals (MULTIVITAMIN-MINERALS) TABS tablet Take 1 tablet by mouth daily 90 tablet 3    acetaminophen (EQ ACETAMINOPHEN) 500 MG tablet Take 1 tablet by mouth every 6 hours as needed for Pain 120 tablet 3    ketoconazole (NIZORAL) 2 % cream Apply on the feet, not between the toes, twice a day 1 Tube 1    lidocaine (LMX) 4 % cream Apply 2-3 times a day as needed for pain 120 g 3    Blood Pressure KIT Diagnosis: HTN. Needs to check blood pressure 1-2 times a day until stable, then once a day.  Goal blood pressure less than 135/85, and above 110/60. 1 kit 0 nitroGLYCERIN (NITROSTAT) 0.4 MG SL tablet DISSOLVE ONE TABLET UNDER THE TONGUE EVERY 5 MINUTES AS NEEDED FOR CHEST PAIN. DO NOT EXCEED A TOTAL OF 3 DOSES IN 15 MINUTES 25 tablet 0     No current facility-administered medications on file prior to visit. Review of Systems    Review of Systems:   History obtained from chart review and the patient  General ROS: negative for - chills, fatigue, fever, night sweats or weight gain  Constitutional: Negative for chills, diaphoresis, fatigue, fever and unexpected weight change. Musculoskeletal: Positive for arthralgias, gait problem and joint swelling. Neurological ROS: negative for - behavioral changes, confusion, headaches or seizures. Negative for weakness and numbness. Dermatological ROS: negative for - mole changes, rash  Cardiovascular: Negative for leg swelling. Gastrointestinal: Negative for constipation, diarrhea, nausea and vomiting. Objective:  Dermatologic Exam:  Skin lesion/ulceration Absent . Skin No rashes or nodules noted. .   Skin is thin, with flaky sloughing skin as well as decreased hair growth to the lower leg  Small red hemosiderin deposits seen dorsal foot   Musculoskeletal:     1st MPJ ROM decreased, Bilateral.  Muscle strength 5/5, Bilateral.  Pain present upon palpation of toenails 1-5, Bilateral. decreased medial longitudinal arch, Bilateral.  Ankle ROM decreased,Bilateral.    Dorsally contracted digits present digits 2, Bilateral.     Vascular: DP pulses 1/4 bilateral.  PT pulses 0/4 bilateral.   CFT <5 seconds, Bilateral.  Hair growth absent to the level of the digits, Bilateral.  Edema present, Bilateral.  Varicosities absent, Bilateral. Erythema absent, Bilateral    Neurological: Sensation diminshed to light touch to level of digits, Bilateral.  Protective sensation intact 6/10 sites via 5.07/10g [place]-Tejinder Monofilament, Bilateral.  negative Tinel's, Bilateral.  negative Valleix sign, Bilateral.      Integument: Warm, dry, supple, Bilateral.  Open lesion absent, Bilateral.  Interdigital maceration absent to web spaces 4, Bilateral.  Nails 1-5 left and 1-5 right thickened > 3.0 mm, dystrophic and crumbly, discolored with subungual debris. Fissures absent, Bilateral.   General: AAO x 3 in NAD. Derm  Toenail Description  Sites of Onychomycosis Involvement (Check affected area)  [x] [x] [x] [x] [x] [x] [x] [x] [x] [x]  5 4 3 2 1 1 2 3 4 5                          Right                                        Left    Thickness  [x] [x] [x] [x] [x] [x] [x] [x] [x] [x]  5 4 3 2 1 1 2 3 4 5                         Right                                        Left    Dystrophic Changes   [x] [x] [x] [x] [x] [x] [x] [x] [x] [x]  5 4 3 2 1 1 2 3 4 5                         Right                                        Left    Color   [x] [x] [x] [x] [x] [x] [x] [x] [x] [x]  5 4 3 2 1 1 2 3 4 5                          Right                                        Left    Incurvation/Ingrowin   [] [] [] [] [] [] [] [] [] []  5 4 3 2 1 1 2 3 4 5                         Right                                        Left    Inflammation/Pain   [x] [x] [x] [x] [x] [x] [x] [x] [x] [x]  5 4 3 2 1 1 2 3 4 5                         Right                                        Left        Visual inspection:  Deformity: hammertoe deformity naomi feet  amputation: absent  Skin lesions: absent  Edema: right- 2+ pitting edema, left- 2+ pitting edema    Sensory exam:  Monofilament sensation: abnormal - 6/10 via SW 5.07/10g monofilament to the plantar foot bilateral feet    Pulses: abnormal - 1/4 dorsalis pedis pulse and 0/4 Posterior tibial pulse,   Pinprick: Impaired  Proprioception: Impaired  Vibration (128 Hz): Impaired       DM with PVD       [x]Yes    []No      Assessment:  65 y.o. male with:   Diagnosis Orders   1. Dermatophytosis of nail  05642 - [place] DEBRIDEMENT OF NAILS, 6 OR MORE    HM DIABETES FOOT EXAM      2.  Type II diabetes mellitus with peripheral circulatory disorder (HCC)  68024 - [place] DEBRIDEMENT OF NAILS, 6 OR MORE    HM DIABETES FOOT EXAM      3. Peripheral vascular disorder ([facility]  [address]59 - [place] DEBRIDEMENT OF NAILS, 6 OR MORE    HM DIABETES FOOT EXAM      4. Other diabetic neurological complication associated with type 2 diabetes mellitus (HCC)  92765 - [place] DEBRIDEMENT OF NAILS, 6 OR MORE    HM DIABETES FOOT EXAM              Q7   []Yes    []No                Q8   [x]Yes    []No                     Q9   []Yes    []No    Plan:   Pt was evaluated and examined. Patient was given personalized discharge instructions. To address numbness and neuropathy pain, advised pt to closely monitor blood glucose. Recommend pt to discuss with PCP regarding oral medication treatment of neuropathy if needed. Recommend Metanx, one tab po BID. Nails 1-10 were debrided sharply in length and thickness with a nipper and , without incident. Pt will follow up in 9 weeks or sooner if any problems arise. Diagnosis was discussed with the pt and all of their questions were answered in detail. Proper foot hygiene and care was discussed with the pt. Informed patient on proper diabetic foot care and importance of tight glycemic control. Patient to check feet daily and contact the office with any questions/problems/concerns.    Other comorbidity noted and will be managed by PCP.  2/27/2023    Electronically signed by Patrick Mcneill DPM on 2/27/2023 at 10:54 AM  2/27/2023

## 2023-03-02 ENCOUNTER — OFFICE VISIT (OUTPATIENT)
Dept: FAMILY MEDICINE CLINIC | Age: 63
End: 2023-03-02

## 2023-03-02 ENCOUNTER — HOSPITAL ENCOUNTER (OUTPATIENT)
Age: 63
Setting detail: SPECIMEN
Discharge: HOME | End: 2023-03-02
Payer: COMMERCIAL

## 2023-03-02 VITALS
DIASTOLIC BLOOD PRESSURE: 82 MMHG | HEIGHT: 73 IN | HEART RATE: 58 BPM | SYSTOLIC BLOOD PRESSURE: 144 MMHG | OXYGEN SATURATION: 99 % | BODY MASS INDEX: 31.54 KG/M2 | WEIGHT: 238 LBS | TEMPERATURE: 98.3 F

## 2023-03-02 DIAGNOSIS — E11.42 TYPE 2 DIABETES MELLITUS WITH DIABETIC POLYNEUROPATHY, WITHOUT LONG-TERM CURRENT USE OF INSULIN (HCC): ICD-10-CM

## 2023-03-02 DIAGNOSIS — Z12.5 PROSTATE CANCER SCREENING: ICD-10-CM

## 2023-03-02 DIAGNOSIS — E55.9 VITAMIN D DEFICIENCY: ICD-10-CM

## 2023-03-02 DIAGNOSIS — I12.9 BENIGN HYPERTENSION WITH CKD (CHRONIC KIDNEY DISEASE), STAGE II: ICD-10-CM

## 2023-03-02 DIAGNOSIS — I25.10 CORONARY ARTERY DISEASE INVOLVING NATIVE CORONARY ARTERY OF NATIVE HEART WITHOUT ANGINA PECTORIS: ICD-10-CM

## 2023-03-02 DIAGNOSIS — G47.33 OSA (OBSTRUCTIVE SLEEP APNEA): ICD-10-CM

## 2023-03-02 DIAGNOSIS — Z12.11 SCREEN FOR COLON CANCER: ICD-10-CM

## 2023-03-02 DIAGNOSIS — I50.42 CHRONIC COMBINED SYSTOLIC AND DIASTOLIC CHF (CONGESTIVE HEART FAILURE) (HCC): ICD-10-CM

## 2023-03-02 DIAGNOSIS — D69.6 THROMBOCYTOPENIA (HCC): ICD-10-CM

## 2023-03-02 DIAGNOSIS — E11.65 TYPE 2 DIABETES MELLITUS WITH HYPERGLYCEMIA, WITHOUT LONG-TERM CURRENT USE OF INSULIN (HCC): ICD-10-CM

## 2023-03-02 DIAGNOSIS — N18.2 BENIGN HYPERTENSION WITH CKD (CHRONIC KIDNEY DISEASE), STAGE II: ICD-10-CM

## 2023-03-02 DIAGNOSIS — E78.5 HYPERLIPIDEMIA WITH TARGET LDL LESS THAN 70: ICD-10-CM

## 2023-03-02 DIAGNOSIS — I42.9 CARDIOMYOPATHY, UNSPECIFIED TYPE (HCC): ICD-10-CM

## 2023-03-02 DIAGNOSIS — E11.65 TYPE 2 DIABETES MELLITUS WITH HYPERGLYCEMIA, WITHOUT LONG-TERM CURRENT USE OF INSULIN (HCC): Primary | ICD-10-CM

## 2023-03-02 PROBLEM — G57.91 NEUROPATHY OF RIGHT LOWER EXTREMITY: Status: RESOLVED | Noted: 2020-02-07 | Resolved: 2023-03-02

## 2023-03-02 LAB
25(OH)D3 SERPL-MCNC: 40.7 NG/ML
ABSOLUTE EOS #: 0.1 K/UL (ref 0–0.4)
ABSOLUTE LYMPH #: 2.3 K/UL (ref 1–4.8)
ABSOLUTE MONO #: 0.9 K/UL (ref 0.1–1.3)
ALBUMIN SERPL-MCNC: 4.2 G/DL (ref 3.5–5.2)
ALP SERPL-CCNC: 93 U/L (ref 40–129)
ALT SERPL-CCNC: 11 U/L (ref 5–41)
ANION GAP SERPL CALCULATED.3IONS-SCNC: 8 MMOL/L (ref 9–17)
AST SERPL-CCNC: 14 U/L
BASOPHILS # BLD: 1 % (ref 0–2)
BASOPHILS ABSOLUTE: 0 K/UL (ref 0–0.2)
BILIRUB SERPL-MCNC: 0.3 MG/DL (ref 0.3–1.2)
BNP SERPL-MCNC: 68 PG/ML
BUN SERPL-MCNC: 16 MG/DL (ref 8–23)
CALCIUM SERPL-MCNC: 9.2 MG/DL (ref 8.6–10.4)
CHLORIDE SERPL-SCNC: 106 MMOL/L (ref 98–107)
CHOLEST SERPL-MCNC: 153 MG/DL
CHOLESTEROL/HDL RATIO: 3.2
CO2 SERPL-SCNC: 26 MMOL/L (ref 20–31)
CREAT SERPL-MCNC: 1.28 MG/DL (ref 0.7–1.2)
EOSINOPHILS RELATIVE PERCENT: 1 % (ref 0–4)
FOLATE SERPL-MCNC: 12.2 NG/ML
GFR SERPL CREATININE-BSD FRML MDRD: >60 ML/MIN/1.73M2
GLUCOSE SERPL-MCNC: 130 MG/DL (ref 70–99)
HBA1C MFR BLD: 7.2 %
HCT VFR BLD AUTO: 41.9 % (ref 41–53)
HDLC SERPL-MCNC: 48 MG/DL
HGB BLD-MCNC: 13.6 G/DL (ref 13.5–17.5)
LDLC SERPL CALC-MCNC: 90 MG/DL (ref 0–130)
LYMPHOCYTES # BLD: 33 % (ref 24–44)
MAGNESIUM SERPL-MCNC: 2 MG/DL (ref 1.6–2.6)
MCH RBC QN AUTO: 30 PG (ref 26–34)
MCHC RBC AUTO-ENTMCNC: 32.5 G/DL (ref 31–37)
MCV RBC AUTO: 92.1 FL (ref 80–100)
MONOCYTES # BLD: 13 % (ref 1–7)
PDW BLD-RTO: 14.2 % (ref 11.5–14.9)
PHOSPHATE SERPL-MCNC: 3 MG/DL (ref 2.5–4.5)
PLATELET # BLD AUTO: 204 K/UL (ref 150–450)
PMV BLD AUTO: 9.9 FL (ref 6–12)
POTASSIUM SERPL-SCNC: 4.1 MMOL/L (ref 3.7–5.3)
PROT SERPL-MCNC: 7.8 G/DL (ref 6.4–8.3)
RBC # BLD: 4.55 M/UL (ref 4.5–5.9)
SEG NEUTROPHILS: 52 % (ref 36–66)
SEGMENTED NEUTROPHILS ABSOLUTE COUNT: 3.7 K/UL (ref 1.3–9.1)
SODIUM SERPL-SCNC: 140 MMOL/L (ref 135–144)
TRIGL SERPL-MCNC: 73 MG/DL
TSH SERPL-ACNC: 0.55 UIU/ML (ref 0.3–5)
URATE SERPL-MCNC: 4 MG/DL (ref 3.4–7)
VIT B12 SERPL-MCNC: 739 PG/ML (ref 232–1245)
WBC # BLD AUTO: 7 K/UL (ref 3.5–11)

## 2023-03-02 PROCEDURE — 84443 ASSAY THYROID STIM HORMONE: CPT

## 2023-03-02 PROCEDURE — 84550 ASSAY OF BLOOD/URIC ACID: CPT

## 2023-03-02 PROCEDURE — 82306 VITAMIN D 25 HYDROXY: CPT

## 2023-03-02 PROCEDURE — 84100 ASSAY OF PHOSPHORUS: CPT

## 2023-03-02 PROCEDURE — 82607 VITAMIN B-12: CPT

## 2023-03-02 PROCEDURE — 83735 ASSAY OF MAGNESIUM: CPT

## 2023-03-02 PROCEDURE — 36415 COLL VENOUS BLD VENIPUNCTURE: CPT

## 2023-03-02 PROCEDURE — 80061 LIPID PANEL: CPT

## 2023-03-02 PROCEDURE — 80053 COMPREHEN METABOLIC PANEL: CPT

## 2023-03-02 PROCEDURE — 83880 ASSAY OF NATRIURETIC PEPTIDE: CPT

## 2023-03-02 PROCEDURE — 82746 ASSAY OF FOLIC ACID SERUM: CPT

## 2023-03-02 PROCEDURE — 85025 COMPLETE CBC W/AUTO DIFF WBC: CPT

## 2023-03-02 RX ORDER — UREA 40 %
CREAM (GRAM) TOPICAL
Qty: 1 EACH | Refills: 0 | Status: SHIPPED | OUTPATIENT
Start: 2023-03-02

## 2023-03-02 SDOH — ECONOMIC STABILITY: INCOME INSECURITY: HOW HARD IS IT FOR YOU TO PAY FOR THE VERY BASICS LIKE FOOD, HOUSING, MEDICAL CARE, AND HEATING?: NOT HARD AT ALL

## 2023-03-02 SDOH — ECONOMIC STABILITY: FOOD INSECURITY: WITHIN THE PAST 12 MONTHS, YOU WORRIED THAT YOUR FOOD WOULD RUN OUT BEFORE YOU GOT MONEY TO BUY MORE.: NEVER TRUE

## 2023-03-02 SDOH — ECONOMIC STABILITY: FOOD INSECURITY: WITHIN THE PAST 12 MONTHS, THE FOOD YOU BOUGHT JUST DIDN'T LAST AND YOU DIDN'T HAVE MONEY TO GET MORE.: NEVER TRUE

## 2023-03-02 ASSESSMENT — ENCOUNTER SYMPTOMS
WHEEZING: 0
VOMITING: 0
CONSTIPATION: 0
CHEST TIGHTNESS: 0
DIARRHEA: 0
ABDOMINAL DISTENTION: 0
APNEA: 1
NAUSEA: 0
SHORTNESS OF BREATH: 0
COUGH: 0
ABDOMINAL PAIN: 0

## 2023-03-02 ASSESSMENT — PATIENT HEALTH QUESTIONNAIRE - PHQ9
5. POOR APPETITE OR OVEREATING: 0
3. TROUBLE FALLING OR STAYING ASLEEP: 0
7. TROUBLE CONCENTRATING ON THINGS, SUCH AS READING THE NEWSPAPER OR WATCHING TELEVISION: 0
6. FEELING BAD ABOUT YOURSELF - OR THAT YOU ARE A FAILURE OR HAVE LET YOURSELF OR YOUR FAMILY DOWN: 0
1. LITTLE INTEREST OR PLEASURE IN DOING THINGS: 0
8. MOVING OR SPEAKING SO SLOWLY THAT OTHER PEOPLE COULD HAVE NOTICED. OR THE OPPOSITE, BEING SO FIGETY OR RESTLESS THAT YOU HAVE BEEN MOVING AROUND A LOT MORE THAN USUAL: 0
10. IF YOU CHECKED OFF ANY PROBLEMS, HOW DIFFICULT HAVE THESE PROBLEMS MADE IT FOR YOU TO DO YOUR WORK, TAKE CARE OF THINGS AT HOME, OR GET ALONG WITH OTHER PEOPLE: 0
SUM OF ALL RESPONSES TO PHQ9 QUESTIONS 1 & 2: 0
SUM OF ALL RESPONSES TO PHQ QUESTIONS 1-9: 0
4. FEELING TIRED OR HAVING LITTLE ENERGY: 0
SUM OF ALL RESPONSES TO PHQ QUESTIONS 1-9: 0
9. THOUGHTS THAT YOU WOULD BE BETTER OFF DEAD, OR OF HURTING YOURSELF: 0
SUM OF ALL RESPONSES TO PHQ QUESTIONS 1-9: 0
2. FEELING DOWN, DEPRESSED OR HOPELESS: 0
SUM OF ALL RESPONSES TO PHQ QUESTIONS 1-9: 0

## 2023-03-02 NOTE — RESULT ENCOUNTER NOTE
Addressed during office visit today, A1c 7.2 worsening diabetes, continue treatment recommended during the office visit.

## 2023-03-02 NOTE — PROGRESS NOTES
Visit Information    Have you changed or started any medications since your last visit including any over-the-counter medicines, vitamins, or herbal medicines? no   Have you stopped taking any of your medications? Is so, why? -  no  Are you having any side effects from any of your medications? - no    Have you seen any other physician or provider since your last visit? yes -    Have you had any other diagnostic tests since your last visit?  no   Have you been seen in the emergency room and/or had an admission in a hospital since we last saw you?  no   Have you had your routine dental cleaning in the past 6 months?  no     Do you have an active MyChart account? If no, what is the barrier?   Yes    Patient Care Team:  Lindy Barreto MD as PCP - General (Family Medicine)  Lindy Barreto MD as PCP - Empaneled Provider  Simon Fuller MD as Consulting Physician (Cardiology)  Hugo Closs, OD (Optometry)  Lobo Thurston MD as Consulting Physician (Nephrology)  Brenda Qureshi MD as Consulting Physician (Cardiology)  Ray Oneal DPM as Physician (Podiatry)  Maricarmen Brady MD as Consulting Physician (Urology)    Medical History Review  Past Medical, Family, and Social History reviewed and does contribute to the patient presenting condition    Health Maintenance   Topic Date Due    Diabetic retinal exam  07/31/2019    COVID-19 Vaccine (2 - Pfizer series) 05/03/2021    Prostate Specific Antigen (PSA) Screening or Monitoring  08/27/2022    Colorectal Cancer Screen  01/15/2023    A1C test (Diabetic or Prediabetic)  02/22/2023    Lipids  06/14/2023    Diabetic Alb to Cr ratio (uACR) test  07/15/2023    Depression Monitoring  07/15/2023    GFR test (Diabetes, CKD 3-4, OR last GFR 15-59)  07/15/2023    Diabetic foot exam  02/27/2024    DTaP/Tdap/Td vaccine (2 - Td or Tdap) 04/03/2024    Flu vaccine  Completed    Shingles vaccine  Completed    Pneumococcal 0-64 years Vaccine  Completed    Hepatitis C screen Completed    HIV screen  Completed    Hepatitis A vaccine  Aged Out    Hib vaccine  Aged Out    Meningococcal (ACWY) vaccine  Aged Out

## 2023-03-02 NOTE — RESULT ENCOUNTER NOTE
Please notify patient: Blood glucose greatly improved 130, mild chronic kidney disease stage II, improved from prior,    Otherwise labs within normal limits  continue current treatment    Future Appointments  3/10/2023  10:00 AM   SCHEDULE, [facility] fp [facility]  5/1/2023   8:45 AM    [facility]        [facility]  6/2/2023   10:00 AM   Destiney Echevarria MD     [facility]

## 2023-03-02 NOTE — PROGRESS NOTES
Barb López (:  1960) is a 65 y.o. male,Established patient, here for evaluation of the following chief complaint(s): Diabetes (HASNT HAD DM EYE EXAM), Hypertension, and Hyperlipidemia      ASSESSMENT/PLAN:    1. Type 2 diabetes mellitus with hyperglycemia, without long-term current use of insulin (HCC)  worsening  But well controlled    -     POCT glycosylated hemoglobin (Hb A1C)    Lab Results   Component Value Date    LABA1C 7.2 2023    LABA1C 7.0 2022    LABA1C 7.3 (H) 2022       -     AFL - Maryellen Summers MD, Ophthalmology, [address] [place] [address] Rd  -     CBC with Auto Differential; Future  -     Comprehensive Metabolic Panel; Future  -     TSH; Future  -     Vitamin B12 & Folate; Future    -advised home blood glucose testing  daily  -daily feet exam, Foot care: advised to wash feet daily, pat dry and apply lotion at night, avoiding between toes. Need to look at feet daily and report to a physician any signs of inflammation or skin damage  -annual dilated eye exam  -Low carb, low fat diet, increase fruits and vegetables, and exercise 4-5 times a day 30-40 minutes a day discussed  -continue current treatment Farxiga 10 Mg, Januvia 100 Mg daily  -continue Aspirin 81 Mg  -continue  statin atorvastatin 40 Mg daily    2. Type 2 diabetes mellitus with diabetic polyneuropathy, without long-term current use of insulin (HCC)  Worsening polyneuropathy  He would benefit from diabetic shoes  Worsening diabetes  Needs to work on improving diabetes control and start walking more  Compliance with low-carb diet discussed  Continue Farxiga 10 Mg and Januvia 100 Mg and recheck labs  -     Diabetic Shoe MISC; Starting Thu 3/2/2023, Disp-1 each, R-0, PrintNeeds diabetic shoes  -     Urea (CARMOL) 40 % cream; Apply to affected area once daily, Disp-1 each, R-0, Normal  -     CBC with Auto Differential; Future  -     Comprehensive Metabolic Panel; Future  -     TSH; Future  -     Vitamin B12 & Folate;  Future  -     HM DIABETES FOOT EXAM  3. Benign hypertension with CKD (chronic kidney disease), stage II  Stable chronic kidney disease stage II  Inadequately controlled hypertension, did not take his blood pressure medications this morning, does not take Coreg twice a day, does not take hydralazine 3 times a day  Compliance with his medications discussed  Recheck labs  To take Aldactone 25 mg daily, amlodipine 10 Mg, Coreg 2 mg twice a day, hydralazine 50 Mg 3 times a day, furosemide 40 Mg daily, to buy a pillbox  -     CBC with Auto Differential; Future  -     Comprehensive Metabolic Panel; Future  -     Phosphorus; Future  -     TSH; Future  -     Uric Acid; Future  4. Chronic combined systolic and diastolic CHF (congestive heart failure) (HCC)  Stable, severe, we will update his echo 2D    Lab Results   Component Value Date    LVEF 30 01/16/2019    LVEFMODE Echo 01/16/2019     Compliance with blood pressure medications discussed, on Coreg 25 Mg twice a day, Aldactone 25 Mg daily, furosemide 40 Mg daily  Improved blood pressure control discussed  He also benefits from [facility], [address], Cardiology, [place]  -     ECHO Complete 2D W Doppler W Color; Future  -     Comprehensive Metabolic Panel; Future  -     Brain Natriuretic Peptide; Future  -     Magnesium; Future  -     TSH; Future  5. Coronary artery disease involving native coronary artery of native heart without angina pectoris  Likely worsening  He has no stents  We will update his echo, compliance with his blood pressure medications discussed, need to improve diabetes discussed, continue Farxiga, carvedilol 25 Mg twice a day, baby aspirin and Lipitor 40 Mg daily  -     DORA - Stanislav Benson DO, Cardiology, [place]  -     ECHO Complete 2D W Doppler W Color; Future  6. MORENA (obstructive sleep apnea)  Likely worsening  Advised to work on weight loss and start walking more, he says his insurance took his CPAP away  7.  Cardiomyopathy, unspecified type ([facility]  Stable  Compliance to his heart medications discussed  Continue Coreg 25 Mg twice a day, Aldactone 25 Mg, furosemide 40 Mg, update echo 2d and follow-up with cardiologist  -     [facility], [address], DO, Cardiology, [place]  -     ECHO Complete 2D W Doppler W Color; Future  8. Vitamin D deficiency  Improved  Will recheck level  Continue supplementation.    -     Vitamin D 25 Hydroxy; Future  9. Hyperlipidemia with target LDL less than 70  Improving  Likely related to noncompliance  Continue atorvastatin 40 Mg daily  Lab Results   Component Value Date    LDLCALC 119 01/16/2019    LDLCHOLESTEROL 90 03/02/2023       -     Lipid Panel; Future  10. Thrombocytopenia ([facility]  Worsening  Platelets 510 on 0/52/4528, mildly low  Patient says he quit drinking alcoholic beverages completely  Recheck CBC with differential  -     CBC with Auto Differential; Future  11. Screen for colon cancer  -     [address], DO Elisa, General Surgery, [place]. Prostate cancer screening  -     PSA Screening; Future      Freddy received counseling on the following healthy behaviors: nutrition, exercise, medication adherence, and weight loss  Reviewed prior labs and health maintenance  Discussed use, benefit, and side effects of prescribed medications. Barriers to medication compliance addressed. Patient given educational materials - see patient instructions  Was a self-tracking handout given in paper form or via [facility]? Yes  All patient questions answered. Patient voiced understanding. The patient's past medical,surgical, social, and family history as well as his current medications and allergies were reviewed as documented in today's encounter. Medications, labs, diagnostic studies, consultations and follow-up as documented in this encounter. Return in about 4 months (around 7/2/2023) for **POC A1C, CHF, DM2, HTN,HLP. Needs nurse visit appointment BP check in 1 week.        Future Appointments   Date Time Provider Vianey Vesna   3/10/2023 10:00 AM SCHEDULE, P VERNON Becker sc [id number]   5/1/2023  8:45 AM JT [facility] Cely Monroy   6/2/2023 10:00 AM Kady Jerezr, MD [facility]         SUBJECTIVE/OBJECTIVE:      Comes with his new girlfriend, Siena Spence    Diabetes Mellitus Type II, Follow-up:    Current symptoms/problems include hyperglycemia, paresthesia of the feet, and visual disturbances. Symptoms have been present for several years. He says he needs new diabetic shows  Saw Dr. Rakel Moore already, follows with podiatry, about every 3 months    Known diabetic complications: nephropathy, peripheral neuropathy, and cardiovascular disease, impotence  Cardiovascular risk factors: advanced age (older than 54 for men, 72 for women), diabetes mellitus, dyslipidemia, hypertension, male gender, and obesity (BMI >= 30 kg/m2)    Medication compliance:  compliant most of the time  Current diabetic medications include : farxiga 10 mg, Januavia 100 mg    Eye exam current (within one year): no  Current diet: diabetic, low fat/ cholesterol, low salt    Barriers: impairment:  physical: CHF, neuropathy in the feet, lack of motivation    Current monitoring regimen: home blood tests - daily  Home blood sugar records: fasting range: 110-135  Any episodes of hypoglycemia? no    Is He on ACE inhibitor or angiotensin II receptor blocker? No      reports that he quit smoking about 42 years ago. His smoking use included cigarettes. He has a 1.00 pack-year smoking history. He has quit using smokeless tobacco.     Counseling given: Yes      Daily Aspirin? Yes      A1c is worsening.    Lab Results   Component Value Date    LABA1C 7.2 03/02/2023    LABA1C 7.0 11/22/2022    LABA1C 7.3 (H) 06/14/2022          Obesity per BMI worsening, has gained 7 pounds in 1 year  Wt Readings from Last 3 Encounters:   03/02/23 238 lb (108 kg)   02/27/23 228 lb (103.4 kg)   11/30/22 228 lb (103.4 kg)     Prior weight 231 pounds on 1/21/2022    Hypertension, cardiomyopathy, CHF, chronic kidney disease stage II     Freddy  is not  exercising and is adherent to low salt diet. Cardiac symptoms  fatigue and lower extremity edema. Dizzy sometimes  Leg swelling on and off   He says his not drinking anymore  Didn't  the compression stockings  Since he stopped drinking, the leg swelling is much better. Patient denies  chest pain, chest pressure/discomfort, claudication, dyspnea, exertional chest pressure/discomfort, irregular heart beat, near-syncope, orthopnea, palpitations, paroxysmal nocturnal dyspnea, syncope, and tachypnea. Cardiac cath at [facility] 6/23/2017 showed 50% stenosis and ejection fraction 45%    Use of agents associated with hypertension: none. History of target organ damage: chronic kidney disease and heart failure. Didn't see cardiology, but when he checked out he says he might have an appointment  \"May the 1st\"    Lab Results   Component Value Date    LVEF 30 01/16/2019    Rei Carty Echo 01/16/2019     His girlfriend says he is not taking his medications as he supposed to  He admits he takes hydralazine  only once a day  He takes carvedilol once a day    Didn't take the BP meds today because he wanted to fast for blood work    Blood pressure is high  BP Readings from Last 3 Encounters:   03/02/23 (!) 144/82   11/22/22 130/77   09/23/22 (!) 150/82        Pulse is  mild bradycardia . Pulse Readings from Last 3 Encounters:   03/02/23 58   11/22/22 51   09/23/22 55         Hyperlipidemia:  No new myalgias or GI upset on atorvastatin (Lipitor). Medication compliance: compliant most of the time. Patient is not following a low fat, low cholesterol diet. LDL is  improved . Lab Results   Component Value Date    LDLCALC 119 01/16/2019    LDLCHOLESTEROL 69 06/14/2022       Sleep Apnea:  Current treatment: none, was taken away 3 years ago. Compliance: noncompliant: Does not have CPAP .   Residual symptoms include: morning fatigue, daytime fatigue, and snoring. [x]Negative depression screening. PHQ Scores 3/2/2023 7/15/2022 6/14/2022 1/21/2022 6/1/2021 1/12/2021 5/27/2020   PHQ2 Score 0 0 3 0 0 0 0   PHQ9 Score 0 0 4 0 0 0 0       Prior to Visit Medications    Medication Sig Taking? Authorizing Provider   Ergocalciferol (VITAMIN D2 PO) Take by mouth Yes Historical Provider, MD   tamsulosin (FLOMAX) 0.4 MG capsule Take 1 capsule by mouth daily Yes Jerzy Hooker MD   atorvastatin (LIPITOR) 40 MG tablet Take 1 tablet by mouth Daily with supper Yes Valencia Avalos MD   aspirin (EQ ASPIRIN ADULT LOW DOSE) 81 MG EC tablet Take 1 tablet by mouth once daily Yes Valencia Avalos MD   SITagliptin (JANUVIA) 100 MG tablet Take 1 tablet by mouth in the morning. Dose increased 6/1/2021. Yes Valencia Avalos MD   spironolactone (ALDACTONE) 25 MG tablet Take 1 tablet by mouth every morning BP and CHF Yes Valencia Avalos MD   amLODIPine (NORVASC) 10 MG tablet Take 1 tablet by mouth once daily Yes Valencia Avalos MD   carvedilol (COREG) 25 MG tablet TAKE 1 TABLET BY MOUTH TWICE DAILY WITH MEALS Yes Valencia Avalos MD   Handicap Placard MISC by Does not apply route Can't walk greater than 200 feet. Expires in 5 years. Yes Valencia Avalos MD   hydrALAZINE (APRESOLINE) 50 MG tablet TAKE 1 TABLET BY MOUTH THREE TIMES DAILY Yes Valencia Avalos MD   dapagliflozin (FARXIGA) 10 MG tablet Take 1 tablet by mouth every morning For diabetes, per your insurance Yes VENTURA Majano CNP   allopurinol (ZYLOPRIM) 100 MG tablet Take 1 tablet by mouth daily FOR GOUT. STOP IT IF ANY RASH DEVELOPS! Yes VENTURA Majano CNP   furosemide (LASIX) 40 MG tablet Take 1 tablet by mouth daily Stop chlorthalidone and Metformin due to leg swelling Yes VENTURA Majano CNP   Lancets 30G MISC Testing once a day. Please dispense according to patients insurance.  Yes Valencia Avalos MD   blood glucose monitor strips Testing once a day. Please dispense according to patients insurance. Yes James Moran MD   glucose monitoring (FREESTYLE FREEDOM) kit Please supply Patient with a glucose monitoring kit that insurance will cover. Yes James Moran MD   Alcohol Swabs PADS Please dispense according to patients insurance/device. Testing once a day Yes James Moran MD   acetaminophen (EQ ACETAMINOPHEN) 500 MG tablet Take 1 tablet by mouth every 6 hours as needed for Pain Yes James Moran MD   Blood Pressure KIT Diagnosis: HTN. Needs to check blood pressure 1-2 times a day until stable, then once a day. Goal blood pressure less than 135/85, and above 110/60. Yes James Moran MD   sildenafil (REVATIO) 20 MG tablet Take 1 tablet by mouth daily as needed (erectile dysfunction)  Patient not taking: Reported on 3/2/2023  Cindy Kapadia MD   sildenafil (VIAGRA) 100 MG tablet Take 1 tablet by mouth daily as needed for Erectile Dysfunction  Patient not taking: Reported on 3/2/2023  Cindy Kapadia MD   tamsulosin (FLOMAX) 0.4 MG capsule Take 1 capsule by mouth once daily  Patient not taking: Reported on 3/2/2023  Uli Wallace MD   Clotrimazole 1 % OINT Apply on the penis on the gland twice a day for 10 days  Patient not taking: Reported on 3/2/2023  James Moran MD   nystatin (MYCOSTATIN) 015567 UNIT/GM powder Apply 1-2 times daily in the inguinal /groin skin folds long-term  Patient not taking: Reported on 3/2/2023  James Moran MD   Diabetic Shoe MISC by Does not apply route Needs diabetic shoes  Patient not taking: Reported on 3/2/2023  James Moran MD   Urea (CARMOL) 40 % cream Apply to affected area once daily  Patient not taking: Reported on 3/2/2023  Marybel Whittington DPM   Compression Stockings MISC 20-30 mm/hg  Patient not taking: Reported on 3/2/2023  Marybel Whittington DPM   Misc.  Devices MISC 1 PAIR OF DIABETIC SHOES (1 LEFT/ 1 RIGHT)  1-3 PAIRS OF INSERTS (LEFT/ RIGHT)  Patient not taking: Reported on 3/2/2023  Hue Grace DPM   Multiple Vitamins-Minerals (MULTIVITAMIN-MINERALS) TABS tablet Take 1 tablet by mouth daily  Patient not taking: Reported on 3/2/2023  Kady Cordoba MD   ketoconazole (NIZORAL) 2 % cream Apply on the feet, not between the toes, twice a day  Patient not taking: Reported on 3/2/2023  Kady Cordoba MD   lidocaine (LMX) 4 % cream Apply 2-3 times a day as needed for pain  Patient not taking: Reported on 3/2/2023  Kady Cordoba MD   nitroGLYCERIN (NITROSTAT) 0.4 MG SL tablet DISSOLVE ONE TABLET UNDER THE TONGUE EVERY 5 MINUTES AS NEEDED FOR CHEST PAIN. DO NOT EXCEED A TOTAL OF 3 DOSES IN 15 MINUTES  Patient not taking: Reported on 3/2/2023  Kady Cordoba MD            Social History     Tobacco Use    Smoking status: Former     Packs/day: 0.50     Years: 2.00     Pack years: 1.00     Types: Cigarettes     Quit date: 1981     Years since quittin.1    Smokeless tobacco: Former   Substance Use Topics    Alcohol use: Yes     Alcohol/week: 1.0 - 2.0 standard drink     Types: 1 - 2 Cans of beer per week     Comment: 1-2 cans a month    Drug use: Yes     Types: Marijuana Lizabeth Steel)     Comment: marijuana every day          Review of Systems   Constitutional:  Positive for fatigue and unexpected weight change. Negative for activity change, appetite change, chills, diaphoresis and fever. Eyes:  Positive for visual disturbance. Respiratory:  Positive for apnea (MORENA, CPAP was taken away years ago). Negative for cough, chest tightness, shortness of breath and wheezing. Cardiovascular:  Positive for leg swelling (on and off). Negative for chest pain and palpitations. Gastrointestinal:  Negative for abdominal distention, abdominal pain, constipation, diarrhea, nausea and vomiting. Endocrine: Negative for cold intolerance, heat intolerance, polydipsia, polyphagia and polyuria.    Genitourinary:  Negative for difficulty urinating, frequency and urgency. Musculoskeletal:  Negative for arthralgias. Neurological:  Positive for light-headedness and numbness (feet). Negative for weakness. Hematological:  Does not bruise/bleed easily. Psychiatric/Behavioral:  Negative for dysphoric mood. -vital signs stable and within normal limits except high blood pressure, mild bradycardia, obesity per BMI  BP (!) 144/82   Pulse 58   Temp 98.3 °F (36.8 °C)   Ht 6' 1\" (1.854 m)   Wt 238 lb (108 kg)   SpO2 99%   BMI 31.40 kg/m²         Physical Exam  Vitals and nursing note reviewed. Constitutional:       General: He is not in acute distress. Appearance: Normal appearance. He is well-developed. He is obese. He is not diaphoretic. HENT:      Head: Normocephalic and atraumatic. Right Ear: External ear normal.      Left Ear: External ear normal.      Mouth/Throat:      Comments: I did not examine the mouth due to coronavirus pandemic and wearing masks. Eyes:      General: Lids are normal. No scleral icterus. Right eye: No discharge. Left eye: No discharge. Extraocular Movements: Extraocular movements intact. Conjunctiva/sclera: Conjunctivae normal.   Neck:      Thyroid: No thyromegaly. Cardiovascular:      Rate and Rhythm: Normal rate and regular rhythm. Pulses:           Dorsalis pedis pulses are 2+ on the right side and 2+ on the left side. Posterior tibial pulses are 2+ on the right side and 2+ on the left side. Heart sounds: Murmur heard. Crescendo systolic murmur is present with a grade of 3/6. Comments: No edema today  Pulmonary:      Effort: Pulmonary effort is normal. No respiratory distress. Breath sounds: Normal breath sounds. No wheezing or rales. Chest:      Chest wall: No tenderness. Abdominal:      General: Bowel sounds are normal. There is no distension. Palpations: Abdomen is soft. There is no hepatomegaly or splenomegaly. Tenderness:  There is no abdominal tenderness. Comments: Obese abdomen. Musculoskeletal:         General: No tenderness. Normal range of motion. Cervical back: Normal range of motion and neck supple. Right lower leg: No edema. Left lower leg: No edema. Right foot: Normal range of motion. No deformity, bunion, Charcot foot, foot drop or prominent metatarsal heads. Left foot: Normal range of motion. No deformity, bunion, Charcot foot, foot drop or prominent metatarsal heads. Feet:      Right foot:      Protective Sensation: 5 sites tested. 2 sites sensed. Skin integrity: Callus and dry skin present. Toenail Condition: Right toenails are normal.      Left foot:      Protective Sensation: 5 sites tested. 2 sites sensed. Skin integrity: Callus and dry skin present. Toenail Condition: Left toenails are normal.   Lymphadenopathy:      Cervical: No cervical adenopathy. Skin:     General: Skin is warm and dry. Capillary Refill: Capillary refill takes less than 2 seconds. Findings: No rash. Neurological:      Mental Status: He is alert and oriented to person, place, and time. Deep Tendon Reflexes: Reflexes are normal and symmetric. Psychiatric:         Mood and Affect: Mood normal.         Behavior: Behavior normal.         Thought Content: Thought content normal.         Judgment: Judgment normal.         I personally reviewed testing with patient.   Hyperglycemia  Hyperlipidemia  Thrombocytopenia  Chronic kidney disease stage II  Prior vitamin D improved  Otherwise labs within normal limits    Office Visit on 11/22/2022   Component Date Value Ref Range Status    Hemoglobin A1C 11/22/2022 7.0  % Final       Lab Results   Component Value Date    WBC 4.3 07/15/2022    HGB 16.8 07/15/2022    HCT 52.8 07/15/2022    MCV 93.7 07/15/2022     (L) 07/15/2022       Lab Results   Component Value Date/Time     07/15/2022 09:17 AM    K 3.8 07/15/2022 09:17 AM     07/15/2022 09:17 AM    CO2 25 07/15/2022 09:17 AM    BUN 28 07/15/2022 09:17 AM    CREATININE 1.41 07/15/2022 09:17 AM    GLUCOSE 164 07/15/2022 09:17 AM    GLUCOSE 157 2012 10:48 AM    CALCIUM 9.3 07/15/2022 09:17 AM        Lab Results   Component Value Date    ALT 16 07/15/2022    AST 17 07/15/2022    ALKPHOS 102 07/15/2022    BILITOT 0.44 07/15/2022       Lab Results   Component Value Date    TSH 1.29 07/15/2022       Lab Results   Component Value Date    CHOL 128 2021    CHOL 192 2020    CHOL 130 2020     Lab Results   Component Value Date    TRIG 131 2021    TRIG 72 2020    TRIG 112 2020     Lab Results   Component Value Date    HDL 49 2022    HDL 38 (L) 2021    HDL 49 2020     Lab Results   Component Value Date    LDLCALC 119 2019    LDLCHOLESTEROL 69 2022    LDLCHOLESTEROL 64 2021    LDLCHOLESTEROL 129 2020     Lab Results   Component Value Date    CHOLHDLRATIO 2.8 2022    CHOLHDLRATIO 3.4 2021    CHOLHDLRATIO 3.9 2020         Lab Results   Component Value Date    HNISYNVP47 930 07/15/2022     Lab Results   Component Value Date    FOLATE 19.5 07/15/2022     Lab Results   Component Value Date    VITD25 40.0 2021         Orders Placed This Encounter   Medications    Diabetic Shoe MISC     Sig: by Does not apply route Needs diabetic shoes     Dispense:  1 each     Refill:  0    DISCONTD: Compression Stockings MISC     Si-30 mm/hg     Dispense:  2 each     Refill:  0    Urea (CARMOL) 40 % cream     Sig: Apply to affected area once daily     Dispense:  1 each     Refill:  0       Orders Placed This Encounter   Procedures    CBC with Auto Differential     Standing Status:   Future     Standing Expiration Date:   3/2/2024    Comprehensive Metabolic Panel     Standing Status:   Future     Standing Expiration Date:   3/2/2024    Brain Natriuretic Peptide     Standing Status:   Future     Standing Expiration Date:   3/2/2024    Magnesium     Standing Status:   Future     Standing Expiration Date:   3/2/2024    Phosphorus     Standing Status:   Future     Standing Expiration Date:   3/2/2024    TSH     Standing Status:   Future     Standing Expiration Date:   3/2/2024    Uric Acid     Standing Status:   Future     Standing Expiration Date:   3/2/2024    Vitamin B12 & Folate     Standing Status:   Future     Standing Expiration Date:   3/2/2024    Vitamin D 25 Hydroxy     Standing Status:   Future     Standing Expiration Date:   3/2/2024    Lipid Panel     Standing Status:   Future     Standing Expiration Date:   3/2/2024     Order Specific Question:   Is Patient Fasting?/# of Hours     Answer:   8-10 Hours, water ok to drink    PSA Screening     Standing Status:   Future     Standing Expiration Date:   3/2/2024    DORA - Du Jarrett MD, Ophthalmology, [place]     Referral Priority:   Routine     Referral Type:   Eval and Treat     Referral Reason:   Specialty Services Required     Referred to Provider:   Josemanuel Cadena MD     Requested Specialty:   Ophthalmology     Number of Visits Requested:   1    DORA Louis DO, Cardiology, [address]     Referral Priority:   Routine     Referral Type:   Eval and Treat     Referral Reason:   Specialty Services Required     Referred to Provider:   Jeff Meza DO     Requested Specialty:   Cardiology     Number of Visits Requested:   [address], [place], General Surgery, [place]     Referral Priority:   Routine     Referral Type:   Eval and Treat     Referral Reason:   Specialty Services Required     Referred to Provider:   Julian Chao DO     Requested Specialty:   General Surgery     Number of Visits Requested:   1    POCT glycosylated hemoglobin (Hb A1C)    ECHO Complete 2D W Doppler W Color     Standing Status:   Future     Standing Expiration Date:   3/1/2024     Order Specific Question:   Reason for exam:     Answer:   EF, RVSP    HM DIABETES FOOT EXAM       Medications Discontinued During This Encounter   Medication Reason    sildenafil (REVATIO) 20 MG tablet Therapy completed    tamsulosin (FLOMAX) 0.4 MG capsule Therapy completed    Clotrimazole 1 % OINT Therapy completed    nystatin (MYCOSTATIN) 856108 UNIT/GM powder Therapy completed    Diabetic Shoe MISC Patient Choice    Urea (CARMOL) 40 % cream REORDER    Compression Stockings MISC Cost of medication    Misc. Devices MISC Cost of medication    Compression Stockings MISC ERROR         On this date 3/2/2023 I have spent 39 minutes reviewing previous notes, test results and face to face with the patient discussing the diagnosis and importance of compliance with the treatment plan as well as documenting on the day of the visit. This note was completed by using the assistance of a speech-recognition program. However, inadvertent computerized transcription errors may be present. Although every effort was made to ensure accuracy, no guarantees can be provided that every mistake has been identified and corrected by editing . An electronic signature was used to authenticate this note.   Electronically signed by Quinn Aldridge MD on 3/2/2023 at 11:18 PM

## 2023-03-08 ENCOUNTER — TELEPHONE (OUTPATIENT)
Dept: SURGERY | Age: 63
End: 2023-03-08

## 2023-03-08 DIAGNOSIS — Z12.11 SCREEN FOR COLON CANCER: Primary | ICD-10-CM

## 2023-03-08 NOTE — TELEPHONE ENCOUNTER
Office spoke with pt to schedule colonoscopy , pt states he would like to have colonoscopy done with Regine Suggs who preformed his last colonoscopy in 2013 . Office states understanding and will closing referral .     Thank you for referral , please send referral to 's office for colonscopy .      Electronically signed by Pino Jack MA on 3/8/2023 at 11:01 AM

## 2023-03-08 NOTE — TELEPHONE ENCOUNTER
New referral placed per patient's request, please check with patient and give him contact information to schedule     Diagnosis Orders   1.  Screen for colon cancer  [facility] (Epic) - Josep Dobson DO, General Surgery, [place]           Future Appointments   Date Time Provider Vianey Malagon   3/17/2023  9:15 AM SCHEDULE, [facility]   5/1/2023  8:45 AM Katherine Guzmán DPM [facility]   6/2/2023 10:00 AM Carolyn Clayton MD [facility]

## 2023-03-17 ENCOUNTER — NURSE ONLY (OUTPATIENT)
Dept: FAMILY MEDICINE CLINIC | Age: 63
End: 2023-03-17
Payer: COMMERCIAL

## 2023-03-17 VITALS — OXYGEN SATURATION: 96 % | SYSTOLIC BLOOD PRESSURE: 118 MMHG | DIASTOLIC BLOOD PRESSURE: 70 MMHG | HEART RATE: 68 BPM

## 2023-03-17 DIAGNOSIS — I12.9 BENIGN HYPERTENSION WITH CKD (CHRONIC KIDNEY DISEASE), STAGE II: Primary | ICD-10-CM

## 2023-03-17 DIAGNOSIS — N18.2 BENIGN HYPERTENSION WITH CKD (CHRONIC KIDNEY DISEASE), STAGE II: Primary | ICD-10-CM

## 2023-03-17 PROCEDURE — 99211 OFF/OP EST MAY X REQ PHY/QHP: CPT | Performed by: FAMILY MEDICINE

## 2023-03-17 NOTE — PROGRESS NOTES
Chief Complaint   Patient presents with    Hypertension    Blood Pressure Check        Patient is here for blood pressure recheck. 1. Benign hypertension with CKD (chronic kidney disease), stage II        Well controlled BP   Continue current treatment.        BP Readings from Last 3 Encounters:   03/17/23 118/70   03/02/23 (!) 144/82   11/22/22 130/77       Pulse Readings from Last 3 Encounters:   03/17/23 68   03/02/23 58   11/22/22 51       Future Appointments   Date Time Provider Vianey Malagon   3/29/2023  8:30 AM SCHEDULE, [place]   5/1/2023  8:45 AM Princess Oden DPM [facility]   6/2/2023 10:00 AM [facility]

## 2023-03-17 NOTE — PROGRESS NOTES
Chief Complaint   Patient presents with    Hypertension    Blood Pressure Check      Gama Mullen is here for BP check    BP Readings from Last 3 Encounters:   03/17/23 118/70   03/02/23 (!) 144/82   11/22/22 130/77       BP is 118/70      Future Appointments   Date Time Provider Vianey Malagon   3/17/2023  9:15 AM SCHEDULE, [facility]   3/29/2023  8:30 AM SCHEDULE, [place]   5/1/2023  8:45 AM Andres Sigala DPM [facility]   6/2/2023 10:00 AM Jeannie Lomeli MD [facility]

## 2023-03-29 ENCOUNTER — OFFICE VISIT (OUTPATIENT)
Dept: SURGERY | Age: 63
End: 2023-03-29

## 2023-03-29 VITALS
HEIGHT: 73 IN | SYSTOLIC BLOOD PRESSURE: 134 MMHG | DIASTOLIC BLOOD PRESSURE: 77 MMHG | WEIGHT: 236 LBS | HEART RATE: 60 BPM | BODY MASS INDEX: 31.28 KG/M2

## 2023-03-29 DIAGNOSIS — Z12.11 ENCOUNTER FOR SCREENING COLONOSCOPY: Primary | ICD-10-CM

## 2023-03-29 DIAGNOSIS — Z71.89 CARDIAC RISK COUNSELING: ICD-10-CM

## 2023-03-29 PROCEDURE — 99999 PR OFFICE/OUTPT VISIT,PROCEDURE ONLY: CPT | Performed by: STUDENT IN AN ORGANIZED HEALTH CARE EDUCATION/TRAINING PROGRAM

## 2023-03-29 RX ORDER — POLYETHYLENE GLYCOL 3350 17 G/17G
238 POWDER, FOR SOLUTION ORAL ONCE
Qty: 238 G | Refills: 0 | Status: SHIPPED | OUTPATIENT
Start: 2023-03-29 | End: 2023-03-29

## 2023-03-29 NOTE — PROGRESS NOTES
[facility]   History and Physical      PATIENT NAME: Puneet Givens   YOB: 1960     TODAY'S DATE: 3/29/2023    CHIEF COMPLAINT:  Screening colonoscopy      HISTORY OF PRESENT ILLNESS:  This is a 65 y.o. male presenting to be evaluated for screening colonoscopy. Patient denies any abdominal pain, constipation, diarrhea, change in color of stool, or blood in stool. Patient has no family history of colorectal cancer or polyps. Denies taking any anticoagulants but does take baby aspirin. Patient had previous colonoscopy 10 years ago and at that time he only thing they found was some internal hemorrhoids. No other complaints noted at this time.       Past Medical History:        Diagnosis Date    Acute heart failure ([address] 5/20/2018    Anemia     Benign hypertension with CKD (chronic kidney disease), stage II 7/24/2017    CAD (coronary artery disease)     Cardiomyopathy (HCC)     Chronic diastolic CHF (congestive heart failure) ([address] 7/23/2018    Chronic kidney disease     Coronary artery disease involving native coronary artery of native heart without angina pectoris 11/19/2016    Diabetic nephropathy associated with type 2 diabetes mellitus ([address] 1/12/2016    Diabetic polyneuropathy associated with type 2 diabetes mellitus ([address]     DM (diabetes mellitus), type 2 ([address]     for 15-20 yrs    Erectile dysfunction     Grief 7/24/2017    HTN (hypertension)     Hypertensive urgency     Lipidemia     Mild episode of recurrent major depressive disorder ([address] 1/13/2019    Neuropathy of right lower extremity 2/7/2020    Obesity     Obesity (BMI 30-39.9) 11/19/2016    MORENA (obstructive sleep apnea) 2/4/2013    MORENA on CPAP 2/4/2013    Sleep apnea 2/4/2013    Slow transit constipation     Uncontrolled type 2 diabetes mellitus, without long-term current use of insulin 10/13/2016       Past Surgical History:        Procedure Laterality Date    CARDIAC CATHETERIZATION  06/23/2017    at [facility] per Halima Garcia
Completed    Pneumococcal 0-64 years Vaccine  Completed    Hepatitis C screen  Completed    HIV screen  Completed    Hepatitis A vaccine  Aged Out    Hib vaccine  Aged Out    Meningococcal (ACWY) vaccine  Aged Out

## 2023-03-31 ENCOUNTER — TELEPHONE (OUTPATIENT)
Dept: FAMILY MEDICINE CLINIC | Age: 63
End: 2023-03-31

## 2023-03-31 DIAGNOSIS — K21.9 GASTROESOPHAGEAL REFLUX DISEASE WITHOUT ESOPHAGITIS: ICD-10-CM

## 2023-03-31 DIAGNOSIS — Z12.11 SCREEN FOR COLON CANCER: Primary | ICD-10-CM

## 2023-03-31 NOTE — TELEPHONE ENCOUNTER
Please give contact information to make appointment     Diagnosis Orders   1. Screen for colon cancer  López Huber MD, Gastroenterology, [place]      2.  Gastroesophageal reflux disease without esophagitis  López Huber MD, Gastroenterology, [place]           Future Appointments   Date Time Provider [facility]   5/1/2023  8:45 AM Maria Dolores Reyna DPM [facility]   6/2/2023 10:00 AM Dionne Buchanan MD [facility]

## 2023-03-31 NOTE — TELEPHONE ENCOUNTER
Pt and wife came into the office and stated that pt went into the hospital after he was seen in the office for GI issues and wants to schedule him a f/u. Pt adv he doesn't want to schedule. Writer inquired if he sees GI and he said he does but asked if a new referral could be place for him. Writer provided wife with [facility] to schedule appt and adv will call if appt opens sooner to bring pt in.

## 2023-05-01 ENCOUNTER — OFFICE VISIT (OUTPATIENT)
Dept: PODIATRY | Age: 63
End: 2023-05-01
Payer: COMMERCIAL

## 2023-05-01 VITALS — BODY MASS INDEX: 31.28 KG/M2 | HEIGHT: 73 IN | WEIGHT: 236 LBS

## 2023-05-01 DIAGNOSIS — B35.1 DERMATOPHYTOSIS OF NAIL: Primary | ICD-10-CM

## 2023-05-01 DIAGNOSIS — I73.9 PERIPHERAL VASCULAR DISORDER (HCC): ICD-10-CM

## 2023-05-01 DIAGNOSIS — E11.51 TYPE II DIABETES MELLITUS WITH PERIPHERAL CIRCULATORY DISORDER (HCC): ICD-10-CM

## 2023-05-01 DIAGNOSIS — R60.0 EDEMA OF LOWER EXTREMITY: ICD-10-CM

## 2023-05-01 PROCEDURE — 99213 OFFICE O/P EST LOW 20 MIN: CPT | Performed by: PODIATRIST

## 2023-05-01 PROCEDURE — G8417 CALC BMI ABV UP PARAM F/U: HCPCS | Performed by: PODIATRIST

## 2023-05-01 PROCEDURE — 3017F COLORECTAL CA SCREEN DOC REV: CPT | Performed by: PODIATRIST

## 2023-05-01 PROCEDURE — G8427 DOCREV CUR MEDS BY ELIG CLIN: HCPCS | Performed by: PODIATRIST

## 2023-05-01 PROCEDURE — 1036F TOBACCO NON-USER: CPT | Performed by: PODIATRIST

## 2023-05-01 PROCEDURE — 2022F DILAT RTA XM EVC RTNOPTHY: CPT | Performed by: PODIATRIST

## 2023-05-01 PROCEDURE — 3051F HG A1C>EQUAL 7.0%<8.0%: CPT | Performed by: PODIATRIST

## 2023-05-01 PROCEDURE — 11721 DEBRIDE NAIL 6 OR MORE: CPT | Performed by: PODIATRIST

## 2023-06-02 ENCOUNTER — HOSPITAL ENCOUNTER (OUTPATIENT)
Dept: GENERAL RADIOLOGY | Age: 63
End: 2023-06-02
Payer: COMMERCIAL

## 2023-06-02 ENCOUNTER — OFFICE VISIT (OUTPATIENT)
Dept: FAMILY MEDICINE CLINIC | Age: 63
End: 2023-06-02
Payer: COMMERCIAL

## 2023-06-02 ENCOUNTER — HOSPITAL ENCOUNTER (OUTPATIENT)
Age: 63
Discharge: HOME | End: 2023-06-02
Payer: COMMERCIAL

## 2023-06-02 ENCOUNTER — HOSPITAL ENCOUNTER (OUTPATIENT)
Age: 63
End: 2023-06-02
Payer: COMMERCIAL

## 2023-06-02 VITALS
HEIGHT: 73 IN | DIASTOLIC BLOOD PRESSURE: 78 MMHG | OXYGEN SATURATION: 99 % | WEIGHT: 244 LBS | BODY MASS INDEX: 32.34 KG/M2 | SYSTOLIC BLOOD PRESSURE: 120 MMHG | HEART RATE: 50 BPM | TEMPERATURE: 98.6 F

## 2023-06-02 DIAGNOSIS — Z12.5 PROSTATE CANCER SCREENING: ICD-10-CM

## 2023-06-02 DIAGNOSIS — N18.2 BENIGN HYPERTENSION WITH CKD (CHRONIC KIDNEY DISEASE), STAGE II: ICD-10-CM

## 2023-06-02 DIAGNOSIS — N52.01 ERECTILE DYSFUNCTION DUE TO ARTERIAL INSUFFICIENCY: ICD-10-CM

## 2023-06-02 DIAGNOSIS — M1A.3710 CHRONIC GOUT OF RIGHT FOOT DUE TO RENAL IMPAIRMENT WITHOUT TOPHUS: ICD-10-CM

## 2023-06-02 DIAGNOSIS — E11.65 TYPE 2 DIABETES MELLITUS WITH HYPERGLYCEMIA, WITHOUT LONG-TERM CURRENT USE OF INSULIN (HCC): Primary | ICD-10-CM

## 2023-06-02 DIAGNOSIS — I42.9 CARDIOMYOPATHY, UNSPECIFIED TYPE (HCC): ICD-10-CM

## 2023-06-02 DIAGNOSIS — I12.9 BENIGN HYPERTENSION WITH CKD (CHRONIC KIDNEY DISEASE), STAGE II: ICD-10-CM

## 2023-06-02 DIAGNOSIS — M21.961 ACQUIRED DEFORMITY OF RIGHT FOOT: ICD-10-CM

## 2023-06-02 DIAGNOSIS — E11.65 TYPE 2 DIABETES MELLITUS WITH HYPERGLYCEMIA, WITHOUT LONG-TERM CURRENT USE OF INSULIN (HCC): ICD-10-CM

## 2023-06-02 DIAGNOSIS — I50.42 CHRONIC COMBINED SYSTOLIC AND DIASTOLIC CHF (CONGESTIVE HEART FAILURE) (HCC): ICD-10-CM

## 2023-06-02 DIAGNOSIS — E11.42 TYPE 2 DIABETES MELLITUS WITH DIABETIC POLYNEUROPATHY, WITHOUT LONG-TERM CURRENT USE OF INSULIN (HCC): ICD-10-CM

## 2023-06-02 DIAGNOSIS — E78.5 HYPERLIPIDEMIA WITH TARGET LDL LESS THAN 70: ICD-10-CM

## 2023-06-02 PROBLEM — R11.2 NAUSEA AND VOMITING: Status: ACTIVE | Noted: 2023-03-25

## 2023-06-02 LAB
ALBUMIN SERPL-MCNC: 4.3 G/DL (ref 3.5–5.2)
ALP SERPL-CCNC: 98 U/L (ref 40–129)
ALT SERPL-CCNC: 11 U/L (ref 5–41)
ANION GAP SERPL CALCULATED.3IONS-SCNC: 9 MMOL/L (ref 9–17)
AST SERPL-CCNC: 13 U/L
BACTERIA URNS QL MICRO: NORMAL
BILIRUB SERPL-MCNC: 0.4 MG/DL (ref 0.3–1.2)
BILIRUB UR QL STRIP: NEGATIVE
BNP SERPL-MCNC: 91 PG/ML
BUN SERPL-MCNC: 18 MG/DL (ref 8–23)
CALCIUM SERPL-MCNC: 9.4 MG/DL (ref 8.6–10.4)
CASTS #/AREA URNS LPF: NORMAL /LPF
CHLORIDE SERPL-SCNC: 107 MMOL/L (ref 98–107)
CLARITY UR: CLEAR
CO2 SERPL-SCNC: 24 MMOL/L (ref 20–31)
COLOR UR: YELLOW
CREAT SERPL-MCNC: 1.36 MG/DL (ref 0.7–1.2)
EPI CELLS #/AREA URNS HPF: NORMAL /HPF
ERYTHROCYTE [DISTWIDTH] IN BLOOD BY AUTOMATED COUNT: 13.9 % (ref 11.5–14.9)
GFR SERPL CREATININE-BSD FRML MDRD: 59 ML/MIN/1.73M2
GLUCOSE SERPL-MCNC: 115 MG/DL (ref 70–99)
GLUCOSE UR STRIP-MCNC: ABNORMAL MG/DL
HBA1C MFR BLD: 7.4 %
HCT VFR BLD AUTO: 39.8 % (ref 41–53)
HGB BLD-MCNC: 13.1 G/DL (ref 13.5–17.5)
HGB UR QL STRIP.AUTO: NEGATIVE
KETONES UR STRIP-MCNC: NEGATIVE MG/DL
LEUKOCYTE ESTERASE UR QL STRIP: ABNORMAL
MAGNESIUM SERPL-MCNC: 2.2 MG/DL (ref 1.6–2.6)
MCH RBC QN AUTO: 30.9 PG (ref 26–34)
MCHC RBC AUTO-ENTMCNC: 33 G/DL (ref 31–37)
MCV RBC AUTO: 93.8 FL (ref 80–100)
NITRITE UR QL STRIP: NEGATIVE
PH UR STRIP: 5 [PH] (ref 5–8)
PHOSPHATE SERPL-MCNC: 3.5 MG/DL (ref 2.5–4.5)
PLATELET # BLD AUTO: 195 K/UL (ref 150–450)
PMV BLD AUTO: 10 FL (ref 6–12)
POTASSIUM SERPL-SCNC: 4.2 MMOL/L (ref 3.7–5.3)
PROT SERPL-MCNC: 7.8 G/DL (ref 6.4–8.3)
PROT UR STRIP-MCNC: ABNORMAL MG/DL
RBC # BLD AUTO: 4.25 M/UL (ref 4.5–5.9)
RBC #/AREA URNS HPF: NORMAL /HPF
SODIUM SERPL-SCNC: 140 MMOL/L (ref 135–144)
SP GR UR STRIP: 1.04 (ref 1–1.03)
TSH SERPL-MCNC: 0.43 UIU/ML (ref 0.3–5)
URATE SERPL-MCNC: 6.3 MG/DL (ref 3.4–7)
UROBILINOGEN UR STRIP-ACNC: NORMAL
WBC #/AREA URNS HPF: NORMAL /HPF
WBC OTHER # BLD: 5.6 K/UL (ref 3.5–11)

## 2023-06-02 PROCEDURE — 36415 COLL VENOUS BLD VENIPUNCTURE: CPT

## 2023-06-02 PROCEDURE — 82607 VITAMIN B-12: CPT

## 2023-06-02 PROCEDURE — 2022F DILAT RTA XM EVC RTNOPTHY: CPT | Performed by: FAMILY MEDICINE

## 2023-06-02 PROCEDURE — G0103 PSA SCREENING: HCPCS

## 2023-06-02 PROCEDURE — 3017F COLORECTAL CA SCREEN DOC REV: CPT | Performed by: FAMILY MEDICINE

## 2023-06-02 PROCEDURE — 82746 ASSAY OF FOLIC ACID SERUM: CPT

## 2023-06-02 PROCEDURE — 85027 COMPLETE CBC AUTOMATED: CPT

## 2023-06-02 PROCEDURE — 1036F TOBACCO NON-USER: CPT | Performed by: FAMILY MEDICINE

## 2023-06-02 PROCEDURE — G8417 CALC BMI ABV UP PARAM F/U: HCPCS | Performed by: FAMILY MEDICINE

## 2023-06-02 PROCEDURE — 83880 ASSAY OF NATRIURETIC PEPTIDE: CPT

## 2023-06-02 PROCEDURE — 73630 X-RAY EXAM OF FOOT: CPT

## 2023-06-02 PROCEDURE — 83036 HEMOGLOBIN GLYCOSYLATED A1C: CPT | Performed by: FAMILY MEDICINE

## 2023-06-02 PROCEDURE — 84443 ASSAY THYROID STIM HORMONE: CPT

## 2023-06-02 PROCEDURE — 84100 ASSAY OF PHOSPHORUS: CPT

## 2023-06-02 PROCEDURE — 99214 OFFICE O/P EST MOD 30 MIN: CPT | Performed by: FAMILY MEDICINE

## 2023-06-02 PROCEDURE — G8427 DOCREV CUR MEDS BY ELIG CLIN: HCPCS | Performed by: FAMILY MEDICINE

## 2023-06-02 PROCEDURE — 3051F HG A1C>EQUAL 7.0%<8.0%: CPT | Performed by: FAMILY MEDICINE

## 2023-06-02 PROCEDURE — 80053 COMPREHEN METABOLIC PANEL: CPT

## 2023-06-02 PROCEDURE — 81001 URINALYSIS AUTO W/SCOPE: CPT

## 2023-06-02 PROCEDURE — 83735 ASSAY OF MAGNESIUM: CPT

## 2023-06-02 PROCEDURE — 84550 ASSAY OF BLOOD/URIC ACID: CPT

## 2023-06-02 RX ORDER — TAMSULOSIN HYDROCHLORIDE 0.4 MG/1
0.4 CAPSULE ORAL DAILY
Qty: 90 CAPSULE | Refills: 1 | Status: SHIPPED | OUTPATIENT
Start: 2023-06-02

## 2023-06-02 RX ORDER — ASPIRIN 81 MG/1
TABLET ORAL
Qty: 90 TABLET | Refills: 3 | Status: SHIPPED | OUTPATIENT
Start: 2023-06-02

## 2023-06-02 RX ORDER — FUROSEMIDE 40 MG/1
40 TABLET ORAL DAILY
Qty: 90 TABLET | Refills: 3 | Status: SHIPPED | OUTPATIENT
Start: 2023-06-02

## 2023-06-02 RX ORDER — SILDENAFIL 100 MG/1
100 TABLET, FILM COATED ORAL DAILY PRN
Qty: 30 TABLET | Refills: 2 | Status: SHIPPED | OUTPATIENT
Start: 2023-06-02

## 2023-06-02 RX ORDER — ATORVASTATIN CALCIUM 40 MG/1
40 TABLET, FILM COATED ORAL
Qty: 90 TABLET | Refills: 3 | Status: SHIPPED | OUTPATIENT
Start: 2023-06-02

## 2023-06-02 RX ORDER — ALLOPURINOL 100 MG/1
100 TABLET ORAL DAILY
Qty: 90 TABLET | Refills: 3 | Status: SHIPPED | OUTPATIENT
Start: 2023-06-02

## 2023-06-02 RX ORDER — METOPROLOL SUCCINATE 100 MG/1
100 TABLET, EXTENDED RELEASE ORAL DAILY
Qty: 90 TABLET | Refills: 3 | Status: SHIPPED | OUTPATIENT
Start: 2023-06-02

## 2023-06-02 RX ORDER — AMLODIPINE BESYLATE 10 MG/1
TABLET ORAL
Qty: 90 TABLET | Refills: 3 | Status: SHIPPED | OUTPATIENT
Start: 2023-06-02

## 2023-06-02 RX ORDER — SPIRONOLACTONE 25 MG/1
25 TABLET ORAL EVERY MORNING
Qty: 90 TABLET | Refills: 3 | Status: SHIPPED | OUTPATIENT
Start: 2023-06-02

## 2023-06-02 SDOH — ECONOMIC STABILITY: FOOD INSECURITY: WITHIN THE PAST 12 MONTHS, THE FOOD YOU BOUGHT JUST DIDN'T LAST AND YOU DIDN'T HAVE MONEY TO GET MORE.: NEVER TRUE

## 2023-06-02 SDOH — ECONOMIC STABILITY: INCOME INSECURITY: HOW HARD IS IT FOR YOU TO PAY FOR THE VERY BASICS LIKE FOOD, HOUSING, MEDICAL CARE, AND HEATING?: NOT HARD AT ALL

## 2023-06-02 SDOH — ECONOMIC STABILITY: FOOD INSECURITY: WITHIN THE PAST 12 MONTHS, YOU WORRIED THAT YOUR FOOD WOULD RUN OUT BEFORE YOU GOT MONEY TO BUY MORE.: NEVER TRUE

## 2023-06-02 ASSESSMENT — ENCOUNTER SYMPTOMS
CONSTIPATION: 0
SHORTNESS OF BREATH: 1
COUGH: 0
NAUSEA: 0
WHEEZING: 0
CHEST TIGHTNESS: 0
ABDOMINAL DISTENTION: 0
DIARRHEA: 0
VOMITING: 0
ABDOMINAL PAIN: 0

## 2023-06-02 ASSESSMENT — PATIENT HEALTH QUESTIONNAIRE - PHQ9
10. IF YOU CHECKED OFF ANY PROBLEMS, HOW DIFFICULT HAVE THESE PROBLEMS MADE IT FOR YOU TO DO YOUR WORK, TAKE CARE OF THINGS AT HOME, OR GET ALONG WITH OTHER PEOPLE: 0
SUM OF ALL RESPONSES TO PHQ QUESTIONS 1-9: 5
9. THOUGHTS THAT YOU WOULD BE BETTER OFF DEAD, OR OF HURTING YOURSELF: 0
4. FEELING TIRED OR HAVING LITTLE ENERGY: 1
SUM OF ALL RESPONSES TO PHQ QUESTIONS 1-9: 5
SUM OF ALL RESPONSES TO PHQ QUESTIONS 1-9: 5
SUM OF ALL RESPONSES TO PHQ9 QUESTIONS 1 & 2: 1
5. POOR APPETITE OR OVEREATING: 1
6. FEELING BAD ABOUT YOURSELF - OR THAT YOU ARE A FAILURE OR HAVE LET YOURSELF OR YOUR FAMILY DOWN: 0
3. TROUBLE FALLING OR STAYING ASLEEP: 1
SUM OF ALL RESPONSES TO PHQ QUESTIONS 1-9: 5
1. LITTLE INTEREST OR PLEASURE IN DOING THINGS: 1
8. MOVING OR SPEAKING SO SLOWLY THAT OTHER PEOPLE COULD HAVE NOTICED. OR THE OPPOSITE, BEING SO FIGETY OR RESTLESS THAT YOU HAVE BEEN MOVING AROUND A LOT MORE THAN USUAL: 1
7. TROUBLE CONCENTRATING ON THINGS, SUCH AS READING THE NEWSPAPER OR WATCHING TELEVISION: 0
2. FEELING DOWN, DEPRESSED OR HOPELESS: 0

## 2023-06-02 NOTE — PROGRESS NOTES
Visit Information    Have you changed or started any medications since your last visit including any over-the-counter medicines, vitamins, or herbal medicines? no   Have you stopped taking any of your medications? Is so, why? -  no  Are you having any side effects from any of your medications? - no    Have you seen any other physician or provider since your last visit?  no   Have you had any other diagnostic tests since your last visit? yes -    Have you been seen in the emergency room and/or had an admission in a hospital since we last saw you?  yes -    Have you had your routine dental cleaning in the past 6 months?  no     Do you have an active MyChart account? If no, what is the barrier?   Yes    Patient Care Team:  Madi Mustafa MD as PCP - General (Family Medicine)  Madi Mustafa MD as PCP - Empaneled Provider  Sunny Wagner MD as Consulting Physician (Cardiology)  Zoila Bass OD (Optometry)  Brandon Rendon MD as Consulting Physician (Nephrology)  Mark Esquivel MD as Consulting Physician (Cardiology)  Dexter Wagner DPM as Physician (Podiatry)  Yves Bo MD as Consulting Physician (Urology)    Medical History Review  Past Medical, Family, and Social History reviewed and does contribute to the patient presenting condition    Health Maintenance   Topic Date Due    COVID-19 Vaccine (2 - Pfizer series) 05/03/2021    Prostate Specific Antigen (PSA) Screening or Monitoring  08/27/2022    Colorectal Cancer Screen  01/15/2023    A1C test (Diabetic or Prediabetic)  06/02/2023    Diabetic Alb to Cr ratio (uACR) test  07/15/2023    Lipids  03/02/2024    Depression Monitoring  03/02/2024    GFR test (Diabetes, CKD 3-4, OR last GFR 15-59)  03/02/2024    DTaP/Tdap/Td vaccine (2 - Td or Tdap) 04/03/2024    Diabetic foot exam  05/01/2024    Diabetic retinal exam  05/02/2024    Flu vaccine  Completed    Shingles vaccine  Completed    Pneumococcal 0-64 years Vaccine  Completed    Hepatitis C screen
** stop carvediolol 25 mg**     Dispense:  90 tablet     Refill:  3    furosemide (LASIX) 40 MG tablet     Sig: Take 1 tablet by mouth daily     Dispense:  90 tablet     Refill:  3    atorvastatin (LIPITOR) 40 MG tablet     Sig: Take 1 tablet by mouth Daily with supper     Dispense:  90 tablet     Refill:  3    allopurinol (ZYLOPRIM) 100 MG tablet     Sig: Take 1 tablet by mouth daily FOR GOUT. STOP IT IF ANY RASH DEVELOPS! Dispense:  90 tablet     Refill:  3    sildenafil (VIAGRA) 100 MG tablet     Sig: Take 1 tablet by mouth daily as needed for Erectile Dysfunction     Dispense:  30 tablet     Refill:  2       Orders Placed This Encounter   Procedures    XR FOOT RIGHT (MIN 3 VIEWS)     Standing Status:   Future     Standing Expiration Date:   6/1/2024     Order Specific Question:   Reason for exam:     Answer:   r/o charcot foot    PSA Screening     Standing Status:   Future     Standing Expiration Date:   6/2/2024    CBC     Standing Status:   Future     Standing Expiration Date:   6/2/2024    Comprehensive Metabolic Panel     Standing Status:   Future     Standing Expiration Date:   6/2/2024    Magnesium     Standing Status:   Future     Standing Expiration Date:   6/2/2024    Phosphorus     Standing Status:   Future     Standing Expiration Date:   6/2/2024    TSH     Standing Status:   Future     Standing Expiration Date:   6/2/2024    Uric Acid     Standing Status:   Future     Standing Expiration Date:   6/2/2024    Urinalysis with Reflex to Culture     Standing Status:   Future     Standing Expiration Date:   6/2/2024     Order Specific Question:   SPECIFY(EX-CATH,MIDSTREAM,CYSTO,ETC)?      Answer:   MIDSTREAM    Vitamin B12 & Folate     Standing Status:   Future     Standing Expiration Date:   6/2/2024    Brain Natriuretic Peptide     Standing Status:   Future     Standing Expiration Date:   6/2/2024    [facility] Failure [address]     Referral Priority:   Routine     Referral Type:   Eval

## 2023-06-02 NOTE — RESULT ENCOUNTER NOTE
Addressed during office visit today, A1c worsening, continue treatment recommended during the office visit.

## 2023-06-03 LAB
FOLATE SERPL-MCNC: 14 NG/ML
PSA SERPL-MCNC: 1.97 NG/ML
VIT B12 SERPL-MCNC: 651 PG/ML (ref 232–1245)

## 2023-06-03 NOTE — RESULT ENCOUNTER NOTE
Please notify patient: Urine is concentrated, should make sure to drink 6 x 8 ounce glasses of water every day  Blood glucose 115 well-controlled  Chronic kidney disease stage III stable  Mild anemia to reschedule the colonoscopy  Otherwise labs within normal limits  continue current treatment    Did he see podiatry for the fracture in his foot?   Future Appointments  7/3/2023   8:00 AM    Candace Raya DPM            [facility] Podiatry        [facility]  7/10/2023  8:30 AM    Beverly Churchill MD        [facility] TETE CHRISTIE  9/21/2023  8:30 AM    Rosita Martinez MD     [facility]

## 2023-06-07 ENCOUNTER — OFFICE VISIT (OUTPATIENT)
Dept: PODIATRY | Age: 63
End: 2023-06-07

## 2023-06-07 VITALS — WEIGHT: 244 LBS | HEIGHT: 73 IN | BODY MASS INDEX: 32.34 KG/M2

## 2023-06-07 DIAGNOSIS — S92.355A CLOSED NONDISPLACED FRACTURE OF FIFTH METATARSAL BONE OF LEFT FOOT, INITIAL ENCOUNTER: Primary | ICD-10-CM

## 2023-06-07 DIAGNOSIS — R60.0 EDEMA OF LOWER EXTREMITY: ICD-10-CM

## 2023-06-20 ENCOUNTER — HOSPITAL ENCOUNTER (OUTPATIENT)
Age: 63
Discharge: HOME | End: 2023-06-20
Payer: COMMERCIAL

## 2023-06-20 ENCOUNTER — HOSPITAL ENCOUNTER (OUTPATIENT)
Dept: OTHER | Age: 63
Discharge: HOME | End: 2023-06-20
Payer: COMMERCIAL

## 2023-06-20 VITALS
SYSTOLIC BLOOD PRESSURE: 134 MMHG | DIASTOLIC BLOOD PRESSURE: 72 MMHG | RESPIRATION RATE: 16 BRPM | HEIGHT: 73 IN | HEART RATE: 51 BPM | OXYGEN SATURATION: 97 % | BODY MASS INDEX: 33.19 KG/M2 | WEIGHT: 250.4 LBS

## 2023-06-20 PROBLEM — N18.30 BENIGN HYPERTENSION WITH CKD (CHRONIC KIDNEY DISEASE) STAGE III (HCC): Status: ACTIVE | Noted: 2017-07-24

## 2023-06-20 LAB
ANION GAP SERPL CALCULATED.3IONS-SCNC: 11 MMOL/L (ref 9–17)
BNP SERPL-MCNC: 136 PG/ML
BUN SERPL-MCNC: 25 MG/DL (ref 8–23)
CHLORIDE SERPL-SCNC: 106 MMOL/L (ref 98–107)
CO2 SERPL-SCNC: 23 MMOL/L (ref 20–31)
CREAT SERPL-MCNC: 1.55 MG/DL (ref 0.7–1.2)
GFR SERPL CREATININE-BSD FRML MDRD: 51 ML/MIN/1.73M2
POTASSIUM SERPL-SCNC: 3.8 MMOL/L (ref 3.7–5.3)
SODIUM SERPL-SCNC: 140 MMOL/L (ref 135–144)

## 2023-06-20 PROCEDURE — 80051 ELECTROLYTE PANEL: CPT

## 2023-06-20 PROCEDURE — 84520 ASSAY OF UREA NITROGEN: CPT

## 2023-06-20 PROCEDURE — 99202 OFFICE O/P NEW SF 15 MIN: CPT

## 2023-06-20 PROCEDURE — 82565 ASSAY OF CREATININE: CPT

## 2023-06-20 PROCEDURE — 36415 COLL VENOUS BLD VENIPUNCTURE: CPT

## 2023-06-20 PROCEDURE — 83880 ASSAY OF NATRIURETIC PEPTIDE: CPT

## 2023-06-20 NOTE — RESULT ENCOUNTER NOTE
Please notify patient: Kidney function stable, chronic kidney disease stage III  Otherwise labs within normal limits  continue current treatment    Future Appointments  6/29/2023  8:00 AM    [facility] CHF CLINIC RM [address]  7/3/2023   8:00 AM    Chance Bell DPM            [facility]        [facility]  7/10/2023  8:30 AM    Aleksander Beltran MD        [facility]        [facility]  7/11/2023  2:45 PM    ST[facility] PAT RM 3              [facility] PAT            [place]  9/21/2023  8:30 AM    Raudel Rodgers MD     fp [facility]

## 2023-06-20 NOTE — PROGRESS NOTES
Date:  2023  Time:  2:10 PM    CHF Clinic at [facility]    Office: [phone] Fax: [phone]    Re:  Otoniel Amezquita   Patient : 1960    Vital Signs: /72   Pulse 51   Resp 16   Ht 6' 1\" (1.854 m)   Wt 250 lb 6.4 oz (113.6 kg)   SpO2 97%   BMI 33.04 kg/m²                                              Recent Labs     23  1006      K 3.8      CO2 23   BUN 25*   CREATININE 1.55*   PROBNP 136      Previous Labs:  23,  BNP 91, BUN 18, Creat 1.36    Respiratory:    Assessment  Charting Type: Admission    Breath Sounds  Right Upper Lobe: Clear  Right Middle Lobe: Clear  Right Lower Lobe: Clear  Left Upper Lobe: Clear  Left Lower Lobe: Clear  Unlabored    Cough/Sputum  Cough: None    Cardiac:  Regular  S-1, S-2  Denies symptoms       Peripheral Vascular  Peripheral Vascular (WDL): Within Defined Limits  Edema: None      Complaints: None    Physician Orders None    Comment : Pt was seen in CHF clinic for his initial consultation, referred by PCP for Chronic combined systolic ad diastolic CHF. Pt is also being seen by cardiologist, Dr. Katerin Campbell. He has not had an Echo in the last year and will be completing this in the near future. Pt oriented to CHF Clinic, CHF video viewed, care plan established with patient input. Goals set to keep patient healthy and out of hospital with med compliance, fluid restrictions and 2000 mg/day sodium restriction. Educational packet provided. Verbally reviewed importance of medication compliance with patient; patient verbalized understanding. Moderate daily exercise encouraged as tolerated. Discussed rest breaks as needed; patient verbalized understanding. Patient instructed to weigh self at the same time of each day, using same clothes and same scale; reinforced teaching to monitor for 3-5 lb weight increase over 1-2 days, and to notify the CHF clinic at [phone] or physician office if weight change noted.

## 2023-06-29 ENCOUNTER — HOSPITAL ENCOUNTER (OUTPATIENT)
Dept: OTHER | Age: 63
Discharge: HOME | End: 2023-06-29
Payer: COMMERCIAL

## 2023-06-29 ENCOUNTER — HOSPITAL ENCOUNTER (OUTPATIENT)
Age: 63
Discharge: HOME | End: 2023-06-29
Payer: COMMERCIAL

## 2023-06-29 VITALS
HEART RATE: 45 BPM | OXYGEN SATURATION: 99 % | HEIGHT: 73 IN | DIASTOLIC BLOOD PRESSURE: 70 MMHG | RESPIRATION RATE: 16 BRPM | WEIGHT: 246.2 LBS | BODY MASS INDEX: 32.63 KG/M2 | SYSTOLIC BLOOD PRESSURE: 138 MMHG

## 2023-06-29 LAB
ANION GAP SERPL CALCULATED.3IONS-SCNC: 11 MMOL/L (ref 9–17)
BNP SERPL-MCNC: 132 PG/ML
BUN SERPL-MCNC: 25 MG/DL (ref 8–23)
CHLORIDE SERPL-SCNC: 104 MMOL/L (ref 98–107)
CO2 SERPL-SCNC: 24 MMOL/L (ref 20–31)
CREAT SERPL-MCNC: 1.39 MG/DL (ref 0.7–1.2)
GFR SERPL CREATININE-BSD FRML MDRD: 58 ML/MIN/1.73M2
POTASSIUM SERPL-SCNC: 4.2 MMOL/L (ref 3.7–5.3)
SODIUM SERPL-SCNC: 139 MMOL/L (ref 135–144)

## 2023-06-29 PROCEDURE — 80051 ELECTROLYTE PANEL: CPT

## 2023-06-29 PROCEDURE — 99211 OFF/OP EST MAY X REQ PHY/QHP: CPT

## 2023-06-29 PROCEDURE — 84520 ASSAY OF UREA NITROGEN: CPT

## 2023-06-29 PROCEDURE — 83880 ASSAY OF NATRIURETIC PEPTIDE: CPT

## 2023-06-29 PROCEDURE — 36415 COLL VENOUS BLD VENIPUNCTURE: CPT

## 2023-06-29 PROCEDURE — 82565 ASSAY OF CREATININE: CPT

## 2023-07-10 ENCOUNTER — OFFICE VISIT (OUTPATIENT)
Dept: PODIATRY | Age: 63
End: 2023-07-10
Payer: COMMERCIAL

## 2023-07-10 VITALS — HEIGHT: 73 IN | BODY MASS INDEX: 32.6 KG/M2 | WEIGHT: 246 LBS

## 2023-07-10 DIAGNOSIS — B35.1 DERMATOPHYTOSIS OF NAIL: ICD-10-CM

## 2023-07-10 DIAGNOSIS — E11.51 TYPE II DIABETES MELLITUS WITH PERIPHERAL CIRCULATORY DISORDER (HCC): Primary | ICD-10-CM

## 2023-07-10 DIAGNOSIS — R60.0 EDEMA OF LOWER EXTREMITY: ICD-10-CM

## 2023-07-10 DIAGNOSIS — S92.355A CLOSED NONDISPLACED FRACTURE OF FIFTH METATARSAL BONE OF LEFT FOOT, INITIAL ENCOUNTER: ICD-10-CM

## 2023-07-10 PROCEDURE — 2022F DILAT RTA XM EVC RTNOPTHY: CPT | Performed by: PODIATRIST

## 2023-07-10 PROCEDURE — 3051F HG A1C>EQUAL 7.0%<8.0%: CPT | Performed by: PODIATRIST

## 2023-07-10 PROCEDURE — 11721 DEBRIDE NAIL 6 OR MORE: CPT | Performed by: PODIATRIST

## 2023-07-10 PROCEDURE — 99214 OFFICE O/P EST MOD 30 MIN: CPT | Performed by: PODIATRIST

## 2023-07-10 PROCEDURE — G8417 CALC BMI ABV UP PARAM F/U: HCPCS | Performed by: PODIATRIST

## 2023-07-10 PROCEDURE — G8427 DOCREV CUR MEDS BY ELIG CLIN: HCPCS | Performed by: PODIATRIST

## 2023-07-10 PROCEDURE — 1036F TOBACCO NON-USER: CPT | Performed by: PODIATRIST

## 2023-07-10 PROCEDURE — 3017F COLORECTAL CA SCREEN DOC REV: CPT | Performed by: PODIATRIST

## 2023-07-17 ENCOUNTER — HOSPITAL ENCOUNTER (OUTPATIENT)
Dept: PREADMISSION TESTING | Age: 63
Discharge: HOME | End: 2023-07-21

## 2023-07-17 VITALS — HEIGHT: 73 IN | BODY MASS INDEX: 32.47 KG/M2 | WEIGHT: 245 LBS

## 2023-07-17 NOTE — PROGRESS NOTES
Dr. Lea Huizar, anesthesia, was contacted and informed of patient's history and planned surgery. CARDIAC  clearance requested. Surgery scheduling will notify Dr. Attila Lewis office who will be responsible for making sure the clearance is obtained and is in the chart for surgery.

## 2023-07-17 NOTE — PROGRESS NOTES
Pre-op Instructions For Out-Patient Endoscopy Surgery    Medication Instructions:  Please stop herbs and any supplements now (includes vitamins and minerals). Please contact your surgeon and prescribing physician for pre-op instructions for any blood thinners. If you have inhalers/aerosol treatments at home, please use them the morning of your surgery and bring the inhalers with you to the hospital.    Please take the following medications the morning of your surgery with a sip of water:    METOPROLOL, AMLODIPINE. Surgery Instructions:  After midnight before surgery:  Do not eat or drink anything, including water, mints, gum, and hard candy. You may brush your teeth without swallowing. No smoking, chewing tobacco, or street drugs. Please shower or bathe before surgery. Please do not wear any cologne, lotion, powder, jewelry, piercings, perfume, makeup, nail polish, hair accessories, or hair spray on the day of surgery. Wear loose comfortable clothing. Leave your valuables at home. Bring a storage case for any glasses/contacts. An adult who is responsible for you MUST drive you home and should be with you for the first 24 hours after surgery. The Day of Surgery:  Arrive at [facility] AT [facility] Surgery Entrance at the time directed by your surgeon and check in at the desk. If you have a living will or healthcare power of , please bring a copy. You will be taken to the pre-op holding area where you will be prepared for surgery. A physical assessment will be performed by a nurse practitioner or house officer. Your IV will be started and you will meet your anesthesiologist.    When you go to surgery, your family will be directed to the surgical waiting room, where the doctor should speak with them after your surgery. After surgery, you will be taken to the recovery room and or short stay unit for recovery and preparation for discharge.     INSTRUCTIONS READ TO

## 2023-07-27 ENCOUNTER — HOSPITAL ENCOUNTER (OUTPATIENT)
Age: 63
Discharge: HOME | End: 2023-07-29
Attending: FAMILY MEDICINE
Payer: COMMERCIAL

## 2023-07-27 DIAGNOSIS — I50.42 CHRONIC COMBINED SYSTOLIC AND DIASTOLIC CHF (CONGESTIVE HEART FAILURE) (HCC): ICD-10-CM

## 2023-07-27 DIAGNOSIS — I42.9 CARDIOMYOPATHY, UNSPECIFIED TYPE (HCC): ICD-10-CM

## 2023-07-27 LAB
ECHO AO ROOT DIAM: 3.1 CM
ECHO AV MEAN GRADIENT: 11 MMHG
ECHO AV MEAN VELOCITY: 1.6 M/S
ECHO AV PEAK GRADIENT: 19 MMHG
ECHO AV PEAK VELOCITY: 2.2 M/S
ECHO AV VELOCITY RATIO: 0.41
ECHO AV VTI: 56 CM
ECHO EST RA PRESSURE: 3 MMHG
ECHO LA AREA 2C: 24 CM2
ECHO LA AREA 4C: 27 CM2
ECHO LV E' LATERAL VELOCITY: 4 CM/S
ECHO LV E' SEPTAL VELOCITY: 5 CM/S
ECHO LV EJECTION FRACTION 3D: 66 %
ECHO LV ESV 3D: 50 ML
ECHO LV FRACTIONAL SHORTENING: 36 % (ref 28–44)
ECHO LV INTERNAL DIMENSION DIASTOLIC: 5.5 CM (ref 4.2–5.9)
ECHO LV INTERNAL DIMENSION SYSTOLIC: 3.5 CM
ECHO LV IVSD: 1.5 CM (ref 0.6–1)
ECHO LV MASS 2D: 373.1 G (ref 88–224)
ECHO LV POSTERIOR WALL DIASTOLIC: 1.5 CM (ref 0.6–1)
ECHO LV RELATIVE WALL THICKNESS RATIO: 0.55
ECHO LVOT AV VTI INDEX: 0.5
ECHO LVOT MEAN GRADIENT: 2 MMHG
ECHO LVOT PEAK GRADIENT: 3 MMHG
ECHO LVOT PEAK VELOCITY: 0.9 M/S
ECHO LVOT VTI: 28 CM
ECHO MV A VELOCITY: 0.8 M/S
ECHO MV E DECELERATION TIME (DT): 211 MS
ECHO MV E VELOCITY: 0.7 M/S
ECHO MV E/A RATIO: 0.88
ECHO MV E/E' LATERAL: 17.5
ECHO MV E/E' RATIO (AVERAGED): 15.75
ECHO MV E/E' SEPTAL: 14
ECHO MV MEAN GRADIENT: 1 MMHG
ECHO RA AREA 4C: 21 CM2
ECHO RIGHT VENTRICULAR SYSTOLIC PRESSURE (RVSP): 7 MMHG
ECHO RV TAPSE: 2 CM (ref 1.7–?)
ECHO TV REGURGITANT MAX VELOCITY: 0.96 M/S
LEFT VENTRICULAR EJECTION FRACTION MODE: NORMAL
LV EF: 55 % (ref 55–60)

## 2023-07-27 PROCEDURE — 93306 TTE W/DOPPLER COMPLETE: CPT | Performed by: INTERNAL MEDICINE

## 2023-07-27 PROCEDURE — 93306 TTE W/DOPPLER COMPLETE: CPT

## 2023-07-28 NOTE — RESULT ENCOUNTER NOTE
Please notify patient: Echo 2D showed chronic changes, ejection fraction 66% which is normal, moderately thick muscle consistent with high blood pressure.   Mild aortic stenosis, nothing to worry about at this time, will continue to monitor, good blood pressure control and diabetes control is important    Follow-up with cardiologist as scheduled  Future Appointments  7/31/2023  8:45 AM    Shanika Alfaro DPM            [facility]        [facility]  8/1/2023   9:00 AM    Phill Cordon APRN* [facility]        [facility] C  8/3/2023   8:00 AM    [facility] CHF CLINIC RM [address] [facility]  9/21/2023  8:30 AM    MD yanelis Mishra

## 2023-07-31 ENCOUNTER — OFFICE VISIT (OUTPATIENT)
Dept: PODIATRY | Age: 63
End: 2023-07-31
Payer: COMMERCIAL

## 2023-07-31 VITALS — BODY MASS INDEX: 32.47 KG/M2 | WEIGHT: 245 LBS | HEIGHT: 73 IN

## 2023-07-31 DIAGNOSIS — S92.355A CLOSED NONDISPLACED FRACTURE OF FIFTH METATARSAL BONE OF LEFT FOOT, INITIAL ENCOUNTER: Primary | ICD-10-CM

## 2023-07-31 PROCEDURE — 99214 OFFICE O/P EST MOD 30 MIN: CPT | Performed by: PODIATRIST

## 2023-07-31 PROCEDURE — G8417 CALC BMI ABV UP PARAM F/U: HCPCS | Performed by: PODIATRIST

## 2023-07-31 PROCEDURE — 3017F COLORECTAL CA SCREEN DOC REV: CPT | Performed by: PODIATRIST

## 2023-07-31 PROCEDURE — G8427 DOCREV CUR MEDS BY ELIG CLIN: HCPCS | Performed by: PODIATRIST

## 2023-07-31 PROCEDURE — 1036F TOBACCO NON-USER: CPT | Performed by: PODIATRIST

## 2023-08-03 ENCOUNTER — HOSPITAL ENCOUNTER (OUTPATIENT)
Dept: OTHER | Age: 63
Discharge: HOME | End: 2023-08-03

## 2023-08-07 NOTE — PROGRESS NOTES
weight change. Musculoskeletal: Positive for arthralgias, gait problem and joint swelling. Neurological ROS: negative for - behavioral changes, confusion, headaches or seizures. Negative for weakness and numbness. Dermatological ROS: negative for - mole changes, rash  Cardiovascular: Negative for leg swelling. Gastrointestinal: Negative for constipation, diarrhea, nausea and vomiting. Lower Extremity Physical Examination:     Vitals: There were no vitals filed for this visit. General: AAO x 3 in NAD. Dermatologic Exam:  Skin lesion/ulceration Absent . Skin No rashes or nodules noted. .       Musculoskeletal:     1st MPJ ROM decreased, Bilateral.  Muscle strength 5/5, Bilateral.  No Pain present upon palpation of right 5th metatarsal.  Medial longitudinal arch, Bilateral WNL. Ankle ROM WNL,Bilateral.    Dorsally contracted digits absent digits 1-5 Bilateral.     Vascular: DP and PT pulses palpable 2/4, Bilateral.  CFT <3 seconds, Bilateral.  Hair growth present to the level of the digits, Bilateral.  Edema absent, Bilateral.  Varicosities absent, Bilateral. Erythema absent, Bilateral    Neurological: Sensation intact to light touch to level of digits, Bilateral.  Protective sensation intact 10/10 sites via 5.07/10g [place]-Tejinder Monofilament, Bilateral.  negative Tinel's, Bilateral.  negative Valleix sign, Bilateral.      Integument: Warm, dry, supple, Bilateral.  Open lesion absent, Bilateral.  Interdigital maceration absent to web spaces 1-4, Bilateral.  Nails are normal in length, thickness and color 1-5 bilateral.  Fissures absent, Bilateral.     Xrays, 3 views right foot:     There is an oblique minimally displaced fracture of the 5th metatarsal shaft. No visible intra-articular extension. No dislocation. There is noted bone healing along fracture site. Asessment: Patient is a 65 y.o. male with:    Diagnosis Orders   1.  Closed nondisplaced fracture of fifth metatarsal bone of left

## 2023-08-10 ENCOUNTER — HOSPITAL ENCOUNTER (OUTPATIENT)
Dept: OTHER | Age: 63
Discharge: HOME | End: 2023-08-10

## 2023-08-18 ENCOUNTER — HOSPITAL ENCOUNTER (OUTPATIENT)
Dept: GENERAL RADIOLOGY | Age: 63
End: 2023-08-18
Payer: COMMERCIAL

## 2023-08-18 ENCOUNTER — HOSPITAL ENCOUNTER (OUTPATIENT)
Age: 63
Discharge: HOME | End: 2023-08-18
Payer: COMMERCIAL

## 2023-08-18 ENCOUNTER — OFFICE VISIT (OUTPATIENT)
Dept: FAMILY MEDICINE CLINIC | Age: 63
End: 2023-08-18
Payer: COMMERCIAL

## 2023-08-18 ENCOUNTER — HOSPITAL ENCOUNTER (OUTPATIENT)
Age: 63
End: 2023-08-18
Payer: COMMERCIAL

## 2023-08-18 VITALS
DIASTOLIC BLOOD PRESSURE: 70 MMHG | OXYGEN SATURATION: 97 % | BODY MASS INDEX: 32.79 KG/M2 | RESPIRATION RATE: 18 BRPM | WEIGHT: 247.4 LBS | HEART RATE: 54 BPM | SYSTOLIC BLOOD PRESSURE: 120 MMHG | TEMPERATURE: 97.5 F | HEIGHT: 73 IN

## 2023-08-18 DIAGNOSIS — Z98.890 HISTORY OF KNEE SURGERY: ICD-10-CM

## 2023-08-18 DIAGNOSIS — D64.9 ANEMIA, UNSPECIFIED TYPE: ICD-10-CM

## 2023-08-18 DIAGNOSIS — I50.42 CHRONIC COMBINED SYSTOLIC AND DIASTOLIC CHF (CONGESTIVE HEART FAILURE) (HCC): ICD-10-CM

## 2023-08-18 DIAGNOSIS — R26.2 DIFFICULTY WALKING: ICD-10-CM

## 2023-08-18 DIAGNOSIS — E11.42 TYPE 2 DIABETES MELLITUS WITH DIABETIC POLYNEUROPATHY, WITHOUT LONG-TERM CURRENT USE OF INSULIN (HCC): ICD-10-CM

## 2023-08-18 DIAGNOSIS — M25.562 ACUTE PAIN OF LEFT KNEE: ICD-10-CM

## 2023-08-18 DIAGNOSIS — M25.562 ACUTE PAIN OF LEFT KNEE: Primary | ICD-10-CM

## 2023-08-18 DIAGNOSIS — N18.30 BENIGN HYPERTENSION WITH CKD (CHRONIC KIDNEY DISEASE) STAGE III (HCC): ICD-10-CM

## 2023-08-18 DIAGNOSIS — I12.9 BENIGN HYPERTENSION WITH CKD (CHRONIC KIDNEY DISEASE) STAGE III (HCC): ICD-10-CM

## 2023-08-18 DIAGNOSIS — R60.0 BILATERAL LEG EDEMA: ICD-10-CM

## 2023-08-18 LAB
ANION GAP SERPL CALCULATED.3IONS-SCNC: 11 MMOL/L (ref 9–17)
BNP SERPL-MCNC: 88 PG/ML
BUN SERPL-MCNC: 23 MG/DL (ref 8–23)
CALCIUM SERPL-MCNC: 9.1 MG/DL (ref 8.6–10.4)
CHLORIDE SERPL-SCNC: 105 MMOL/L (ref 98–107)
CO2 SERPL-SCNC: 23 MMOL/L (ref 20–31)
CREAT SERPL-MCNC: 1.4 MG/DL (ref 0.7–1.2)
CREAT UR-MCNC: 19.6 MG/DL (ref 39–259)
ERYTHROCYTE [DISTWIDTH] IN BLOOD BY AUTOMATED COUNT: 14 % (ref 11.5–14.9)
GFR SERPL CREATININE-BSD FRML MDRD: 57 ML/MIN/1.73M2
GLUCOSE SERPL-MCNC: 189 MG/DL (ref 70–99)
HCT VFR BLD AUTO: 44 % (ref 41–53)
HGB BLD-MCNC: 14.2 G/DL (ref 13.5–17.5)
MAGNESIUM SERPL-MCNC: 2.4 MG/DL (ref 1.6–2.6)
MCH RBC QN AUTO: 30 PG (ref 26–34)
MCHC RBC AUTO-ENTMCNC: 32.2 G/DL (ref 31–37)
MCV RBC AUTO: 93.3 FL (ref 80–100)
MICROALBUMIN UR-MCNC: <12 MG/L
MICROALBUMIN/CREAT UR-RTO: ABNORMAL MCG/MG CREAT
PLATELET # BLD AUTO: 188 K/UL (ref 150–450)
PMV BLD AUTO: 9.6 FL (ref 6–12)
POTASSIUM SERPL-SCNC: 4.2 MMOL/L (ref 3.7–5.3)
RBC # BLD AUTO: 4.71 M/UL (ref 4.5–5.9)
SODIUM SERPL-SCNC: 139 MMOL/L (ref 135–144)
URATE SERPL-MCNC: 5.8 MG/DL (ref 3.4–7)
WBC OTHER # BLD: 6.9 K/UL (ref 3.5–11)

## 2023-08-18 PROCEDURE — 83880 ASSAY OF NATRIURETIC PEPTIDE: CPT

## 2023-08-18 PROCEDURE — G8417 CALC BMI ABV UP PARAM F/U: HCPCS | Performed by: FAMILY MEDICINE

## 2023-08-18 PROCEDURE — 99214 OFFICE O/P EST MOD 30 MIN: CPT | Performed by: FAMILY MEDICINE

## 2023-08-18 PROCEDURE — 36415 COLL VENOUS BLD VENIPUNCTURE: CPT

## 2023-08-18 PROCEDURE — 82570 ASSAY OF URINE CREATININE: CPT

## 2023-08-18 PROCEDURE — 3051F HG A1C>EQUAL 7.0%<8.0%: CPT | Performed by: FAMILY MEDICINE

## 2023-08-18 PROCEDURE — 82043 UR ALBUMIN QUANTITATIVE: CPT

## 2023-08-18 PROCEDURE — 2022F DILAT RTA XM EVC RTNOPTHY: CPT | Performed by: FAMILY MEDICINE

## 2023-08-18 PROCEDURE — 83735 ASSAY OF MAGNESIUM: CPT

## 2023-08-18 PROCEDURE — 73562 X-RAY EXAM OF KNEE 3: CPT

## 2023-08-18 PROCEDURE — G8427 DOCREV CUR MEDS BY ELIG CLIN: HCPCS | Performed by: FAMILY MEDICINE

## 2023-08-18 PROCEDURE — 80048 BASIC METABOLIC PNL TOTAL CA: CPT

## 2023-08-18 PROCEDURE — 1036F TOBACCO NON-USER: CPT | Performed by: FAMILY MEDICINE

## 2023-08-18 PROCEDURE — 84550 ASSAY OF BLOOD/URIC ACID: CPT

## 2023-08-18 PROCEDURE — 85027 COMPLETE CBC AUTOMATED: CPT

## 2023-08-18 PROCEDURE — 3017F COLORECTAL CA SCREEN DOC REV: CPT | Performed by: FAMILY MEDICINE

## 2023-08-18 ASSESSMENT — ENCOUNTER SYMPTOMS
DIARRHEA: 0
ABDOMINAL PAIN: 0
NAUSEA: 0
CHEST TIGHTNESS: 0
SHORTNESS OF BREATH: 0
COUGH: 0
ABDOMINAL DISTENTION: 0
CONSTIPATION: 0
VOMITING: 0
WHEEZING: 0

## 2023-08-18 NOTE — PROGRESS NOTES
Visit Information    Have you changed or started any medications since your last visit including any over-the-counter medicines, vitamins, or herbal medicines? yes - vitamin d  ,  Have you stopped taking any of your medications? Is so, why? -  no  Are you having any side effects from any of your medications? - no    Have you seen any other physician or provider since your last visit?  no   Have you had any other diagnostic tests since your last visit?  -X-RAY RIGHT FOOT  Have you been seen in the emergency room and/or had an admission in a hospital since we last saw you?  no   Have you had your routine dental cleaning in the past 6 months?  no     Do you have an active MyChart account? If no, what is the barrier?   Yes    Patient Care Team:  Margaret Meyers MD as PCP - General (Family Medicine)  Margaret Meyers MD as PCP - Empaneled Provider  Sebastien Lieberman MD as Consulting Physician (Cardiology)  Matthieu Contreras OD (Optometry)  Leatha Mejía MD as Consulting Physician (Nephrology)  Regina Langston MD as Consulting Physician (Cardiology)  Ari Almaraz DPM as Physician (Podiatry)  Francheska Vega MD as Consulting Physician (Urology)  Jade Crowder MD as Consulting Physician (Internal Medicine Cardiovascular Disease)    Medical History Review  Past Medical, Family, and Social History reviewed and does not contribute to the patient presenting condition    Health Maintenance   Topic Date Due    COVID-19 Vaccine (2 - Pfizer series) 06/07/2021    Colorectal Cancer Screen  01/15/2023    Diabetic Alb to Cr ratio (uACR) test  07/15/2023    Flu vaccine (1) 08/01/2023    A1C test (Diabetic or Prediabetic)  09/02/2023    Lipids  03/02/2024    DTaP/Tdap/Td vaccine (2 - Td or Tdap) 04/03/2024    Diabetic retinal exam  05/02/2024    Depression Monitoring  06/02/2024    Prostate Specific Antigen (PSA) Screening or Monitoring  06/02/2024    GFR test (Diabetes, CKD 3-4, OR last GFR 15-59)  06/29/2024    Diabetic foot

## 2023-08-18 NOTE — PROGRESS NOTES
Eh Robert (:  1960) is a 65 y.o. male,Established patient, here for evaluation of the following chief complaint(s): Diabetes (Pt is here for a follow up ), Immunizations (WILL LIKE TO COME BACK FOR NURSE VISIT FOR FLU VACCINE ), Knee Pain (LEFT KNEE SWELLING AND PAIN ONSET FOR A MONTH, ), Swelling (BILATERAL LEG AND FEET SWELLING ONSET FOR A FEW WEEKS), and Weight Gain (ACHARYA 10 LBS PT STATED DUE TO SWELLING )      ASSESSMENT/PLAN:    1. Acute pain of left knee  Failing to improve  I suspect lateral ligament injury  Refer to orthopedics, do x-ray, he would benefit from knee brace and immobilization, Voltaren gel as needed    -     XR KNEE LEFT (3 VIEWS); Future  -     Anastasia Dawson MD, Orthopaedic Surgery, [facility]  -     Elastic Bandages & Supports (KNEE BRACE/HINGED BARS XL) MISC; Disp-1 each, R-0, PrintLeft knee  -     diclofenac sodium (VOLTAREN) 1 % GEL; Apply 2 g topically 4 times daily as needed for Pain, Topical, 4 TIMES DAILY PRN Starting 2023, Disp-100 g, R-0, Normal  2. Difficulty walking  Failing to improve  Ambulates with cane  He would benefit from knee brace for stability and pain control  Will do x-rays, and refer to orthopedics  -     XR KNEE LEFT (3 VIEWS); Future  -     Anastasia Dawson MD, Orthopaedic Surgery, [facility]  -     Elastic Bandages & Supports (KNEE BRACE/HINGED BARS XL) MISC; Disp-1 each, R-0, PrintLeft knee  -     diclofenac sodium (VOLTAREN) 1 % GEL; Apply 2 g topically 4 times daily as needed for Pain, Topical, 4 TIMES DAILY PRN Starting Fri 2023, Disp-100 g, R-0, Normal  3.  Chronic combined systolic and diastolic CHF (congestive heart failure) (HCC)  Stable  Reports noncompliance with checking his weight every day  Reports noncompliance with low-salt diet and eating potato chips  His significant other, Soraida Kimball, reports that he missed an appointment with cardiologist  Lab Results   Component Value Date    LVEF 2019    Troy Mcneill

## 2023-08-18 NOTE — RESULT ENCOUNTER NOTE
Please notify patient: Blood glucose high 189  Chronic kidney disease stage III stable  Otherwise labs within normal limits  continue current treatment    Future Appointments  8/22/2023  8:30 AM    [facility] CHF CLINIC  [address] [facility]  9/6/2023   11:40 AM   Kim Oliver MD        [facility]            [email or web address]  10/2/2023  8:30 AM    Prince Shefali DPM            [facility]        [facility] [[email or web address]  11/2/2023  8:30 AM    Sam Delaney MD     [facility]

## 2023-08-21 NOTE — RESULT ENCOUNTER NOTE
Please notify patient: Please advise patient x-ray showed degenerative changes moderate and a loose body behind the knee, suggest to try to see the orthopedics earlier and to contact their appointment if they can see him earlier    Future Appointments  8/22/2023  8:30 AM    [facility] CHF CLINIC [address]  9/6/2023   11:40 AM   Kim Oliver MD        [facility]            [facility]  10/2/2023  8:30 AM    Prince Shefali DPM            [facility] Podiatry        [facility]  11/2/2023  8:30 AM    Sam Delaney MD     [facility]

## 2023-08-22 ENCOUNTER — HOSPITAL ENCOUNTER (OUTPATIENT)
Dept: OTHER | Age: 63
Discharge: HOME | End: 2023-08-22
Payer: COMMERCIAL

## 2023-08-22 ENCOUNTER — HOSPITAL ENCOUNTER (OUTPATIENT)
Age: 63
Discharge: HOME | End: 2023-08-22
Payer: COMMERCIAL

## 2023-08-22 VITALS
RESPIRATION RATE: 18 BRPM | WEIGHT: 244.2 LBS | BODY MASS INDEX: 32.37 KG/M2 | OXYGEN SATURATION: 98 % | HEART RATE: 58 BPM | SYSTOLIC BLOOD PRESSURE: 118 MMHG | DIASTOLIC BLOOD PRESSURE: 70 MMHG | HEIGHT: 73 IN

## 2023-08-22 LAB
ANION GAP SERPL CALCULATED.3IONS-SCNC: 10 MMOL/L (ref 9–17)
BNP SERPL-MCNC: 91 PG/ML
BUN SERPL-MCNC: 19 MG/DL (ref 8–23)
CHLORIDE SERPL-SCNC: 106 MMOL/L (ref 98–107)
CO2 SERPL-SCNC: 24 MMOL/L (ref 20–31)
CREAT SERPL-MCNC: 1.4 MG/DL (ref 0.7–1.2)
GFR SERPL CREATININE-BSD FRML MDRD: 57 ML/MIN/1.73M2
POTASSIUM SERPL-SCNC: 4.3 MMOL/L (ref 3.7–5.3)
SODIUM SERPL-SCNC: 140 MMOL/L (ref 135–144)

## 2023-08-22 PROCEDURE — 80051 ELECTROLYTE PANEL: CPT

## 2023-08-22 PROCEDURE — 99211 OFF/OP EST MAY X REQ PHY/QHP: CPT

## 2023-08-22 PROCEDURE — 36415 COLL VENOUS BLD VENIPUNCTURE: CPT

## 2023-08-22 PROCEDURE — 82565 ASSAY OF CREATININE: CPT

## 2023-08-22 PROCEDURE — 84520 ASSAY OF UREA NITROGEN: CPT

## 2023-08-22 PROCEDURE — 83880 ASSAY OF NATRIURETIC PEPTIDE: CPT

## 2023-08-23 ENCOUNTER — OFFICE VISIT (OUTPATIENT)
Dept: ORTHOPEDIC SURGERY | Age: 63
End: 2023-08-23
Payer: COMMERCIAL

## 2023-08-23 VITALS — WEIGHT: 244 LBS | HEIGHT: 73 IN | RESPIRATION RATE: 14 BRPM | BODY MASS INDEX: 32.34 KG/M2

## 2023-08-23 DIAGNOSIS — M17.12 PRIMARY OSTEOARTHRITIS OF LEFT KNEE: Primary | ICD-10-CM

## 2023-08-23 PROCEDURE — 3017F COLORECTAL CA SCREEN DOC REV: CPT | Performed by: PHYSICIAN ASSISTANT

## 2023-08-23 PROCEDURE — 1036F TOBACCO NON-USER: CPT | Performed by: PHYSICIAN ASSISTANT

## 2023-08-23 PROCEDURE — G8428 CUR MEDS NOT DOCUMENT: HCPCS | Performed by: PHYSICIAN ASSISTANT

## 2023-08-23 PROCEDURE — 99204 OFFICE O/P NEW MOD 45 MIN: CPT | Performed by: PHYSICIAN ASSISTANT

## 2023-08-23 PROCEDURE — G8417 CALC BMI ABV UP PARAM F/U: HCPCS | Performed by: PHYSICIAN ASSISTANT

## 2023-08-23 PROCEDURE — 20610 DRAIN/INJ JOINT/BURSA W/O US: CPT | Performed by: PHYSICIAN ASSISTANT

## 2023-08-23 RX ORDER — LIDOCAINE HYDROCHLORIDE 10 MG/ML
5 INJECTION, SOLUTION INFILTRATION; PERINEURAL ONCE
Status: COMPLETED | OUTPATIENT
Start: 2023-08-23 | End: 2023-08-23

## 2023-08-23 RX ORDER — BETAMETHASONE SODIUM PHOSPHATE AND BETAMETHASONE ACETATE 3; 3 MG/ML; MG/ML
12 INJECTION, SUSPENSION INTRA-ARTICULAR; INTRALESIONAL; INTRAMUSCULAR; SOFT TISSUE ONCE
Status: COMPLETED | OUTPATIENT
Start: 2023-08-23 | End: 2023-08-23

## 2023-08-23 RX ORDER — BUPIVACAINE HYDROCHLORIDE 2.5 MG/ML
2 INJECTION, SOLUTION INFILTRATION; PERINEURAL ONCE
Status: COMPLETED | OUTPATIENT
Start: 2023-08-23 | End: 2023-08-23

## 2023-08-23 RX ADMIN — BETAMETHASONE SODIUM PHOSPHATE AND BETAMETHASONE ACETATE 12 MG: 3; 3 INJECTION, SUSPENSION INTRA-ARTICULAR; INTRALESIONAL; INTRAMUSCULAR; SOFT TISSUE at 13:08

## 2023-08-23 RX ADMIN — LIDOCAINE HYDROCHLORIDE 5 ML: 10 INJECTION, SOLUTION INFILTRATION; PERINEURAL at 13:10

## 2023-08-23 RX ADMIN — BUPIVACAINE HYDROCHLORIDE 5 MG: 2.5 INJECTION, SOLUTION INFILTRATION; PERINEURAL at 13:10

## 2023-08-23 NOTE — PROGRESS NOTES
treatment options available to him including non-operative and operative intervention. To this end we discussed use of NSAIDs, cortisone and viscosupplementation injections, weight loss, activity modification, physical therapy, bracing, and use of assistive walking devices. We also had discussions about total knee arthroplasties. As outlined above he has attempted treatment to include use of voltaren gel. At this time he would like to proceed with aspiration and celestone injection. Patient tolerated aspiration/injection well educated on postinjection care. Aspiration revealed 15 cc of blood-tinged, straw-colored synovial fluid without evidence of purulence or gouty arthropathy. We will see outpatient response to corticosteroid injection have him follow-up in 4 weeks if he continues to be painful. Procedure Note: Left knee aspiration Celestone Injection   An informed verbal consent for the procedure was obtained and risks including, but not limited to: allergy to medications, injection, bleeding, stiffness of joint, recurrence of symptoms, loss of function, swelling, drainage, irrigation, need for surgery and pseudo-septic inflammation, were explained to the patient. Also, discussed was the potential for further injections, irrigation and debridement and surgery. Alternate means of treatment have also been discussed with the patient. Following an appropriate discussion with the patient regarding the risks and benefits of the procedure he consented to proceed. his left knee was prepped using betadine solution and alcohol swab. Using aseptic technique and through a anterolateral approach, his left knee was injected superficially with 4 cc mixture of 2 cc Lidocaine without epi and 2 cc of Marcaine, aspiration revealed 15 cc of blood tinged, straw colored synovial fluid and subsequently with 2 cc of 6 mg/mL Celestone into the left knee. A band aid was applied to the injection site.  he tolerated the

## 2023-08-28 ENCOUNTER — ANESTHESIA EVENT (OUTPATIENT)
Dept: OPERATING ROOM | Age: 63
End: 2023-08-28
Payer: COMMERCIAL

## 2023-08-28 ENCOUNTER — HOSPITAL ENCOUNTER (OUTPATIENT)
Age: 63
Setting detail: OUTPATIENT SURGERY
Discharge: HOME | End: 2023-08-28
Attending: SURGERY | Admitting: SURGERY
Payer: COMMERCIAL

## 2023-08-28 ENCOUNTER — ANESTHESIA (OUTPATIENT)
Dept: OPERATING ROOM | Age: 63
End: 2023-08-28
Payer: COMMERCIAL

## 2023-08-28 VITALS
SYSTOLIC BLOOD PRESSURE: 157 MMHG | TEMPERATURE: 96.8 F | RESPIRATION RATE: 17 BRPM | BODY MASS INDEX: 31.94 KG/M2 | OXYGEN SATURATION: 99 % | WEIGHT: 241 LBS | HEART RATE: 68 BPM | HEIGHT: 73 IN | DIASTOLIC BLOOD PRESSURE: 84 MMHG

## 2023-08-28 DIAGNOSIS — Z12.11 SCREENING FOR COLON CANCER: ICD-10-CM

## 2023-08-28 DIAGNOSIS — K63.89 CECUM MASS: Primary | ICD-10-CM

## 2023-08-28 LAB
GLUCOSE BLD-MCNC: 107 MG/DL (ref 75–110)
GLUCOSE BLD-MCNC: 99 MG/DL (ref 75–110)

## 2023-08-28 PROCEDURE — 3700000001 HC ADD 15 MINUTES (ANESTHESIA): Performed by: SURGERY

## 2023-08-28 PROCEDURE — 3609010300 HC COLONOSCOPY W/BIOPSY SINGLE/MULTIPLE: Performed by: SURGERY

## 2023-08-28 PROCEDURE — 3700000000 HC ANESTHESIA ATTENDED CARE: Performed by: SURGERY

## 2023-08-28 PROCEDURE — 2709999900 HC NON-CHARGEABLE SUPPLY: Performed by: SURGERY

## 2023-08-28 PROCEDURE — 88305 TISSUE EXAM BY PATHOLOGIST: CPT

## 2023-08-28 PROCEDURE — 2720000010 HC SURG SUPPLY STERILE: Performed by: SURGERY

## 2023-08-28 PROCEDURE — 2580000003 HC RX 258: Performed by: ANESTHESIOLOGY

## 2023-08-28 PROCEDURE — 6360000002 HC RX W HCPCS: Performed by: NURSE ANESTHETIST, CERTIFIED REGISTERED

## 2023-08-28 PROCEDURE — 7100000011 HC PHASE II RECOVERY - ADDTL 15 MIN: Performed by: SURGERY

## 2023-08-28 PROCEDURE — 82947 ASSAY GLUCOSE BLOOD QUANT: CPT

## 2023-08-28 PROCEDURE — 2500000003 HC RX 250 WO HCPCS: Performed by: NURSE ANESTHETIST, CERTIFIED REGISTERED

## 2023-08-28 PROCEDURE — 7100000010 HC PHASE II RECOVERY - FIRST 15 MIN: Performed by: SURGERY

## 2023-08-28 RX ORDER — LIDOCAINE HYDROCHLORIDE 10 MG/ML
1 INJECTION, SOLUTION EPIDURAL; INFILTRATION; INTRACAUDAL; PERINEURAL
Status: DISCONTINUED | OUTPATIENT
Start: 2023-08-28 | End: 2023-08-28 | Stop reason: HOSPADM

## 2023-08-28 RX ORDER — LIDOCAINE HYDROCHLORIDE 20 MG/ML
INJECTION, SOLUTION EPIDURAL; INFILTRATION; INTRACAUDAL; PERINEURAL PRN
Status: DISCONTINUED | OUTPATIENT
Start: 2023-08-28 | End: 2023-08-28 | Stop reason: SDUPTHER

## 2023-08-28 RX ORDER — SODIUM CHLORIDE 9 MG/ML
INJECTION, SOLUTION INTRAVENOUS CONTINUOUS
Status: DISCONTINUED | OUTPATIENT
Start: 2023-08-28 | End: 2023-08-28 | Stop reason: HOSPADM

## 2023-08-28 RX ORDER — SODIUM CHLORIDE 0.9 % (FLUSH) 0.9 %
5-40 SYRINGE (ML) INJECTION PRN
Status: DISCONTINUED | OUTPATIENT
Start: 2023-08-28 | End: 2023-08-28 | Stop reason: HOSPADM

## 2023-08-28 RX ORDER — SODIUM CHLORIDE, SODIUM LACTATE, POTASSIUM CHLORIDE, CALCIUM CHLORIDE 600; 310; 30; 20 MG/100ML; MG/100ML; MG/100ML; MG/100ML
INJECTION, SOLUTION INTRAVENOUS CONTINUOUS
Status: DISCONTINUED | OUTPATIENT
Start: 2023-08-28 | End: 2023-08-28 | Stop reason: HOSPADM

## 2023-08-28 RX ORDER — SODIUM CHLORIDE 0.9 % (FLUSH) 0.9 %
5-40 SYRINGE (ML) INJECTION EVERY 12 HOURS SCHEDULED
Status: DISCONTINUED | OUTPATIENT
Start: 2023-08-28 | End: 2023-08-28 | Stop reason: HOSPADM

## 2023-08-28 RX ORDER — SODIUM CHLORIDE 9 MG/ML
INJECTION, SOLUTION INTRAVENOUS PRN
Status: DISCONTINUED | OUTPATIENT
Start: 2023-08-28 | End: 2023-08-28 | Stop reason: HOSPADM

## 2023-08-28 RX ORDER — PROPOFOL 10 MG/ML
INJECTION, EMULSION INTRAVENOUS PRN
Status: DISCONTINUED | OUTPATIENT
Start: 2023-08-28 | End: 2023-08-28 | Stop reason: SDUPTHER

## 2023-08-28 RX ADMIN — PROPOFOL 30 MG: 10 INJECTION, EMULSION INTRAVENOUS at 15:51

## 2023-08-28 RX ADMIN — PROPOFOL 20 MG: 10 INJECTION, EMULSION INTRAVENOUS at 15:38

## 2023-08-28 RX ADMIN — PROPOFOL 30 MG: 10 INJECTION, EMULSION INTRAVENOUS at 15:29

## 2023-08-28 RX ADMIN — LIDOCAINE HYDROCHLORIDE 100 MG: 20 INJECTION, SOLUTION EPIDURAL; INFILTRATION; INTRACAUDAL; PERINEURAL at 15:27

## 2023-08-28 RX ADMIN — PROPOFOL 80 MG: 10 INJECTION, EMULSION INTRAVENOUS at 15:27

## 2023-08-28 RX ADMIN — PROPOFOL 30 MG: 10 INJECTION, EMULSION INTRAVENOUS at 15:49

## 2023-08-28 RX ADMIN — PROPOFOL 20 MG: 10 INJECTION, EMULSION INTRAVENOUS at 15:53

## 2023-08-28 RX ADMIN — PROPOFOL 30 MG: 10 INJECTION, EMULSION INTRAVENOUS at 15:43

## 2023-08-28 RX ADMIN — PROPOFOL 30 MG: 10 INJECTION, EMULSION INTRAVENOUS at 15:41

## 2023-08-28 RX ADMIN — PROPOFOL 30 MG: 10 INJECTION, EMULSION INTRAVENOUS at 15:31

## 2023-08-28 RX ADMIN — PROPOFOL 30 MG: 10 INJECTION, EMULSION INTRAVENOUS at 15:37

## 2023-08-28 RX ADMIN — PROPOFOL 30 MG: 10 INJECTION, EMULSION INTRAVENOUS at 15:47

## 2023-08-28 RX ADMIN — PROPOFOL 30 MG: 10 INJECTION, EMULSION INTRAVENOUS at 15:33

## 2023-08-28 RX ADMIN — PROPOFOL 30 MG: 10 INJECTION, EMULSION INTRAVENOUS at 15:35

## 2023-08-28 RX ADMIN — PROPOFOL 20 MG: 10 INJECTION, EMULSION INTRAVENOUS at 15:40

## 2023-08-28 RX ADMIN — PROPOFOL 30 MG: 10 INJECTION, EMULSION INTRAVENOUS at 15:45

## 2023-08-28 RX ADMIN — SODIUM CHLORIDE, POTASSIUM CHLORIDE, SODIUM LACTATE AND CALCIUM CHLORIDE: 600; 310; 30; 20 INJECTION, SOLUTION INTRAVENOUS at 13:30

## 2023-08-28 RX ADMIN — PROPOFOL 30 MG: 10 INJECTION, EMULSION INTRAVENOUS at 15:39

## 2023-08-28 ASSESSMENT — PAIN - FUNCTIONAL ASSESSMENT: PAIN_FUNCTIONAL_ASSESSMENT: 0-10

## 2023-08-28 NOTE — OP NOTE
Operative Note      Patient: Emilia Hager  YOB: 1960  MRN: [id number]    Date of Procedure: 8/28/2023    Pre-Op Diagnosis Codes:     * Screening for colon cancer [Z12.11]    Post-Op Diagnosis:  cecal mass       Procedure(s):  COLONOSCOPY WITH BIOPSIES AND TATTOOING    Surgeon(s):  Charles Gilliland IV, DO    Assistant:   * No surgical staff found *    Anesthesia: Monitor Anesthesia Care    Estimated Blood Loss (mL): Minimal    Complications: None    Specimens:   ID Type Source Tests Collected by Time Destination   A : CECAL BIOPSIES(ILEO CECAL VALVE) Tissue Cecum SURGICAL PATHOLOGY Johan Rao IV, DO 8/28/2023 1543        Implants:  * No implants in log *      Drains: * No LDAs found *    Findings: broad based lipoma vs cecal mass at ileocecal valve    Indications for Surgery:  64 y/o male here for screening colonoscopy. Patient informed of the risks of procedure which include but not limited to bleeding, perforation, and risks of anesthesia. Patient understood risks and signed informed consent for colonoscopy under monitored anesthesia care. Detailed Description of Procedure:   Description of Procedure: The patient was given monitored anesthesia care. The patient was given oxygen by nasal cannula. The colonoscope was inserted per rectum and advanced under direct vision to the cecum without difficulty. Findings:  Cecum/Ascending colon: broad based submucosal lipoma vs cecal mass. Mass was biopsied with cold foreceps. Area was tattooed in four areas with ink. Transverse colon: normal    Descending/Sigmoid colon: normal    Rectum/Anus: examined in normal and retroflexed positions and was normal    The colon was decompressed and the scope was removed. The patient tolerated the procedure well. Recommendations: Will refer to GI for evaluation for EMR.       Electronically signed by Johan Rao IV, DO on 8/28/2023 at 3:59 PM

## 2023-08-28 NOTE — ANESTHESIA POSTPROCEDURE EVALUATION
Department of Anesthesiology  Postprocedure Note    Patient: Garret Lares  MRN: [id number]  YOB: 1960  Date of evaluation: 8/28/2023      Procedure Summary     Date: 08/28/23 Room / Location: [place] [address] / [facility] - INPATIENT    Anesthesia Start: 3478 Anesthesia Stop: 1606    Procedure: COLONOSCOPY WITH BIOPSIES AND TATTOOING Diagnosis:       Screening for colon cancer      (Screening for colon cancer [Z12.11])    Surgeons: Elvia Vergara IV, DO Responsible Provider: Toshia Dupree MD    Anesthesia Type: MAC, general ASA Status: 3          Anesthesia Type: No value filed.     Jeanne Phase I:      Jeanne Phase II: Jeanne Score: 10      Anesthesia Post Evaluation    Patient location during evaluation: PACU  Patient participation: complete - patient participated  Level of consciousness: awake  Airway patency: patent  Nausea & Vomiting: no nausea  Complications: no  Cardiovascular status: blood pressure returned to baseline  Respiratory status: acceptable  Hydration status: euvolemic  Comments: Multimodal analgesia pain management as indicated by procedure  Multimodal analgesia pain management approach  Pain management: adequate

## 2023-08-28 NOTE — DISCHARGE INSTRUCTIONS
Colonoscopy: What to Expect at [address]  After a colonoscopy, you'll stay at the clinic until you wake up. Then you can go home. But you'll need to arrange for a ride. Your doctor will tell you when you can eat and do your other usual activities. Your doctor will talk to you about when you'll need your next colonoscopy. Your doctor can help you decide how often you need to be checked. This will depend on the results of your test and your risk for colorectal cancer. After the test, you may be bloated or have gas pains. You may need to pass gas. If a biopsy was done or a polyp was removed, you may have streaks of blood in your stool (feces) for a few days. Problems such as heavy rectal bleeding may not occur until several weeks after the test. This isn't common. But it can happen after polyps are removed. This care sheet gives you a general idea about how long it will take for you to recover. But each person recovers at a different pace. Follow the steps below to get better as quickly as possible. How can you care for yourself at home? Activity    Rest when you feel tired. You can do your normal activities when it feels okay to do so. Diet    Follow your doctor's directions for eating. Unless your doctor has told you not to, drink plenty of fluids. This helps to replace the fluids that were lost during the colon prep. Do not drink alcohol. Medicines    Your doctor will tell you if and when you can restart your medicines. You will also be given instructions about taking any new medicines. If you stopped taking aspirin or some other blood thinner, your doctor will tell you when to start taking it again. If polyps were removed or a biopsy was done during the test, your doctor may tell you not to take aspirin or other anti-inflammatory medicines for a few days. These include ibuprofen (Advil, Motrin) and naproxen (Aleve).    Other instructions    For your safety, do not drive or

## 2023-08-28 NOTE — H&P
Sig Taking? Authorizing Provider   SITagliptin (JANUVIA) 100 MG tablet Take 1 tablet by mouth daily Yes Historical Provider, MD   tamsulosin (FLOMAX) 0.4 MG capsule Take 1 capsule by mouth daily Yes Susan Babcock MD   spironolactone (ALDACTONE) 25 MG tablet Take 1 tablet by mouth every morning BP and CHF Yes Susan Babcock MD   aspirin (EQ ASPIRIN ADULT LOW DOSE) 81 MG EC tablet Take 1 tablet by mouth once daily Yes Susan Babcock MD   amLODIPine (NORVASC) 10 MG tablet Take 1 tablet by mouth once daily Yes Susan Babcock MD   dapagliflozin (FARXIGA) 10 MG tablet Take 1 tablet by mouth every morning For diabetes, per your insurance Yes Susan Babcock MD   metoprolol succinate (TOPROL XL) 100 MG extended release tablet Take 1 tablet by mouth daily ** stop carvediolol 25 mg** Yes Susan Babcock MD   furosemide (LASIX) 40 MG tablet Take 1 tablet by mouth daily Yes Susan Babcock MD   atorvastatin (LIPITOR) 40 MG tablet Take 1 tablet by mouth Daily with supper Yes Susan Babcock MD   allopurinol (ZYLOPRIM) 100 MG tablet Take 1 tablet by mouth daily FOR GOUT. STOP IT IF ANY RASH DEVELOPS! Yes Susan Babcock MD   sildenafil (VIAGRA) 100 MG tablet Take 1 tablet by mouth daily as needed for Erectile Dysfunction Yes Susan Babcock MD   ciclopirox (PENLAC) 8 % solution Apply topically nightly. Remove once weekly with alcohol or nail polish remover. Yes Jaswinder Dhaliwal DPM   Ergocalciferol (VITAMIN D2 PO) Take by mouth daily Yes Historical Provider, MD   Diabetic Shoe MISC by Does not apply route Needs diabetic shoes Yes Susan Babcock MD   Urea (CARMOL) 40 % cream Apply to affected area once daily Yes Susan Babcock MD   Handicap Placard MISC by Does not apply route Can't walk greater than 200 feet. Expires in 5 years. Yes Susan Babcock MD   Lancets 30G MISC Testing once a day. Please dispense according to patients insurance.  Yes Susan Babcock MD   blood

## 2023-08-31 LAB — SURGICAL PATHOLOGY REPORT: NORMAL

## 2023-08-31 NOTE — RESULT ENCOUNTER NOTE
Noted, benign    -- Diagnosis --   CECUM, BIOPSIES:-COLONIC MUCOSA WITH SCANT SUBEPITHELIAL ADIPOSE   TISSUE, FAVOR SUBMUCOSAL LIPOMA.      Future Appointments  9/19/2023  9:00 AM    [facility] [address] [facility]  10/2/2023  8:30 AM    Rafaela Smith DPM            [facility]        Doug Campos  10/9/2023  8:45 AM    Daniel Curtis MD            [facility]        [facility]  11/2/2023  8:30 AM    Maurisio Bettencourt MD     [facility]

## 2023-09-14 DIAGNOSIS — E11.42 TYPE 2 DIABETES MELLITUS WITH DIABETIC POLYNEUROPATHY, WITHOUT LONG-TERM CURRENT USE OF INSULIN (HCC): ICD-10-CM

## 2023-09-14 DIAGNOSIS — E11.65 TYPE 2 DIABETES MELLITUS WITH HYPERGLYCEMIA, WITHOUT LONG-TERM CURRENT USE OF INSULIN (HCC): ICD-10-CM

## 2023-09-14 RX ORDER — ASPIRIN 81 MG/1
TABLET ORAL
Qty: 90 TABLET | Refills: 3 | Status: SHIPPED | OUTPATIENT
Start: 2023-09-14

## 2023-09-14 NOTE — TELEPHONE ENCOUNTER
Please Approve or Refuse.   Send to Pharmacy per Pt's Request:      Next Visit Date:  11/2/2023   Last Visit Date: 8/18/2023    Hemoglobin A1C (%)   Date Value   06/02/2023 7.4   03/02/2023 7.2   11/22/2022 7.0             ( goal A1C is < 7)   BP Readings from Last 3 Encounters:   08/28/23 (!) 157/84   08/22/23 118/70   08/18/23 120/70          (goal 120/80)  BUN   Date Value Ref Range Status   08/22/2023 19 8 - 23 mg/dL Final     Creatinine   Date Value Ref Range Status   08/22/2023 1.4 (H) 0.7 - 1.2 mg/dL Final     Potassium   Date Value Ref Range Status   08/22/2023 4.3 3.7 - 5.3 mmol/L Final

## 2023-09-19 ENCOUNTER — HOSPITAL ENCOUNTER (OUTPATIENT)
Dept: OTHER | Age: 63
Discharge: HOME | End: 2023-09-19
Payer: COMMERCIAL

## 2023-09-19 ENCOUNTER — HOSPITAL ENCOUNTER (OUTPATIENT)
Age: 63
Discharge: HOME | End: 2023-09-19
Payer: COMMERCIAL

## 2023-09-19 VITALS
WEIGHT: 242.8 LBS | BODY MASS INDEX: 32.18 KG/M2 | DIASTOLIC BLOOD PRESSURE: 80 MMHG | SYSTOLIC BLOOD PRESSURE: 140 MMHG | OXYGEN SATURATION: 100 % | HEIGHT: 73 IN | RESPIRATION RATE: 20 BRPM | HEART RATE: 51 BPM

## 2023-09-19 LAB
ANION GAP SERPL CALCULATED.3IONS-SCNC: 11 MMOL/L (ref 9–17)
BNP SERPL-MCNC: 102 PG/ML
BUN SERPL-MCNC: 16 MG/DL (ref 8–23)
CHLORIDE SERPL-SCNC: 103 MMOL/L (ref 98–107)
CO2 SERPL-SCNC: 24 MMOL/L (ref 20–31)
CREAT SERPL-MCNC: 1.3 MG/DL (ref 0.7–1.2)
GFR SERPL CREATININE-BSD FRML MDRD: >60 ML/MIN/1.73M2
POTASSIUM SERPL-SCNC: 4.2 MMOL/L (ref 3.7–5.3)
SODIUM SERPL-SCNC: 138 MMOL/L (ref 135–144)

## 2023-09-19 PROCEDURE — 83880 ASSAY OF NATRIURETIC PEPTIDE: CPT

## 2023-09-19 PROCEDURE — 80051 ELECTROLYTE PANEL: CPT

## 2023-09-19 PROCEDURE — 84520 ASSAY OF UREA NITROGEN: CPT

## 2023-09-19 PROCEDURE — 36415 COLL VENOUS BLD VENIPUNCTURE: CPT

## 2023-09-19 PROCEDURE — 82565 ASSAY OF CREATININE: CPT

## 2023-09-19 PROCEDURE — 99211 OFF/OP EST MAY X REQ PHY/QHP: CPT

## 2023-10-02 ENCOUNTER — OFFICE VISIT (OUTPATIENT)
Dept: PODIATRY | Age: 63
End: 2023-10-02
Payer: COMMERCIAL

## 2023-10-02 VITALS — WEIGHT: 242 LBS | BODY MASS INDEX: 32.07 KG/M2 | HEIGHT: 73 IN

## 2023-10-02 DIAGNOSIS — R60.0 EDEMA OF LOWER EXTREMITY: ICD-10-CM

## 2023-10-02 DIAGNOSIS — I73.9 PERIPHERAL VASCULAR DISORDER (HCC): ICD-10-CM

## 2023-10-02 DIAGNOSIS — B35.1 DERMATOPHYTOSIS OF NAIL: ICD-10-CM

## 2023-10-02 DIAGNOSIS — E11.51 TYPE II DIABETES MELLITUS WITH PERIPHERAL CIRCULATORY DISORDER (HCC): Primary | ICD-10-CM

## 2023-10-02 DIAGNOSIS — E11.49 OTHER DIABETIC NEUROLOGICAL COMPLICATION ASSOCIATED WITH TYPE 2 DIABETES MELLITUS (HCC): ICD-10-CM

## 2023-10-02 PROCEDURE — 2022F DILAT RTA XM EVC RTNOPTHY: CPT | Performed by: PODIATRIST

## 2023-10-02 PROCEDURE — 99213 OFFICE O/P EST LOW 20 MIN: CPT | Performed by: PODIATRIST

## 2023-10-02 PROCEDURE — G8427 DOCREV CUR MEDS BY ELIG CLIN: HCPCS | Performed by: PODIATRIST

## 2023-10-02 PROCEDURE — G8417 CALC BMI ABV UP PARAM F/U: HCPCS | Performed by: PODIATRIST

## 2023-10-02 PROCEDURE — 3017F COLORECTAL CA SCREEN DOC REV: CPT | Performed by: PODIATRIST

## 2023-10-02 PROCEDURE — 11721 DEBRIDE NAIL 6 OR MORE: CPT | Performed by: PODIATRIST

## 2023-10-02 PROCEDURE — 1036F TOBACCO NON-USER: CPT | Performed by: PODIATRIST

## 2023-10-02 PROCEDURE — 3051F HG A1C>EQUAL 7.0%<8.0%: CPT | Performed by: PODIATRIST

## 2023-10-02 PROCEDURE — G8484 FLU IMMUNIZE NO ADMIN: HCPCS | Performed by: PODIATRIST

## 2023-10-02 NOTE — PROGRESS NOTES
EXAM              Q7   []Yes    []No                Q8   [x]Yes    []No                     Q9   []Yes    []No    Plan:   Pt was evaluated and examined. Patient was given personalized discharge instructions. To address new complaint of increased swelling, recommend patient to wear compression stockings. Dispensed tubi  for naomi LE and instructed pt to wear at all times when walking. Pt may take NSAIDs as needed. To address increased numbness and neuropathy pain, advised pt to closely monitor blood glucose. Recommend pt to discuss with PCP regarding oral medication treatment of neuropathy if needed. Recommend Metanx, one tab po BID. Nails 1-10 were debrided sharply in length and thickness with a nipper and , without incident. Pt will follow up in 9 weeks or sooner if any problems arise. Diagnosis was discussed with the pt and all of their questions were answered in detail. Proper foot hygiene and care was discussed with the pt. Informed patient on proper diabetic foot care and importance of tight glycemic control. Patient to check feet daily and contact the office with any questions/problems/concerns. Other comorbidity noted and will be managed by PCP.   10/2/2023    Electronically signed by Yared Llanos DPM on 10/2/2023 at 11:22 AM  10/2/2023

## 2023-11-02 ENCOUNTER — OFFICE VISIT (OUTPATIENT)
Dept: FAMILY MEDICINE CLINIC | Age: 63
End: 2023-11-02
Payer: COMMERCIAL

## 2023-11-02 ENCOUNTER — HOSPITAL ENCOUNTER (OUTPATIENT)
Age: 63
Discharge: HOME | End: 2023-11-02
Payer: COMMERCIAL

## 2023-11-02 VITALS
DIASTOLIC BLOOD PRESSURE: 78 MMHG | SYSTOLIC BLOOD PRESSURE: 114 MMHG | WEIGHT: 250 LBS | HEART RATE: 51 BPM | TEMPERATURE: 97.5 F | HEIGHT: 73 IN | BODY MASS INDEX: 33.13 KG/M2 | OXYGEN SATURATION: 97 %

## 2023-11-02 DIAGNOSIS — E11.42 TYPE 2 DIABETES MELLITUS WITH DIABETIC POLYNEUROPATHY, WITHOUT LONG-TERM CURRENT USE OF INSULIN (HCC): Primary | ICD-10-CM

## 2023-11-02 DIAGNOSIS — E55.9 VITAMIN D DEFICIENCY: ICD-10-CM

## 2023-11-02 DIAGNOSIS — M1A.3710 CHRONIC GOUT OF RIGHT FOOT DUE TO RENAL IMPAIRMENT WITHOUT TOPHUS: ICD-10-CM

## 2023-11-02 DIAGNOSIS — E11.42 TYPE 2 DIABETES MELLITUS WITH DIABETIC POLYNEUROPATHY, WITHOUT LONG-TERM CURRENT USE OF INSULIN (HCC): ICD-10-CM

## 2023-11-02 DIAGNOSIS — Z23 ENCOUNTER FOR IMMUNIZATION: ICD-10-CM

## 2023-11-02 DIAGNOSIS — I12.9 BENIGN HYPERTENSION WITH CKD (CHRONIC KIDNEY DISEASE), STAGE II: ICD-10-CM

## 2023-11-02 DIAGNOSIS — E78.5 HYPERLIPIDEMIA WITH TARGET LDL LESS THAN 70: ICD-10-CM

## 2023-11-02 DIAGNOSIS — I50.42 CHRONIC COMBINED SYSTOLIC AND DIASTOLIC CHF (CONGESTIVE HEART FAILURE) (HCC): ICD-10-CM

## 2023-11-02 DIAGNOSIS — N18.2 BENIGN HYPERTENSION WITH CKD (CHRONIC KIDNEY DISEASE), STAGE II: ICD-10-CM

## 2023-11-02 LAB
ALBUMIN SERPL-MCNC: 4.1 G/DL (ref 3.5–5.2)
ALP SERPL-CCNC: 96 U/L (ref 40–129)
ALT SERPL-CCNC: 15 U/L (ref 5–41)
ANION GAP SERPL CALCULATED.3IONS-SCNC: 8 MMOL/L (ref 9–17)
AST SERPL-CCNC: 13 U/L
BACTERIA URNS QL MICRO: ABNORMAL
BASOPHILS # BLD: 0 K/UL (ref 0–0.2)
BASOPHILS NFR BLD: 0 % (ref 0–2)
BILIRUB SERPL-MCNC: 0.6 MG/DL (ref 0.3–1.2)
BILIRUB UR QL STRIP: NEGATIVE
BUN SERPL-MCNC: 17 MG/DL (ref 8–23)
CALCIUM SERPL-MCNC: 9.4 MG/DL (ref 8.6–10.4)
CASTS #/AREA URNS LPF: ABNORMAL /LPF
CHLORIDE SERPL-SCNC: 106 MMOL/L (ref 98–107)
CHOLEST SERPL-MCNC: 155 MG/DL
CHOLESTEROL/HDL RATIO: 3.4
CLARITY UR: CLEAR
CO2 SERPL-SCNC: 25 MMOL/L (ref 20–31)
COLOR UR: YELLOW
CREAT SERPL-MCNC: 1.3 MG/DL (ref 0.7–1.2)
EOSINOPHIL # BLD: 0.1 K/UL (ref 0–0.4)
EOSINOPHILS RELATIVE PERCENT: 2 % (ref 0–4)
EPI CELLS #/AREA URNS HPF: ABNORMAL /HPF
ERYTHROCYTE [DISTWIDTH] IN BLOOD BY AUTOMATED COUNT: 14.5 % (ref 11.5–14.9)
FOLATE SERPL-MCNC: 9.8 NG/ML (ref 4.8–24.2)
GFR SERPL CREATININE-BSD FRML MDRD: >60 ML/MIN/1.73M2
GLUCOSE SERPL-MCNC: 131 MG/DL (ref 70–99)
GLUCOSE UR STRIP-MCNC: ABNORMAL MG/DL
HBA1C MFR BLD: 8 %
HCT VFR BLD AUTO: 42.7 % (ref 41–53)
HDLC SERPL-MCNC: 45 MG/DL
HGB BLD-MCNC: 13.6 G/DL (ref 13.5–17.5)
HGB UR QL STRIP.AUTO: NEGATIVE
KETONES UR STRIP-MCNC: NEGATIVE MG/DL
LDLC SERPL CALC-MCNC: 89 MG/DL (ref 0–130)
LEUKOCYTE ESTERASE UR QL STRIP: ABNORMAL
LYMPHOCYTES NFR BLD: 2.6 K/UL (ref 1–4.8)
LYMPHOCYTES RELATIVE PERCENT: 34 % (ref 24–44)
MAGNESIUM SERPL-MCNC: 2.2 MG/DL (ref 1.6–2.6)
MCH RBC QN AUTO: 30 PG (ref 26–34)
MCHC RBC AUTO-ENTMCNC: 31.8 G/DL (ref 31–37)
MCV RBC AUTO: 94.4 FL (ref 80–100)
MONOCYTES NFR BLD: 0.7 K/UL (ref 0.1–1.3)
MONOCYTES NFR BLD: 10 % (ref 1–7)
NEUTROPHILS NFR BLD: 54 % (ref 36–66)
NEUTS SEG NFR BLD: 4.1 K/UL (ref 1.3–9.1)
NITRITE UR QL STRIP: NEGATIVE
PH UR STRIP: 5 [PH] (ref 5–8)
PHOSPHATE SERPL-MCNC: 3.9 MG/DL (ref 2.5–4.5)
PLATELET # BLD AUTO: 210 K/UL (ref 150–450)
PMV BLD AUTO: 9.4 FL (ref 6–12)
POTASSIUM SERPL-SCNC: 4.4 MMOL/L (ref 3.7–5.3)
PROT SERPL-MCNC: 7.7 G/DL (ref 6.4–8.3)
PROT UR STRIP-MCNC: NEGATIVE MG/DL
RBC # BLD AUTO: 4.52 M/UL (ref 4.5–5.9)
RBC #/AREA URNS HPF: ABNORMAL /HPF
SODIUM SERPL-SCNC: 139 MMOL/L (ref 135–144)
SP GR UR STRIP: 1.03 (ref 1–1.03)
TRIGL SERPL-MCNC: 103 MG/DL
TSH SERPL DL<=0.05 MIU/L-ACNC: 0.98 UIU/ML (ref 0.3–5)
URATE SERPL-MCNC: 5.2 MG/DL (ref 3.4–7)
UROBILINOGEN UR STRIP-ACNC: NORMAL EU/DL (ref 0–1)
VIT B12 SERPL-MCNC: 618 PG/ML (ref 232–1245)
WBC #/AREA URNS HPF: ABNORMAL /HPF
WBC OTHER # BLD: 7.6 K/UL (ref 3.5–11)

## 2023-11-02 PROCEDURE — 82607 VITAMIN B-12: CPT

## 2023-11-02 PROCEDURE — 3017F COLORECTAL CA SCREEN DOC REV: CPT | Performed by: FAMILY MEDICINE

## 2023-11-02 PROCEDURE — 80061 LIPID PANEL: CPT

## 2023-11-02 PROCEDURE — 90471 IMMUNIZATION ADMIN: CPT | Performed by: FAMILY MEDICINE

## 2023-11-02 PROCEDURE — 83036 HEMOGLOBIN GLYCOSYLATED A1C: CPT | Performed by: FAMILY MEDICINE

## 2023-11-02 PROCEDURE — 82746 ASSAY OF FOLIC ACID SERUM: CPT

## 2023-11-02 PROCEDURE — 87086 URINE CULTURE/COLONY COUNT: CPT

## 2023-11-02 PROCEDURE — 84443 ASSAY THYROID STIM HORMONE: CPT

## 2023-11-02 PROCEDURE — 2022F DILAT RTA XM EVC RTNOPTHY: CPT | Performed by: FAMILY MEDICINE

## 2023-11-02 PROCEDURE — 84550 ASSAY OF BLOOD/URIC ACID: CPT

## 2023-11-02 PROCEDURE — 36415 COLL VENOUS BLD VENIPUNCTURE: CPT

## 2023-11-02 PROCEDURE — 81001 URINALYSIS AUTO W/SCOPE: CPT

## 2023-11-02 PROCEDURE — 99214 OFFICE O/P EST MOD 30 MIN: CPT | Performed by: FAMILY MEDICINE

## 2023-11-02 PROCEDURE — 80053 COMPREHEN METABOLIC PANEL: CPT

## 2023-11-02 PROCEDURE — 90674 CCIIV4 VAC NO PRSV 0.5 ML IM: CPT | Performed by: FAMILY MEDICINE

## 2023-11-02 PROCEDURE — G8427 DOCREV CUR MEDS BY ELIG CLIN: HCPCS | Performed by: FAMILY MEDICINE

## 2023-11-02 PROCEDURE — 3052F HG A1C>EQUAL 8.0%<EQUAL 9.0%: CPT | Performed by: FAMILY MEDICINE

## 2023-11-02 PROCEDURE — 84100 ASSAY OF PHOSPHORUS: CPT

## 2023-11-02 PROCEDURE — 83735 ASSAY OF MAGNESIUM: CPT

## 2023-11-02 PROCEDURE — G8482 FLU IMMUNIZE ORDER/ADMIN: HCPCS | Performed by: FAMILY MEDICINE

## 2023-11-02 PROCEDURE — G8417 CALC BMI ABV UP PARAM F/U: HCPCS | Performed by: FAMILY MEDICINE

## 2023-11-02 PROCEDURE — 85025 COMPLETE CBC W/AUTO DIFF WBC: CPT

## 2023-11-02 PROCEDURE — 1036F TOBACCO NON-USER: CPT | Performed by: FAMILY MEDICINE

## 2023-11-02 RX ORDER — B-COMPLEX WITH VITAMIN C
1 TABLET ORAL DAILY
Qty: 90 TABLET | Refills: 3 | Status: SHIPPED | OUTPATIENT
Start: 2023-11-02

## 2023-11-02 RX ORDER — FUROSEMIDE 20 MG/1
20 TABLET ORAL DAILY
Qty: 90 TABLET | Refills: 3 | Status: SHIPPED | OUTPATIENT
Start: 2023-11-02

## 2023-11-02 RX ORDER — CHOLECALCIFEROL (VITAMIN D3) 50 MCG
2000 TABLET ORAL DAILY
Qty: 90 TABLET | Refills: 3 | Status: SHIPPED | OUTPATIENT
Start: 2023-11-02

## 2023-11-02 RX ORDER — LIDOCAINE 40 MG/G
CREAM TOPICAL
Qty: 120 G | Refills: 3 | Status: SHIPPED | OUTPATIENT
Start: 2023-11-02

## 2023-11-02 RX ORDER — ALLOPURINOL 100 MG/1
100 TABLET ORAL DAILY
Qty: 90 TABLET | Refills: 3 | Status: SHIPPED | OUTPATIENT
Start: 2023-11-02

## 2023-11-02 RX ORDER — CAPSAICIN 0.025 %
CREAM (GRAM) TOPICAL
Qty: 120 G | Refills: 3 | Status: SHIPPED | OUTPATIENT
Start: 2023-11-02

## 2023-11-02 SDOH — ECONOMIC STABILITY: INCOME INSECURITY: HOW HARD IS IT FOR YOU TO PAY FOR THE VERY BASICS LIKE FOOD, HOUSING, MEDICAL CARE, AND HEATING?: NOT HARD AT ALL

## 2023-11-02 SDOH — ECONOMIC STABILITY: FOOD INSECURITY: WITHIN THE PAST 12 MONTHS, YOU WORRIED THAT YOUR FOOD WOULD RUN OUT BEFORE YOU GOT MONEY TO BUY MORE.: NEVER TRUE

## 2023-11-02 SDOH — ECONOMIC STABILITY: FOOD INSECURITY: WITHIN THE PAST 12 MONTHS, THE FOOD YOU BOUGHT JUST DIDN'T LAST AND YOU DIDN'T HAVE MONEY TO GET MORE.: NEVER TRUE

## 2023-11-02 ASSESSMENT — ENCOUNTER SYMPTOMS
ABDOMINAL PAIN: 0
SHORTNESS OF BREATH: 0
DIARRHEA: 0
NAUSEA: 0
ABDOMINAL DISTENTION: 0
VOMITING: 0
WHEEZING: 0
COUGH: 0
CONSTIPATION: 0
CHEST TIGHTNESS: 0

## 2023-11-02 NOTE — PROGRESS NOTES
Svitlana Manzanares (:  1960) is a 65 y.o. male,Established patient, here for evaluation of the following chief complaint(s): Diabetes, Health Maintenance (Flu vaccine: DID RECEIVE FLU VACCINE TODAY IN LEFT ARM), Discuss Medications, Hypertension, Congestive Heart Failure, and Hyperlipidemia      ASSESSMENT/PLAN:    1. Type 2 diabetes mellitus with diabetic polyneuropathy, without long-term current use of insulin (HCC)  Worsening type 2 diabetes mellitus  Slightly worsening polyneuropathy  -     POCT glycosylated hemoglobin (Hb A1C)    Lab Results   Component Value Date    LABA1C 8.0 (H) 2023    LABA1C 7.4 2023    LABA1C 7.2 2023       -start     B Complex Vitamins (VITAMIN B COMPLEX) TABS; Take 1 tablet by mouth daily, Disp-90 tablet, R-3Normal  -We will start creams for polyneuropathy  Discussed that I cannot give him gabapentin due to using THC, he understands  He does have diabetic shoes  Follow-up with podiatry  Soak in Eps salt  Check labs  -     CBC with Auto Differential; Future  -     Comprehensive Metabolic Panel; Future  -     Magnesium; Future  -     Phosphorus; Future  -     TSH; Future  -     Uric Acid; Future  -     Vitamin B12 & Folate; Future  -    start capsaicin (ZOSTRIX) 0.025 % cream; Apply topically 2-3 times daily for back pain, use a little bit at a time and wash your hands very well after use, it is made out of hot peppers. , Disp-120 g, R-3, Normal  -  start   lidocaine (LMX) 4 % cream; 5 g topical 2-3 times a day as needed for pain, maximum 20 g/day, Disp-120 g, R-3, Normal  -advised home blood glucose testing  daily  -daily feet exam, Foot care: advised to wash feet daily, pat dry and apply lotion at night, avoiding between toes.  Need to look at feet daily and report to a physician any signs of inflammation or skin damage  -annual dilated eye exam  -Low carb, low fat diet, increase fruits and vegetables, and exercise 4-5 times a day 30-40 minutes a day

## 2023-11-02 NOTE — PROGRESS NOTES
Visit Information    Have you changed or started any medications since your last visit including any over-the-counter medicines, vitamins, or herbal medicines? yes -     Have you stopped taking any of your medications? Is so, why? -  no  Are you having any side effects from any of your medications? - no    Have you seen any other physician or provider since your last visit? yes -     Have you had any other diagnostic tests since your last visit?  no   Have you been seen in the emergency room and/or had an admission in a hospital since we last saw you?  no   Have you had your routine dental cleaning in the past 6 months?  no     Do you have an active MyChart account? If no, what is the barrier?   Yes    Patient Care Team:  Dayna Medel MD as PCP - General (Family Medicine)  Dayna Medel MD as PCP - Empaneled Provider  Leny Zambrano MD as Consulting Physician (Cardiology)  [address] (Optometry)  Heraclio Hills MD as Consulting Physician (Nephrology)  Torri Saldaña MD as Consulting Physician (Cardiology)  Sharyn Gutierrez DPM as Physician (Podiatry)  Yulia Almendarez MD as Consulting Physician (Urology)  Manju Saldaña MD as Consulting Physician (Internal Medicine Cardiovascular Disease)  Alondra Ling MD as Consulting Physician (Orthopedic Surgery)    Medical History Review  Past Medical, Family, and Social History reviewed and does contribute to the patient presenting condition    Health Maintenance   Topic Date Due    Pneumococcal 0-64 years Vaccine (2 - PCV) 11/03/2007    COVID-19 Vaccine (2 - Pfizer series) 06/07/2021    Flu vaccine (1) 08/01/2023    A1C test (Diabetic or Prediabetic)  09/02/2023    Lipids  03/02/2024    DTaP/Tdap/Td vaccine (2 - Td or Tdap) 04/03/2024    Diabetic retinal exam  05/02/2024    Depression Monitoring  06/02/2024    Prostate Specific Antigen (PSA) Screening or Monitoring  06/02/2024    Diabetic Alb to Cr ratio (uACR) test  08/18/2024

## 2023-11-03 LAB
MICROORGANISM SPEC CULT: NORMAL
SPECIMEN DESCRIPTION: NORMAL

## 2023-11-03 NOTE — RESULT ENCOUNTER NOTE
Please notify patient: Blood glucose improved 131  Chronic kidney disease stage II slightly improved from prior  No urinary tract infection    Otherwise labs within normal limits  continue current treatment    Future Appointments  11/14/2023 9:00 AM    [facility] CHF CLINIC  [address] [facility]  11/22/2023 10:30 AM   Sterling Moreno MD         [facility]  12/4/2023  3:00 PM    Jodie Martinez DPM           [facility]        [facility]  3/6/2024   9:30 AM    Lita Ennis MD    [facility]

## 2023-11-06 PROBLEM — R11.2 NAUSEA AND VOMITING: Status: RESOLVED | Noted: 2023-03-25 | Resolved: 2023-11-06

## 2023-11-06 PROBLEM — E55.9 VITAMIN D DEFICIENCY: Status: ACTIVE | Noted: 2023-11-06

## 2023-11-06 PROBLEM — F32.9 MAJOR DEPRESSIVE DISORDER, SINGLE EPISODE, UNSPECIFIED: Status: RESOLVED | Noted: 2022-06-14 | Resolved: 2023-11-06

## 2023-11-06 PROBLEM — R26.2 DIFFICULTY WALKING: Status: RESOLVED | Noted: 2023-08-18 | Resolved: 2023-11-06

## 2023-11-06 PROBLEM — B37.42 CANDIDAL BALANITIS: Status: RESOLVED | Noted: 2022-09-12 | Resolved: 2023-11-06

## 2023-11-06 PROBLEM — G47.00 INSOMNIA: Status: RESOLVED | Noted: 2017-07-24 | Resolved: 2023-11-06

## 2023-11-14 ENCOUNTER — HOSPITAL ENCOUNTER (OUTPATIENT)
Age: 63
Setting detail: SPECIMEN
Discharge: HOME | End: 2023-11-14
Payer: COMMERCIAL

## 2023-11-14 ENCOUNTER — HOSPITAL ENCOUNTER (OUTPATIENT)
Dept: OTHER | Age: 63
Discharge: HOME | End: 2023-11-14
Payer: COMMERCIAL

## 2023-11-14 VITALS
DIASTOLIC BLOOD PRESSURE: 70 MMHG | WEIGHT: 244.6 LBS | SYSTOLIC BLOOD PRESSURE: 118 MMHG | RESPIRATION RATE: 18 BRPM | BODY MASS INDEX: 32.42 KG/M2 | HEIGHT: 73 IN | HEART RATE: 54 BPM | OXYGEN SATURATION: 98 %

## 2023-11-14 LAB
ANION GAP SERPL CALCULATED.3IONS-SCNC: 11 MMOL/L (ref 9–17)
BNP SERPL-MCNC: 61 PG/ML
BUN SERPL-MCNC: 26 MG/DL (ref 8–23)
CHLORIDE SERPL-SCNC: 104 MMOL/L (ref 98–107)
CO2 SERPL-SCNC: 27 MMOL/L (ref 20–31)
CREAT SERPL-MCNC: 1.5 MG/DL (ref 0.7–1.2)
GFR SERPL CREATININE-BSD FRML MDRD: 53 ML/MIN/1.73M2
POTASSIUM SERPL-SCNC: 4.4 MMOL/L (ref 3.7–5.3)
SODIUM SERPL-SCNC: 142 MMOL/L (ref 135–144)

## 2023-11-14 PROCEDURE — 80051 ELECTROLYTE PANEL: CPT

## 2023-11-14 PROCEDURE — 83880 ASSAY OF NATRIURETIC PEPTIDE: CPT

## 2023-11-14 PROCEDURE — 82565 ASSAY OF CREATININE: CPT

## 2023-11-14 PROCEDURE — 36415 COLL VENOUS BLD VENIPUNCTURE: CPT

## 2023-11-14 PROCEDURE — 99211 OFF/OP EST MAY X REQ PHY/QHP: CPT

## 2023-11-14 PROCEDURE — 84520 ASSAY OF UREA NITROGEN: CPT

## 2023-11-14 NOTE — PROGRESS NOTES
Value Date/Time    PROBNP 61 11/14/2023 08:59 AM    PROBNP 102 09/19/2023 09:07 AM    PROBNP 91 08/22/2023 10:45 AM     [unfilled]  BP Readings from Last 3 Encounters:   11/14/23 118/70   11/02/23 114/78   09/19/23 (!) 140/80     Wt Readings from Last 6 Encounters:   11/14/23 110.9 kg (244 lb 9.6 oz)   11/02/23 113.4 kg (250 lb)   10/02/23 109.8 kg (242 lb)   09/19/23 110.1 kg (242 lb 12.8 oz)   08/28/23 109.3 kg (241 lb)   08/23/23 110.7 kg (244 lb)        Heart Failure Medication Assessment      Current Heart Failure Medications:  - Aldactone 25 mg daily  -Metoprolol  mg daily  - Lasix 20 mg daily        Get With The Guidelines:  Mr. Osito Fuentes is on a Beta-Blocker for (HFrEF) (systolic) EF </= 73%  yes    Mr. Osito Fuentes is on an [employer] / ARB / Stan Adele for (HFrEF) (systolic) EF </= 95% no      Mr. Osito Fuentes is on a Aldosterone Receptor Antagonist for (HFrEF) (systolic) EF </= 24% or EF </= 40% with MI (Okay to use if SCr </= 2.5mg/dL in men, SCr </= 2mg/dL in women; Potassium < 5.0meq/L)  yes    Mr. Osito Fuentes is on a Diuretic  yes  Heart Failure Medication Adherence: Currently taking their Heart Failure medications as prescribed. Non-Heart Failure Medication Adherence: Currently taking their non-Heart Failure medications as prescribed. Medication Adverse Reactions Noted: none    Barriers to Medication: none    Recent Medication changes: yes - carvedilol changed to [place]  mg daily          Further Assessment / Education     Verbally reviewed importance of medication compliance with patient; patient verbalized understanding. Discussed 1500mg/day sodium restricted diet; patient verbalized understanding. Moderate daily exercise encouraged as tolerated. Discussed rest breaks as needed; patient verbalized understanding.     Patient instructed to weigh self at the same time of each day, using same clothes and same scale; reinforced teaching to monitor for 3-5 lb weight increase over 1-2 days, and to notify

## 2023-11-17 ENCOUNTER — TELEPHONE (OUTPATIENT)
Dept: FAMILY MEDICINE CLINIC | Age: 63
End: 2023-11-17

## 2023-11-17 NOTE — TELEPHONE ENCOUNTER
Noted. Thank you!   Please make him an appointment, 15 minutes appointment when we have an opening, for the documentation of compression stockings  If not, to [facility] to [facility] [address] [facility] 3 pairs for $20 from what I was told from patient's, needs knee-high medium strength compression stockings    Future Appointments   Date Time Provider [address]   11/22/2023 10:30 AM Ric Stephenson MD [address]   12/4/2023  3:00 PM Kathi Bravo DPM [facility]   1/16/2024  9:00 AM [facility] CHF CLINIC RM [address]   3/6/2024  9:30 AM Germaine Christopher MD [facility]

## 2023-11-22 ENCOUNTER — OFFICE VISIT (OUTPATIENT)
Age: 63
End: 2023-11-22
Payer: COMMERCIAL

## 2023-11-22 VITALS
DIASTOLIC BLOOD PRESSURE: 71 MMHG | BODY MASS INDEX: 32.55 KG/M2 | HEIGHT: 73 IN | WEIGHT: 245.6 LBS | HEART RATE: 47 BPM | OXYGEN SATURATION: 97 % | TEMPERATURE: 97.5 F | SYSTOLIC BLOOD PRESSURE: 131 MMHG

## 2023-11-22 DIAGNOSIS — D17.5 LIPOMA OF COLON: Primary | ICD-10-CM

## 2023-11-22 PROCEDURE — 3017F COLORECTAL CA SCREEN DOC REV: CPT | Performed by: INTERNAL MEDICINE

## 2023-11-22 PROCEDURE — G8482 FLU IMMUNIZE ORDER/ADMIN: HCPCS | Performed by: INTERNAL MEDICINE

## 2023-11-22 PROCEDURE — G8427 DOCREV CUR MEDS BY ELIG CLIN: HCPCS | Performed by: INTERNAL MEDICINE

## 2023-11-22 PROCEDURE — G8417 CALC BMI ABV UP PARAM F/U: HCPCS | Performed by: INTERNAL MEDICINE

## 2023-11-22 PROCEDURE — 1036F TOBACCO NON-USER: CPT | Performed by: INTERNAL MEDICINE

## 2023-11-22 PROCEDURE — 99203 OFFICE O/P NEW LOW 30 MIN: CPT | Performed by: INTERNAL MEDICINE

## 2023-11-22 ASSESSMENT — ENCOUNTER SYMPTOMS
ABDOMINAL DISTENTION: 0
TROUBLE SWALLOWING: 0
COLOR CHANGE: 0
VOMITING: 0
COUGH: 0
BACK PAIN: 0
BLOOD IN STOOL: 0
CONSTIPATION: 0
DIARRHEA: 0
ABDOMINAL PAIN: 0
NAUSEA: 0

## 2023-11-22 NOTE — PROGRESS NOTES
Reason for Referral:       [place], [address]  [address] [facility] [facility] [address]  [place],  [address]    Chief Complaint   Patient presents with    New Patient     Cecum Mass           HISTORY OF PRESENT ILLNESS: Gali Paige is a 65 y.o. male with a past history remarkable for CAD, Obesity, HTN, MORENA, referred for evaluation of cecal mass. Patient had a colonsocopy recently for screening with Dr. Frederick Lorenzana and noted to have cecal mass. Superficial biopsies obtained that favored Lipoma. Patient denied any active symptoms today including blood in stool, weight loss and abdominal pain. Denied family history of CRC. Previous Endoscopies    Colonsocopy 2023 Dr. Frederick Lorenzana:  Cecum/Ascending colon: broad based submucosal lipoma vs cecal mass. Mass was biopsied with cold foreceps. Area was tattooed in four areas with ink. CECUM, BIOPSIES:-COLONIC MUCOSA WITH SCANT SUBEPITHELIAL ADIPOSE   TISSUE, FAVOR SUBMUCOSAL LIPOMA. Previous GI workup       Past Medical,Family, and Social History reviewed and does not contribute to the patient presentingcondition. Patient's PMH/PSH,SH,PSYCH Hx, MEDs, ALLERGIES, and ROS were all reviewed and updated in the appropriate sections.     PAST MEDICAL HISTORY:  Past Medical History:   Diagnosis Date    Acute heart failure ([facility]) 05/20/2018    Anemia     Benign hypertension with CKD (chronic kidney disease) stage III ([facility]) 07/24/2017    Benign hypertension with CKD (chronic kidney disease), stage II 07/24/2017    CAD (coronary artery disease)     Candidal balanitis 9/12/2022    Cardiomyopathy (HCC)     Chronic diastolic CHF (congestive heart failure) ([facility]) 07/23/2018    GOES TO CHF CLINIC NEXT VISIT 8-03-23    Chronic kidney disease     Coronary artery disease involving native coronary artery of native heart without angina pectoris 11/19/2016    Diabetic nephropathy associated with type 2 diabetes mellitus ([facility]) 01/12/2016    Diabetic polyneuropathy associated with type 2 diabetes

## 2023-12-04 ENCOUNTER — OFFICE VISIT (OUTPATIENT)
Dept: PODIATRY | Age: 63
End: 2023-12-04
Payer: COMMERCIAL

## 2023-12-04 VITALS — HEIGHT: 73 IN | WEIGHT: 250 LBS | BODY MASS INDEX: 33.13 KG/M2

## 2023-12-04 DIAGNOSIS — B35.1 DERMATOPHYTOSIS OF NAIL: ICD-10-CM

## 2023-12-04 DIAGNOSIS — R60.0 EDEMA OF LOWER EXTREMITY: ICD-10-CM

## 2023-12-04 DIAGNOSIS — I73.9 PERIPHERAL VASCULAR DISORDER (HCC): ICD-10-CM

## 2023-12-04 DIAGNOSIS — E11.49 OTHER DIABETIC NEUROLOGICAL COMPLICATION ASSOCIATED WITH TYPE 2 DIABETES MELLITUS (HCC): ICD-10-CM

## 2023-12-04 DIAGNOSIS — E11.51 TYPE II DIABETES MELLITUS WITH PERIPHERAL CIRCULATORY DISORDER (HCC): Primary | ICD-10-CM

## 2023-12-04 PROCEDURE — G8427 DOCREV CUR MEDS BY ELIG CLIN: HCPCS | Performed by: PODIATRIST

## 2023-12-04 PROCEDURE — 2022F DILAT RTA XM EVC RTNOPTHY: CPT | Performed by: PODIATRIST

## 2023-12-04 PROCEDURE — 1036F TOBACCO NON-USER: CPT | Performed by: PODIATRIST

## 2023-12-04 PROCEDURE — 3017F COLORECTAL CA SCREEN DOC REV: CPT | Performed by: PODIATRIST

## 2023-12-04 PROCEDURE — 3052F HG A1C>EQUAL 8.0%<EQUAL 9.0%: CPT | Performed by: PODIATRIST

## 2023-12-04 PROCEDURE — 11721 DEBRIDE NAIL 6 OR MORE: CPT | Performed by: PODIATRIST

## 2023-12-04 PROCEDURE — 99213 OFFICE O/P EST LOW 20 MIN: CPT | Performed by: PODIATRIST

## 2023-12-04 PROCEDURE — G8417 CALC BMI ABV UP PARAM F/U: HCPCS | Performed by: PODIATRIST

## 2023-12-04 PROCEDURE — G8482 FLU IMMUNIZE ORDER/ADMIN: HCPCS | Performed by: PODIATRIST

## 2023-12-04 NOTE — PROGRESS NOTES
weeks or sooner if any problems arise. Diagnosis was discussed with the pt and all of their questions were answered in detail. Proper foot hygiene and care was discussed with the pt. Informed patient on proper diabetic foot care and importance of tight glycemic control. Patient to check feet daily and contact the office with any questions/problems/concerns. Other comorbidity noted and will be managed by PCP.   12/4/2023    Electronically signed by David Solomon DPM on 12/4/2023 at 3:13 PM  12/4/2023

## 2023-12-08 DIAGNOSIS — N40.0 BENIGN PROSTATIC HYPERPLASIA WITHOUT LOWER URINARY TRACT SYMPTOMS: Primary | ICD-10-CM

## 2023-12-08 RX ORDER — TAMSULOSIN HYDROCHLORIDE 0.4 MG/1
0.4 CAPSULE ORAL DAILY
Qty: 90 CAPSULE | Refills: 0 | Status: SHIPPED | OUTPATIENT
Start: 2023-12-08

## 2023-12-08 NOTE — TELEPHONE ENCOUNTER
Please Approve or Refuse.   Send to Pharmacy per Pt's Request:      Next Visit Date:  3/6/2024   Last Visit Date: 11/2/2023    Hemoglobin A1C (%)   Date Value   11/02/2023 8.0 (H)   06/02/2023 7.4   03/02/2023 7.2             ( goal A1C is < 7)   BP Readings from Last 3 Encounters:   11/22/23 131/71   11/14/23 118/70   11/02/23 114/78          (goal 120/80)  BUN   Date Value Ref Range Status   11/14/2023 26 (H) 8 - 23 mg/dL Final     Creatinine   Date Value Ref Range Status   11/14/2023 1.5 (H) 0.7 - 1.2 mg/dL Final     Potassium   Date Value Ref Range Status   11/14/2023 4.4 3.7 - 5.3 mmol/L Final

## 2024-01-16 ENCOUNTER — HOSPITAL ENCOUNTER (OUTPATIENT)
Age: 64
Setting detail: SPECIMEN
Discharge: HOME | End: 2024-01-16

## 2024-01-16 ENCOUNTER — HOSPITAL ENCOUNTER (OUTPATIENT)
Dept: OTHER | Age: 64
Discharge: HOME | End: 2024-01-16

## 2024-01-18 NOTE — PROGRESS NOTES
P/C to pt to remind him of his appt with heart failure clinic for tomorrow. Pt wants to reschedule due to anticipated bad weather predicted. Appt rescheduled.

## 2024-01-19 ENCOUNTER — HOSPITAL ENCOUNTER (OUTPATIENT)
Dept: OTHER | Age: 64
Discharge: HOME | End: 2024-01-19

## 2024-01-25 ENCOUNTER — HOSPITAL ENCOUNTER (OUTPATIENT)
Dept: OTHER | Age: 64
Discharge: HOME | End: 2024-01-25

## 2024-01-25 ENCOUNTER — HOSPITAL ENCOUNTER (OUTPATIENT)
Age: 64
Setting detail: SPECIMEN
Discharge: HOME | End: 2024-01-25

## 2024-01-30 ENCOUNTER — HOSPITAL ENCOUNTER (OUTPATIENT)
Dept: OTHER | Age: 64
Discharge: HOME | End: 2024-01-30
Payer: COMMERCIAL

## 2024-01-30 ENCOUNTER — HOSPITAL ENCOUNTER (OUTPATIENT)
Age: 64
Setting detail: SPECIMEN
Discharge: HOME | End: 2024-01-30
Payer: COMMERCIAL

## 2024-01-30 VITALS
HEART RATE: 64 BPM | RESPIRATION RATE: 18 BRPM | BODY MASS INDEX: 33.21 KG/M2 | HEIGHT: 73 IN | WEIGHT: 250.6 LBS | OXYGEN SATURATION: 98 % | DIASTOLIC BLOOD PRESSURE: 66 MMHG | SYSTOLIC BLOOD PRESSURE: 116 MMHG

## 2024-01-30 LAB
ANION GAP SERPL CALCULATED.3IONS-SCNC: 14 MMOL/L (ref 9–17)
BNP SERPL-MCNC: 105 PG/ML
BUN SERPL-MCNC: 30 MG/DL (ref 8–23)
CHLORIDE SERPL-SCNC: 100 MMOL/L (ref 98–107)
CO2 SERPL-SCNC: 24 MMOL/L (ref 20–31)
CREAT SERPL-MCNC: 1.7 MG/DL (ref 0.7–1.2)
GFR SERPL CREATININE-BSD FRML MDRD: 45 ML/MIN/1.73M2
POTASSIUM SERPL-SCNC: 4.1 MMOL/L (ref 3.7–5.3)
SODIUM SERPL-SCNC: 138 MMOL/L (ref 135–144)

## 2024-01-30 PROCEDURE — 99211 OFF/OP EST MAY X REQ PHY/QHP: CPT

## 2024-01-30 PROCEDURE — 36415 COLL VENOUS BLD VENIPUNCTURE: CPT

## 2024-01-30 PROCEDURE — 80051 ELECTROLYTE PANEL: CPT

## 2024-01-30 PROCEDURE — 82565 ASSAY OF CREATININE: CPT

## 2024-01-30 PROCEDURE — 83880 ASSAY OF NATRIURETIC PEPTIDE: CPT

## 2024-01-30 PROCEDURE — 84520 ASSAY OF UREA NITROGEN: CPT

## 2024-01-30 NOTE — PROGRESS NOTES
[facility]  Heart Failure Clinic      [address] [address]  Phone: [phone]  Fax: [phone]      NAME: Freddy Avila  MEDICAL RECORD NUMBER:  [id number]  AGE: 63 y.o.   GENDER: male  : 1960  EPISODE DATE:  2024      Freddy Avila was referred to the [facility] by Dr. Colon for heart failure services.     ECHO EF%: 55 Date: 2023    ECHO EF%: 30 Date: 2019      Recent Cardiology follow-up apt: Pt needs to call to schedule appt, reminded pt and significant other to call and schedule.  Follow-up apt recommended: N/A  Upcoming testing: N/A  Medication Management: N/A  Nephrologist: N/A     Freddy Avila is a 63 y.o. male here for the Heart Failure Services.  He is here today for a Heart Failure assessment/consultation, monitoring medication effectiveness, reviewing necessary labs, transition of care, extensive Heart Failure education, reporting any concerns or symptoms and obtaining any necessary medication adjustments or orders from the patients health care team.    Vital Signs:   /66   Pulse 64   Resp 18   Ht 1.854 m (6' 1\")   Wt 113.7 kg (250 lb 9.6 oz)   SpO2 98%   BMI 33.06 kg/m²    O2 Device: None (Room air)        Weight gain of 6 pounds since last visit      Nursing Assessment:    Respiratory:    Assessment  Charting Type: Reassessment  Reassessment Performed: No change to previous assessment    Breath Sounds  Right Upper Lobe: Clear  Right Middle Lobe: Clear  Right Lower Lobe: Clear  Left Upper Lobe: Clear  Left Lower Lobe: Clear    Cough/Sputum  Cough: None    Cardiac  Cardiac Regularity: Regular  Heart Sounds: S1, S2, Murmur  Cardiac Symptoms: Peripheral edema    Peripheral Vascular  Peripheral Vascular (WDL): Exceptions to WDL  Edema: Left lower extremity  RLE Edema: Other (comment) (per pt previously injured, baseline for pt)  LLE Edema: Trace      Subjective data:    Shortness of Breath:   Dyspnea on

## 2024-02-22 ENCOUNTER — HOSPITAL ENCOUNTER (OUTPATIENT)
Age: 64
Setting detail: SPECIMEN
Discharge: HOME | End: 2024-02-22
Payer: COMMERCIAL

## 2024-02-22 ENCOUNTER — HOSPITAL ENCOUNTER (OUTPATIENT)
Dept: OTHER | Age: 64
Discharge: HOME | End: 2024-02-22
Payer: COMMERCIAL

## 2024-02-22 VITALS
DIASTOLIC BLOOD PRESSURE: 76 MMHG | WEIGHT: 250 LBS | SYSTOLIC BLOOD PRESSURE: 134 MMHG | OXYGEN SATURATION: 98 % | HEIGHT: 73 IN | RESPIRATION RATE: 18 BRPM | HEART RATE: 52 BPM | BODY MASS INDEX: 33.13 KG/M2

## 2024-02-22 LAB
ANION GAP SERPL CALCULATED.3IONS-SCNC: 13 MMOL/L (ref 9–17)
BNP SERPL-MCNC: 65 PG/ML
BUN SERPL-MCNC: 26 MG/DL (ref 8–23)
CHLORIDE SERPL-SCNC: 100 MMOL/L (ref 98–107)
CO2 SERPL-SCNC: 25 MMOL/L (ref 20–31)
CREAT SERPL-MCNC: 1.7 MG/DL (ref 0.7–1.2)
GFR SERPL CREATININE-BSD FRML MDRD: 45 ML/MIN/1.73M2
POTASSIUM SERPL-SCNC: 4 MMOL/L (ref 3.7–5.3)
SODIUM SERPL-SCNC: 138 MMOL/L (ref 135–144)

## 2024-02-22 PROCEDURE — 82565 ASSAY OF CREATININE: CPT

## 2024-02-22 PROCEDURE — 99211 OFF/OP EST MAY X REQ PHY/QHP: CPT

## 2024-02-22 PROCEDURE — 83880 ASSAY OF NATRIURETIC PEPTIDE: CPT

## 2024-02-22 PROCEDURE — 84520 ASSAY OF UREA NITROGEN: CPT

## 2024-02-22 PROCEDURE — 36415 COLL VENOUS BLD VENIPUNCTURE: CPT

## 2024-02-22 PROCEDURE — 80051 ELECTROLYTE PANEL: CPT

## 2024-02-22 NOTE — PROGRESS NOTES
[facility]  Heart Failure Clinic      [address] [address]  Phone: [phone]  Fax: [phone]      NAME: Freddy Avila  MEDICAL RECORD NUMBER:  [id number]  AGE: 63 y.o.   GENDER: male  : 1960  EPISODE DATE:  2024      Freddy Avila was referred to the [facility] by Dr. Colon for heart failure services.     ECHO EF%: 55 Date: 2023    ECHO EF%: 30 Date: 2019      Recent Cardiology follow-up apt: Pt needs to schedule appt  Follow-up apt recommended: N/A  Upcoming testing: N/A  Medication Management: No  Nephrologist: No     Freddy Avila is a 63 y.o. male here for the Heart Failure Services.  He is here today for a Heart Failure assessment/consultation, monitoring medication effectiveness, reviewing necessary labs, transition of care, extensive Heart Failure education, reporting any concerns or symptoms and obtaining any necessary medication adjustments or orders from the patients health care team.    Vital Signs:   /76   Pulse 52   Resp 18   Ht 1.854 m (6' 1\")   Wt 113.4 kg (250 lb)   SpO2 98%   BMI 32.98 kg/m²    O2 Device: None (Room air)        Weight loss/gain unchanged/stable      Nursing Assessment:    Respiratory:    Assessment  Charting Type: Reassessment  Reassessment Performed: No change to previous assessment    Breath Sounds  Right Upper Lobe: Clear  Right Middle Lobe: Clear  Right Lower Lobe: Clear  Left Upper Lobe: Clear  Left Lower Lobe: Clear    Cough/Sputum  Cough: None    Cardiac  Cardiac Regularity: Regular  Heart Sounds: S1, S2, Murmur, Distant/Muffled  Cardiac Symptoms: Peripheral edema    Peripheral Vascular  Peripheral Vascular (WDL): Exceptions to WDL  Edema: Right lower extremity  RLE Edema: Other (comment) (baseline for pt, previous injury)  LLE Edema: None      Subjective data:    Shortness of Breath:   Denies shortness of breath  Denies all other shortness of breath    Other Heart Failure

## 2024-03-06 ENCOUNTER — OFFICE VISIT (OUTPATIENT)
Dept: FAMILY MEDICINE CLINIC | Age: 64
End: 2024-03-06
Payer: COMMERCIAL

## 2024-03-06 VITALS
OXYGEN SATURATION: 97 % | HEART RATE: 54 BPM | HEIGHT: 73 IN | BODY MASS INDEX: 33.19 KG/M2 | SYSTOLIC BLOOD PRESSURE: 120 MMHG | WEIGHT: 250.4 LBS | DIASTOLIC BLOOD PRESSURE: 82 MMHG

## 2024-03-06 DIAGNOSIS — I50.42 CHRONIC COMBINED SYSTOLIC AND DIASTOLIC CHF (CONGESTIVE HEART FAILURE) (HCC): ICD-10-CM

## 2024-03-06 DIAGNOSIS — N18.2 BENIGN HYPERTENSION WITH CKD (CHRONIC KIDNEY DISEASE), STAGE II: ICD-10-CM

## 2024-03-06 DIAGNOSIS — M1A.3710 CHRONIC GOUT OF RIGHT FOOT DUE TO RENAL IMPAIRMENT WITHOUT TOPHUS: ICD-10-CM

## 2024-03-06 DIAGNOSIS — I12.9 BENIGN HYPERTENSION WITH CKD (CHRONIC KIDNEY DISEASE), STAGE II: ICD-10-CM

## 2024-03-06 DIAGNOSIS — E55.9 VITAMIN D DEFICIENCY: ICD-10-CM

## 2024-03-06 DIAGNOSIS — E78.5 HYPERLIPIDEMIA WITH TARGET LDL LESS THAN 70: ICD-10-CM

## 2024-03-06 DIAGNOSIS — E11.42 TYPE 2 DIABETES MELLITUS WITH DIABETIC POLYNEUROPATHY, WITHOUT LONG-TERM CURRENT USE OF INSULIN (HCC): Primary | ICD-10-CM

## 2024-03-06 LAB — HBA1C MFR BLD: 8.6 %

## 2024-03-06 PROCEDURE — 1036F TOBACCO NON-USER: CPT | Performed by: NURSE PRACTITIONER

## 2024-03-06 PROCEDURE — G8482 FLU IMMUNIZE ORDER/ADMIN: HCPCS | Performed by: NURSE PRACTITIONER

## 2024-03-06 PROCEDURE — G8417 CALC BMI ABV UP PARAM F/U: HCPCS | Performed by: NURSE PRACTITIONER

## 2024-03-06 PROCEDURE — G8427 DOCREV CUR MEDS BY ELIG CLIN: HCPCS | Performed by: NURSE PRACTITIONER

## 2024-03-06 PROCEDURE — 99215 OFFICE O/P EST HI 40 MIN: CPT | Performed by: NURSE PRACTITIONER

## 2024-03-06 PROCEDURE — 83036 HEMOGLOBIN GLYCOSYLATED A1C: CPT | Performed by: NURSE PRACTITIONER

## 2024-03-06 PROCEDURE — 3052F HG A1C>EQUAL 8.0%<EQUAL 9.0%: CPT | Performed by: NURSE PRACTITIONER

## 2024-03-06 PROCEDURE — 3017F COLORECTAL CA SCREEN DOC REV: CPT | Performed by: NURSE PRACTITIONER

## 2024-03-06 PROCEDURE — 2022F DILAT RTA XM EVC RTNOPTHY: CPT | Performed by: NURSE PRACTITIONER

## 2024-03-06 RX ORDER — B-COMPLEX WITH VITAMIN C
1 TABLET ORAL DAILY
Qty: 90 TABLET | Refills: 3 | Status: SHIPPED | OUTPATIENT
Start: 2024-03-06

## 2024-03-06 RX ORDER — FUROSEMIDE 20 MG/1
20 TABLET ORAL DAILY
Qty: 90 TABLET | Refills: 3 | Status: SHIPPED | OUTPATIENT
Start: 2024-03-06

## 2024-03-06 RX ORDER — ALLOPURINOL 100 MG/1
100 TABLET ORAL DAILY
Qty: 90 TABLET | Refills: 3 | Status: SHIPPED | OUTPATIENT
Start: 2024-03-06

## 2024-03-06 RX ORDER — SPIRONOLACTONE 25 MG/1
25 TABLET ORAL EVERY MORNING
Qty: 90 TABLET | Refills: 3 | Status: SHIPPED | OUTPATIENT
Start: 2024-03-06

## 2024-03-06 RX ORDER — CHOLECALCIFEROL (VITAMIN D3) 50 MCG
2000 TABLET ORAL DAILY
Qty: 90 TABLET | Refills: 3 | Status: SHIPPED | OUTPATIENT
Start: 2024-03-06

## 2024-03-06 RX ORDER — METOPROLOL SUCCINATE 100 MG/1
100 TABLET, EXTENDED RELEASE ORAL DAILY
Qty: 90 TABLET | Refills: 3 | Status: SHIPPED | OUTPATIENT
Start: 2024-03-06

## 2024-03-06 RX ORDER — LIDOCAINE 40 MG/G
CREAM TOPICAL
Qty: 120 G | Refills: 3 | Status: SHIPPED | OUTPATIENT
Start: 2024-03-06

## 2024-03-06 RX ORDER — ATORVASTATIN CALCIUM 40 MG/1
40 TABLET, FILM COATED ORAL
Qty: 90 TABLET | Refills: 3 | Status: SHIPPED | OUTPATIENT
Start: 2024-03-06

## 2024-03-06 RX ORDER — AMLODIPINE BESYLATE 10 MG/1
TABLET ORAL
Qty: 90 TABLET | Refills: 3 | Status: SHIPPED | OUTPATIENT
Start: 2024-03-06

## 2024-03-06 ASSESSMENT — PATIENT HEALTH QUESTIONNAIRE - PHQ9
8. MOVING OR SPEAKING SO SLOWLY THAT OTHER PEOPLE COULD HAVE NOTICED. OR THE OPPOSITE, BEING SO FIGETY OR RESTLESS THAT YOU HAVE BEEN MOVING AROUND A LOT MORE THAN USUAL: 1
5. POOR APPETITE OR OVEREATING: 1
1. LITTLE INTEREST OR PLEASURE IN DOING THINGS: 1
SUM OF ALL RESPONSES TO PHQ QUESTIONS 1-9: 5
SUM OF ALL RESPONSES TO PHQ QUESTIONS 1-9: 5
10. IF YOU CHECKED OFF ANY PROBLEMS, HOW DIFFICULT HAVE THESE PROBLEMS MADE IT FOR YOU TO DO YOUR WORK, TAKE CARE OF THINGS AT HOME, OR GET ALONG WITH OTHER PEOPLE: 0
9. THOUGHTS THAT YOU WOULD BE BETTER OFF DEAD, OR OF HURTING YOURSELF: 0
7. TROUBLE CONCENTRATING ON THINGS, SUCH AS READING THE NEWSPAPER OR WATCHING TELEVISION: 0
SUM OF ALL RESPONSES TO PHQ QUESTIONS 1-9: 5
6. FEELING BAD ABOUT YOURSELF - OR THAT YOU ARE A FAILURE OR HAVE LET YOURSELF OR YOUR FAMILY DOWN: 0
SUM OF ALL RESPONSES TO PHQ QUESTIONS 1-9: 5
2. FEELING DOWN, DEPRESSED OR HOPELESS: 0
SUM OF ALL RESPONSES TO PHQ9 QUESTIONS 1 & 2: 1
4. FEELING TIRED OR HAVING LITTLE ENERGY: 1
3. TROUBLE FALLING OR STAYING ASLEEP: 1

## 2024-03-06 ASSESSMENT — ENCOUNTER SYMPTOMS
CHEST TIGHTNESS: 0
COUGH: 0
ABDOMINAL PAIN: 0
VOMITING: 0
DIARRHEA: 0
WHEEZING: 0
SHORTNESS OF BREATH: 0
CONSTIPATION: 0
ABDOMINAL DISTENTION: 0
BACK PAIN: 0
NAUSEA: 0

## 2024-03-06 NOTE — PROGRESS NOTES
Pt in office for routine dm follow up    Visit Information    Have you changed or started any medications since your last visit including any over-the-counter medicines, vitamins, or herbal medicines? no   Have you stopped taking any of your medications? Is so, why? -  no  Are you having any side effects from any of your medications? - no    Have you seen any other physician or provider since your last visit?  no   Have you had any other diagnostic tests since your last visit?  no   Have you been seen in the emergency room and/or had an admission in a hospital since we last saw you?  no   Have you had your routine dental cleaning in the past 6 months?  no     Do you have an active MyChart account? If no, what is the barrier?  Yes    Patient Care Team:  Stacey Levi MD as PCP - General (Family Medicine)  [facility]  Sol Evans MD as Consulting Physician (Cardiology)  Jagdish Mcmullen, BELGICA (Optometry)  Andra Jacobo MD as Consulting Physician (Nephrology)  Elizabeth Pradhan MD as Consulting Physician (Cardiology)  Rosanne Enrique DPM as Physician (Podiatry)  Gabriele Brumfield MD as Consulting Physician (Urology)  Alex Sampson MD as Consulting Physician (Internal Medicine Cardiovascular Disease)  Michele López MD as Consulting Physician (Orthopedic Surgery)  Shonda Beth MD as Consulting Physician (Internal Medicine)    Medical History Review  Past Medical, Family, and Social History reviewed and does contribute to the patient presenting condition    Health Maintenance   Topic Date Due    Pneumococcal 0-64 years Vaccine (2 - PCV) 11/03/2007    Respiratory Syncytial Virus (RSV) Pregnant or age 60 yrs+ (1 - 1-dose 60+ series) Never done    COVID-19 Vaccine (2 - 2023-24 season) 09/01/2023    A1C test (Diabetic or Prediabetic)  02/02/2024    DTaP/Tdap/Td vaccine (2 - Td or Tdap) 04/03/2024    Diabetic retinal exam  05/02/2024    Depression Monitoring  
beer per week     Comment: 1-2 cans a month    Drug use: Yes     Types: Marijuana (Weed)     Comment: marijuana every day          SUBJECTIVE/OBJECTIVE:    Patient is here today with his wife for diabetes follow-up.  A1c check today does show a worsening A1c at 8.6 now.  We did discuss his medication regimen, possibly adding another medication to the mix or changing something which she states he does not want to do at this time.  He states he is tolerating his current regimen and knows he needs to change his diet around.  He states that he snacks on a lot of high carbohydrate snacks such as crackers and chips.  He states that as of last couple weeks he and his wife have tried to change their diet around, primarily with their meals and with her eating.  His wife states they are trying to eat more organic and clean.  He states that sometimes late at night he will snack high carbohydrate snacks.  I did discuss the importance of lifestyle modifications to help control his diabetes better and he states understanding.  He states he would like to give himself until his next A1c check to try to improve his levels on his own.  Continue checking blood sugars at home and keep a log.  Discussed warning signs and when to seek medical attention he states understanding.    He does need to medication refills, but otherwise states things are going pretty well for him.  Continue to follow with specialist as scheduled. Follow-up here again in 3 months for A1c check.    Lab Results   Component Value Date    LABA1C 8.6 (H) 03/06/2024    LABA1C 8.0 (H) 11/02/2023    LABA1C 7.4 06/02/2023        []Negative depression screening.   []1-4 = Minimal depression   [x]5-9 = Mild depression   []10-14 = Moderate depression   []15-19 = Moderately severe depression   []20-27 = Severe depression        3/6/2024     9:48 AM 6/2/2023    10:20 AM 3/2/2023     8:06 AM 7/15/2022     8:11 AM 6/14/2022     2:13 PM 1/21/2022     2:18 PM 6/1/2021     8:11 AM

## 2024-03-06 NOTE — PATIENT INSTRUCTIONS
Thank you for choosing [facility].  We know you have options when it comes to your healthcare; we appreciate that you chose us. Our goal is to provide exceptional  service and world class care to every patient.  You will be receiving a survey via email or text message asking for your feedback.  Please take a few minutes to share your thoughts about your recent visit. Your comments helps us understand what we do well and ways we can improve.  Thank you in advance for your valuable feedback.

## 2024-03-23 DIAGNOSIS — N40.0 BENIGN PROSTATIC HYPERPLASIA WITHOUT LOWER URINARY TRACT SYMPTOMS: ICD-10-CM

## 2024-03-25 RX ORDER — TAMSULOSIN HYDROCHLORIDE 0.4 MG/1
0.4 CAPSULE ORAL DAILY
Qty: 90 CAPSULE | Refills: 3 | Status: SHIPPED | OUTPATIENT
Start: 2024-03-25

## 2024-03-25 NOTE — TELEPHONE ENCOUNTER
Please Approve or Refuse.  Send to Pharmacy per Pt's Request:      Next Visit Date:  6/6/2024   Last Visit Date: 3/6/2024    Hemoglobin A1C (%)   Date Value   03/06/2024 8.6 (H)   11/02/2023 8.0 (H)   06/02/2023 7.4             ( goal A1C is < 7)   BP Readings from Last 3 Encounters:   03/06/24 120/82   02/22/24 134/76   01/30/24 116/66          (goal 120/80)  BUN   Date Value Ref Range Status   02/22/2024 26 (H) 8 - 23 mg/dL Final     Creatinine   Date Value Ref Range Status   02/22/2024 1.7 (H) 0.7 - 1.2 mg/dL Final     Potassium   Date Value Ref Range Status   02/22/2024 4.0 3.7 - 5.3 mmol/L Final

## 2024-04-12 ENCOUNTER — OFFICE VISIT (OUTPATIENT)
Dept: PODIATRY | Age: 64
End: 2024-04-12
Payer: COMMERCIAL

## 2024-04-12 VITALS — WEIGHT: 250 LBS | BODY MASS INDEX: 33.13 KG/M2 | HEIGHT: 73 IN

## 2024-04-12 DIAGNOSIS — E11.51 TYPE II DIABETES MELLITUS WITH PERIPHERAL CIRCULATORY DISORDER (HCC): Primary | ICD-10-CM

## 2024-04-12 DIAGNOSIS — B35.1 DERMATOPHYTOSIS OF NAIL: ICD-10-CM

## 2024-04-12 DIAGNOSIS — I73.9 PERIPHERAL VASCULAR DISORDER (HCC): ICD-10-CM

## 2024-04-12 DIAGNOSIS — M79.604 PAIN IN BOTH LOWER EXTREMITIES: ICD-10-CM

## 2024-04-12 DIAGNOSIS — M79.605 PAIN IN BOTH LOWER EXTREMITIES: ICD-10-CM

## 2024-04-12 PROCEDURE — 1036F TOBACCO NON-USER: CPT | Performed by: PODIATRIST

## 2024-04-12 PROCEDURE — G8427 DOCREV CUR MEDS BY ELIG CLIN: HCPCS | Performed by: PODIATRIST

## 2024-04-12 PROCEDURE — 3052F HG A1C>EQUAL 8.0%<EQUAL 9.0%: CPT | Performed by: PODIATRIST

## 2024-04-12 PROCEDURE — 2022F DILAT RTA XM EVC RTNOPTHY: CPT | Performed by: PODIATRIST

## 2024-04-12 PROCEDURE — 3017F COLORECTAL CA SCREEN DOC REV: CPT | Performed by: PODIATRIST

## 2024-04-12 PROCEDURE — G8417 CALC BMI ABV UP PARAM F/U: HCPCS | Performed by: PODIATRIST

## 2024-04-12 PROCEDURE — 11721 DEBRIDE NAIL 6 OR MORE: CPT | Performed by: PODIATRIST

## 2024-04-12 PROCEDURE — 99213 OFFICE O/P EST LOW 20 MIN: CPT | Performed by: PODIATRIST

## 2024-04-12 NOTE — PROGRESS NOTES
[facility], [facility] PODIATRY [address]  [address]  [facility] [address]  Dept: [phone]  Dept Fax: [phone]    DIABETIC PROGRESS NOTE  Date of patient's visit: 4/12/2024  Patient's Name:  Freddy Avila YOB: 1960            Patient Care Team:  Stacey Levi MD as PCP - General (Family Medicine)  [facility]  Sol Evans MD as Consulting Physician (Cardiology)  Jagdish Mcmullen OD (Optometry)  Andra Jacobo MD as Consulting Physician (Nephrology)  Elizabeth Pradhan MD as Consulting Physician (Cardiology)  Rosanne Enrique DPM as Physician (Podiatry)  Gabriele Brumfield MD as Consulting Physician (Urology)  Alex Sampson MD as Consulting Physician (Internal Medicine Cardiovascular Disease)  Michele López MD as Consulting Physician (Orthopedic Surgery)  Shonda Beth MD as Consulting Physician (Internal Medicine)          Chief Complaint   Patient presents with    Diabetes     Diabetic foot care    Peripheral Neuropathy     Bilateral feet       Subjective:   Freddy Avila comes to clinic for Diabetes (Diabetic foot care) and Peripheral Neuropathy (Bilateral feet)    he is a diabetic and states that he is doing well.  Pt currently has complaint of thickened, elongated nails that they cannot manage by themselves.   Pt's primary care physician is Stacey Levi MD last seen 03/06/2024.   Pt's last blood sugar was unknown.  Pt has a new complaint of increased discoloration to naomi nails, naomi.  Pt has tried changing shoes but it has not helped the pain    Lab Results   Component Value Date    LABA1C 8.6 (H) 03/06/2024      Complains of numbness in the feet bilat.  Past Medical History:   Diagnosis Date    Acute heart failure (HCC) 05/20/2018    Anemia     Benign hypertension with CKD (chronic kidney disease) stage III (HCC) 07/24/2017    Benign hypertension with CKD

## 2024-04-25 ENCOUNTER — HOSPITAL ENCOUNTER (OUTPATIENT)
Dept: OTHER | Age: 64
Discharge: HOME | End: 2024-04-25
Payer: COMMERCIAL

## 2024-04-25 ENCOUNTER — HOSPITAL ENCOUNTER (OUTPATIENT)
Age: 64
Setting detail: SPECIMEN
Discharge: HOME | End: 2024-04-25
Payer: COMMERCIAL

## 2024-04-25 VITALS
DIASTOLIC BLOOD PRESSURE: 70 MMHG | OXYGEN SATURATION: 98 % | BODY MASS INDEX: 32.88 KG/M2 | HEART RATE: 61 BPM | RESPIRATION RATE: 18 BRPM | SYSTOLIC BLOOD PRESSURE: 120 MMHG | WEIGHT: 248.1 LBS | HEIGHT: 73 IN

## 2024-04-25 LAB
ANION GAP SERPL CALCULATED.3IONS-SCNC: 11 MMOL/L (ref 9–17)
BNP SERPL-MCNC: 84 PG/ML
BUN SERPL-MCNC: 26 MG/DL (ref 8–23)
CHLORIDE SERPL-SCNC: 105 MMOL/L (ref 98–107)
CO2 SERPL-SCNC: 23 MMOL/L (ref 20–31)
CREAT SERPL-MCNC: 1.9 MG/DL (ref 0.7–1.2)
GFR SERPL CREATININE-BSD FRML MDRD: 39 ML/MIN/1.73M2
POTASSIUM SERPL-SCNC: 4.5 MMOL/L (ref 3.7–5.3)
SODIUM SERPL-SCNC: 139 MMOL/L (ref 135–144)

## 2024-04-25 PROCEDURE — 83880 ASSAY OF NATRIURETIC PEPTIDE: CPT

## 2024-04-25 PROCEDURE — 36415 COLL VENOUS BLD VENIPUNCTURE: CPT

## 2024-04-25 PROCEDURE — 99211 OFF/OP EST MAY X REQ PHY/QHP: CPT

## 2024-04-25 PROCEDURE — 82565 ASSAY OF CREATININE: CPT

## 2024-04-25 PROCEDURE — 80051 ELECTROLYTE PANEL: CPT

## 2024-04-25 PROCEDURE — 84520 ASSAY OF UREA NITROGEN: CPT

## 2024-04-25 NOTE — PROGRESS NOTES
[facility]  Heart Failure Clinic      [address] [address]  Phone: [phone]  Fax: [phone]      NAME: Freddy Avila  MEDICAL RECORD NUMBER:  [id number]  AGE: 63 y.o.   GENDER: male  : 1960  EPISODE DATE:  2024      Freddy Avila was referred to the [facility] by Dr. Colon for heart failure services.     ECHO EF%: 55-60 Date: 2023    ECHO EF%: 30 Date: 2019      Recent Cardiology follow-up apt:   Follow-up apt recommended: pt given phone number for Dr. Colon's office and instructed to call to make appointment, pt v/u  Upcoming testing: N/A  Medication Management: No  Nephrologist: No     Freddy Avila is a 63 y.o. male here for the Heart Failure Services.  He is here today for a Heart Failure assessment/consultation, monitoring medication effectiveness, reviewing necessary labs, transition of care, extensive Heart Failure education, reporting any concerns or symptoms and obtaining any necessary medication adjustments or orders from the patients health care team.    Vital Signs:   /70   Pulse 61   Resp 18   Ht 1.854 m (6' 1\")   Wt 112.5 kg (248 lb 1.6 oz)   SpO2 98%   BMI 32.73 kg/m²    O2 Device: None (Room air)        Weight loss/gain -2 lbs  Per pt his weight at home ranges from 245-250 lbs.      Nursing Assessment:    Respiratory:    Assessment  Charting Type: Reassessment    Breath Sounds  Right Upper Lobe: Clear  Right Middle Lobe: Clear  Right Lower Lobe: Clear  Left Upper Lobe: Clear  Left Lower Lobe: Clear    Cough/Sputum  Cough: None    Cardiac  Cardiac Regularity: Regular  Heart Sounds: S1, S2, Murmur  Implanted Cardiac Device (Pacemaker, ICD, PA Sensor): No    Peripheral Vascular  Peripheral Vascular (WDL): Exceptions to WDL  Edema: Right lower extremity  RLE Edema: Other (comment) (per pt he fractured his ankle years ago and this is baseline for  him.)  LLE Edema: None      Subjective data:    Shortness  English

## 2024-05-29 ENCOUNTER — TELEPHONE (OUTPATIENT)
Dept: BEHAVIORAL/MENTAL HEALTH CLINIC | Age: 64
End: 2024-05-29

## 2024-05-29 NOTE — TELEPHONE ENCOUNTER
Patient is asking for a letter for Child Support stating he is under the care of Dr. Levi and list his Chronic Dx.    Please call patient when ready.    Please advise and route to clinical staff.

## 2024-06-06 ENCOUNTER — OFFICE VISIT (OUTPATIENT)
Dept: FAMILY MEDICINE CLINIC | Age: 64
End: 2024-06-06
Payer: COMMERCIAL

## 2024-06-06 ENCOUNTER — HOSPITAL ENCOUNTER (OUTPATIENT)
Age: 64
Discharge: HOME | End: 2024-06-06
Payer: COMMERCIAL

## 2024-06-06 DIAGNOSIS — E11.22 TYPE 2 DIABETES MELLITUS WITH STAGE 3B CHRONIC KIDNEY DISEASE, WITHOUT LONG-TERM CURRENT USE OF INSULIN (HCC): ICD-10-CM

## 2024-06-06 DIAGNOSIS — Z12.5 PROSTATE CANCER SCREENING: ICD-10-CM

## 2024-06-06 DIAGNOSIS — E11.42 TYPE 2 DIABETES MELLITUS WITH DIABETIC POLYNEUROPATHY, WITHOUT LONG-TERM CURRENT USE OF INSULIN (HCC): ICD-10-CM

## 2024-06-06 DIAGNOSIS — I25.10 CORONARY ARTERY DISEASE INVOLVING NATIVE CORONARY ARTERY OF NATIVE HEART WITHOUT ANGINA PECTORIS: ICD-10-CM

## 2024-06-06 DIAGNOSIS — E78.5 HYPERLIPIDEMIA WITH TARGET LDL LESS THAN 70: ICD-10-CM

## 2024-06-06 DIAGNOSIS — T14.8XXA EXCORIATION: ICD-10-CM

## 2024-06-06 DIAGNOSIS — N18.30 BENIGN HYPERTENSION WITH CKD (CHRONIC KIDNEY DISEASE) STAGE III (HCC): ICD-10-CM

## 2024-06-06 DIAGNOSIS — I12.9 BENIGN HYPERTENSION WITH CKD (CHRONIC KIDNEY DISEASE) STAGE III (HCC): ICD-10-CM

## 2024-06-06 DIAGNOSIS — I50.42 CHRONIC COMBINED SYSTOLIC AND DIASTOLIC CHF (CONGESTIVE HEART FAILURE) (HCC): ICD-10-CM

## 2024-06-06 DIAGNOSIS — E11.65 TYPE 2 DIABETES MELLITUS WITH HYPERGLYCEMIA, WITHOUT LONG-TERM CURRENT USE OF INSULIN (HCC): ICD-10-CM

## 2024-06-06 DIAGNOSIS — E11.65 TYPE 2 DIABETES MELLITUS WITH HYPERGLYCEMIA, WITHOUT LONG-TERM CURRENT USE OF INSULIN (HCC): Primary | ICD-10-CM

## 2024-06-06 DIAGNOSIS — N18.32 TYPE 2 DIABETES MELLITUS WITH STAGE 3B CHRONIC KIDNEY DISEASE, WITHOUT LONG-TERM CURRENT USE OF INSULIN (HCC): ICD-10-CM

## 2024-06-06 DIAGNOSIS — N18.32 STAGE 3B CHRONIC KIDNEY DISEASE (HCC): ICD-10-CM

## 2024-06-06 LAB
ALBUMIN SERPL-MCNC: 4.1 G/DL (ref 3.5–5.2)
ALP SERPL-CCNC: 123 U/L (ref 40–129)
ALT SERPL-CCNC: 11 U/L (ref 5–41)
ANION GAP SERPL CALCULATED.3IONS-SCNC: 10 MMOL/L (ref 9–17)
AST SERPL-CCNC: 12 U/L
BASOPHILS # BLD: 0 K/UL (ref 0–0.2)
BASOPHILS NFR BLD: 0 % (ref 0–2)
BILIRUB SERPL-MCNC: 0.4 MG/DL (ref 0.3–1.2)
BNP SERPL-MCNC: 52 PG/ML
BUN SERPL-MCNC: 26 MG/DL (ref 8–23)
CALCIUM SERPL-MCNC: 9.5 MG/DL (ref 8.6–10.4)
CHLORIDE SERPL-SCNC: 106 MMOL/L (ref 98–107)
CO2 SERPL-SCNC: 25 MMOL/L (ref 20–31)
CREAT SERPL-MCNC: 1.5 MG/DL (ref 0.7–1.2)
EOSINOPHIL # BLD: 0.2 K/UL (ref 0–0.4)
EOSINOPHILS RELATIVE PERCENT: 3 % (ref 0–4)
ERYTHROCYTE [DISTWIDTH] IN BLOOD BY AUTOMATED COUNT: 14.4 % (ref 11.5–14.9)
EST. AVERAGE GLUCOSE BLD GHB EST-MCNC: 203 MG/DL
FOLATE SERPL-MCNC: 10.1 NG/ML (ref 4.8–24.2)
GFR, ESTIMATED: 52 ML/MIN/1.73M2
GLUCOSE SERPL-MCNC: 156 MG/DL (ref 70–99)
HBA1C MFR BLD: 8.7 % (ref 4–6)
HCT VFR BLD AUTO: 41.3 % (ref 41–53)
HGB BLD-MCNC: 13.5 G/DL (ref 13.5–17.5)
LYMPHOCYTES NFR BLD: 1.9 K/UL (ref 1–4.8)
LYMPHOCYTES RELATIVE PERCENT: 23 % (ref 24–44)
MAGNESIUM SERPL-MCNC: 2.5 MG/DL (ref 1.6–2.6)
MCH RBC QN AUTO: 30.9 PG (ref 26–34)
MCHC RBC AUTO-ENTMCNC: 32.7 G/DL (ref 31–37)
MCV RBC AUTO: 94.4 FL (ref 80–100)
MONOCYTES NFR BLD: 1 K/UL (ref 0.1–1.3)
MONOCYTES NFR BLD: 12 % (ref 1–7)
NEUTROPHILS NFR BLD: 62 % (ref 36–66)
NEUTS SEG NFR BLD: 5.1 K/UL (ref 1.3–9.1)
PLATELET # BLD AUTO: 199 K/UL (ref 150–450)
PMV BLD AUTO: 9.6 FL (ref 6–12)
POTASSIUM SERPL-SCNC: 4 MMOL/L (ref 3.7–5.3)
PROT SERPL-MCNC: 7.8 G/DL (ref 6.4–8.3)
PSA SERPL-MCNC: 1.8 NG/ML (ref 0–4)
RBC # BLD AUTO: 4.38 M/UL (ref 4.5–5.9)
SODIUM SERPL-SCNC: 141 MMOL/L (ref 135–144)
TSH SERPL DL<=0.05 MIU/L-ACNC: 0.75 UIU/ML (ref 0.3–5)
URATE SERPL-MCNC: 6.4 MG/DL (ref 3.4–7)
VIT B12 SERPL-MCNC: 644 PG/ML (ref 232–1245)
WBC OTHER # BLD: 8.2 K/UL (ref 3.5–11)

## 2024-06-06 PROCEDURE — 83036 HEMOGLOBIN GLYCOSYLATED A1C: CPT

## 2024-06-06 PROCEDURE — 83735 ASSAY OF MAGNESIUM: CPT

## 2024-06-06 PROCEDURE — 1036F TOBACCO NON-USER: CPT | Performed by: FAMILY MEDICINE

## 2024-06-06 PROCEDURE — 80053 COMPREHEN METABOLIC PANEL: CPT

## 2024-06-06 PROCEDURE — 99214 OFFICE O/P EST MOD 30 MIN: CPT | Performed by: FAMILY MEDICINE

## 2024-06-06 PROCEDURE — 3017F COLORECTAL CA SCREEN DOC REV: CPT | Performed by: FAMILY MEDICINE

## 2024-06-06 PROCEDURE — 82746 ASSAY OF FOLIC ACID SERUM: CPT

## 2024-06-06 PROCEDURE — 85025 COMPLETE CBC W/AUTO DIFF WBC: CPT

## 2024-06-06 PROCEDURE — G0103 PSA SCREENING: HCPCS

## 2024-06-06 PROCEDURE — G8417 CALC BMI ABV UP PARAM F/U: HCPCS | Performed by: FAMILY MEDICINE

## 2024-06-06 PROCEDURE — 82607 VITAMIN B-12: CPT

## 2024-06-06 PROCEDURE — 36415 COLL VENOUS BLD VENIPUNCTURE: CPT

## 2024-06-06 PROCEDURE — 2022F DILAT RTA XM EVC RTNOPTHY: CPT | Performed by: FAMILY MEDICINE

## 2024-06-06 PROCEDURE — 83880 ASSAY OF NATRIURETIC PEPTIDE: CPT

## 2024-06-06 PROCEDURE — G8427 DOCREV CUR MEDS BY ELIG CLIN: HCPCS | Performed by: FAMILY MEDICINE

## 2024-06-06 PROCEDURE — 84550 ASSAY OF BLOOD/URIC ACID: CPT

## 2024-06-06 PROCEDURE — 84443 ASSAY THYROID STIM HORMONE: CPT

## 2024-06-06 PROCEDURE — 3052F HG A1C>EQUAL 8.0%<EQUAL 9.0%: CPT | Performed by: FAMILY MEDICINE

## 2024-06-06 RX ORDER — FUROSEMIDE 40 MG/1
40 TABLET ORAL EVERY MORNING
Qty: 90 TABLET | Refills: 3 | Status: SHIPPED | OUTPATIENT
Start: 2024-06-06

## 2024-06-06 NOTE — PROGRESS NOTES
well as documenting on the day of the visit.      This note was completed by using the assistance of a speech-recognition program. However, inadvertent computerized transcription errors may be present. Although every effort was made to ensure accuracy, no guarantees can be provided that every mistake has been identified and corrected by editing .      An electronic signature was used to authenticate this note.  Electronically signed by Stacey Levi MD on 6/11/2024 at 8:38 AM

## 2024-06-06 NOTE — RESULT ENCOUNTER NOTE
Please notify patient: Hemoglobin A1c 8.7 slightly worsening diabetes  His blood glucose today was 156 but on average is around 200  Chronic kidney disease stage IIIa, improved from prior  Otherwise labs within normal limits  continue current treatment    He needs to work on the diet a bit better to improve his diabetes    Lab Results       Component                Value               Date                       PSA                      1.80                06/06/2024                 PSA                      1.97                06/02/2023                 PSA                      1.95                08/27/2021              Future Appointments  6/27/2024  8:00 AM    [facility] CHF CLINIC  1        [facility] CHFCLIN        [place]  7/15/2024  8:00 AM    Rosanne Enrique DPM           [facility]        [facility]  9/18/2024  8:30 AM    Stacey Levi MD    fp sc               [facility]  12/4/2024  3:00 PM    Kike Jackson, VENTURA - * [facility]        DORA CHRISTIE

## 2024-06-11 ENCOUNTER — TELEPHONE (OUTPATIENT)
Dept: OTHER | Age: 64
End: 2024-06-11

## 2024-06-11 VITALS
SYSTOLIC BLOOD PRESSURE: 122 MMHG | HEART RATE: 54 BPM | OXYGEN SATURATION: 95 % | TEMPERATURE: 98.2 F | HEIGHT: 73 IN | DIASTOLIC BLOOD PRESSURE: 72 MMHG | BODY MASS INDEX: 32.2 KG/M2 | RESPIRATION RATE: 12 BRPM | WEIGHT: 243 LBS

## 2024-06-11 PROBLEM — N18.32 TYPE 2 DIABETES MELLITUS WITH STAGE 3B CHRONIC KIDNEY DISEASE, WITHOUT LONG-TERM CURRENT USE OF INSULIN (HCC): Status: ACTIVE | Noted: 2024-06-11

## 2024-06-11 PROBLEM — E11.22 TYPE 2 DIABETES MELLITUS WITH STAGE 3B CHRONIC KIDNEY DISEASE, WITHOUT LONG-TERM CURRENT USE OF INSULIN (HCC): Status: ACTIVE | Noted: 2024-06-11

## 2024-06-11 PROBLEM — R60.0 BILATERAL LEG EDEMA: Status: RESOLVED | Noted: 2023-08-18 | Resolved: 2024-06-11

## 2024-06-11 NOTE — TELEPHONE ENCOUNTER
P/c to pt to reschedule upcoming CHF appt 6/27. Per pt he is in [place], phone connection is terrible. Pt will call when he is back from [facility] to reschedule, appt for 6/27 cancelled.

## 2024-06-20 ENCOUNTER — APPOINTMENT (OUTPATIENT)
Dept: GENERAL RADIOLOGY | Age: 64
End: 2024-06-20
Payer: COMMERCIAL

## 2024-06-20 ENCOUNTER — HOSPITAL ENCOUNTER (INPATIENT)
Age: 64
LOS: 1 days | Discharge: LEFT BEFORE DISCHARGE | End: 2024-06-21
Attending: EMERGENCY MEDICINE | Admitting: INTERNAL MEDICINE
Payer: COMMERCIAL

## 2024-06-20 DIAGNOSIS — I42.9 CARDIOMYOPATHY, UNSPECIFIED TYPE (HCC): ICD-10-CM

## 2024-06-20 DIAGNOSIS — N30.00 ACUTE CYSTITIS WITHOUT HEMATURIA: ICD-10-CM

## 2024-06-20 DIAGNOSIS — R07.9 CHEST PAIN, UNSPECIFIED TYPE: Primary | ICD-10-CM

## 2024-06-20 DIAGNOSIS — I20.9 ANGINA PECTORIS (HCC): ICD-10-CM

## 2024-06-20 DIAGNOSIS — R06.02 SHORTNESS OF BREATH: ICD-10-CM

## 2024-06-20 PROBLEM — I20.89 ANGINA AT REST (HCC): Status: ACTIVE | Noted: 2024-06-20

## 2024-06-20 LAB
ALBUMIN SERPL-MCNC: 3.9 G/DL (ref 3.5–5.2)
ALP SERPL-CCNC: 115 U/L (ref 40–129)
ALT SERPL-CCNC: 6 U/L (ref 5–41)
ANION GAP SERPL CALCULATED.3IONS-SCNC: 13 MMOL/L (ref 9–17)
AST SERPL-CCNC: 13 U/L
BACTERIA URNS QL MICRO: ABNORMAL
BASOPHILS # BLD: 0 K/UL (ref 0–0.2)
BASOPHILS NFR BLD: 0 % (ref 0–2)
BILIRUB SERPL-MCNC: 0.9 MG/DL (ref 0.3–1.2)
BILIRUB UR QL STRIP: NEGATIVE
BUN SERPL-MCNC: 20 MG/DL (ref 8–23)
CALCIUM SERPL-MCNC: 7.8 MG/DL (ref 8.6–10.4)
CASTS #/AREA URNS LPF: ABNORMAL /LPF
CHLORIDE SERPL-SCNC: 100 MMOL/L (ref 98–107)
CLARITY UR: CLEAR
CO2 SERPL-SCNC: 23 MMOL/L (ref 20–31)
COLOR UR: YELLOW
CREAT SERPL-MCNC: 1.3 MG/DL (ref 0.7–1.2)
EOSINOPHIL # BLD: 0 K/UL (ref 0–0.4)
EOSINOPHILS RELATIVE PERCENT: 0 % (ref 0–4)
EPI CELLS #/AREA URNS HPF: ABNORMAL /HPF
ERYTHROCYTE [DISTWIDTH] IN BLOOD BY AUTOMATED COUNT: 14 % (ref 11.5–14.9)
GFR, ESTIMATED: 62 ML/MIN/1.73M2
GLUCOSE BLD-MCNC: 109 MG/DL (ref 75–110)
GLUCOSE BLD-MCNC: 147 MG/DL (ref 75–110)
GLUCOSE BLD-MCNC: 92 MG/DL (ref 75–110)
GLUCOSE SERPL-MCNC: 98 MG/DL (ref 70–99)
GLUCOSE UR STRIP-MCNC: ABNORMAL MG/DL
HCT VFR BLD AUTO: 42.5 % (ref 41–53)
HGB BLD-MCNC: 13.8 G/DL (ref 13.5–17.5)
HGB UR QL STRIP.AUTO: NEGATIVE
KETONES UR STRIP-MCNC: ABNORMAL MG/DL
LEUKOCYTE ESTERASE UR QL STRIP: ABNORMAL
LIPASE SERPL-CCNC: 86 U/L (ref 13–60)
LYMPHOCYTES NFR BLD: 1.4 K/UL (ref 1–4.8)
LYMPHOCYTES RELATIVE PERCENT: 19 % (ref 24–44)
MAGNESIUM SERPL-MCNC: 2.4 MG/DL (ref 1.6–2.6)
MCH RBC QN AUTO: 30.5 PG (ref 26–34)
MCHC RBC AUTO-ENTMCNC: 32.4 G/DL (ref 31–37)
MCV RBC AUTO: 94.3 FL (ref 80–100)
MONOCYTES NFR BLD: 0.7 K/UL (ref 0.1–1.3)
MONOCYTES NFR BLD: 9 % (ref 1–7)
NEUTROPHILS NFR BLD: 72 % (ref 36–66)
NEUTS SEG NFR BLD: 5.5 K/UL (ref 1.3–9.1)
NITRITE UR QL STRIP: NEGATIVE
PH UR STRIP: 5.5 [PH] (ref 5–8)
PLATELET # BLD AUTO: 214 K/UL (ref 150–450)
PMV BLD AUTO: 9.4 FL (ref 6–12)
POTASSIUM SERPL-SCNC: 4.2 MMOL/L (ref 3.7–5.3)
PROT SERPL-MCNC: 7.6 G/DL (ref 6.4–8.3)
PROT UR STRIP-MCNC: ABNORMAL MG/DL
RBC # BLD AUTO: 4.51 M/UL (ref 4.5–5.9)
RBC #/AREA URNS HPF: ABNORMAL /HPF
SODIUM SERPL-SCNC: 136 MMOL/L (ref 135–144)
SP GR UR STRIP: 1.03 (ref 1–1.03)
TROPONIN I SERPL HS-MCNC: 26 NG/L (ref 0–22)
TROPONIN I SERPL HS-MCNC: 31 NG/L (ref 0–22)
UROBILINOGEN UR STRIP-ACNC: NORMAL EU/DL (ref 0–1)
WBC #/AREA URNS HPF: ABNORMAL /HPF
WBC OTHER # BLD: 7.7 K/UL (ref 3.5–11)

## 2024-06-20 PROCEDURE — 81001 URINALYSIS AUTO W/SCOPE: CPT

## 2024-06-20 PROCEDURE — 80053 COMPREHEN METABOLIC PANEL: CPT

## 2024-06-20 PROCEDURE — 74022 RADEX COMPL AQT ABD SERIES: CPT

## 2024-06-20 PROCEDURE — 83690 ASSAY OF LIPASE: CPT

## 2024-06-20 PROCEDURE — 96375 TX/PRO/DX INJ NEW DRUG ADDON: CPT

## 2024-06-20 PROCEDURE — 2060000000 HC ICU INTERMEDIATE R&B

## 2024-06-20 PROCEDURE — 83735 ASSAY OF MAGNESIUM: CPT

## 2024-06-20 PROCEDURE — 87086 URINE CULTURE/COLONY COUNT: CPT

## 2024-06-20 PROCEDURE — 96374 THER/PROPH/DIAG INJ IV PUSH: CPT

## 2024-06-20 PROCEDURE — 82947 ASSAY GLUCOSE BLOOD QUANT: CPT

## 2024-06-20 PROCEDURE — 6370000000 HC RX 637 (ALT 250 FOR IP): Performed by: EMERGENCY MEDICINE

## 2024-06-20 PROCEDURE — 6360000002 HC RX W HCPCS: Performed by: EMERGENCY MEDICINE

## 2024-06-20 PROCEDURE — 2500000003 HC RX 250 WO HCPCS: Performed by: EMERGENCY MEDICINE

## 2024-06-20 PROCEDURE — 2580000003 HC RX 258: Performed by: EMERGENCY MEDICINE

## 2024-06-20 PROCEDURE — 6370000000 HC RX 637 (ALT 250 FOR IP): Performed by: INTERNAL MEDICINE

## 2024-06-20 PROCEDURE — 99285 EMERGENCY DEPT VISIT HI MDM: CPT

## 2024-06-20 PROCEDURE — 84484 ASSAY OF TROPONIN QUANT: CPT

## 2024-06-20 PROCEDURE — 36415 COLL VENOUS BLD VENIPUNCTURE: CPT

## 2024-06-20 PROCEDURE — 2580000003 HC RX 258: Performed by: INTERNAL MEDICINE

## 2024-06-20 PROCEDURE — 85025 COMPLETE CBC W/AUTO DIFF WBC: CPT

## 2024-06-20 PROCEDURE — 6360000002 HC RX W HCPCS: Performed by: INTERNAL MEDICINE

## 2024-06-20 PROCEDURE — 93005 ELECTROCARDIOGRAM TRACING: CPT | Performed by: INTERNAL MEDICINE

## 2024-06-20 PROCEDURE — 99223 1ST HOSP IP/OBS HIGH 75: CPT | Performed by: INTERNAL MEDICINE

## 2024-06-20 RX ORDER — ACETAMINOPHEN 325 MG/1
650 TABLET ORAL EVERY 6 HOURS PRN
Status: DISCONTINUED | OUTPATIENT
Start: 2024-06-20 | End: 2024-06-21 | Stop reason: HOSPADM

## 2024-06-20 RX ORDER — ONDANSETRON 4 MG/1
4 TABLET, ORALLY DISINTEGRATING ORAL EVERY 8 HOURS PRN
Status: DISCONTINUED | OUTPATIENT
Start: 2024-06-20 | End: 2024-06-20 | Stop reason: SDUPTHER

## 2024-06-20 RX ORDER — MAGNESIUM SULFATE HEPTAHYDRATE 40 MG/ML
2000 INJECTION, SOLUTION INTRAVENOUS PRN
Status: DISCONTINUED | OUTPATIENT
Start: 2024-06-20 | End: 2024-06-21 | Stop reason: HOSPADM

## 2024-06-20 RX ORDER — ASPIRIN 81 MG/1
81 TABLET, CHEWABLE ORAL DAILY
Status: DISCONTINUED | OUTPATIENT
Start: 2024-06-20 | End: 2024-06-21 | Stop reason: HOSPADM

## 2024-06-20 RX ORDER — SODIUM CHLORIDE 0.9 % (FLUSH) 0.9 %
5-40 SYRINGE (ML) INJECTION PRN
Status: DISCONTINUED | OUTPATIENT
Start: 2024-06-20 | End: 2024-06-21 | Stop reason: HOSPADM

## 2024-06-20 RX ORDER — DEXTROSE MONOHYDRATE 100 MG/ML
INJECTION, SOLUTION INTRAVENOUS CONTINUOUS PRN
Status: DISCONTINUED | OUTPATIENT
Start: 2024-06-20 | End: 2024-06-21 | Stop reason: HOSPADM

## 2024-06-20 RX ORDER — ONDANSETRON 2 MG/ML
8 INJECTION INTRAMUSCULAR; INTRAVENOUS ONCE
Status: COMPLETED | OUTPATIENT
Start: 2024-06-20 | End: 2024-06-20

## 2024-06-20 RX ORDER — SODIUM CHLORIDE 0.9 % (FLUSH) 0.9 %
5-40 SYRINGE (ML) INJECTION EVERY 12 HOURS SCHEDULED
Status: DISCONTINUED | OUTPATIENT
Start: 2024-06-20 | End: 2024-06-21 | Stop reason: HOSPADM

## 2024-06-20 RX ORDER — SPIRONOLACTONE 25 MG/1
25 TABLET ORAL EVERY MORNING
Status: DISCONTINUED | OUTPATIENT
Start: 2024-06-21 | End: 2024-06-21 | Stop reason: HOSPADM

## 2024-06-20 RX ORDER — METOPROLOL SUCCINATE 100 MG/1
100 TABLET, EXTENDED RELEASE ORAL DAILY
Status: DISCONTINUED | OUTPATIENT
Start: 2024-06-20 | End: 2024-06-21

## 2024-06-20 RX ORDER — ONDANSETRON 4 MG/1
4 TABLET, FILM COATED ORAL EVERY 8 HOURS PRN
COMMUNITY
End: 2024-06-20 | Stop reason: DRUGHIGH

## 2024-06-20 RX ORDER — ONDANSETRON 2 MG/ML
4 INJECTION INTRAMUSCULAR; INTRAVENOUS EVERY 6 HOURS PRN
Status: DISCONTINUED | OUTPATIENT
Start: 2024-06-20 | End: 2024-06-21 | Stop reason: HOSPADM

## 2024-06-20 RX ORDER — ONDANSETRON 4 MG/1
4 TABLET, ORALLY DISINTEGRATING ORAL EVERY 8 HOURS PRN
COMMUNITY
End: 2024-06-22

## 2024-06-20 RX ORDER — INSULIN LISPRO 100 [IU]/ML
0-4 INJECTION, SOLUTION INTRAVENOUS; SUBCUTANEOUS NIGHTLY
Status: DISCONTINUED | OUTPATIENT
Start: 2024-06-20 | End: 2024-06-21 | Stop reason: HOSPADM

## 2024-06-20 RX ORDER — SODIUM CHLORIDE 9 MG/ML
INJECTION, SOLUTION INTRAVENOUS PRN
Status: DISCONTINUED | OUTPATIENT
Start: 2024-06-20 | End: 2024-06-21 | Stop reason: HOSPADM

## 2024-06-20 RX ORDER — ACETAMINOPHEN 650 MG/1
650 SUPPOSITORY RECTAL EVERY 6 HOURS PRN
Status: DISCONTINUED | OUTPATIENT
Start: 2024-06-20 | End: 2024-06-21 | Stop reason: HOSPADM

## 2024-06-20 RX ORDER — ALLOPURINOL 100 MG/1
100 TABLET ORAL DAILY
Status: DISCONTINUED | OUTPATIENT
Start: 2024-06-20 | End: 2024-06-21 | Stop reason: HOSPADM

## 2024-06-20 RX ORDER — POTASSIUM CHLORIDE 7.45 MG/ML
10 INJECTION INTRAVENOUS PRN
Status: DISCONTINUED | OUTPATIENT
Start: 2024-06-20 | End: 2024-06-21 | Stop reason: HOSPADM

## 2024-06-20 RX ORDER — ALOGLIPTIN 6.25 MG/1
6.25 TABLET, FILM COATED ORAL DAILY
Status: DISCONTINUED | OUTPATIENT
Start: 2024-06-20 | End: 2024-06-20

## 2024-06-20 RX ORDER — POLYETHYLENE GLYCOL 3350 17 G/17G
17 POWDER, FOR SOLUTION ORAL DAILY PRN
Status: DISCONTINUED | OUTPATIENT
Start: 2024-06-20 | End: 2024-06-21 | Stop reason: HOSPADM

## 2024-06-20 RX ORDER — ATORVASTATIN CALCIUM 40 MG/1
40 TABLET, FILM COATED ORAL
Status: DISCONTINUED | OUTPATIENT
Start: 2024-06-20 | End: 2024-06-21 | Stop reason: HOSPADM

## 2024-06-20 RX ORDER — METOPROLOL SUCCINATE 100 MG/1
100 TABLET, EXTENDED RELEASE ORAL DAILY
COMMUNITY

## 2024-06-20 RX ORDER — POTASSIUM CHLORIDE 20 MEQ/1
40 TABLET, EXTENDED RELEASE ORAL PRN
Status: DISCONTINUED | OUTPATIENT
Start: 2024-06-20 | End: 2024-06-21 | Stop reason: HOSPADM

## 2024-06-20 RX ORDER — FUROSEMIDE 40 MG/1
40 TABLET ORAL EVERY MORNING
Status: DISCONTINUED | OUTPATIENT
Start: 2024-06-21 | End: 2024-06-21 | Stop reason: HOSPADM

## 2024-06-20 RX ORDER — ENOXAPARIN SODIUM 100 MG/ML
30 INJECTION SUBCUTANEOUS 2 TIMES DAILY
Status: DISCONTINUED | OUTPATIENT
Start: 2024-06-20 | End: 2024-06-21 | Stop reason: HOSPADM

## 2024-06-20 RX ORDER — ONDANSETRON 4 MG/1
4 TABLET, ORALLY DISINTEGRATING ORAL EVERY 8 HOURS PRN
Status: DISCONTINUED | OUTPATIENT
Start: 2024-06-20 | End: 2024-06-21 | Stop reason: HOSPADM

## 2024-06-20 RX ORDER — 0.9 % SODIUM CHLORIDE 0.9 %
250 INTRAVENOUS SOLUTION INTRAVENOUS ONCE
Status: DISCONTINUED | OUTPATIENT
Start: 2024-06-20 | End: 2024-06-20

## 2024-06-20 RX ORDER — INSULIN LISPRO 100 [IU]/ML
0-8 INJECTION, SOLUTION INTRAVENOUS; SUBCUTANEOUS
Status: DISCONTINUED | OUTPATIENT
Start: 2024-06-20 | End: 2024-06-21 | Stop reason: HOSPADM

## 2024-06-20 RX ORDER — 0.9 % SODIUM CHLORIDE 0.9 %
1000 INTRAVENOUS SOLUTION INTRAVENOUS ONCE
Status: DISCONTINUED | OUTPATIENT
Start: 2024-06-20 | End: 2024-06-20

## 2024-06-20 RX ORDER — AMLODIPINE BESYLATE 5 MG/1
5 TABLET ORAL DAILY
Status: DISCONTINUED | OUTPATIENT
Start: 2024-06-20 | End: 2024-06-21 | Stop reason: HOSPADM

## 2024-06-20 RX ORDER — TAMSULOSIN HYDROCHLORIDE 0.4 MG/1
0.4 CAPSULE ORAL DAILY
Status: DISCONTINUED | OUTPATIENT
Start: 2024-06-20 | End: 2024-06-21 | Stop reason: HOSPADM

## 2024-06-20 RX ORDER — ASPIRIN 325 MG
325 TABLET ORAL ONCE
Status: COMPLETED | OUTPATIENT
Start: 2024-06-20 | End: 2024-06-20

## 2024-06-20 RX ADMIN — EMPAGLIFLOZIN 10 MG: 10 TABLET, FILM COATED ORAL at 16:33

## 2024-06-20 RX ADMIN — ATORVASTATIN CALCIUM 40 MG: 40 TABLET, FILM COATED ORAL at 16:18

## 2024-06-20 RX ADMIN — TAMSULOSIN HYDROCHLORIDE 0.4 MG: 0.4 CAPSULE ORAL at 16:18

## 2024-06-20 RX ADMIN — FAMOTIDINE 20 MG: 10 INJECTION, SOLUTION INTRAVENOUS at 12:01

## 2024-06-20 RX ADMIN — CEFTRIAXONE SODIUM 1000 MG: 1 INJECTION, POWDER, FOR SOLUTION INTRAMUSCULAR; INTRAVENOUS at 14:42

## 2024-06-20 RX ADMIN — ONDANSETRON 8 MG: 2 INJECTION INTRAMUSCULAR; INTRAVENOUS at 11:59

## 2024-06-20 RX ADMIN — METOPROLOL SUCCINATE 100 MG: 100 TABLET, EXTENDED RELEASE ORAL at 16:18

## 2024-06-20 RX ADMIN — ONDANSETRON 4 MG: 2 INJECTION INTRAMUSCULAR; INTRAVENOUS at 17:39

## 2024-06-20 RX ADMIN — ALLOPURINOL 100 MG: 100 TABLET ORAL at 16:18

## 2024-06-20 RX ADMIN — ENOXAPARIN SODIUM 30 MG: 100 INJECTION SUBCUTANEOUS at 20:35

## 2024-06-20 RX ADMIN — SODIUM CHLORIDE, PRESERVATIVE FREE 10 ML: 5 INJECTION INTRAVENOUS at 20:35

## 2024-06-20 RX ADMIN — AMLODIPINE BESYLATE 5 MG: 5 TABLET ORAL at 16:33

## 2024-06-20 RX ADMIN — ASPIRIN 325 MG: 325 TABLET ORAL at 13:23

## 2024-06-20 RX ADMIN — ALOGLIPTIN 6.25 MG: 6.25 TABLET, FILM COATED ORAL at 16:19

## 2024-06-20 ASSESSMENT — PAIN DESCRIPTION - LOCATION: LOCATION: CHEST

## 2024-06-20 ASSESSMENT — LIFESTYLE VARIABLES
HOW MANY STANDARD DRINKS CONTAINING ALCOHOL DO YOU HAVE ON A TYPICAL DAY: PATIENT DOES NOT DRINK
HOW OFTEN DO YOU HAVE A DRINK CONTAINING ALCOHOL: NEVER

## 2024-06-20 ASSESSMENT — PAIN DESCRIPTION - ORIENTATION: ORIENTATION: LEFT

## 2024-06-20 ASSESSMENT — PAIN SCALES - GENERAL: PAINLEVEL_OUTOF10: 3

## 2024-06-20 NOTE — ED PROVIDER NOTES
EMERGENCY DEPARTMENT ENCOUNTER    Pt Name: Freddy Avila  MRN: [id number]  Birthdate 1960  Date of evaluation: 6/20/24  CHIEF COMPLAINT       Chief Complaint   Patient presents with    Chest Pain    Emesis     HISTORY OF PRESENT ILLNESS   HPI  Abdominal pain, history of UTI, put on cefdinir in [place] over the weekend, was diagnosed with a UTI there.  States he is not feeling any better.  Feeling some abdominal pain.  Some nausea vomiting, radiates up into his chest.  Had a CAT scan and labs done at [facility]"" over the weekend in [place].  Flew home on Tuesday.  Denies any shortness of breath.  The chest pain radiates up from his abdomen, feels like reflux.  Associate with nausea vomiting      REVIEW OF SYSTEMS     Review of Systems   All other systems reviewed and are negative.    PASTMEDICAL HISTORY     Past Medical History:   Diagnosis Date    Acute heart failure (HCC) 05/20/2018    Anemia     Benign hypertension with CKD (chronic kidney disease) stage III (HCC) 07/24/2017    Benign hypertension with CKD (chronic kidney disease), stage II 07/24/2017    Bilateral leg edema 08/18/2023    CAD (coronary artery disease)     Candidal balanitis 09/12/2022    Cardiomyopathy (HCC)     Chronic diastolic CHF (congestive heart failure) (HCC) 07/23/2018    GOES TO CHF CLINIC NEXT VISIT 8-03-23    Chronic kidney disease     Coronary artery disease involving native coronary artery of native heart without angina pectoris 11/19/2016    Diabetic nephropathy associated with type 2 diabetes mellitus (HCC) 01/12/2016    Diabetic polyneuropathy associated with type 2 diabetes mellitus (HCC)     DM [facility]di[facility] mellitus[facility] ([facility])     for 15-20 yrs    Erectile dysfunction     Grief 07/24/2017    HTN (hypertension)     Hypertensive urgency     Insomnia 07/24/2017    Lipidemia     Major depressive disorder, single episode, unspecified 06/14/2022    Mild episode of recurrent major depressive disorder (HCC) 01/13/2019  bradycardia, no ST elevation or depression.  Rate 48 bpm, WA interval 188, , QTc 421 [WM]   1147 Our unit clerk is contacting [facility] to see if we can get copy of his recent CT abdomen pelvis, labs, urine culture.  Patient consents to this [WM]   1151 Ejection fraction 30% on previous echo [WM]   1302 Medical records obtained from [facility] reviewing recent labs, this was done on June 16, 2024, white blood cell count was 8.4, hemoglobin 14.5.  Creatinine 1.5.  Troponin high-sensitivity was 12.  Urinalysis showed greater than 1000 glucose, yellow cloudy in appearance, negative for blood negative for protein negative nitrite small excite esterase positive, 5-10 white cells were present with 0-5 epithelial cells and 0-4 RBCs.  He also had a CT abdomen pelvis done at [facility] that day, it showed no acute abdominal or pelvic process, there was an enlarged prostate.  No abnormality of the biliary tree, there was a small 1 cm nodule the right adrenal gland, no appendicitis.  No free fluid.  Unremarkable aorta    Chest x-ray was done as well that showed no acute process. [WM]   1336 Consulting hospitalist for admission.  Giving aspirin.  Concern for ACS. [WM]   1345 DW DR Mcmahan for admission [WM]      ED Course User Index  [WM] Pepito Sanchez MD     Patient comfortable upon repeat assessment.  Is been on cefdinir for UTI.  Will initiate some IV antibiotics.  Rocephin.  Failure of outpatient treatment.  I do not think he has sepsis.  Admitting for chest pain, some troponin elevation.  Given an aspirin.    DATA FOR LAB AND RADIOLOGY TESTS ORDERED BELOW ARE REVIEWED BY THE ED CLINICIAN:    RADIOLOGY: All x-rays, CT, MRI, and formal ultrasound images (except ED bedside ultrasound) are read by the radiologist, see reports below, unless otherwise noted in MDM or here.  Reports below are reviewed by myself.  XR ACUTE ABD SERIES CHEST 1 VW   Final Result   No acute process in the

## 2024-06-20 NOTE — H&P
[facility]   IN-PATIENT SERVICE   [facility]    HISTORY AND PHYSICAL EXAMINATION            Date:   6/20/2024  Patient name:  Freddy Avila  Date of admission:  6/20/2024 11:22 AM  MRN:   [id number]  Account:  [id number]  YOB: 1960  PCP:    Stacey Levi MD  [facility]   [facility]  Code Status:    Full Code    Chief Complaint:     Chief Complaint   Patient presents with    Chest Pain    Emesis       History Obtained From:     patient    History of Present Illness:     The patient is a 63 y.o.  Non- / non  male who presents with Chest Pain and Emesis   and he is admitted to the hospital for the management of      Patient is 63-year-old male with multiple comorbidities including chronic diastolic heart failure ejection fraction as per echocardiogram in 2019, history of hypertension, type 2 diabetes mellitus hyperlipidemia, hypertension, noncompliant with the medication presented to the ER with vomiting and chest pain since last 4 days.  Patient states that 4 days ago he was in [facility] when he started throwing up,, patient also had abdominal pain that has not resolved, patient had abdominal CAT scan done at [facility], diagnosed with UTI and was discharged,  Patient states that since then he has been having off-and-on dull ache on the left side of the chest, no shortness of breath, continues to have vomiting denies any alcohol use, reports that he does u smoke weed    Past Medical History:     Past Medical History:   Diagnosis Date    Acute heart failure (HCC) 05/20/2018    Anemia     Benign hypertension with CKD (chronic kidney disease) stage III (HCC) 07/24/2017    Benign hypertension with CKD (chronic kidney disease), stage II 07/24/2017    Bilateral leg edema 08/18/2023    CAD (coronary artery disease)     Candidal balanitis 09/12/2022    Cardiomyopathy (HCC)     Chronic diastolic CHF (congestive heart failure) (HCC) 07/23/2018    GOES TO CHF CLINIC NEXT VISIT      Investigations:      Laboratory Testing:  Recent Results (from the past 24 hour(s))   POC Glucose Fingerstick    Collection Time: 06/20/24 11:35 AM   Result Value Ref Range    POC Glucose 92 75 - 110 mg/dL   CBC with Auto Differential    Collection Time: 06/20/24 11:55 AM   Result Value Ref Range    WBC 7.7 3.5 - 11.0 k/uL    RBC 4.51 4.5 - 5.9 m/uL    Hemoglobin 13.8 13.5 - 17.5 g/dL    Hematocrit 42.5 41 - 53 %    MCV 94.3 80 - 100 fL    MCH 30.5 26 - 34 pg    MCHC 32.4 31 - 37 g/dL    RDW 14.0 11.5 - 14.9 %    Platelets 214 150 - 450 k/uL    MPV 9.4 6.0 - 12.0 fL    Neutrophils % 72 (H) 36 - 66 %    Lymphocytes % 19 (L) 24 - 44 %    Monocytes % 9 (H) 1 - 7 %    Eosinophils % 0 0 - 4 %    Basophils % 0 0 - 2 %    Neutrophils Absolute 5.50 1.3 - 9.1 k/uL    Lymphocytes Absolute 1.40 1.0 - 4.8 k/uL    Monocytes Absolute 0.70 0.1 - 1.3 k/uL    Eosinophils Absolute 0.00 0.0 - 0.4 k/uL    Basophils Absolute 0.00 0.0 - 0.2 k/uL   Comprehensive Metabolic Panel    Collection Time: 06/20/24 11:55 AM   Result Value Ref Range    Sodium 136 135 - 144 mmol/L    Potassium 4.2 3.7 - 5.3 mmol/L    Chloride 100 98 - 107 mmol/L    CO2 23 20 - 31 mmol/L    Anion Gap 13 9 - 17 mmol/L    Glucose 98 70 - 99 mg/dL    BUN 20 8 - 23 mg/dL    Creatinine 1.3 (H) 0.7 - 1.2 mg/dL    Est, Glom Filt Rate 62 >60 mL/min/1.73m2    Calcium 7.8 (L) 8.6 - 10.4 mg/dL    Total Protein 7.6 6.4 - 8.3 g/dL    Albumin 3.9 3.5 - 5.2 g/dL    Total Bilirubin 0.9 0.3 - 1.2 mg/dL    Alkaline Phosphatase 115 40 - 129 U/L    ALT 6 5 - 41 U/L    AST 13 <40 U/L   Lipase    Collection Time: 06/20/24 11:55 AM   Result Value Ref Range    Lipase 86 (H) 13 - 60 U/L   Magnesium    Collection Time: 06/20/24 11:55 AM   Result Value Ref Range    Magnesium 2.4 1.6 - 2.6 mg/dL   Troponin    Collection Time: 06/20/24 11:55 AM   Result Value Ref Range    Troponin, High Sensitivity 31 (H) 0 - 22 ng/L   Troponin    Collection Time: 06/20/24 12:52 PM   Result Value Ref Range

## 2024-06-20 NOTE — ED NOTES
Report given to LETY Grimes from PCU.   Report method by phone   The following was reviewed with receiving RN:   Current vital signs:  /69   Pulse (!) 45   Temp 97.8 °F (36.6 °C) (Oral)   Resp 11   Wt 110.2 kg (243 lb)   SpO2 97%   BMI 32.06 kg/m²                      Any medication or safety alerts were reviewed. Any pending diagnostics and notifications were also reviewed, as well as any safety concerns or issues, abnormal labs, abnormal imaging, and abnormal assessment findings. Questions were answered.

## 2024-06-20 NOTE — PROGRESS NOTES
Pharmacy Medication History Note      List of current medications patient is taking is complete.    Source of information: Patient, [facility], OARRS    Changes made to medication list:  Medications removed (include reason, ex. therapy complete or physician discontinued, noncompliance):  None    Medications flagged for provider review:  Lidocaine 4% cream  Mupirocin 2% ointment  Ciclopirox 8% solution    Medications added/doses adjusted:  Metoprolol 100mg, take 1 tablet by mouth daily  Zofran 4mg, take 1 tablet by mouth every 8 hours as needed    Other notes (ex. Recent course of antibiotics, Coumadin dosing):  OARRS negative  There was a script for Cefdinir 300mg on 6/18/24 for 7 days, patient stated he was not taking it currently      Current Home Medication List at Time of Admission:  Prior to Admission medications    Medication Sig   metoprolol succinate (TOPROL XL) 100 MG extended release tablet Take 1 tablet by mouth daily   ondansetron (ZOFRAN-ODT) 4 MG disintegrating tablet Take 1 tablet by mouth every 8 hours as needed for Nausea or Vomiting   ondansetron (ZOFRAN) 4 MG tablet Take 1 tablet by mouth every 8 hours as needed for Nausea or Vomiting   furosemide (LASIX) 40 MG tablet Take 1 tablet by mouth every morning Dose increased on 6/5/2024   mupirocin (BACTROBAN) 2 % ointment Apply topically 3 times daily x 10 days.  Patient not taking: Reported on 6/20/2024   BLOOD GLUCOSE MONITOR    Alcohol Swabs PADS by Does not apply route   carvedilol (COREG) 3.125 MG tablet Take 1 tablet by mouth 2 times daily   tamsulosin (FLOMAX) 0.4 MG capsule Take 1 capsule by mouth once daily   vitamin D (CHOLECALCIFEROL) 50 MCG (2000 UT) TABS tablet Take 1 tablet by mouth daily   B Complex Vitamins (VITAMIN B COMPLEX) TABS Take 1 tablet by mouth daily   amLODIPine (NORVASC) 10 MG tablet Take 1 tablet by mouth once daily   lidocaine (LMX) 4 % cream 5 g topical 2-3 times a day as needed for pain, maximum 20 g/day  Patient not

## 2024-06-21 ENCOUNTER — HOSPITAL ENCOUNTER (INPATIENT)
Age: 64
End: 2024-06-21
Payer: COMMERCIAL

## 2024-06-21 ENCOUNTER — APPOINTMENT (OUTPATIENT)
Dept: NUCLEAR MEDICINE | Age: 64
End: 2024-06-21
Payer: COMMERCIAL

## 2024-06-21 ENCOUNTER — APPOINTMENT (OUTPATIENT)
Age: 64
End: 2024-06-21
Payer: COMMERCIAL

## 2024-06-21 VITALS
HEART RATE: 43 BPM | RESPIRATION RATE: 16 BRPM | BODY MASS INDEX: 30.45 KG/M2 | WEIGHT: 229.72 LBS | OXYGEN SATURATION: 99 % | HEIGHT: 73 IN | TEMPERATURE: 97.8 F | SYSTOLIC BLOOD PRESSURE: 125 MMHG | DIASTOLIC BLOOD PRESSURE: 68 MMHG

## 2024-06-21 PROBLEM — R07.9 CHEST PAIN: Status: ACTIVE | Noted: 2024-06-20

## 2024-06-21 LAB
AMPHET UR QL SCN: NEGATIVE
ANION GAP SERPL CALCULATED.3IONS-SCNC: 13 MMOL/L (ref 9–17)
BARBITURATES UR QL SCN: NEGATIVE
BASOPHILS # BLD: 0 K/UL (ref 0–0.2)
BASOPHILS NFR BLD: 1 % (ref 0–2)
BENZODIAZ UR QL: NEGATIVE
BUN SERPL-MCNC: 17 MG/DL (ref 8–23)
CALCIUM SERPL-MCNC: 8.9 MG/DL (ref 8.6–10.4)
CANNABINOIDS UR QL SCN: POSITIVE
CHLORIDE SERPL-SCNC: 102 MMOL/L (ref 98–107)
CO2 SERPL-SCNC: 22 MMOL/L (ref 20–31)
COCAINE UR QL SCN: NEGATIVE
CREAT SERPL-MCNC: 1.3 MG/DL (ref 0.7–1.2)
ECHO AO ROOT DIAM: 3 CM
ECHO AO ROOT INDEX: 1.32 CM/M2
ECHO AV AREA PEAK VELOCITY: 1.7 CM2
ECHO AV AREA VTI: 2 CM2
ECHO AV AREA/BSA PEAK VELOCITY: 0.7 CM2/M2
ECHO AV AREA/BSA VTI: 0.9 CM2/M2
ECHO AV MEAN GRADIENT: 10 MMHG
ECHO AV MEAN VELOCITY: 1.5 M/S
ECHO AV PEAK GRADIENT: 17 MMHG
ECHO AV PEAK VELOCITY: 2.1 M/S
ECHO AV VELOCITY RATIO: 0.38
ECHO AV VTI: 47.5 CM
ECHO BSA: 2.32 M2
ECHO BSA: 2.32 M2
ECHO EST RA PRESSURE: 3 MMHG
ECHO LA AREA 2C: 21.1 CM2
ECHO LA AREA 4C: 19.2 CM2
ECHO LA DIAMETER INDEX: 1.75 CM/M2
ECHO LA DIAMETER: 4 CM
ECHO LA MAJOR AXIS: 5.4 CM
ECHO LA MINOR AXIS: 5.7 CM
ECHO LA TO AORTIC ROOT RATIO: 1.33
ECHO LA VOL BP: 60 ML (ref 18–58)
ECHO LA VOL MOD A2C: 64 ML (ref 18–58)
ECHO LA VOL MOD A4C: 53 ML (ref 18–58)
ECHO LA VOL/BSA BIPLANE: 26 ML/M2 (ref 16–34)
ECHO LA VOLUME INDEX MOD A2C: 28 ML/M2 (ref 16–34)
ECHO LA VOLUME INDEX MOD A4C: 23 ML/M2 (ref 16–34)
ECHO LV E' LATERAL VELOCITY: 4 CM/S
ECHO LV E' SEPTAL VELOCITY: 5 CM/S
ECHO LV FRACTIONAL SHORTENING: 33 % (ref 28–44)
ECHO LV INTERNAL DIMENSION DIASTOLE INDEX: 1.97 CM/M2
ECHO LV INTERNAL DIMENSION DIASTOLIC: 4.5 CM (ref 4.2–5.9)
ECHO LV INTERNAL DIMENSION SYSTOLIC INDEX: 1.32 CM/M2
ECHO LV INTERNAL DIMENSION SYSTOLIC: 3 CM
ECHO LV IVSD: 1.7 CM (ref 0.6–1)
ECHO LV MASS 2D: 335 G (ref 88–224)
ECHO LV MASS INDEX 2D: 146.9 G/M2 (ref 49–115)
ECHO LV POSTERIOR WALL DIASTOLIC: 1.7 CM (ref 0.6–1)
ECHO LV RELATIVE WALL THICKNESS RATIO: 0.76
ECHO LVOT AREA: 4.2 CM2
ECHO LVOT AV VTI INDEX: 0.49
ECHO LVOT DIAM: 2.3 CM
ECHO LVOT MEAN GRADIENT: 2 MMHG
ECHO LVOT PEAK GRADIENT: 3 MMHG
ECHO LVOT PEAK VELOCITY: 0.8 M/S
ECHO LVOT STROKE VOLUME INDEX: 42.4 ML/M2
ECHO LVOT SV: 96.8 ML
ECHO LVOT VTI: 23.3 CM
ECHO MV A VELOCITY: 0.79 M/S
ECHO MV AREA VTI: 2.4 CM2
ECHO MV E DECELERATION TIME (DT): 261 MS
ECHO MV E VELOCITY: 0.54 M/S
ECHO MV E/A RATIO: 0.68
ECHO MV E/E' LATERAL: 13.5
ECHO MV E/E' RATIO (AVERAGED): 12.15
ECHO MV E/E' SEPTAL: 10.8
ECHO MV LVOT VTI INDEX: 1.74
ECHO MV MAX VELOCITY: 1 M/S
ECHO MV MEAN GRADIENT: 1 MMHG
ECHO MV MEAN VELOCITY: 0.4 M/S
ECHO MV PEAK GRADIENT: 4 MMHG
ECHO MV VTI: 40.5 CM
ECHO RA AREA 4C: 14.8 CM2
ECHO RA END SYSTOLIC VOLUME APICAL 4 CHAMBER INDEX BSA: 16 ML/M2
ECHO RA VOLUME: 37 ML
ECHO RIGHT VENTRICULAR SYSTOLIC PRESSURE (RVSP): 7 MMHG
ECHO RV TAPSE: 2.2 CM (ref 1.7–?)
ECHO TV REGURGITANT MAX VELOCITY: 1.03 M/S
ECHO TV REGURGITANT PEAK GRADIENT: 4 MMHG
EKG ATRIAL RATE: 48 BPM
EKG P AXIS: 43 DEGREES
EKG P-R INTERVAL: 188 MS
EKG Q-T INTERVAL: 472 MS
EKG QRS DURATION: 100 MS
EKG QTC CALCULATION (BAZETT): 421 MS
EKG R AXIS: -17 DEGREES
EKG T AXIS: 47 DEGREES
EKG VENTRICULAR RATE: 48 BPM
EOSINOPHIL # BLD: 0.1 K/UL (ref 0–0.4)
EOSINOPHILS RELATIVE PERCENT: 1 % (ref 0–4)
ERYTHROCYTE [DISTWIDTH] IN BLOOD BY AUTOMATED COUNT: 14.2 % (ref 11.5–14.9)
FENTANYL UR QL: NEGATIVE
GFR, ESTIMATED: 62 ML/MIN/1.73M2
GLUCOSE BLD-MCNC: 107 MG/DL (ref 75–110)
GLUCOSE BLD-MCNC: 131 MG/DL (ref 75–110)
GLUCOSE BLD-MCNC: 88 MG/DL (ref 75–110)
GLUCOSE SERPL-MCNC: 83 MG/DL (ref 70–99)
HCT VFR BLD AUTO: 41.3 % (ref 41–53)
HGB BLD-MCNC: 13.4 G/DL (ref 13.5–17.5)
LYMPHOCYTES NFR BLD: 2.6 K/UL (ref 1–4.8)
LYMPHOCYTES RELATIVE PERCENT: 33 % (ref 24–44)
MCH RBC QN AUTO: 30.5 PG (ref 26–34)
MCHC RBC AUTO-ENTMCNC: 32.4 G/DL (ref 31–37)
MCV RBC AUTO: 94.1 FL (ref 80–100)
METHADONE UR QL: NEGATIVE
MICROORGANISM SPEC CULT: NO GROWTH
MONOCYTES NFR BLD: 0.9 K/UL (ref 0.1–1.3)
MONOCYTES NFR BLD: 11 % (ref 1–7)
NEUTROPHILS NFR BLD: 54 % (ref 36–66)
NEUTS SEG NFR BLD: 4.4 K/UL (ref 1.3–9.1)
NUC STRESS EJECTION FRACTION: 49 %
NUC STRESS LV EDV: 93 ML (ref 67–155)
OPIATES UR QL SCN: NEGATIVE
OXYCODONE UR QL SCN: NEGATIVE
PCP UR QL SCN: NEGATIVE
PLATELET # BLD AUTO: 193 K/UL (ref 150–450)
PMV BLD AUTO: 9.6 FL (ref 6–12)
POTASSIUM SERPL-SCNC: 3.8 MMOL/L (ref 3.7–5.3)
RBC # BLD AUTO: 4.39 M/UL (ref 4.5–5.9)
SODIUM SERPL-SCNC: 137 MMOL/L (ref 135–144)
SPECIMEN DESCRIPTION: NORMAL
STRESS BASELINE DIAS BP: 74 MMHG
STRESS BASELINE HR: 53 BPM
STRESS BASELINE ST DEPRESSION: 0 MM
STRESS BASELINE SYS BP: 129 MMHG
STRESS ESTIMATED WORKLOAD: 1 METS
STRESS PEAK DIAS BP: 92 MMHG
STRESS PEAK SYS BP: 175 MMHG
STRESS PERCENT HR ACHIEVED: 51 %
STRESS POST PEAK HR: 81 BPM
STRESS RATE PRESSURE PRODUCT: NORMAL BPM*MMHG
STRESS ST DEPRESSION: 0 MM
STRESS STAGE RECOVERY 1 BP: NORMAL MMHG
STRESS STAGE RECOVERY 1 DURATION: 1 MIN:SEC
STRESS STAGE RECOVERY 1 HR: 73 BPM
STRESS STAGE RECOVERY 2 BP: NORMAL MMHG
STRESS STAGE RECOVERY 2 DURATION: 8 MIN:SEC
STRESS STAGE RECOVERY 2 HR: 57 BPM
STRESS TARGET HR: 158 BPM
TEST INFORMATION: ABNORMAL
TID: 0.91
WBC OTHER # BLD: 8 K/UL (ref 3.5–11)

## 2024-06-21 PROCEDURE — 85025 COMPLETE CBC W/AUTO DIFF WBC: CPT

## 2024-06-21 PROCEDURE — 82947 ASSAY GLUCOSE BLOOD QUANT: CPT

## 2024-06-21 PROCEDURE — 6360000002 HC RX W HCPCS: Performed by: INTERNAL MEDICINE

## 2024-06-21 PROCEDURE — 93017 CV STRESS TEST TRACING ONLY: CPT

## 2024-06-21 PROCEDURE — 93018 CV STRESS TEST I&R ONLY: CPT | Performed by: RADIOLOGY

## 2024-06-21 PROCEDURE — 36415 COLL VENOUS BLD VENIPUNCTURE: CPT

## 2024-06-21 PROCEDURE — 99223 1ST HOSP IP/OBS HIGH 75: CPT | Performed by: NURSE PRACTITIONER

## 2024-06-21 PROCEDURE — 6370000000 HC RX 637 (ALT 250 FOR IP): Performed by: INTERNAL MEDICINE

## 2024-06-21 PROCEDURE — 93306 TTE W/DOPPLER COMPLETE: CPT | Performed by: INTERNAL MEDICINE

## 2024-06-21 PROCEDURE — 2580000003 HC RX 258: Performed by: INTERNAL MEDICINE

## 2024-06-21 PROCEDURE — 93016 CV STRESS TEST SUPVJ ONLY: CPT | Performed by: RADIOLOGY

## 2024-06-21 PROCEDURE — 80048 BASIC METABOLIC PNL TOTAL CA: CPT

## 2024-06-21 PROCEDURE — 80307 DRUG TEST PRSMV CHEM ANLYZR: CPT

## 2024-06-21 PROCEDURE — 3430000000 HC RX DIAGNOSTIC RADIOPHARMACEUTICAL: Performed by: INTERNAL MEDICINE

## 2024-06-21 PROCEDURE — A9500 TC99M SESTAMIBI: HCPCS | Performed by: INTERNAL MEDICINE

## 2024-06-21 PROCEDURE — 99232 SBSQ HOSP IP/OBS MODERATE 35: CPT | Performed by: INTERNAL MEDICINE

## 2024-06-21 PROCEDURE — 93306 TTE W/DOPPLER COMPLETE: CPT

## 2024-06-21 PROCEDURE — 78452 HT MUSCLE IMAGE SPECT MULT: CPT

## 2024-06-21 RX ORDER — SODIUM CHLORIDE 0.9 % (FLUSH) 0.9 %
5-40 SYRINGE (ML) INJECTION PRN
Status: ACTIVE | OUTPATIENT
Start: 2024-06-21 | End: 2024-06-21

## 2024-06-21 RX ORDER — SODIUM CHLORIDE 0.9 % (FLUSH) 0.9 %
10 SYRINGE (ML) INJECTION PRN
Status: DISCONTINUED | OUTPATIENT
Start: 2024-06-21 | End: 2024-06-21 | Stop reason: HOSPADM

## 2024-06-21 RX ORDER — METOPROLOL SUCCINATE 50 MG/1
50 TABLET, EXTENDED RELEASE ORAL DAILY
Status: DISCONTINUED | OUTPATIENT
Start: 2024-06-22 | End: 2024-06-21 | Stop reason: HOSPADM

## 2024-06-21 RX ORDER — TETRAKIS(2-METHOXYISOBUTYLISOCYANIDE)COPPER(I) TETRAFLUOROBORATE 1 MG/ML
30 INJECTION, POWDER, LYOPHILIZED, FOR SOLUTION INTRAVENOUS
Status: COMPLETED | OUTPATIENT
Start: 2024-06-21 | End: 2024-06-21

## 2024-06-21 RX ORDER — TETRAKIS(2-METHOXYISOBUTYLISOCYANIDE)COPPER(I) TETRAFLUOROBORATE 1 MG/ML
10 INJECTION, POWDER, LYOPHILIZED, FOR SOLUTION INTRAVENOUS
Status: COMPLETED | OUTPATIENT
Start: 2024-06-21 | End: 2024-06-21

## 2024-06-21 RX ORDER — NITROGLYCERIN 0.4 MG/1
0.4 TABLET SUBLINGUAL EVERY 5 MIN PRN
Status: ACTIVE | OUTPATIENT
Start: 2024-06-21 | End: 2024-06-21

## 2024-06-21 RX ORDER — ALBUTEROL SULFATE 90 UG/1
2 AEROSOL, METERED RESPIRATORY (INHALATION) PRN
Status: ACTIVE | OUTPATIENT
Start: 2024-06-21 | End: 2024-06-21

## 2024-06-21 RX ORDER — METOPROLOL TARTRATE 1 MG/ML
5 INJECTION, SOLUTION INTRAVENOUS EVERY 5 MIN PRN
Status: ACTIVE | OUTPATIENT
Start: 2024-06-21 | End: 2024-06-21

## 2024-06-21 RX ORDER — AMINOPHYLLINE 25 MG/ML
50 INJECTION, SOLUTION INTRAVENOUS PRN
Status: ACTIVE | OUTPATIENT
Start: 2024-06-21 | End: 2024-06-21

## 2024-06-21 RX ORDER — ATROPINE SULFATE 0.1 MG/ML
0.5 INJECTION INTRAVENOUS EVERY 5 MIN PRN
Status: ACTIVE | OUTPATIENT
Start: 2024-06-21 | End: 2024-06-21

## 2024-06-21 RX ORDER — REGADENOSON 0.08 MG/ML
0.4 INJECTION, SOLUTION INTRAVENOUS
Status: COMPLETED | OUTPATIENT
Start: 2024-06-21 | End: 2024-06-21

## 2024-06-21 RX ORDER — SODIUM CHLORIDE 9 MG/ML
500 INJECTION, SOLUTION INTRAVENOUS CONTINUOUS PRN
Status: ACTIVE | OUTPATIENT
Start: 2024-06-21 | End: 2024-06-21

## 2024-06-21 RX ADMIN — Medication 33 MILLICURIE: at 13:31

## 2024-06-21 RX ADMIN — REGADENOSON 0.4 MG: 0.08 INJECTION, SOLUTION INTRAVENOUS at 08:29

## 2024-06-21 RX ADMIN — ONDANSETRON 4 MG: 2 INJECTION INTRAMUSCULAR; INTRAVENOUS at 16:08

## 2024-06-21 RX ADMIN — FUROSEMIDE 40 MG: 40 TABLET ORAL at 10:08

## 2024-06-21 RX ADMIN — ALLOPURINOL 100 MG: 100 TABLET ORAL at 10:08

## 2024-06-21 RX ADMIN — EMPAGLIFLOZIN 10 MG: 10 TABLET, FILM COATED ORAL at 10:08

## 2024-06-21 RX ADMIN — ENOXAPARIN SODIUM 30 MG: 100 INJECTION SUBCUTANEOUS at 10:09

## 2024-06-21 RX ADMIN — SODIUM CHLORIDE, PRESERVATIVE FREE 10 ML: 5 INJECTION INTRAVENOUS at 09:58

## 2024-06-21 RX ADMIN — ONDANSETRON 4 MG: 2 INJECTION INTRAMUSCULAR; INTRAVENOUS at 01:37

## 2024-06-21 RX ADMIN — SODIUM CHLORIDE, PRESERVATIVE FREE 10 ML: 5 INJECTION INTRAVENOUS at 13:31

## 2024-06-21 RX ADMIN — AMLODIPINE BESYLATE 5 MG: 5 TABLET ORAL at 10:08

## 2024-06-21 RX ADMIN — Medication 18 MILLICURIE: at 08:30

## 2024-06-21 RX ADMIN — CEFTRIAXONE SODIUM 1000 MG: 1 INJECTION, POWDER, FOR SOLUTION INTRAMUSCULAR; INTRAVENOUS at 14:39

## 2024-06-21 RX ADMIN — ASPIRIN 81 MG 81 MG: 81 TABLET ORAL at 10:08

## 2024-06-21 RX ADMIN — ONDANSETRON 4 MG: 2 INJECTION INTRAMUSCULAR; INTRAVENOUS at 09:56

## 2024-06-21 RX ADMIN — TAMSULOSIN HYDROCHLORIDE 0.4 MG: 0.4 CAPSULE ORAL at 10:09

## 2024-06-21 RX ADMIN — SPIRONOLACTONE 25 MG: 25 TABLET ORAL at 10:08

## 2024-06-21 NOTE — PROGRESS NOTES
Comprehensive Nutrition Assessment    Type and Reason for Visit:  Positive Nutrition Screen, Initial (Poor appetite, intake, and wt loss.)    Nutrition Recommendations/Plan:   Continue current diet.  Add Ensure High Protein once daily.     Malnutrition Assessment:  Malnutrition Status:  At risk for malnutrition (Comment) (Current medical condition) (06/21/24 3252)    Context:  Acute Illness     Findings of the 6 clinical characteristics of malnutrition:  Energy Intake:  No significant decrease in energy intake  Weight Loss:  Greater than 5% over 1 month (6% wt loss in 1 month: from 6/5/2024 with 110.7 kg (Stated) to today's date with 104.2 kg (Bed))     Body Fat Loss:  No significant body fat loss     Muscle Mass Loss:  No significant muscle mass loss    Fluid Accumulation:  No significant fluid accumulation     Strength:  Not Performed    Nutrition Assessment:    Pt admitted for angina at rest, acute cystitis without hematuria, with PMH of DM2, HTN, CKD3, CHF, CAD, depression. Pt reports 50-75% intake. Ensure High Protein to be ordered this date. Possible discharge this date.    Nutrition Related Findings:    Labs: eGFR 62, Hgb A1c 8.7 (6/6). Edema: 1+ RLE. Wound Type: None       Current Nutrition Intake & Therapies:    Average Meal Intake: 51-75%     ADULT DIET; Regular  ADULT ORAL NUTRITION SUPPLEMENT; Lunch; Low Calorie/High Protein Oral Supplement    Anthropometric Measures:  Height: 185.4 cm (6' 0.99\")  Ideal Body Weight (IBW): 184 lbs (84 kg)    Admission Body Weight: 104.2 kg (229 lb 11.5 oz)  Current Body Weight: 104.2 kg (229 lb 11.5 oz), 124.8 % IBW. Weight Source: Bed Scale  Current BMI (kg/m2): 30.3  Usual Body Weight: 110.7 kg (244 lb 0.8 oz) (6/5/2024 (~1mo.) Stated)  % Weight Change (Calculated): -5.9  Weight Adjustment For: No Adjustment                 BMI Categories: Obese Class 1 (BMI 30.0-34.9)    Estimated Daily Nutrient Needs:  Energy Requirements Based On: Kcal/kg  Weight Used for Energy

## 2024-06-21 NOTE — PROGRESS NOTES
[facility]   IN-PATIENT SERVICE   [facility]    HISTORY AND PHYSICAL EXAMINATION            Date:   6/21/2024  Patient name:  Freddy Avila  Date of admission:  6/20/2024 11:22 AM  MRN:   [id number]  Account:  [id number]  YOB: 1960  PCP:    Stacey Levi MD  Room:   [facility]  Code Status:    Full Code    Chief Complaint:     Chief Complaint   Patient presents with    Chest Pain    Emesis       History Obtained From:     patient    History of Present Illness:     The patient is a 63 y.o.  Non- / non  male who presents with Chest Pain and Emesis   and he is admitted to the hospital for the management of      Patient is 63-year-old male with multiple comorbidities including chronic diastolic heart failure ejection fraction as per echocardiogram in 2019, history of hypertension, type 2 diabetes mellitus hyperlipidemia, hypertension, noncompliant with the medication presented to the ER with vomiting and chest pain since last 4 days.  Patient states that 4 days ago he was in [facility] when he started throwing up,, patient also had abdominal pain that has not resolved, patient had abdominal CAT scan done at [facility], diagnosed with UTI and was discharged,  Patient states that since then he has been having off-and-on dull ache on the left side of the chest, no shortness of breath, continues to have vomiting denies any alcohol use, reports that he does u smoke weed    Past Medical History:     Past Medical History:   Diagnosis Date    Acute heart failure (HCC) 05/20/2018    Anemia     Benign hypertension with CKD (chronic kidney disease) stage III (HCC) 07/24/2017    Benign hypertension with CKD (chronic kidney disease), stage II 07/24/2017    Bilateral leg edema 08/18/2023    CAD (coronary artery disease)     Candidal balanitis 09/12/2022    Cardiomyopathy (HCC)     Chronic diastolic CHF (congestive heart failure) (HCC) 07/23/2018    GOES TO CHF CLINIC NEXT VISIT  24 - 44 %    Monocytes % 11 (H) 1 - 7 %    Eosinophils % 1 0 - 4 %    Basophils % 1 0 - 2 %    Neutrophils Absolute 4.40 1.3 - 9.1 k/uL    Lymphocytes Absolute 2.60 1.0 - 4.8 k/uL    Monocytes Absolute 0.90 0.1 - 1.3 k/uL    Eosinophils Absolute 0.10 0.0 - 0.4 k/uL    Basophils Absolute 0.00 0.0 - 0.2 k/uL   POC Glucose Fingerstick    Collection Time: 06/21/24  6:42 AM   Result Value Ref Range    POC Glucose 88 75 - 110 mg/dL   Nuclear stress test with myocardial perfusion    Collection Time: 06/21/24  8:29 AM   Result Value Ref Range    Body Surface Area 2.32 m2    Stress Systolic  mmHg    Stress Diastolic BP 92 mmHg    Baseline HR 53 BPM    Stress Peak HR 81 BPM    Stress Estimated Workload 1.0 METS    Stress Rate Pressure Product 14,175 BPM*mmHg    Stress Target  bpm    Stress Percent HR Achieved 51 %    Baseline Systolic  mmHg    Baseline Diastolic BP 74 mmHg    Recovery Stage 1 Duration 1 min:sec    Recovery Stage 1 HR 73 bpm    Recovery Stage 1 /81 mmHg    Recovery Stage 2 Duration 8 min:sec    Recovery Stage 2 HR 57 bpm    Recovery Stage 2 /91 mmHg    Baseline ST Depression 0 mm    Stress ST Depression 0 mm   POC Glucose Fingerstick    Collection Time: 06/21/24 11:13 AM   Result Value Ref Range    POC Glucose 131 (H) 75 - 110 mg/dL   Echo (TTE) complete (PRN contrast/bubble/strain/3D)    Collection Time: 06/21/24 12:09 PM   Result Value Ref Range    Body Surface Area 2.32 m2    LA Minor Axis 5.7 cm    LA Major Axis 5.4 cm    LA Area 2C 21.1 cm2    LA Area 4C 19.2 cm2    LA Volume MOD A2C 64 (A) 18 - 58 mL    LA Volume MOD A4C 53 18 - 58 mL    LA Volume BP 60 (A) 18 - 58 mL    LA Diameter 4.0 cm    RA Area 4C 14.8 cm2    RA Volume 37 ml    AV Mean Gradient 10 mmHg    AV VTI 47.5 cm    AV Mean Velocity 1.5 m/s    AV Peak Velocity 2.1 m/s    AV Peak Gradient 17 mmHg    AV Area by VTI 2.0 cm2    AV Area by Peak Velocity 1.7 cm2    Aortic Root 3.0 cm    IVSd 1.7 (A) 0.6 - 1.0 cm

## 2024-06-21 NOTE — PLAN OF CARE
Problem: Discharge Planning  Goal: Discharge to home or other facility with appropriate resources  Outcome: Completed     Problem: Pain  Goal: Verbalizes/displays adequate comfort level or baseline comfort level  6/21/2024 1934 by Alexis Walker, RN  Outcome: Completed  6/21/2024 1731 by Pamela Elizalde RN  Outcome: Progressing     Problem: ABCDS Injury Assessment  Goal: Absence of physical injury  Outcome: Completed     Problem: Safety - Adult  Goal: Free from fall injury  Outcome: Completed     Problem: Chronic Conditions and Co-morbidities  Goal: Patient's chronic conditions and co-morbidity symptoms are monitored and maintained or improved  Outcome: Completed     Problem: Nutrition Deficit:  Goal: Optimize nutritional status  Outcome: Completed

## 2024-06-21 NOTE — CONSULTS
LABGLOM 62 62   GLUCOSE 98 83     BNP: No results for input(s): \"BNP\" in the last 72 hours.  PT/INR: No results for input(s): \"PROTIME\", \"INR\" in the last 72 hours.  APTT:No results for input(s): \"APTT\" in the last 72 hours.  CARDIAC ENZYMES:No results for input(s): \"CKTOTAL\", \"CKMB\", \"CKMBINDEX\", \"TROPONINI\" in the last 72 hours.  FASTING LIPID PANEL:  Lab Results   Component Value Date/Time    HDL 45 11/02/2023 10:26 AM    TRIG 103 11/02/2023 10:26 AM     LIVER PROFILE:  Recent Labs     06/20/24  1155   AST 13   ALT 6       IMPRESSION:    Patient Active Problem List   Diagnosis    Cardiomyopathy (HCC)    Slow transit constipation    Hyperlipidemia with target LDL less than 70    MORENA (obstructive sleep apnea)    Diabetic nephropathy associated with type 2 diabetes mellitus ([facility])    Obesity (BMI 30-39.9)    Coronary artery disease involving native coronary artery of native heart without angina pectoris    Foot callus    Type 2 diabetes mellitus with hyperglycemia, without long-term current use of insulin (HCC)    Benign hypertension with CKD (chronic kidney disease), stage II    Erectile dysfunction    Type 2 diabetes mellitus with diabetic polyneuropathy, without long-term current use of insulin (HCC)    Chronic combined systolic and diastolic CHF (congestive heart failure) (HCC)    Recurrent major depressive disorder, in partial remission (HCC)    Anemia    Increasing prostate specific antigen level    Chronic gout of right foot due to renal impairment without tophus    Enlarged prostate    Acquired deformity of right foot    Marijuana use    History of knee surgery    Vitamin D deficiency    Stage 3b chronic kidney disease (HCC)    Excoriation left leg    Type 2 diabetes mellitus with stage 3b chronic kidney disease, without long-term current use of insulin (HCC)    Angina at rest (HCC)     Significant unrelieved Nausea  Chest pressure with nausea (states to me not a pain)  Chronic NICMP (was 30-35 % in LVEF in  2019)  with recovered LVEF 55% on echo 7/2023  Mild AS  HTN  HLP  Current UTI  Cannabis abuse  DM2    RECOMMENDATIONS:  Stable. Seen in stress lab. Denies any chest pain or pressure but c/o continued nausea for almost a week now.   Will proceed with stress test today. Further ischemic evaluation pending results  Repeat echo (had been ordered OP and not completed) to reassess LVEF and AS  DM2 per primary  BP elevated during stress test but stable on floor. Continue present medications and monitor  Chronic sinus bradycardia, worsened with vagal response from nausea. Asymptomatic. Continue to monitor.  Continue statin   Clinically no volume overload on exam  IV ATB per primary      Discussed with patient and Nurse    Rosangela Michael, APRN - CNP    [facility]           [phone]

## 2024-06-21 NOTE — PROGRESS NOTES
Writer came in and patient immediately told us that he will not stay at the hospital any longer .Writer and another RN ask pt if there is anything we could do and if we could address any problem. Pt declined and he just stated that he just does not like being in the hospital .Fanied MEGANNP came by and pt is still wanting to leave. Removed pt iv and discuss the risk of leaving AMA. Pt stated that he understood. Pt verified that all belonging are with him.

## 2024-06-21 NOTE — PROGRESS NOTES
Patient with sinus citlalli most of shift.  HR dipping into the low 40s and bounces back to 50s. Asymptomatic.  Yolande SIERRA texted through [employer] to update on bradycardia,

## 2024-06-21 NOTE — PLAN OF CARE
Pt admitted for chest pain and nausea, pt denies any chest pain, echo wnl, stress test pending, pt sinus citlalli mainly in the 40's today, lopresser held and adjusted, IV abx for possible UTI, other treatments continue     Problem: Pain  Goal: Verbalizes/displays adequate comfort level or baseline comfort level  Outcome: Progressing

## 2024-06-21 NOTE — PLAN OF CARE
Problem: Discharge Planning  Goal: Discharge to home or other facility with appropriate resources  Outcome: Progressing     Problem: Pain  Goal: Verbalizes/displays adequate comfort level or baseline comfort level  Outcome: Progressing  Note: Patient denies chest pain.  Complains of nausea.  Medicated with zofran with relief.Patient for stress test this am.     Problem: ABCDS Injury Assessment  Goal: Absence of physical injury  Outcome: Progressing     Problem: Safety - Adult  Goal: Free from fall injury  Outcome: Progressing  Note: Patient up per self with steady gait.

## 2024-06-21 NOTE — PROGRESS NOTES
Bedside report completed and pt says that he does not want to stay in hospital anymore and wants to sign himself out AMA

## 2024-06-21 NOTE — PROGRESS NOTES
[facility]   OCCUPATIONAL THERAPY MISSED TREATMENT NOTE   INPATIENT   Date: 24  Patient Name: Freddy Avila       Room:   MRN: [id number]   Account #: [id number]    : 1960  (63 y.o.)  Gender: male                 REASON FOR MISSED TREATMENT:  Patient at testing and/or off the floor   -    Patient off the floor, stress test. OT will continue to follow.  0913        Electronically signed by JAY Miller on 24 at 1:03 PM EDT

## 2024-06-22 ENCOUNTER — HOSPITAL ENCOUNTER (EMERGENCY)
Age: 64
Discharge: HOME | End: 2024-06-22
Attending: EMERGENCY MEDICINE
Payer: COMMERCIAL

## 2024-06-22 VITALS
OXYGEN SATURATION: 98 % | RESPIRATION RATE: 20 BRPM | HEIGHT: 73 IN | WEIGHT: 229.72 LBS | HEART RATE: 56 BPM | DIASTOLIC BLOOD PRESSURE: 80 MMHG | SYSTOLIC BLOOD PRESSURE: 150 MMHG | BODY MASS INDEX: 30.45 KG/M2 | TEMPERATURE: 97 F

## 2024-06-22 DIAGNOSIS — R11.2 NAUSEA AND VOMITING, UNSPECIFIED VOMITING TYPE: Primary | ICD-10-CM

## 2024-06-22 LAB
ALBUMIN SERPL-MCNC: 4.2 G/DL (ref 3.5–5.2)
ALBUMIN/GLOB SERPL: 1 {RATIO} (ref 1–2.5)
ALP SERPL-CCNC: 114 U/L (ref 40–129)
ALT SERPL-CCNC: 6 U/L (ref 10–50)
ANION GAP SERPL CALCULATED.3IONS-SCNC: 14 MMOL/L (ref 9–16)
AST SERPL-CCNC: 18 U/L (ref 10–50)
BASOPHILS # BLD: 0.03 K/UL (ref 0–0.2)
BASOPHILS NFR BLD: 0 % (ref 0–2)
BILIRUB SERPL-MCNC: 0.7 MG/DL (ref 0–1.2)
BUN SERPL-MCNC: 17 MG/DL (ref 8–23)
CALCIUM SERPL-MCNC: 9.1 MG/DL (ref 8.6–10.4)
CHLORIDE SERPL-SCNC: 97 MMOL/L (ref 98–107)
CO2 SERPL-SCNC: 24 MMOL/L (ref 20–31)
CREAT SERPL-MCNC: 1.6 MG/DL (ref 0.7–1.2)
EOSINOPHIL # BLD: 0.04 K/UL (ref 0–0.44)
EOSINOPHILS RELATIVE PERCENT: 0 % (ref 1–4)
ERYTHROCYTE [DISTWIDTH] IN BLOOD BY AUTOMATED COUNT: 13.2 % (ref 11.8–14.4)
GFR, ESTIMATED: 50 ML/MIN/1.73M2
GLUCOSE SERPL-MCNC: 109 MG/DL (ref 74–99)
HCT VFR BLD AUTO: 43.7 % (ref 40.7–50.3)
HGB BLD-MCNC: 14.3 G/DL (ref 13–17)
IMM GRANULOCYTES # BLD AUTO: <0.03 K/UL (ref 0–0.3)
IMM GRANULOCYTES NFR BLD: 0 %
LIPASE SERPL-CCNC: 83 U/L (ref 13–60)
LYMPHOCYTES NFR BLD: 2.64 K/UL (ref 1.1–3.7)
LYMPHOCYTES RELATIVE PERCENT: 25 % (ref 24–43)
MCH RBC QN AUTO: 30.5 PG (ref 25.2–33.5)
MCHC RBC AUTO-ENTMCNC: 32.7 G/DL (ref 28.4–34.8)
MCV RBC AUTO: 93.2 FL (ref 82.6–102.9)
MONOCYTES NFR BLD: 1.07 K/UL (ref 0.1–1.2)
MONOCYTES NFR BLD: 10 % (ref 3–12)
NEUTROPHILS NFR BLD: 65 % (ref 36–65)
NEUTS SEG NFR BLD: 6.72 K/UL (ref 1.5–8.1)
NRBC BLD-RTO: 0 PER 100 WBC
PLATELET # BLD AUTO: 222 K/UL (ref 138–453)
PMV BLD AUTO: 11 FL (ref 8.1–13.5)
POTASSIUM SERPL-SCNC: 3.6 MMOL/L (ref 3.7–5.3)
PROT SERPL-MCNC: 7.7 G/DL (ref 6.6–8.7)
RBC # BLD AUTO: 4.69 M/UL (ref 4.21–5.77)
SODIUM SERPL-SCNC: 135 MMOL/L (ref 136–145)
WBC OTHER # BLD: 10.5 K/UL (ref 3.5–11.3)

## 2024-06-22 PROCEDURE — 83690 ASSAY OF LIPASE: CPT

## 2024-06-22 PROCEDURE — 85025 COMPLETE CBC W/AUTO DIFF WBC: CPT

## 2024-06-22 PROCEDURE — 96375 TX/PRO/DX INJ NEW DRUG ADDON: CPT

## 2024-06-22 PROCEDURE — 99284 EMERGENCY DEPT VISIT MOD MDM: CPT

## 2024-06-22 PROCEDURE — 2580000003 HC RX 258: Performed by: EMERGENCY MEDICINE

## 2024-06-22 PROCEDURE — 96374 THER/PROPH/DIAG INJ IV PUSH: CPT

## 2024-06-22 PROCEDURE — 80053 COMPREHEN METABOLIC PANEL: CPT

## 2024-06-22 PROCEDURE — 6360000002 HC RX W HCPCS: Performed by: EMERGENCY MEDICINE

## 2024-06-22 PROCEDURE — 2500000003 HC RX 250 WO HCPCS: Performed by: EMERGENCY MEDICINE

## 2024-06-22 RX ORDER — ONDANSETRON 2 MG/ML
4 INJECTION INTRAMUSCULAR; INTRAVENOUS ONCE
Status: COMPLETED | OUTPATIENT
Start: 2024-06-22 | End: 2024-06-22

## 2024-06-22 RX ORDER — FAMOTIDINE 20 MG/1
20 TABLET, FILM COATED ORAL 2 TIMES DAILY
Qty: 14 TABLET | Refills: 0 | Status: SHIPPED | OUTPATIENT
Start: 2024-06-22 | End: 2024-06-29

## 2024-06-22 RX ORDER — ONDANSETRON 4 MG/1
4 TABLET, ORALLY DISINTEGRATING ORAL 3 TIMES DAILY PRN
Qty: 7 TABLET | Refills: 0 | Status: SHIPPED | OUTPATIENT
Start: 2024-06-22

## 2024-06-22 RX ADMIN — ONDANSETRON 4 MG: 2 INJECTION INTRAMUSCULAR; INTRAVENOUS at 03:37

## 2024-06-22 RX ADMIN — FAMOTIDINE 20 MG: 10 INJECTION, SOLUTION INTRAVENOUS at 03:38

## 2024-06-22 ASSESSMENT — PAIN - FUNCTIONAL ASSESSMENT: PAIN_FUNCTIONAL_ASSESSMENT: NONE - DENIES PAIN

## 2024-06-22 NOTE — ED TRIAGE NOTES
Pt ambulated to room 29 from triage with c/o emesis x 1 week. Pt reports becoming ill during a trip to [place] in which he started to vomit and it has not gotten any better. Pt reports being seen in the ED in [place] where he was prescribed Zofran. No other complaints at this time. Breathing is non-labored. Call light is within reach.

## 2024-06-22 NOTE — ED PROVIDER NOTES
[facility]     Emergency Department     Faculty Attestation    I performed a history and physical examination of the patient and discussed management with the resident. I reviewed the resident’s note and agree with the documented findings including all diagnostic interpretations and plan of care. Any areas of disagreement are noted on the chart. I was personally present for the key portions of any procedures. I have documented in the chart those procedures where I was not present during the key portions. I have reviewed the emergency nurses triage note. I agree with the chief complaint, past medical history, past surgical history, allergies, medications, social and family history as documented unless otherwise noted below. Documentation of the HPI, Physical Exam and Medical Decision Making performed by dali is based on my personal performance of the HPI, PE and MDM. For Physician Assistant/ Nurse Practitioner cases/documentation I have personally evaluated this patient and have completed at least one if not all key elements of the E/M (history, physical exam, and MDM). Additional findings are as noted.    Primary Care Physician: Stacey Levi MD    Note Started: 5:24 AM EDT     VITAL SIGNS:   height is 1.854 m (6' 1\") and weight is 104.2 kg (229 lb 11.5 oz). His oral temperature is 97 °F (36.1 °C). His blood pressure is 150/80 (abnormal) and his pulse is 56. His respiration is 20 and oxygen saturation is 98%.      Medical Decision Making  Nausea vomiting.  No abdominal pain.  Vomiting ongoing for the past week.  Recently in [place] and believes he caught a bug there.  Patient resting comfortably at this time reports nausea has significantly improved with symptomatic treatment.  Abdomen is soft nontender nondistended no rebound no guarding.  Will check labs, symptomatic treatment, hopeful discharge    Amount and/or Complexity of Data Reviewed  Labs:

## 2024-06-22 NOTE — DISCHARGE INSTRUCTIONS
You are seen in the ER today for your nausea.  We did labs which were largely unremarkable.  We gave you medications that seem to make you feel better.  At this time, you are stable to be discharged.  Will discharge you with medication for Pepcid as well as Zofran.  Please use this as needed for nausea.  Please return to the ER for any new or worsening symptoms, otherwise, please call your primary care provider to be reassessed.    PLEASE RETURN TO THE EMERGENCY DEPARTMENT IMMEDIATELY if you develop any concerning symptoms such as: chest pain, shortness of breath, feeling like your heart is racing, high fever not relieved by acetaminophen (Tylenol) and/or ibuprofen (Motrin / Advil), chills, persistent nausea and/or vomiting, loss of consciousness, numbness, weakness or tingling in the arms or legs or change in color of the extremities, changes in mental status, persistent or severe headache, blurry vision, loss of bladder / bowel control, unable to follow up with your physician, or other any other care or concern.

## 2024-06-22 NOTE — ED PROVIDER NOTES
[facility] ED  Emergency Department Encounter  Emergency Medicine Resident     Pt Name:Freddy Avila  MRN: [id number]  Birthdate 1960  Date of evaluation: 6/22/24  PCP:  Stacey Levi MD  Note Started: 2:15 AM EDT      CHIEF COMPLAINT       Chief Complaint   Patient presents with    Emesis     Emesis x 1 week.       HISTORY OF PRESENT ILLNESS  (Location/Symptom, Timing/Onset, Context/Setting, Quality, Duration, Modifying Factors, Severity.)      Freddy Avila is a 63 y.o. male who presents with ***    PAST MEDICAL / SURGICAL / SOCIAL / FAMILY HISTORY      has a past medical history of Acute heart failure (HCC), Anemia, Benign hypertension with CKD (chronic kidney disease) stage III (HCC), Benign hypertension with CKD (chronic kidney disease), stage II, Bilateral leg edema, CAD (coronary artery disease), Candidal balanitis, Cardiomyopathy (HCC), Chronic diastolic CHF (congestive heart failure) (HCC), Chronic kidney disease, Coronary artery disease involving native coronary artery of native heart without angina pectoris, Diabetic nephropathy associated with type 2 diabetes mellitus (HCC), Diabetic polyneuropathy associated with type 2 diabetes mellitus (HCC), DM (diabetes mellitus), type 2 (HCC), Erectile dysfunction, Grief, HTN (hypertension), Hypertensive urgency, Insomnia, Lipidemia, Major depressive disorder, single episode, unspecified, Mild episode of recurrent major depressive disorder (HCC), Neuropathy of right lower extremity, Obesity, Obesity (BMI 30-39.9), MORENA (obstructive sleep apnea), MORENA on CPAP, Sleep apnea, Slow transit constipation, Tinea pedis of both feet, and Uncontrolled type 2 diabetes mellitus, without long-term current use of insulin.  ***     has a past surgical history that includes Colonoscopy (01/15/2013); Cardiac catheterization (06/23/2017); Cardiac catheterization (04/2011); Knee arthroscopy (Left); and Colonoscopy (N/A, 8/28/2023).  ***    Social

## 2024-06-24 ENCOUNTER — CARE COORDINATION (OUTPATIENT)
Dept: CARE COORDINATION | Age: 64
End: 2024-06-24

## 2024-06-24 DIAGNOSIS — R07.9 CHEST PAIN, UNSPECIFIED TYPE: Primary | ICD-10-CM

## 2024-06-24 NOTE — CARE COORDINATION
Care Transitions Note    Initial Call - Call within 2 business days of discharge: Yes    Patient Current Location:  Home: [address] [place] [address]    Care Transition Nurse contacted the patient by telephone to perform post hospital discharge assessment, verified name and  as identifiers. Provided introduction to self, and explanation of the Care Transition Nurse role.     Patient: Freddy Avila    Patient : 1960   MRN: [id number]    Reason for Admission: Nausea and vomiting, UTI  Discharge Date: 24  RURS: Readmission Risk Score: 8.2      Last Discharge Facility       Date Complaint Diagnosis Description Type Department Provider    24 Emesis Nausea and vomiting, unspecified vomiting type ED (DISCHARGE) [facility] Joe Mcgowan MD            Was this an external facility discharge? No    Additional needs identified to be addressed with provider   No needs identified             Method of communication with provider: none.    Patients top risk factors for readmission: medical condition-chest pain, nausea and vomiting    Interventions to address risk factors:   Education: Anti emetics for nausea, PPI for GERD, fluids.    Care Summary Note: Spoke with patient this am for initial 24 hour follow up call. Patient went to [place] for a weekend trip, became ill with abdominal discomfort going up into his chest with nausea.  He was treated for UTI and discharged on Cefdinir. Patient returned home and presented to the hospital again for nausea and vomiting.  He ultimately left AMA but then returned to the ED again for same issue and was discharged home on antiemetic and Pepcid.  Today, he said he feels good, does not have any chest pains or nausea.  He has not picked up medications yet but said he plans to because when he took the Zofran it really helped.  He has no nausea currently and denies any dysuria, fevers or chills.  He remains on the Cefdinir and will complete till finished.  He

## 2024-06-25 ASSESSMENT — ENCOUNTER SYMPTOMS
COUGH: 0
SHORTNESS OF BREATH: 0
VOMITING: 1
NAUSEA: 1
ABDOMINAL PAIN: 0

## 2024-07-02 ENCOUNTER — CARE COORDINATION (OUTPATIENT)
Dept: CARE COORDINATION | Age: 64
End: 2024-07-02

## 2024-07-02 NOTE — CARE COORDINATION
Care Transitions Note    Follow Up Call     Attempted to reach patient for transitions of care follow up.  Unable to reach patient.      Outreach Attempts:   HIPAA compliant voicemail left for patient.     Care Summary Note: 1st attempt    Follow Up Appointment:   Future Appointments         Provider Specialty Dept Phone    7/15/2024 8:00 AM Rosanne Ernique DPM Podiatry [phone]    9/18/2024 8:30 AM Stacey Levi MD Family Medicine [phone]    12/4/2024 3:00 PM Kike Jackson, VENTURA - NP Cardiology [phone]            Plan for follow-up on next business day.  based on severity of symptoms and risk factors. Plan for next call:  Any nausea or vomiting? Any dysuria? Is ATB done? Did he get appt with PCP?       Haven Cooper LPN

## 2024-07-05 ENCOUNTER — CARE COORDINATION (OUTPATIENT)
Dept: CARE COORDINATION | Age: 64
End: 2024-07-05

## 2024-07-05 NOTE — CARE COORDINATION
Care Transitions Note    Follow Up Call     Attempted to reach patient for transitions of care follow up.  Unable to reach patient.      Outreach Attempts:   Multiple attempts to contact patient at phone numbers on file.     Care Summary Note: Unable to reach x 2. Left message with contact information, advised to call if any issues arise, episode closed     Follow Up Appointment:   Future Appointments         Provider Specialty Dept Phone    7/15/2024 8:00 AM Rosanne Enrique DPM Podiatry [phone]    9/18/2024 8:30 AM Stacey Levi MD Family Medicine [phone]    12/4/2024 3:00 PM Kike Jackson, APRN - NP Cardiology [phone]            No further follow-up call indicated based on severity of symptoms and risk factors. Plan for next call:  no further calls.     Brenda Cotto RN

## 2024-07-17 DIAGNOSIS — E11.42 TYPE 2 DIABETES MELLITUS WITH DIABETIC POLYNEUROPATHY, WITHOUT LONG-TERM CURRENT USE OF INSULIN (HCC): ICD-10-CM

## 2024-07-17 DIAGNOSIS — E11.65 TYPE 2 DIABETES MELLITUS WITH HYPERGLYCEMIA, WITHOUT LONG-TERM CURRENT USE OF INSULIN (HCC): ICD-10-CM

## 2024-07-17 DIAGNOSIS — I50.42 CHRONIC COMBINED SYSTOLIC AND DIASTOLIC CHF (CONGESTIVE HEART FAILURE) (HCC): Primary | ICD-10-CM

## 2024-07-17 RX ORDER — DAPAGLIFLOZIN 10 MG/1
TABLET, FILM COATED ORAL
Qty: 90 TABLET | Refills: 3 | Status: SHIPPED | OUTPATIENT
Start: 2024-07-17

## 2024-07-17 NOTE — TELEPHONE ENCOUNTER
Please Approve or Refuse.  Send to Pharmacy per Pt's Request: [email or web address]     Next Visit Date:  9/18/2024   Last Visit Date: 6/6/2024    Hemoglobin A1C (%)   Date Value   06/06/2024 8.7 (H)   03/06/2024 8.6 (H)   11/02/2023 8.0 (H)             ( goal A1C is < 7)   BP Readings from Last 3 Encounters:   06/22/24 (!) 150/80   06/21/24 125/68   06/06/24 122/72          (goal 120/80)  BUN   Date Value Ref Range Status   06/22/2024 17 8 - 23 mg/dL Final     Creatinine   Date Value Ref Range Status   06/22/2024 1.6 (H) 0.70 - 1.20 mg/dL Final     Potassium   Date Value Ref Range Status   06/22/2024 3.6 (L) 3.7 - 5.3 mmol/L Final

## 2024-07-31 ENCOUNTER — TELEPHONE (OUTPATIENT)
Dept: INTERNAL MEDICINE CLINIC | Age: 64
End: 2024-07-31

## 2024-08-12 ENCOUNTER — OFFICE VISIT (OUTPATIENT)
Dept: PODIATRY | Age: 64
End: 2024-08-12

## 2024-08-12 VITALS — BODY MASS INDEX: 30.35 KG/M2 | HEIGHT: 73 IN | WEIGHT: 229 LBS

## 2024-08-12 DIAGNOSIS — B35.1 DERMATOPHYTOSIS OF NAIL: ICD-10-CM

## 2024-08-12 DIAGNOSIS — I73.9 PERIPHERAL VASCULAR DISORDER (HCC): ICD-10-CM

## 2024-08-12 DIAGNOSIS — R60.0 EDEMA OF LOWER EXTREMITY: ICD-10-CM

## 2024-08-12 DIAGNOSIS — E11.51 TYPE II DIABETES MELLITUS WITH PERIPHERAL CIRCULATORY DISORDER (HCC): Primary | ICD-10-CM

## 2024-08-12 NOTE — PROGRESS NOTES
[facility], [facility] PODIATRY [address]  [address]  [facility] [address]  Dept: [phone]  Dept Fax: [phone]    DIABETIC PROGRESS NOTE  Date of patient's visit: 8/12/2024  Patient's Name:  Freddy Avila YOB: 1960            Patient Care Team:  Stacey Levi MD as PCP - General (Family Medicine)  [facility]  Sol Evans MD as Consulting Physician (Cardiology)  Jagdish Mcmullen OD (Optometry)  Andra Jacobo MD as Consulting Physician (Nephrology)  Elizabeth Pradhan MD as Consulting Physician (Cardiology)  Rosanne Enrique DPM as Physician (Podiatry)  Gabriele Brumfield MD as Consulting Physician (Urology)  Alex Sampson MD as Consulting Physician (Internal Medicine Cardiovascular Disease)  Michele López MD as Consulting Physician (Orthopedic Surgery)  Shonda Beth MD as Consulting Physician (Internal Medicine)  Elaine Mcadams, RN as Ambulatory Care Manager          Chief Complaint   Patient presents with    Diabetes     Diabetic foot care    Peripheral Neuropathy     Bilateral feet       Subjective:   Freddy Avila comes to clinic for Diabetes (Diabetic foot care) and Peripheral Neuropathy (Bilateral feet)    he is a diabetic and states that he is doing well.  Pt currently has complaint of thickened, elongated nails that they cannot manage by themselves.   Pt's primary care physician is Stacey Levi MD last seen 06/06/2024.   Pt's last blood sugar was 111 this morning.  Pt has a new complaint of increased swelling to naomi LE.  Pt has tried changing shoes but it has not helped the pain       Lab Results   Component Value Date    LABA1C 8.7 (H) 06/06/2024      Complains of numbness in the feet bilat.  Past Medical History:   Diagnosis Date    Acute heart failure (HCC) 05/20/2018    Anemia     Benign hypertension with CKD (chronic kidney disease) stage III ([facility])

## 2024-08-19 LAB
ESTIMATED AVERAGE GLUCOSE: NORMAL
HBA1C MFR BLD: 8.6 %

## 2024-09-06 NOTE — RESULT ENCOUNTER NOTE
Noted, relatively stable diabetes  Future Appointments  11/7/2024  8:00 AM    Stacey Levi MD    fp sc               BS ECC DEP  11/13/2024 3:00 PM    Rosanne Enrique DPM           [facility]        TOP  12/4/2024  3:00 PM    Kike Jackson APRN - * [facility]

## 2024-09-09 RX ORDER — SITAGLIPTIN 100 MG/1
100 TABLET, FILM COATED ORAL DAILY
Qty: 90 TABLET | Refills: 3 | Status: SHIPPED | OUTPATIENT
Start: 2024-09-09

## 2024-11-14 ENCOUNTER — APPOINTMENT (OUTPATIENT)
Dept: GENERAL RADIOLOGY | Age: 64
End: 2024-11-14
Payer: COMMERCIAL

## 2024-11-14 ENCOUNTER — HOSPITAL ENCOUNTER (EMERGENCY)
Age: 64
Discharge: HOME | End: 2024-11-14
Attending: EMERGENCY MEDICINE
Payer: COMMERCIAL

## 2024-11-14 ENCOUNTER — TELEPHONE (OUTPATIENT)
Dept: FAMILY MEDICINE CLINIC | Age: 64
End: 2024-11-14

## 2024-11-14 VITALS
HEART RATE: 52 BPM | SYSTOLIC BLOOD PRESSURE: 157 MMHG | OXYGEN SATURATION: 100 % | HEIGHT: 73 IN | WEIGHT: 239 LBS | RESPIRATION RATE: 19 BRPM | TEMPERATURE: 97.6 F | BODY MASS INDEX: 31.68 KG/M2 | DIASTOLIC BLOOD PRESSURE: 91 MMHG

## 2024-11-14 DIAGNOSIS — K85.90 ACUTE PANCREATITIS WITHOUT INFECTION OR NECROSIS, UNSPECIFIED PANCREATITIS TYPE: Primary | ICD-10-CM

## 2024-11-14 DIAGNOSIS — K21.9 GASTROESOPHAGEAL REFLUX DISEASE WITHOUT ESOPHAGITIS: ICD-10-CM

## 2024-11-14 LAB
ALBUMIN SERPL-MCNC: 4.1 G/DL (ref 3.5–5.2)
ALBUMIN/GLOB SERPL: 1.2 {RATIO} (ref 1–2.5)
ALP SERPL-CCNC: 98 U/L (ref 40–129)
ALT SERPL-CCNC: 7 U/L (ref 10–50)
ANION GAP SERPL CALCULATED.3IONS-SCNC: 11 MMOL/L (ref 9–16)
AST SERPL-CCNC: 14 U/L (ref 10–50)
BASOPHILS # BLD: 0.04 K/UL (ref 0–0.2)
BASOPHILS NFR BLD: 1 % (ref 0–2)
BILIRUB DIRECT SERPL-MCNC: 0.3 MG/DL (ref 0–0.2)
BILIRUB INDIRECT SERPL-MCNC: 0.4 MG/DL (ref 0–1)
BILIRUB SERPL-MCNC: 0.7 MG/DL (ref 0–1.2)
BUN SERPL-MCNC: 24 MG/DL (ref 8–23)
CALCIUM SERPL-MCNC: 9 MG/DL (ref 8.6–10.4)
CHLORIDE SERPL-SCNC: 102 MMOL/L (ref 98–107)
CO2 SERPL-SCNC: 25 MMOL/L (ref 20–31)
CREAT SERPL-MCNC: 1.6 MG/DL (ref 0.7–1.2)
EOSINOPHIL # BLD: 0.1 K/UL (ref 0–0.44)
EOSINOPHILS RELATIVE PERCENT: 2 % (ref 1–4)
ERYTHROCYTE [DISTWIDTH] IN BLOOD BY AUTOMATED COUNT: 13.1 % (ref 11.8–14.4)
GFR, ESTIMATED: 48 ML/MIN/1.73M2
GLOBULIN SER CALC-MCNC: 3.4 G/DL
GLUCOSE SERPL-MCNC: 203 MG/DL (ref 74–99)
HCT VFR BLD AUTO: 47.1 % (ref 40.7–50.3)
HGB BLD-MCNC: 14.6 G/DL (ref 13–17)
IMM GRANULOCYTES # BLD AUTO: <0.03 K/UL (ref 0–0.3)
IMM GRANULOCYTES NFR BLD: 0 %
LIPASE SERPL-CCNC: 136 U/L (ref 13–60)
LYMPHOCYTES NFR BLD: 2.12 K/UL (ref 1.1–3.7)
LYMPHOCYTES RELATIVE PERCENT: 33 % (ref 24–43)
MCH RBC QN AUTO: 30.2 PG (ref 25.2–33.5)
MCHC RBC AUTO-ENTMCNC: 31 G/DL (ref 28.4–34.8)
MCV RBC AUTO: 97.5 FL (ref 82.6–102.9)
MONOCYTES NFR BLD: 0.81 K/UL (ref 0.1–1.2)
MONOCYTES NFR BLD: 12 % (ref 3–12)
NEUTROPHILS NFR BLD: 52 % (ref 36–65)
NEUTS SEG NFR BLD: 3.45 K/UL (ref 1.5–8.1)
NRBC BLD-RTO: 0 PER 100 WBC
PLATELET # BLD AUTO: 189 K/UL (ref 138–453)
PMV BLD AUTO: 11.5 FL (ref 8.1–13.5)
POTASSIUM SERPL-SCNC: 4.2 MMOL/L (ref 3.7–5.3)
PROT SERPL-MCNC: 7.5 G/DL (ref 6.6–8.7)
RBC # BLD AUTO: 4.83 M/UL (ref 4.21–5.77)
SODIUM SERPL-SCNC: 138 MMOL/L (ref 136–145)
TROPONIN I SERPL HS-MCNC: 25 NG/L (ref 0–22)
TROPONIN I SERPL HS-MCNC: 26 NG/L (ref 0–22)
WBC OTHER # BLD: 6.5 K/UL (ref 3.5–11.3)

## 2024-11-14 PROCEDURE — 80048 BASIC METABOLIC PNL TOTAL CA: CPT

## 2024-11-14 PROCEDURE — 84484 ASSAY OF TROPONIN QUANT: CPT

## 2024-11-14 PROCEDURE — 99285 EMERGENCY DEPT VISIT HI MDM: CPT

## 2024-11-14 PROCEDURE — 83690 ASSAY OF LIPASE: CPT

## 2024-11-14 PROCEDURE — 71046 X-RAY EXAM CHEST 2 VIEWS: CPT

## 2024-11-14 PROCEDURE — 96374 THER/PROPH/DIAG INJ IV PUSH: CPT

## 2024-11-14 PROCEDURE — 6370000000 HC RX 637 (ALT 250 FOR IP)

## 2024-11-14 PROCEDURE — 2580000003 HC RX 258

## 2024-11-14 PROCEDURE — 96375 TX/PRO/DX INJ NEW DRUG ADDON: CPT

## 2024-11-14 PROCEDURE — 85025 COMPLETE CBC W/AUTO DIFF WBC: CPT

## 2024-11-14 PROCEDURE — 2500000003 HC RX 250 WO HCPCS

## 2024-11-14 PROCEDURE — 6360000002 HC RX W HCPCS

## 2024-11-14 PROCEDURE — 80076 HEPATIC FUNCTION PANEL: CPT

## 2024-11-14 PROCEDURE — 93005 ELECTROCARDIOGRAM TRACING: CPT

## 2024-11-14 RX ORDER — CALCIUM CARBONATE 500 MG/1
1 TABLET, CHEWABLE ORAL DAILY
Qty: 30 TABLET | Refills: 0 | Status: SHIPPED | OUTPATIENT
Start: 2024-11-14 | End: 2024-12-14

## 2024-11-14 RX ORDER — MAGNESIUM HYDROXIDE/ALUMINUM HYDROXICE/SIMETHICONE 120; 1200; 1200 MG/30ML; MG/30ML; MG/30ML
30 SUSPENSION ORAL ONCE
Status: COMPLETED | OUTPATIENT
Start: 2024-11-14 | End: 2024-11-14

## 2024-11-14 RX ORDER — ONDANSETRON 2 MG/ML
4 INJECTION INTRAMUSCULAR; INTRAVENOUS ONCE
Status: COMPLETED | OUTPATIENT
Start: 2024-11-14 | End: 2024-11-14

## 2024-11-14 RX ORDER — ONDANSETRON 4 MG/1
4 TABLET, ORALLY DISINTEGRATING ORAL 3 TIMES DAILY PRN
Qty: 21 TABLET | Refills: 0 | Status: SHIPPED | OUTPATIENT
Start: 2024-11-14

## 2024-11-14 RX ORDER — FAMOTIDINE 20 MG/1
20 TABLET, FILM COATED ORAL 2 TIMES DAILY
Qty: 60 TABLET | Refills: 0 | Status: SHIPPED | OUTPATIENT
Start: 2024-11-14

## 2024-11-14 RX ADMIN — ONDANSETRON 4 MG: 2 INJECTION INTRAMUSCULAR; INTRAVENOUS at 16:18

## 2024-11-14 RX ADMIN — ALUMINUM HYDROXIDE, MAGNESIUM HYDROXIDE, AND SIMETHICONE 30 ML: 200; 200; 20 SUSPENSION ORAL at 16:18

## 2024-11-14 RX ADMIN — FAMOTIDINE 20 MG: 10 INJECTION, SOLUTION INTRAVENOUS at 16:18

## 2024-11-14 ASSESSMENT — ENCOUNTER SYMPTOMS
CONSTIPATION: 0
NAUSEA: 1
DIARRHEA: 0
ABDOMINAL PAIN: 0
SHORTNESS OF BREATH: 0
VOMITING: 1

## 2024-11-14 ASSESSMENT — PAIN SCALES - GENERAL: PAINLEVEL_OUTOF10: 6

## 2024-11-14 ASSESSMENT — PAIN - FUNCTIONAL ASSESSMENT: PAIN_FUNCTIONAL_ASSESSMENT: 0-10

## 2024-11-14 ASSESSMENT — PAIN DESCRIPTION - LOCATION: LOCATION: ABDOMEN

## 2024-11-14 ASSESSMENT — PAIN DESCRIPTION - PAIN TYPE: TYPE: ACUTE PAIN

## 2024-11-14 NOTE — TELEPHONE ENCOUNTER
Patient called and wanted to let you know he has been having a upset stomach for the past week every night between 9-10 pm he gets nauseous. He said he hasn't been able to eat like he normally does, its not a sharp pain just makes him feel sick, I advised him to do a E-vist he said he didn't want to do that and wanted to go to the ER but he wanted to let you know what was going on before he went to the Emergency room. He was having no pain at the time of the call.

## 2024-11-14 NOTE — ED NOTES
Labeled blood specimens sent to lab via tube system.    [x] Green/yellow  [x] Lavender   [] on ice   [x] Blue   [x] Green/black [] on ice  [x] Yellow  [] on ice  [] Red  [] Pink  [] Blood Cultures  [] x1 [] X2    [] Ped Green  [] on ice  [] Ped Lavender  [] on ice    [] Ped Yellow  [] on ice  [] Ped Red

## 2024-11-14 NOTE — TELEPHONE ENCOUNTER
Noted. Thank you!  Currently in the emergency room    Lab Results   Component Value Date    LIPASE 136 (H) 11/14/2024       Future Appointments   Date Time Provider Department Center   12/4/2024  3:00 PM Kike Jackson, VENTURA - NP [facility]

## 2024-11-14 NOTE — ED PROVIDER NOTES
[facility] ED  Emergency Department Encounter  Emergency Medicine Resident     Pt Name:Freddy Avila  MRN: [id number]  Birthdate 1960  Date of evaluation: 11/14/24  PCP:  Stacey Levi MD  Note Started: 4:12 PM EST      CHIEF COMPLAINT       Chief Complaint   Patient presents with    Abdominal Pain       HISTORY OF PRESENT ILLNESS  (Location/Symptom, Timing/Onset, Context/Setting, Quality, Duration, Modifying Factors, Severity.)      Freddy Avila is a 63 y.o. male with past medical history of diabetes, CHF, CKD not on dialysis, HTN who presents with generalized nausea and \"woozy\" feeling abdomen that has been ongoing intermittently for the last 3 weeks.  Patient reports that he had similar symptoms in June and was admitted at that time.  He is unsure of what he was diagnosed with but states that he was prescribed medications that helped until he ran out (Chart review shows that this was Zofran).  Patient denies having significant abdominal pain.  He denies any association with food.  He reports some episodes of nonbloody emesis.  States that the pain occurs most frequently when he is getting ready for sleep \"like clockwork.\"  He denies that the pain occurs when he is laying flat, states that he can be standing or sitting.  He denies constipation or diarrhea, states that his last bowel movement was yesterday and was normal for him.  Patient denies chest pain or shortness of breath.  No recent cold-like symptoms.  Did not take his oral antihypertensives today.    PAST MEDICAL / SURGICAL / SOCIAL / FAMILY HISTORY      has a past medical history of Acute heart failure (HCC), Anemia, Benign hypertension with CKD (chronic kidney disease) stage III (HCC), Benign hypertension with CKD (chronic kidney disease), stage II, Bilateral leg edema, CAD (coronary artery disease), Candidal balanitis, Cardiomyopathy (HCC), Chronic diastolic CHF (congestive heart failure) ([facility]), Chronic kidney

## 2024-11-14 NOTE — DISCHARGE INSTRUCTIONS
Call today or tomorrow to follow up with Stacey Levi MD  in 2-5 days.    Take your medication as indicated.  Do not drink any alcohol.  Avoid spicy foods and dairy products.  Avoid drinking carbonated beverages (especially any of the dark beverages like coke or pepsi).    Return to the Emergency Department for worsening of symptoms, excessive nausea or vomiting, throwing up blood, black stools, any other care or concern.

## 2024-11-14 NOTE — ED NOTES
Pt arrives alert and oriented x4 and ambulatory from triage  Pt complains of abdominal pain with nausea and vomiting x1 week   Pt reports he smokes weed on a daily basis and the medication he had last time he washere helped   Pt denies chest pain, SOB, or dizziness   RR even and unlabored.   NAD noted.   Will continue with plan of care.

## 2024-11-14 NOTE — ED PROVIDER NOTES
[facility] ED  eMERGENCY dEPARTMENT eNCOUnter   Attending Attestation     Pt Name: Freddy Avila  MRN: [id number]  Birthdate 1960  Date of evaluation: 11/14/24       Freddy Avila is a 63 y.o. male who presents with Abdominal Pain      6:18 PM EST      History: Patient has been abdominal pain.  Patient has no other complaints.  Patient said he gets this from time to time.  Patient does smoke marijuana he says this might be contributing.    Exam: Heart rate is low.  Lungs are clear to auscultation bilaterally.  Abdomen is soft, tender.  Patient is awake and alert and acting appropriately.      Will obtain abdominal pain workup, ACS screening, will discharge negative workup.    Patient heart rate is persistently low this is nothing out of the ordinary for him.    I performed a history and physical examination of the patient and discussed management with the resident. I reviewed the resident’s note and agree with the documented findings and plan of care. Any areas of disagreement are noted on the chart. I was personally present for the key portions of any procedures. I have documented in the chart those procedures where I was not present during the key portions. I have personally reviewed all images and agree with the resident's interpretation. I have reviewed the emergency nurses triage note. I agree with the chief complaint, past medical history, past surgical history, allergies, medications, social and family history as documented unless otherwise noted below. Documentation of the HPI, Physical Exam and Medical Decision Making performed by medical students or scribes is based on my personal performance of the HPI, PE and MDM. For Phys Assistant/ Nurse Practitioner cases/documentation I have had a face to face evaluation of this patient and have completed at least one if not all key elements of the E/M (history, physical exam, and MDM). Additional findings are as noted.    For APC cases I have

## 2024-11-15 ENCOUNTER — CARE COORDINATION (OUTPATIENT)
Dept: CARE COORDINATION | Age: 64
End: 2024-11-15

## 2024-11-15 ENCOUNTER — TELEPHONE (OUTPATIENT)
Dept: FAMILY MEDICINE CLINIC | Age: 64
End: 2024-11-15

## 2024-11-15 DIAGNOSIS — I12.9 BENIGN HYPERTENSION WITH CKD (CHRONIC KIDNEY DISEASE), STAGE II: ICD-10-CM

## 2024-11-15 DIAGNOSIS — I50.42 CHRONIC COMBINED SYSTOLIC AND DIASTOLIC CHF (CONGESTIVE HEART FAILURE) (HCC): Primary | ICD-10-CM

## 2024-11-15 DIAGNOSIS — N18.2 BENIGN HYPERTENSION WITH CKD (CHRONIC KIDNEY DISEASE), STAGE II: ICD-10-CM

## 2024-11-15 NOTE — CARE COORDINATION
Coordination  Confidence: 7/10  Anticipated Goal Completion Date: 12 July 2019         COMPLETED: Self Monitoring        Self-Monitored Blood Glucose - I will check my blood sugar Fasting blood sugar and Other: PRN  I will notify my provider of any trends of increasing or decreasing blood sugars over a 1 month period.  I will notify my provider if I have any blood sugar readings less than 70 more than 2 times a month.  Daily Weights - I will weight myself as directed - Daily and write down weights  I will notify my provider of any increase in weight by 3 or more pounds in 2 days OR 5 or more pounds in a week.    Patient Reported Weight No flowsheet data found.  Patient Reported Blood Glucose No flowsheet data found.    Barriers: lack of motivation and lack of importance  Plan for overcoming my barriers: Care coordination  Confidence: 7/10  Anticipated Goal Completion Date: 12 July 2019         Self Monitoring        Self-Monitored Blood Glucose - I will check my blood sugar Fasting blood sugar, blood sugars ac & HS, and random blood sugars  I will notify my provider of any trends of increasing or decreasing blood sugars over a 1 month period.  I will notify my provider if I have any blood sugar readings less than 70 more than 2 times a month.  Daily Weights - I will weight myself as directed - Daily and write down weights  I will notify my provider of any increase in weight by 3 or more pounds in 2 days OR 5 or more pounds in a week.  Blood Pressure - I will take my blood pressure as directed - Daily  I will notify my provider of any trends of increasing or decreasing blood pressures over a month period of time.  I will notify my provider of any changes in blood pressure associated with symptoms of dizziness, falls, passing out, headache, confusion/change in mental status.  Other Self-Monitoring - I will monitor my oxygen level and pulse rate - Daily    None Recently Recorded    Barriers: overwhelmed by complexity

## 2024-11-15 NOTE — TELEPHONE ENCOUNTER
Patient needs emergency room follow-up, or if any providers for pancreatitis, CHF, GERD, DM2  Future Appointments   Date Time Provider Department Center   12/4/2024  3:00 PM Kike Jackson, VENTURA - NP [facility]

## 2024-11-15 NOTE — PROGRESS NOTES
Noted. Thank you!    Future Appointments   Date Time Provider Department Center   12/4/2024  3:00 PM Kike Jackson, VENTURA - NP [facility]

## 2024-11-15 NOTE — TELEPHONE ENCOUNTER
[facility] ED Follow up Call    Reason for ED visit:  pancreatitis      11/15/2024           FU appts/Provider:    Future Appointments   Date Time Provider Department Center   12/4/2024  3:00 PM Kike Jackson, VENTURA - NP [facility]         VOICEMAIL DOCUMENTATION - ERASE IF NOT USED  Hi, this message is for Freddy.  This is kayaDr.[email or web address] Florentina office.  Just calling to see how you are doing after your recent visit to the Emergency Room.  Stacey Diggs wants to make sure you were able to fill any prescriptions and that you understand your discharge instructions.  Please return our call if you need to make a follow up appointment with your provider or have any further needs.   Our phone number is [phone].  Have a great day.

## 2024-11-15 NOTE — PROGRESS NOTES
Remote Patient Monitoring Treatment Plan    Received request from [facility]/Elaine Hughes RN   to order remote patient monitoring for in home monitoring of CHF; Condition managed by PCP.  HTN; Condition managed by PCP.  and order completed.     Patient will be monitoring blood pressure   pulse ox   weight  disease specific education modules .      Patient will engage in Remote Patient Monitoring each day to develop the skills necessary for self management.       RPM Care Team Responsibilities:   Alerts will be reviewed daily and addressed within 2-4 hours during operational hours (Monday -Friday 9 am-4 pm)  Alert response and intervention documented in patient medical record  Alert response escalated to PCP per protocol and documented in patient medical record  Patient monitored over approximately  days  Discharge from program based on self-management readiness    See care coordination encounters for additional details.

## 2024-11-16 LAB
EKG ATRIAL RATE: 44 BPM
EKG P AXIS: 49 DEGREES
EKG P-R INTERVAL: 204 MS
EKG Q-T INTERVAL: 442 MS
EKG QRS DURATION: 94 MS
EKG QTC CALCULATION (BAZETT): 377 MS
EKG R AXIS: -27 DEGREES
EKG T AXIS: 74 DEGREES
EKG VENTRICULAR RATE: 44 BPM

## 2024-11-16 PROCEDURE — 93010 ELECTROCARDIOGRAM REPORT: CPT | Performed by: INTERNAL MEDICINE

## 2024-11-18 ENCOUNTER — CARE COORDINATION (OUTPATIENT)
Dept: PRIMARY CARE CLINIC | Age: 64
End: 2024-11-18

## 2024-11-18 ENCOUNTER — CARE COORDINATION (OUTPATIENT)
Dept: CARE COORDINATION | Age: 64
End: 2024-11-18

## 2024-11-18 NOTE — CARE COORDINATION
Remote Patient Kit Ordering Note      Date/Time:  11/18/2024 9:15 AM      [facility]/family today to notify of RPM kit order; patient/family was available; discussed the following topics below and all questions answered.    [x] [facility]  [x] Patient will receive package over the next 1-3 business days. Someone 21 years or older must be present to sign for UPS delivery.  [] HRS will contact patient within 24 hours, an HRS  will call the patient directly: If the patient does not answer, HRS will follow up with the clinical team notifying them about the unsuccessful attempt to contact the patient. HRS will make three call attempts to the patient.Provide patient with [facility] Virtual install number is: [phone].  [x] The RPM Nurse will contact patient once equipment is active to welcome them to the program.                                                         [] Hours of RPM monitoring - Monday-Friday [id number]; encourage patient to get vitals entered by Noon each day to have the alert addressed same day.  [x][facility] mailed RPM Patient flyer to patient.                      ACM made aware the RPM kit has been ordered.

## 2024-11-19 NOTE — CARE COORDINATION
Ambulatory Care Coordination Note     2024 9:41 PM     Patient Current Location:  Home: [address] [place] [address]     ACM contacted the patient by telephone. Verified name and  with patient as identifiers.         ACM: Elaine Mcadams RN     Challenges to be reviewed by the provider   Additional needs identified to be addressed with provider No  none               Method of communication with provider: none.    Utilization: Patient has not had any utilization since our last call.     Care Summary Note: Called Freddy to discuss metoprolol and carvedilol. Explained that the cardiologist had discontinued the metoprolol back in , but the pharmacy never got the discontinuation notice. Explained that the carvedilol was ordered at that time. Explained that they are both in the same class of drugs. The carvedilol works better to bring the blood pressure down, and doesn't drop the heart rate as much. He verbalized understanding. He said he will finish out his 7 day pill box, then start the carvedilol.     Offered patient enrollment in the Remote Patient Monitoring (RPM) program for in-home monitoring: Yes, patient enrolled; current status is awaiting kit.     Assessments Completed:   No changes since last call    Medications Reviewed:   Patient denies any changes with medications and reports taking all medications as prescribed.    Advance Care Planning:   Not reviewed during this call     Care Planning:   Not completed during this call    PCP/Specialist follow up:   Future Appointments         Provider Specialty Dept Phone    2024 3:00 PM Kike Jackson APRN - NP Cardiology [phone]            Follow Up:   Plan for next AC outreach in approximately 1 week to complete:  - CC Protocol assessments  - disease specific assessments  - SDOH assessments  - goal progression  - RPM.   Patient  is agreeable to this plan.

## 2024-12-02 ENCOUNTER — CARE COORDINATION (OUTPATIENT)
Dept: CASE MANAGEMENT | Age: 64
End: 2024-12-02

## 2024-12-02 NOTE — CARE COORDINATION
Date/Time:  12/2/2024 2:25 PM  LPN attempted to reach patient by telephone regarding  no metrics in 7 days   in remote patient monitoring program. Left HIPAA compliant message requesting a return call. Also LM  with EC  ACM NOTIFIED Will attempt to reach patient again.

## 2024-12-03 ENCOUNTER — CARE COORDINATION (OUTPATIENT)
Dept: CARE COORDINATION | Age: 64
End: 2024-12-03

## 2024-12-03 NOTE — CARE COORDINATION
Remote Patient Monitoring Welcome Note    Date/Time:  12/3/2024 2:25 PM    Patient Current Location: Home: [address] [place] [address]    Verified patients name and  as identifiers.  Completed and confirmed the following:   Emergency Contact:  Mae Avila, gayatri- [phone]     [x] Patient received all RPM equipment (tablet, scale, blood pressure device and cuff, and pulse oximeter)  Cuff Size: large (13.8\"-19.68\")    Weight Scale:  regular (<330lbs)                    [x] Instructed patient keep box for use when returning equipment                                                          [x] Reviewed Patient Welcome Letter with patient                         [x] Reviewed expectations for patient and care team  Monitoring hours M-F 9-4pm  Completing monitoring by 12pm on  so that alerts can be responded to in the same day  Patient weighs self at same time every day (or after urinating and waking up)  Take blood pressure 1-2 hrs after medications   RPM team may have different phone area code (including [place], [place], [place] or [place])                              [x] Instructed patient to keep scale on flat surface                                                         [x] Instructed patient to keep tablet plugged in at all times                         [x] Instructed how to contact IT support ([phone])  [x] Provided Remote Patient Monitoring care  information                 All questions answered at this time.     Mile Dewitt LPN  [facility]/ CTN/ Remote Patient monitoring  [phone]

## 2024-12-04 NOTE — CARE COORDINATION
Ambulatory Care Coordination Note     12/3/2024 10:33 PM     Patient Current Location:  Home: [address] [place] [address]     ACM contacted the patient by telephone. Verified name and  with patient as identifiers.         ACM: Elaine Mcadams RN     Challenges to be reviewed by the provider   Additional needs identified to be addressed with provider No  none               Method of communication with provider: none.    Utilization: Patient has not had any utilization since our last call.     Care Summary Note: spoke to Freddy. He hasn't checked in with RPM in a few days. He said that was because he didn't know how to use it. Talked him through it. He was able to turn the tablet on and checked his weight. There was a big discrepancy from the last weight. Asked if he had moved the scale and he had moved it to his bedroom which is carpeted. Suggested he put it back in the bathroom on the hard floor. His weight was more accurate at 241, although it was up about 9.5 lbs in the last week. He stated \"that's because of all that good Thanksgiving food\". He denies any symptoms. He hadn't taken his morning meds yet. Encouraged him to take his meds, then check his BP in a hour. Reviewed the carvedilol again, making sure he was taking it and not the metoprolol. He said he had the correct medication in his pill box.   Reminded him of his cardiology appt tomorrow, and the time and location.   No other concerns.     Offered patient enrollment in the Remote Patient Monitoring (RPM) program for in-home monitoring: Yes, patient enrolled; current status is activated and monitoring.     Assessments Completed:   Diabetes Assessment    Medic Alert ID: No  Meal Planning: Avoidance of concentrated sweets, Plate Method   How often do you test your blood sugar?: Daily, Bedtime   Do you have barriers with adherence to non-pharmacologic self-management interventions? (Nutrition/Exercise/Self-Monitoring): No   Have you ever had to go

## 2024-12-06 VITALS
WEIGHT: 236.5 LBS | SYSTOLIC BLOOD PRESSURE: 154 MMHG | DIASTOLIC BLOOD PRESSURE: 84 MMHG | OXYGEN SATURATION: 97 % | HEART RATE: 62 BPM | BODY MASS INDEX: 31.2 KG/M2

## 2025-06-03 ENCOUNTER — APPOINTMENT (OUTPATIENT)
Dept: URBAN - METROPOLITAN AREA CLINIC 338 | Facility: CLINIC | Age: 65
Setting detail: DERMATOLOGY
End: 2025-06-03

## 2025-06-03 DIAGNOSIS — L57.0 ACTINIC KERATOSIS: ICD-10-CM

## 2025-06-03 DIAGNOSIS — D18.0 HEMANGIOMA: ICD-10-CM

## 2025-06-03 DIAGNOSIS — L81.4 OTHER MELANIN HYPERPIGMENTATION: ICD-10-CM

## 2025-06-03 DIAGNOSIS — L81.5 LEUKODERMA, NOT ELSEWHERE CLASSIFIED: ICD-10-CM

## 2025-06-03 DIAGNOSIS — L82.0 INFLAMED SEBORRHEIC KERATOSIS: ICD-10-CM

## 2025-06-03 PROBLEM — D18.01 HEMANGIOMA OF SKIN AND SUBCUTANEOUS TISSUE: Status: ACTIVE | Noted: 2025-06-03

## 2025-06-03 PROCEDURE — ? COUNSELING

## 2025-06-03 PROCEDURE — ? LIQUID NITROGEN

## 2025-06-03 ASSESSMENT — LOCATION DETAILED DESCRIPTION DERM
LOCATION DETAILED: LEFT PROXIMAL DORSAL FOREARM
LOCATION DETAILED: RIGHT LATERAL INFERIOR EYELID
LOCATION DETAILED: RIGHT INFERIOR FOREHEAD
LOCATION DETAILED: LEFT DISTAL DORSAL FOREARM
LOCATION DETAILED: LEFT VENTRAL LATERAL PROXIMAL FOREARM

## 2025-06-03 ASSESSMENT — LOCATION SIMPLE DESCRIPTION DERM
LOCATION SIMPLE: LEFT FOREARM
LOCATION SIMPLE: RIGHT INFERIOR EYELID
LOCATION SIMPLE: RIGHT FOREHEAD

## 2025-06-03 ASSESSMENT — LOCATION ZONE DERM
LOCATION ZONE: EYELID
LOCATION ZONE: ARM
LOCATION ZONE: FACE

## 2025-06-03 NOTE — PROCEDURE: LIQUID NITROGEN
Include Z78.9 (Other Specified Conditions Influencing Health Status) As An Associated Diagnosis?: No
Consent: The patient's consent was obtained including but not limited to risks of crusting, scabbing, blistering, scarring, darker or lighter pigmentary change, recurrence, incomplete removal and infection.
Number Of Freeze-Thaw Cycles: 3 freeze-thaw cycles
Detail Level: Detailed
Show Spray Paint Technique Variable?: Yes
Post-Care Instructions: I reviewed with the patient in detail post-care instructions. Patient is to wear sunprotection, and avoid picking at any of the treated lesions. Pt may apply Vaseline to crusted or scabbing areas.
Medical Necessity Clause: This procedure was medically necessary because the lesions that were treated were:
Spray Paint Text: The liquid nitrogen was applied to the skin utilizing a spray paint frosting technique.
Medical Necessity Information: It is in your best interest to select a reason for this procedure from the list below. All of these items fulfill various CMS LCD requirements except the new and changing color options.
Duration Of Freeze Thaw-Cycle (Seconds): 1
Number Of Freeze-Thaw Cycles: 1 freeze-thaw cycle
Detail Level: Zone

## (undated) DEVICE — TRAP SURG QUAD PARABOLA SLOT DSGN SFTY SCRN TRAPEASE

## (undated) DEVICE — ADAPTER TBNG LUER STUB 15 GA INTMED

## (undated) DEVICE — MEDICINE CUP, GRADUATED, STER: Brand: MEDLINE

## (undated) DEVICE — CO2 CANNULA,SUPERSOFT, ADLT,7'O2,7'CO2: Brand: MEDLINE

## (undated) DEVICE — ELECTRODE PT RET AD L9FT HI MOIST COND ADH HYDRGEL CORDED

## (undated) DEVICE — GOWN,AURORA,NONREINFORCED,LARGE: Brand: MEDLINE

## (undated) DEVICE — SYRINGE MED 50ML LUERLOCK TIP

## (undated) DEVICE — Device: Brand: SPOT ENDOSOPIC MARKER

## (undated) DEVICE — Device: Brand: DISPOSABLE INJECTOR NM